# Patient Record
Sex: FEMALE | Race: BLACK OR AFRICAN AMERICAN | Employment: FULL TIME | URBAN - METROPOLITAN AREA
[De-identification: names, ages, dates, MRNs, and addresses within clinical notes are randomized per-mention and may not be internally consistent; named-entity substitution may affect disease eponyms.]

---

## 2019-08-01 ENCOUNTER — HOSPITAL ENCOUNTER (OUTPATIENT)
Age: 59
Discharge: HOME | End: 2019-08-03
Payer: MEDICAID

## 2019-08-01 ENCOUNTER — HOSPITAL ENCOUNTER (OUTPATIENT)
Dept: GENERAL RADIOLOGY | Age: 59
Discharge: HOME | End: 2019-08-03
Payer: MEDICAID

## 2019-08-01 DIAGNOSIS — M54.5 LOW BACK PAIN, UNSPECIFIED BACK PAIN LATERALITY, UNSPECIFIED CHRONICITY, WITH SCIATICA PRESENCE UNSPECIFIED: ICD-10-CM

## 2019-08-01 PROCEDURE — 72100 X-RAY EXAM L-S SPINE 2/3 VWS: CPT

## 2020-01-11 ENCOUNTER — APPOINTMENT (OUTPATIENT)
Dept: GENERAL RADIOLOGY | Age: 60
End: 2020-01-11
Payer: MEDICAID

## 2020-01-11 ENCOUNTER — HOSPITAL ENCOUNTER (EMERGENCY)
Age: 60
Discharge: HOME | End: 2020-01-11
Attending: EMERGENCY MEDICINE
Payer: MEDICAID

## 2020-01-11 VITALS
WEIGHT: 110 LBS | DIASTOLIC BLOOD PRESSURE: 74 MMHG | HEART RATE: 81 BPM | BODY MASS INDEX: 20.77 KG/M2 | RESPIRATION RATE: 19 BRPM | SYSTOLIC BLOOD PRESSURE: 124 MMHG | TEMPERATURE: 97.8 F | OXYGEN SATURATION: 99 % | HEIGHT: 61 IN

## 2020-01-11 LAB
ALBUMIN SERPL-MCNC: 3.6 G/DL (ref 3.5–5.2)
ALP BLD-CCNC: 87 U/L (ref 35–104)
ALT SERPL-CCNC: 48 U/L (ref 0–32)
ANION GAP SERPL CALCULATED.3IONS-SCNC: 11 MMOL/L (ref 7–16)
AST SERPL-CCNC: 90 U/L (ref 0–31)
BACTERIA: NORMAL /HPF
BASOPHILS ABSOLUTE: 0.05 E9/L (ref 0–0.2)
BASOPHILS RELATIVE PERCENT: 0.7 % (ref 0–2)
BILIRUB SERPL-MCNC: 0.9 MG/DL (ref 0–1.2)
BILIRUBIN DIRECT: 0.3 MG/DL (ref 0–0.3)
BILIRUBIN URINE: NEGATIVE
BILIRUBIN, INDIRECT: 0.6 MG/DL (ref 0–1)
BLOOD, URINE: NEGATIVE
BUN BLDV-MCNC: 10 MG/DL (ref 6–20)
CALCIUM SERPL-MCNC: 9.4 MG/DL (ref 8.6–10.2)
CHLORIDE BLD-SCNC: 81 MMOL/L (ref 98–107)
CLARITY: CLEAR
CO2: 29 MMOL/L (ref 22–29)
COLOR: YELLOW
CREAT SERPL-MCNC: 0.9 MG/DL (ref 0.5–1)
EOSINOPHILS ABSOLUTE: 0.08 E9/L (ref 0.05–0.5)
EOSINOPHILS RELATIVE PERCENT: 1.1 % (ref 0–6)
GFR AFRICAN AMERICAN: >60
GFR NON-AFRICAN AMERICAN: >60 ML/MIN/1.73
GLUCOSE BLD-MCNC: 107 MG/DL (ref 74–99)
GLUCOSE URINE: NEGATIVE MG/DL
HCT VFR BLD CALC: 35.9 % (ref 34–48)
HEMOGLOBIN: 12.8 G/DL (ref 11.5–15.5)
IMMATURE GRANULOCYTES #: 0.04 E9/L
IMMATURE GRANULOCYTES %: 0.6 % (ref 0–5)
KETONES, URINE: NEGATIVE MG/DL
LEUKOCYTE ESTERASE, URINE: ABNORMAL
LIPASE: 49 U/L (ref 13–60)
LYMPHOCYTES ABSOLUTE: 1.81 E9/L (ref 1.5–4)
LYMPHOCYTES RELATIVE PERCENT: 25.5 % (ref 20–42)
MAGNESIUM: 1.9 MG/DL (ref 1.6–2.6)
MCH RBC QN AUTO: 34 PG (ref 26–35)
MCHC RBC AUTO-ENTMCNC: 35.7 % (ref 32–34.5)
MCV RBC AUTO: 95.2 FL (ref 80–99.9)
MONOCYTES ABSOLUTE: 1.41 E9/L (ref 0.1–0.95)
MONOCYTES RELATIVE PERCENT: 19.9 % (ref 2–12)
NEUTROPHILS ABSOLUTE: 3.71 E9/L (ref 1.8–7.3)
NEUTROPHILS RELATIVE PERCENT: 52.2 % (ref 43–80)
NITRITE, URINE: NEGATIVE
PDW BLD-RTO: 15 FL (ref 11.5–15)
PH UA: 7 (ref 5–9)
PLATELET # BLD: 123 E9/L (ref 130–450)
PMV BLD AUTO: 10.2 FL (ref 7–12)
POTASSIUM REFLEX MAGNESIUM: 2.9 MMOL/L (ref 3.5–5)
PROTEIN UA: NEGATIVE MG/DL
RBC # BLD: 3.77 E12/L (ref 3.5–5.5)
RBC UA: NORMAL /HPF (ref 0–2)
SODIUM BLD-SCNC: 121 MMOL/L (ref 132–146)
SPECIFIC GRAVITY UA: <=1.005 (ref 1–1.03)
TOTAL PROTEIN: 7.5 G/DL (ref 6.4–8.3)
TROPONIN: <0.01 NG/ML (ref 0–0.03)
UROBILINOGEN, URINE: 0.2 E.U./DL
WBC # BLD: 7.1 E9/L (ref 4.5–11.5)
WBC UA: NORMAL /HPF (ref 0–5)

## 2020-01-11 PROCEDURE — 81001 URINALYSIS AUTO W/SCOPE: CPT

## 2020-01-11 PROCEDURE — 80076 HEPATIC FUNCTION PANEL: CPT

## 2020-01-11 PROCEDURE — 71045 X-RAY EXAM CHEST 1 VIEW: CPT

## 2020-01-11 PROCEDURE — 80048 BASIC METABOLIC PNL TOTAL CA: CPT

## 2020-01-11 PROCEDURE — 87088 URINE BACTERIA CULTURE: CPT

## 2020-01-11 PROCEDURE — 83690 ASSAY OF LIPASE: CPT

## 2020-01-11 PROCEDURE — 84484 ASSAY OF TROPONIN QUANT: CPT

## 2020-01-11 PROCEDURE — 83735 ASSAY OF MAGNESIUM: CPT

## 2020-01-11 PROCEDURE — 99284 EMERGENCY DEPT VISIT MOD MDM: CPT

## 2020-01-11 PROCEDURE — 96360 HYDRATION IV INFUSION INIT: CPT

## 2020-01-11 PROCEDURE — 85025 COMPLETE CBC W/AUTO DIFF WBC: CPT

## 2020-01-11 PROCEDURE — 2580000003 HC RX 258: Performed by: EMERGENCY MEDICINE

## 2020-01-11 PROCEDURE — 36415 COLL VENOUS BLD VENIPUNCTURE: CPT

## 2020-01-11 PROCEDURE — 6370000000 HC RX 637 (ALT 250 FOR IP): Performed by: EMERGENCY MEDICINE

## 2020-01-11 PROCEDURE — 93005 ELECTROCARDIOGRAM TRACING: CPT | Performed by: EMERGENCY MEDICINE

## 2020-01-11 RX ORDER — SODIUM CHLORIDE, SODIUM LACTATE, POTASSIUM CHLORIDE, CALCIUM CHLORIDE 600; 310; 30; 20 MG/100ML; MG/100ML; MG/100ML; MG/100ML
1000 INJECTION, SOLUTION INTRAVENOUS ONCE
Status: DISCONTINUED | OUTPATIENT
Start: 2020-01-11 | End: 2020-01-11

## 2020-01-11 RX ORDER — SODIUM CHLORIDE, SODIUM LACTATE, POTASSIUM CHLORIDE, CALCIUM CHLORIDE 600; 310; 30; 20 MG/100ML; MG/100ML; MG/100ML; MG/100ML
1000 INJECTION, SOLUTION INTRAVENOUS ONCE
Status: COMPLETED | OUTPATIENT
Start: 2020-01-11 | End: 2020-01-11

## 2020-01-11 RX ADMIN — POTASSIUM BICARBONATE 40 MEQ: 782 TABLET, EFFERVESCENT ORAL at 19:50

## 2020-01-11 RX ADMIN — SODIUM CHLORIDE, POTASSIUM CHLORIDE, SODIUM LACTATE AND CALCIUM CHLORIDE 1000 ML: 600; 310; 30; 20 INJECTION, SOLUTION INTRAVENOUS at 19:49

## 2020-01-11 RX ADMIN — SODIUM CHLORIDE, POTASSIUM CHLORIDE, SODIUM LACTATE AND CALCIUM CHLORIDE 1000 ML: 600; 310; 30; 20 INJECTION, SOLUTION INTRAVENOUS at 19:13

## 2020-01-11 NOTE — ED PROVIDER NOTES
HPI:  1/11/20, Time: 6:02 PM         Giovanna Henao is a 61 y.o. female presenting to the ED for dizziness and nausea/vomiting/diarrhea, beginning 3 days ago. The complaint has been intermittent, mild in severity, and worsened by nothing. Patient states that she has had 3 days of intermittent nausea with vomiting, ongoing diarrhea. Patient states this has happened in the past and her potassium has decreased and causes her to become lightheaded. She states that today she is feeling lightheaded, describes a feeling as well of being off balance when she walks. Patient denies having any falls. She denies any headache. No fevers, denies any abdominal pain. Review of Systems:   Pertinent positives and negatives are stated within HPI, all other systems reviewed and are negative.          --------------------------------------------- PAST HISTORY ---------------------------------------------  Past Medical History:  has a past medical history of Thyroid disease. Past Surgical History:  has no past surgical history on file. Social History:  reports that she has been smoking. She does not have any smokeless tobacco history on file. Family History: family history is not on file. The patients home medications have been reviewed.     Allergies: Morphine    -------------------------------------------------- RESULTS -------------------------------------------------  All laboratory and radiology results have been personally reviewed by myself   LABS:  Results for orders placed or performed during the hospital encounter of 01/11/20   CBC Auto Differential   Result Value Ref Range    WBC 7.1 4.5 - 11.5 E9/L    RBC 3.77 3.50 - 5.50 E12/L    Hemoglobin 12.8 11.5 - 15.5 g/dL    Hematocrit 35.9 34.0 - 48.0 %    MCV 95.2 80.0 - 99.9 fL    MCH 34.0 26.0 - 35.0 pg    MCHC 35.7 (H) 32.0 - 34.5 %    RDW 15.0 11.5 - 15.0 fL    Platelets 548 (L) 900 - 450 E9/L    MPV 10.2 7.0 - 12.0 fL    Neutrophils % 52.2 43.0 - 80.0 % Immature Granulocytes % 0.6 0.0 - 5.0 %    Lymphocytes % 25.5 20.0 - 42.0 %    Monocytes % 19.9 (H) 2.0 - 12.0 %    Eosinophils % 1.1 0.0 - 6.0 %    Basophils % 0.7 0.0 - 2.0 %    Neutrophils Absolute 3.71 1.80 - 7.30 E9/L    Immature Granulocytes # 0.04 E9/L    Lymphocytes Absolute 1.81 1.50 - 4.00 E9/L    Monocytes Absolute 1.41 (H) 0.10 - 0.95 E9/L    Eosinophils Absolute 0.08 0.05 - 0.50 E9/L    Basophils Absolute 0.05 0.00 - 0.20 N4/Z   Basic Metabolic Panel w/ Reflex to MG   Result Value Ref Range    Sodium 121 (L) 132 - 146 mmol/L    Potassium reflex Magnesium 2.9 (L) 3.5 - 5.0 mmol/L    Chloride 81 (L) 98 - 107 mmol/L    CO2 29 22 - 29 mmol/L    Anion Gap 11 7 - 16 mmol/L    Glucose 107 (H) 74 - 99 mg/dL    BUN 10 6 - 20 mg/dL    CREATININE 0.9 0.5 - 1.0 mg/dL    GFR Non-African American >60 >=60 mL/min/1.73    GFR African American >60     Calcium 9.4 8.6 - 10.2 mg/dL   Troponin   Result Value Ref Range    Troponin <0.01 0.00 - 0.03 ng/mL   Lipase   Result Value Ref Range    Lipase 49 13 - 60 U/L   Hepatic Function Panel   Result Value Ref Range    Total Protein 7.5 6.4 - 8.3 g/dL    Alb 3.6 3.5 - 5.2 g/dL    Alkaline Phosphatase 87 35 - 104 U/L    ALT 48 (H) 0 - 32 U/L    AST 90 (H) 0 - 31 U/L    Total Bilirubin 0.9 0.0 - 1.2 mg/dL    Bilirubin, Direct 0.3 0.0 - 0.3 mg/dL    Bilirubin, Indirect 0.6 0.0 - 1.0 mg/dL   Urinalysis, reflex to microscopic   Result Value Ref Range    Color, UA Yellow Straw/Yellow    Clarity, UA Clear Clear    Glucose, Ur Negative Negative mg/dL    Bilirubin Urine Negative Negative    Ketones, Urine Negative Negative mg/dL    Specific Gravity, UA <=1.005 1.005 - 1.030    Blood, Urine Negative Negative    pH, UA 7.0 5.0 - 9.0    Protein, UA Negative Negative mg/dL    Urobilinogen, Urine 0.2 <2.0 E.U./dL    Nitrite, Urine Negative Negative    Leukocyte Esterase, Urine TRACE (A) Negative   Magnesium   Result Value Ref Range    Magnesium 1.9 1.6 - 2.6 mg/dL   Microscopic Urinalysis Result Value Ref Range    WBC, UA 1-3 0 - 5 /HPF    RBC, UA NONE 0 - 2 /HPF    Bacteria, UA NONE /HPF   EKG 12 Lead   Result Value Ref Range    Ventricular Rate 94 BPM    Atrial Rate 94 BPM    P-R Interval 140 ms    QRS Duration 78 ms    Q-T Interval 394 ms    QTc Calculation (Bazett) 492 ms    P Axis 75 degrees    R Axis 84 degrees    T Axis 74 degrees       RADIOLOGY:  Interpreted by Radiologist.  XR CHEST PORTABLE   Final Result   No acute cardiopulmonary process. EKG:  Per my interpretation sinus rhythm 94, normal axis, normal intervals, no ST-T changes. No findings suggestive of acute ischemia or injury      ------------------------- NURSING NOTES AND VITALS REVIEWED ---------------------------   The nursing notes within the ED encounter and vital signs as below have been reviewed. /74   Pulse 81   Temp 97.8 °F (36.6 °C)   Resp 19   Ht 5' 1\" (1.549 m)   Wt 110 lb (49.9 kg)   SpO2 99%   BMI 20.78 kg/m²   Oxygen Saturation Interpretation: Normal      ---------------------------------------------------PHYSICAL EXAM--------------------------------------      Constitutional/General: Alert and oriented x3, well appearing, non toxic in NAD  Head: Normocephalic and atraumatic  Eyes: PERRL, EOMI  Mouth: Oropharynx clear, handling secretions, no trismus  Neck: Supple, full ROM,   Pulmonary: Lungs clear to auscultation bilaterally, no wheezes, rales, or rhonchi. Not in respiratory distress  Cardiovascular:  Regular rate and rhythm, no murmurs, gallops, or rubs. 2+ distal pulses  Abdomen: Soft, non tender, non distended,   Extremities: Moves all extremities x 4.  Warm and well perfused  Skin: warm and dry without rash  Neurologic: GCS 15, no focal deficits  Psych: Normal Affect      ------------------------------ ED COURSE/MEDICAL DECISION MAKING----------------------  Medications   lactated ringers infusion 1,000 mL (0 mLs Intravenous Stopped 1/11/20 2035)   potassium bicarb-citric acid (EFFER-K) effervescent tablet 40 mEq (40 mEq Oral Given 20)         ED COURSE:       Medical Decision Makin-year-old female presenting with lightheadedness as well as nausea/vomiting/diarrhea. She has had low potassium in the past with volume losses causing the same symptoms. Concern for the same. She is hemodynamically stable and otherwise well-appearing, neurologically intact. EKG shows no signs of ischemia or injury. Metabolic panel does show a slight decrease in potassium at 2.9. No leukocytosis or anemia. Patient was given oral potassium as well as lactated Ringer's. Reevaluation has patient feeling significantly better. We ambulated through the department with no difficulties. Patient is comfortable with discharge home. She states that the diarrhea has decreased significantly today, likely that she will retain her potassium. But has agreed to follow-up with PCP, instruction to return for any changes in condition or new symptoms. Counseling: The emergency provider has spoken with the patient and discussed todays results, in addition to providing specific details for the plan of care and counseling regarding the diagnosis and prognosis. Questions are answered at this time and they are agreeable with the plan.      --------------------------------- IMPRESSION AND DISPOSITION ---------------------------------    IMPRESSION  1. Near syncope    2. Diarrhea, unspecified type    3. Hypokalemia        DISPOSITION  Disposition: Discharge to home  Patient condition is stable      NOTE: This report was transcribed using voice recognition software.  Every effort was made to ensure accuracy; however, inadvertent computerized transcription errors may be present        Ria Lo DO  [phone]

## 2020-01-13 LAB
EKG ATRIAL RATE: 94 BPM
EKG P AXIS: 75 DEGREES
EKG P-R INTERVAL: 140 MS
EKG Q-T INTERVAL: 394 MS
EKG QRS DURATION: 78 MS
EKG QTC CALCULATION (BAZETT): 492 MS
EKG R AXIS: 84 DEGREES
EKG T AXIS: 74 DEGREES
EKG VENTRICULAR RATE: 94 BPM
URINE CULTURE, ROUTINE: NORMAL

## 2020-01-13 PROCEDURE — 93010 ELECTROCARDIOGRAM REPORT: CPT | Performed by: INTERNAL MEDICINE

## 2020-09-11 ENCOUNTER — OFFICE VISIT (OUTPATIENT)
Dept: FAMILY MEDICINE CLINIC | Age: 60
End: 2020-09-11
Payer: MEDICAID

## 2020-09-11 VITALS
OXYGEN SATURATION: 94 % | HEIGHT: 61 IN | DIASTOLIC BLOOD PRESSURE: 82 MMHG | SYSTOLIC BLOOD PRESSURE: 123 MMHG | BODY MASS INDEX: 19.45 KG/M2 | HEART RATE: 97 BPM | TEMPERATURE: 98.5 F | WEIGHT: 103 LBS

## 2020-09-11 PROCEDURE — 99213 OFFICE O/P EST LOW 20 MIN: CPT | Performed by: STUDENT IN AN ORGANIZED HEALTH CARE EDUCATION/TRAINING PROGRAM

## 2020-09-11 PROCEDURE — G8427 DOCREV CUR MEDS BY ELIG CLIN: HCPCS | Performed by: STUDENT IN AN ORGANIZED HEALTH CARE EDUCATION/TRAINING PROGRAM

## 2020-09-11 PROCEDURE — 4004F PT TOBACCO SCREEN RCVD TLK: CPT | Performed by: STUDENT IN AN ORGANIZED HEALTH CARE EDUCATION/TRAINING PROGRAM

## 2020-09-11 PROCEDURE — 3017F COLORECTAL CA SCREEN DOC REV: CPT | Performed by: STUDENT IN AN ORGANIZED HEALTH CARE EDUCATION/TRAINING PROGRAM

## 2020-09-11 PROCEDURE — G8420 CALC BMI NORM PARAMETERS: HCPCS | Performed by: STUDENT IN AN ORGANIZED HEALTH CARE EDUCATION/TRAINING PROGRAM

## 2020-09-11 PROCEDURE — 99212 OFFICE O/P EST SF 10 MIN: CPT | Performed by: STUDENT IN AN ORGANIZED HEALTH CARE EDUCATION/TRAINING PROGRAM

## 2020-09-11 ASSESSMENT — PATIENT HEALTH QUESTIONNAIRE - PHQ9
SUM OF ALL RESPONSES TO PHQ9 QUESTIONS 1 & 2: 0
1. LITTLE INTEREST OR PLEASURE IN DOING THINGS: 0
SUM OF ALL RESPONSES TO PHQ QUESTIONS 1-9: 0
2. FEELING DOWN, DEPRESSED OR HOPELESS: 0
SUM OF ALL RESPONSES TO PHQ QUESTIONS 1-9: 0

## 2020-09-11 NOTE — PROGRESS NOTES
[facility]  FAMILY MEDICINE RESIDENCY PROGRAM  DATE OF VISIT : 2020    Patient : Palak Escudero   Age : 61 y.o.  : 1960   MRN : [id number]   ______________________________________________________________________    Chief Complaint :   Chief Complaint   Patient presents with    Leg Pain     Right leg    COPD     and asthma       HPI : Palak Escudero is 61 y.o. female who presented to the clinic today for R leg pain and for refill of albuterol for asthma/copd. Leg pain-Patient reports R anterior thigh pain that has been occurring a few times per week. She has had in in the past and it usually resolves on its own, but it is lasting longer this time. Patient also gets cramping in her calf muscles at night. She denies tenderness or swelling. She has a history of hypokalemia. She is not taking any medication for pain relief. Asthma/COPD-Patient reports using rescue inhaler about once per week for wheezing, as well as nasal spray. She denies chest pain, chest tightness, and SOB. Patient also has hyperthyroidism, for which she takes methimazole. She does not take it every day. She reports some weight loss and loss of appetite. She denies night sweats. She also denies depression and anxiety. Patient also takes omeprazole as needed for occasional reflux with specific foods. She is interested in an appetite stimulant. She is also due for some lab work. Past Medical History :  Past Medical History:   Diagnosis Date    Thyroid disease      No past surgical history on file. Allergies : Allergies   Allergen Reactions    Morphine        Medication List :    Current Outpatient Medications   Medication Sig Dispense Refill    albuterol sulfate HFA (VENTOLIN HFA) 108 (90 Base) MCG/ACT inhaler Inhale 2 puffs into the lungs 4 times daily as needed for Wheezing 3 Inhaler 1     No current facility-administered medications for this visit.          Review of Systems :  Review of Systems   Constitutional: Positive for appetite change (decreased appetite) and unexpected weight change (weight loss). Negative for diaphoresis. Respiratory: Positive for wheezing (occasional). Negative for chest tightness and shortness of breath. Cardiovascular: Negative for chest pain. Gastrointestinal:        Denies reflux   Psychiatric/Behavioral: Negative for dysphoric mood. The patient is not nervous/anxious. ______________________________________________________________________    Physical Exam :    Vitals: /82 (Site: Left Upper Arm, Position: Sitting, Cuff Size: Medium Adult)   Pulse 97   Temp 98.5 °F (36.9 °C) (Oral)   Ht 5' 1\" (1.549 m)   Wt 103 lb (46.7 kg)   SpO2 94%   BMI 19.46 kg/m²   General Appearance: Well developed, awake, alert, oriented, and in NAD  HEENT: NCAT, MMM, no pallor or icterus. Neck: Symmetrical, trachea midline. Chest wall/Lung: CTAB, respirations unlabored. No ronchi/wheezing/rales   Heart: RRR, normal S1 and S2, no murmurs, rubs or gallops. Abdomen: SNTND  Extremities: Extremities normal, atraumatic, no cyanosis, clubbing or edema. Skin: Skin color, texture, turgor normal, no rashes or lesions  Musculokeletal: ROM grossly normal in all joints of extremities, no obvious joint swelling. No tenderness to palpation of R thigh  Neurologic: Alert&Oriented x3. No focal motor deficits detected. Psychiatric: Normal mood. Normal affect. Normal behavior. ______________________________________________________________________    Assessment & Plan :    1. Mild intermittent asthma without complication  · Refill:  - albuterol sulfate HFA (VENTOLIN HFA) 108 (90 Base) MCG/ACT inhaler; Inhale 2 puffs into the lungs 4 times daily as needed for Wheezing  Dispense: 3 Inhaler;  Refill: 1    Patient left prior to discussing plan    Additional plan and future considerations:     -consider remeron as appetite stimulant  -counseled patient on taking methimazole daily  -patient due for labs, mammogram, and other care gaps    Return to Office: No follow-ups on file.     Regla Seymour, DO   Case discussed with Dr. Lauren Gupta

## 2020-09-11 NOTE — PROGRESS NOTES
S: 61 y.o. female here for establish care and R anterolateral thigh pain. Happens 3 times per week. Resolved on its own in the past. Can get severe. No redness/swelling. Not taking any med for this. Gets muscle cramps occasionally. Hypokalemia 2.9 in January 2/2 n/v/d.   pmh gerd, copd, asthma, hyperthyroidism. Takes albuterol once per wk. Omeprazole prn for gerd  Methimazole 5 mg but inconsistently   Wants to quit smoking, has patch at home. Weight loss. No f/c/abd pain or night sweats. Decreased appetite. O: VS: /82 (Site: Left Upper Arm, Position: Sitting, Cuff Size: Medium Adult)   Pulse 97   Temp 98.5 °F (36.9 °C) (Oral)   Ht 5' 1\" (1.549 m)   Wt 103 lb (46.7 kg)   SpO2 94%   BMI 19.46 kg/m²    General: NAD, alert and interacting appropriately. CV:  RRR, no gallops, rubs, or murmurs    Resp: CTAB   Abd:  Soft, nontender   Ext:  No edema. No ttp thigh. Impression: R anterior thigh pain 2/2 muscle spasm/cramp 2/2 dehydration vs radiculopathy vs OA of knee or hip. gerd, copd, asthma, hyperthyroidism. Plan:   Increase hydration. CTM leg pain  Refill albuterol  cscope utd. Needs mammo  S/p hysterectomy    Attending Physician Statement  I have discussed the case, including pertinent history and exam findings with the resident. I agree with the documented assessment and plan.

## 2020-09-13 RX ORDER — ALBUTEROL SULFATE 90 UG/1
2 AEROSOL, METERED RESPIRATORY (INHALATION) 4 TIMES DAILY PRN
Qty: 3 INHALER | Refills: 1 | Status: SHIPPED
Start: 2020-09-13 | End: 2021-03-04 | Stop reason: SDUPTHER

## 2020-09-14 ENCOUNTER — HOSPITAL ENCOUNTER (OUTPATIENT)
Age: 60
Discharge: HOME | End: 2020-09-16
Payer: MEDICAID

## 2020-09-14 ENCOUNTER — NURSE ONLY (OUTPATIENT)
Dept: FAMILY MEDICINE CLINIC | Age: 60
End: 2020-09-14
Payer: MEDICAID

## 2020-09-14 LAB
BASOPHILS ABSOLUTE: 0.04 E9/L (ref 0–0.2)
BASOPHILS RELATIVE PERCENT: 1 % (ref 0–2)
EOSINOPHILS ABSOLUTE: 0.07 E9/L (ref 0.05–0.5)
EOSINOPHILS RELATIVE PERCENT: 1.8 % (ref 0–6)
HCT VFR BLD CALC: 42.8 % (ref 34–48)
HEMOGLOBIN: 15 G/DL (ref 11.5–15.5)
IMMATURE GRANULOCYTES #: 0.01 E9/L
IMMATURE GRANULOCYTES %: 0.3 % (ref 0–5)
LYMPHOCYTES ABSOLUTE: 1.21 E9/L (ref 1.5–4)
LYMPHOCYTES RELATIVE PERCENT: 30.6 % (ref 20–42)
MCH RBC QN AUTO: 34.7 PG (ref 26–35)
MCHC RBC AUTO-ENTMCNC: 35 % (ref 32–34.5)
MCV RBC AUTO: 99.1 FL (ref 80–99.9)
MONOCYTES ABSOLUTE: 0.65 E9/L (ref 0.1–0.95)
MONOCYTES RELATIVE PERCENT: 16.4 % (ref 2–12)
NEUTROPHILS ABSOLUTE: 1.98 E9/L (ref 1.8–7.3)
NEUTROPHILS RELATIVE PERCENT: 49.9 % (ref 43–80)
PDW BLD-RTO: 16.8 FL (ref 11.5–15)
PLATELET # BLD: 196 E9/L (ref 130–450)
PMV BLD AUTO: 10.6 FL (ref 7–12)
RBC # BLD: 4.32 E12/L (ref 3.5–5.5)
WBC # BLD: 4 E9/L (ref 4.5–11.5)

## 2020-09-14 PROCEDURE — 80053 COMPREHEN METABOLIC PANEL: CPT

## 2020-09-14 PROCEDURE — 85025 COMPLETE CBC W/AUTO DIFF WBC: CPT

## 2020-09-14 PROCEDURE — 36415 COLL VENOUS BLD VENIPUNCTURE: CPT

## 2020-09-15 LAB
ALBUMIN SERPL-MCNC: 4.2 G/DL (ref 3.5–5.2)
ALP BLD-CCNC: 94 U/L (ref 35–104)
ALT SERPL-CCNC: 22 U/L (ref 0–32)
ANION GAP SERPL CALCULATED.3IONS-SCNC: 17 MMOL/L (ref 7–16)
AST SERPL-CCNC: 68 U/L (ref 0–31)
BILIRUB SERPL-MCNC: 0.6 MG/DL (ref 0–1.2)
BUN BLDV-MCNC: 4 MG/DL (ref 8–23)
CALCIUM SERPL-MCNC: 9.7 MG/DL (ref 8.6–10.2)
CHLORIDE BLD-SCNC: 97 MMOL/L (ref 98–107)
CO2: 21 MMOL/L (ref 22–29)
CREAT SERPL-MCNC: 0.8 MG/DL (ref 0.5–1)
GFR AFRICAN AMERICAN: >60
GFR NON-AFRICAN AMERICAN: >60 ML/MIN/1.73
GLUCOSE BLD-MCNC: 94 MG/DL (ref 74–99)
POTASSIUM SERPL-SCNC: 4.9 MMOL/L (ref 3.5–5)
SODIUM BLD-SCNC: 135 MMOL/L (ref 132–146)
TOTAL PROTEIN: 8 G/DL (ref 6.4–8.3)

## 2020-09-15 ASSESSMENT — ENCOUNTER SYMPTOMS
CHEST TIGHTNESS: 0
SHORTNESS OF BREATH: 0
WHEEZING: 1

## 2020-10-24 ENCOUNTER — HOSPITAL ENCOUNTER (OUTPATIENT)
Dept: GENERAL RADIOLOGY | Age: 60
Discharge: HOME | End: 2020-10-26
Payer: MEDICAID

## 2020-10-24 PROCEDURE — 77063 BREAST TOMOSYNTHESIS BI: CPT

## 2020-11-04 ENCOUNTER — TELEPHONE (OUTPATIENT)
Dept: ONCOLOGY | Age: 60
End: 2020-11-04

## 2020-11-04 NOTE — TELEPHONE ENCOUNTER
Call to patient in reference to her mammogram performed at [facility] on October 24, 2020. Instructed patient that the radiologist has recommended some additional breast imaging, in order to make a final determination/result. Verbalizes understanding and is agreeable to proceed. Appointment provided.        Edgar Ramirez, RN, BSN, [address]

## 2020-11-12 ENCOUNTER — HOSPITAL ENCOUNTER (OUTPATIENT)
Dept: GENERAL RADIOLOGY | Age: 60
Discharge: HOME | End: 2020-11-14
Payer: MEDICAID

## 2020-11-12 PROCEDURE — 76642 ULTRASOUND BREAST LIMITED: CPT

## 2020-11-12 PROCEDURE — G0279 TOMOSYNTHESIS, MAMMO: HCPCS

## 2021-03-04 ENCOUNTER — OFFICE VISIT (OUTPATIENT)
Dept: FAMILY MEDICINE CLINIC | Age: 61
End: 2021-03-04
Payer: MEDICAID

## 2021-03-04 VITALS
HEART RATE: 92 BPM | OXYGEN SATURATION: 95 % | TEMPERATURE: 97.6 F | WEIGHT: 111 LBS | HEIGHT: 62 IN | BODY MASS INDEX: 20.43 KG/M2 | SYSTOLIC BLOOD PRESSURE: 125 MMHG | DIASTOLIC BLOOD PRESSURE: 82 MMHG

## 2021-03-04 DIAGNOSIS — J45.20 MILD INTERMITTENT ASTHMA WITHOUT COMPLICATION: ICD-10-CM

## 2021-03-04 DIAGNOSIS — E05.90 HYPERTHYROIDISM: Primary | ICD-10-CM

## 2021-03-04 DIAGNOSIS — Z00.00 HEALTH CARE MAINTENANCE: ICD-10-CM

## 2021-03-04 DIAGNOSIS — M62.838 MUSCLE SPASMS OF BOTH LOWER EXTREMITIES: ICD-10-CM

## 2021-03-04 DIAGNOSIS — J44.9 CHRONIC OBSTRUCTIVE PULMONARY DISEASE, UNSPECIFIED COPD TYPE (HCC): ICD-10-CM

## 2021-03-04 PROCEDURE — 99212 OFFICE O/P EST SF 10 MIN: CPT | Performed by: STUDENT IN AN ORGANIZED HEALTH CARE EDUCATION/TRAINING PROGRAM

## 2021-03-04 PROCEDURE — G8926 SPIRO NO PERF OR DOC: HCPCS | Performed by: STUDENT IN AN ORGANIZED HEALTH CARE EDUCATION/TRAINING PROGRAM

## 2021-03-04 PROCEDURE — G8484 FLU IMMUNIZE NO ADMIN: HCPCS | Performed by: STUDENT IN AN ORGANIZED HEALTH CARE EDUCATION/TRAINING PROGRAM

## 2021-03-04 PROCEDURE — 99213 OFFICE O/P EST LOW 20 MIN: CPT | Performed by: STUDENT IN AN ORGANIZED HEALTH CARE EDUCATION/TRAINING PROGRAM

## 2021-03-04 PROCEDURE — 3017F COLORECTAL CA SCREEN DOC REV: CPT | Performed by: STUDENT IN AN ORGANIZED HEALTH CARE EDUCATION/TRAINING PROGRAM

## 2021-03-04 PROCEDURE — 4004F PT TOBACCO SCREEN RCVD TLK: CPT | Performed by: STUDENT IN AN ORGANIZED HEALTH CARE EDUCATION/TRAINING PROGRAM

## 2021-03-04 PROCEDURE — G8427 DOCREV CUR MEDS BY ELIG CLIN: HCPCS | Performed by: STUDENT IN AN ORGANIZED HEALTH CARE EDUCATION/TRAINING PROGRAM

## 2021-03-04 PROCEDURE — 3023F SPIROM DOC REV: CPT | Performed by: STUDENT IN AN ORGANIZED HEALTH CARE EDUCATION/TRAINING PROGRAM

## 2021-03-04 PROCEDURE — G8420 CALC BMI NORM PARAMETERS: HCPCS | Performed by: STUDENT IN AN ORGANIZED HEALTH CARE EDUCATION/TRAINING PROGRAM

## 2021-03-04 RX ORDER — ALBUTEROL SULFATE 90 UG/1
2 AEROSOL, METERED RESPIRATORY (INHALATION) 4 TIMES DAILY PRN
Qty: 3 INHALER | Refills: 1 | Status: SHIPPED
Start: 2021-03-04 | End: 2021-12-23 | Stop reason: SDUPTHER

## 2021-03-04 RX ORDER — CYCLOBENZAPRINE HCL 5 MG
5 TABLET ORAL NIGHTLY PRN
Qty: 30 TABLET | Refills: 1 | Status: SHIPPED
Start: 2021-03-04 | End: 2021-06-23 | Stop reason: SDUPTHER

## 2021-03-04 RX ORDER — TIOTROPIUM BROMIDE INHALATION SPRAY 1.56 UG/1
2 SPRAY, METERED RESPIRATORY (INHALATION) DAILY
Qty: 1 INHALER | Refills: 3 | Status: SHIPPED
Start: 2021-03-04 | End: 2021-08-11

## 2021-03-04 ASSESSMENT — PATIENT HEALTH QUESTIONNAIRE - PHQ9
SUM OF ALL RESPONSES TO PHQ QUESTIONS 1-9: 0
2. FEELING DOWN, DEPRESSED OR HOPELESS: 0
SUM OF ALL RESPONSES TO PHQ9 QUESTIONS 1 & 2: 0

## 2021-03-04 ASSESSMENT — ENCOUNTER SYMPTOMS
WHEEZING: 1
SHORTNESS OF BREATH: 1
COUGH: 0

## 2021-03-04 NOTE — PROGRESS NOTES
[facility]  FAMILY MEDICINE RESIDENCY PROGRAM  DATE OF VISIT : 3/7/2021    Patient : Martha Landeros   Age : 61 y.o.  : 1960   MRN : [id number]   ______________________________________________________________________    Chief Complaint :   Chief Complaint   Patient presents with    COPD     follow up    Back Pain     for the past seven months    Hyperthyroidism     follow up       HPI : Martha Landeros is 61 y.o. female who presented to the clinic today for follow up of COPD and hyperthyroidism, as well as for low back pain with leg pain. Patient reports her mask is bothering her, and she has been using her albuterol inhaler 3-4 times per day for the past 4 months. She denies SOB. Currently smokes 1/2 ppd, thinking about quitting but declines medications or nicotine replacement at this time. Patient is on methimazole 5 mg/day. She denies heat/cold intolerance, diarrhea, constipation. She also reports low back pain with leg pain, and reports the pain in her legs is more cramping in nature. This has been present for around 7 months. Patient reports she has had hallucinations when taking ibuprofen. Past Medical History :  Past Medical History:   Diagnosis Date    Thyroid disease      No past surgical history on file. Allergies : Allergies   Allergen Reactions    Ibuprofen Other (See Comments)     Hallucinations    Morphine        Medication List :    Current Outpatient Medications   Medication Sig Dispense Refill    albuterol sulfate HFA (VENTOLIN HFA) 108 (90 Base) MCG/ACT inhaler Inhale 2 puffs into the lungs 4 times daily as needed for Wheezing 3 Inhaler 1    tiotropium (SPIRIVA RESPIMAT) 1.25 MCG/ACT AERS inhaler Inhale 2 puffs into the lungs daily 1 Inhaler 3    cyclobenzaprine (FLEXERIL) 5 MG tablet Take 1 tablet by mouth nightly as needed for Muscle spasms 30 tablet 1     No current facility-administered medications for this visit.          Review of Systems :  Review of Systems   Respiratory: Positive for shortness of breath and wheezing (occasional). Negative for cough. Cardiovascular: Negative for chest pain. Gastrointestinal: Negative for constipation and diarrhea. Endocrine: Negative for cold intolerance and heat intolerance. Musculoskeletal: Positive for back pain. Reports leg cramps     ______________________________________________________________________    Physical Exam :    Vitals: /82 (Site: Left Upper Arm, Position: Sitting, Cuff Size: Medium Adult)   Pulse 92   Temp 97.6 °F (36.4 °C) (Temporal)   Ht 5' 2\" (1.575 m)   Wt 111 lb (50.3 kg)   SpO2 95%   BMI 20.30 kg/m²   General Appearance: Well developed, awake, alert, oriented, and in NAD  HEENT: NCAT, MMM, no pallor or icterus. Neck: Supple, symmetrical, trachea midline. No carotid bruits. No cervical lymphadenopathy  Chest wall/Lung: CTAB, respirations unlabored. No ronchi/wheezing/rales   Heart: RRR, normal S1 and S2, no murmurs, rubs or gallops. Abdomen: SNTND  Extremities: Extremities normal, atraumatic, no cyanosis, clubbing or edema. Skin: Skin color, texture, turgor normal, no rashes or lesions  Musculokeletal: ROM grossly normal in all joints of extremities, no obvious joint swelling. Neurologic: Alert&Oriented x3. No focal motor deficits detected. Psychiatric: Normal mood. Normal affect. Normal behavior. ______________________________________________________________________    Assessment & Plan :    1. Mild intermittent asthma without complication/Chronic obstructive pulmonary disease, unspecified COPD type ([address]  · Refill:  - albuterol sulfate HFA (VENTOLIN HFA) 108 (90 Base) MCG/ACT inhaler; Inhale 2 puffs into the lungs 4 times daily as needed for Wheezing  Dispense: 3 Inhaler; Refill: 1  · Start:  - tiotropium (SPIRIVA RESPIMAT) 1.25 MCG/ACT AERS inhaler; Inhale 2 puffs into the lungs daily  Dispense: 1 Inhaler; Refill: 3    2. Hyperthyroidism  - TSH; Future    3. Health care maintenance  - LIPID PANEL; Future  - BASIC METABOLIC PANEL; Future    4. Muscle spasms of both lower extremities  · Start:  - cyclobenzaprine (FLEXERIL) 5 MG tablet; Take 1 tablet by mouth nightly as needed for Muscle spasms  Dispense: 30 tablet; Refill: 1      Additional plan and future considerations:       Return to Office: Return in about 4 weeks (around 4/1/2021) for copd, muscle spasms.     Albaro Pedro DO   Case discussed with Dr. Griselda Ramos

## 2021-03-04 NOTE — PROGRESS NOTES
S: Ember Freeman 61 y.o. female  here for F/U COPD, hyperthyroidism  COPD: albuterol PRN. Masks for COVID bothering her breathing with increased albuterol use of 3-4 times/day x 4 months. Denies current SOB  Current smokes 1/2 ppd in pre contemplative cessation phase  Hyperthyoiridsm: Methimazole 5 mg/day; longstanding diagnosis. Currently denies S/S of thyroid dysfunction  ROS: low back pain with leg pain. Described as cramping. O: VS: /82 (Site: Left Upper Arm, Position: Sitting, Cuff Size: Medium Adult)   Pulse 92   Temp 97.6 °F (36.4 °C) (Temporal)   Ht 5' 2\" (1.575 m)   Wt 111 lb (50.3 kg)   SpO2 95%   BMI 20.30 kg/m²    General: NAD              Neck: thyroid exam unremarkable; no LA or carotid bruit   CV:  RRR, no gallops, rubs, or murmurs   Resp: CTAB no R/R/W   Abd:  Soft, nontender, no masses    Ext:  no C/C/E    Assessment / Plan:      Cresencio Oliva was seen today for copd, back pain and hyperthyroidism. Diagnoses and all orders for this visit:    1. Mild intermittent asthma without complication/Chronic obstructive pulmonary disease, unspecified COPD type (HCC)  · Refill:  - albuterol sulfate HFA (VENTOLIN HFA) 108 (90 Base) MCG/ACT inhaler; Inhale 2 puffs into the lungs 4 times daily as needed for Wheezing  Dispense: 3 Inhaler; Refill: 1  · Start:  - tiotropium (SPIRIVA RESPIMAT) 1.25 MCG/ACT AERS inhaler; Inhale 2 puffs into the lungs daily  Dispense: 1 Inhaler; Refill: 3     2. Hyperthyroidism  - TSH; Future     3. Health care maintenance  - LIPID PANEL; Future  - BASIC METABOLIC PANEL; Future     4. Muscle spasms of both lower extremities  · Start:  - cyclobenzaprine (FLEXERIL) 5 MG tablet; Take 1 tablet by mouth nightly as needed for Muscle spasms  Dispense: 30 tablet; Refill: 1        Additional plan and future considerations:        Return to Office: Return in about 4 weeks (around 4/1/2021) for copd, muscle spasms. COPD: known diagnosis.   Only has rescue inhaler with increasing use due to symptoms. Current smoker; not ready to quit. Initiate LAMA (Spiriva). Refill Albuterol  Hyperthyroidism: due for lab work  Leg Cramps: check lytes, BUN, Creatinine, GFR. Therapy based on lab outcome       Return in about 4 weeks (around 4/1/2021) for copd, muscle spasms. F/U 4 weeks to assess response of symptom to new medication    Attending Physician Statement  I have discussed the case, including pertinent history and exam findings with the resident. I agree with the documented assessment and plan.          Huyen Costello MD

## 2021-03-04 NOTE — PATIENT INSTRUCTIONS
Patient Education        Back Stretches: Exercises  Introduction  Here are some examples of exercises for stretching your back. Start each exercise slowly. Ease off the exercise if you start to have pain. Your doctor or physical therapist will tell you when you can start these exercises and which ones will work best for you. How to do the exercises  Overhead stretch   1. Stand comfortably with your feet shoulder-width apart. 2. Looking straight ahead, raise both arms over your head and reach toward the ceiling. Do not allow your head to tilt back. 3. Hold for 15 to 30 seconds, then lower your arms to your sides. 4. Repeat 2 to 4 times. Side stretch   1. Stand comfortably with your feet shoulder-width apart. 2. Raise one arm over your head, and then lean to the other side. 3. Slide your hand down your leg as you let the weight of your arm gently stretch your side muscles. Hold for 15 to 30 seconds. 4. Repeat 2 to 4 times on each side. Press-up   1. Lie on your stomach, supporting your body with your forearms. 2. Press your elbows down into the floor to raise your upper back. As you do this, relax your stomach muscles and allow your back to arch without using your back muscles. As your press up, do not let your hips or pelvis come off the floor. 3. Hold for 15 to 30 seconds, then relax. 4. Repeat 2 to 4 times. Relax and rest   1. Lie on your back with a rolled towel under your neck and a pillow under your knees. Extend your arms comfortably to your sides. 2. Relax and breathe normally. 3. Remain in this position for about 10 minutes. 4. If you can, do this 2 or 3 times each day. Follow-up care is a key part of your treatment and safety. Be sure to make and go to all appointments, and call your doctor if you are having problems. It's also a good idea to know your test results and keep a list of the medicines you take. Where can you learn more? Go to https://agustín.[employer]. org and sign in to your [employer] account. Enter B000 in the [employer] box to learn more about \"Back Stretches: Exercises. \"     If you do not have an account, please click on the \"Sign Up Now\" link. Current as of: March 2, 2020               Content Version: 12.6  © 3063-8899 [email or web address]. Care instructions adapted under license by [facility] [place] ([facility]). If you have questions about a medical condition or this instruction, always ask your healthcare professional. [address] any warranty or liability for your use of this information.

## 2021-03-07 ASSESSMENT — ENCOUNTER SYMPTOMS
BACK PAIN: 1
CONSTIPATION: 0
DIARRHEA: 0

## 2021-03-12 ENCOUNTER — IMMUNIZATION (OUTPATIENT)
Dept: PRIMARY CARE CLINIC | Age: 61
End: 2021-03-12
Payer: MEDICAID

## 2021-03-12 PROCEDURE — 91300 COVID-19, PFIZER VACCINE 30MCG/0.3ML DOSE: CPT | Performed by: PHYSICIAN ASSISTANT

## 2021-03-12 PROCEDURE — 0001A PR IMM ADMN SARSCOV2 30MCG/0.3ML DIL RECON 1ST DOSE: CPT | Performed by: PHYSICIAN ASSISTANT

## 2021-04-05 ENCOUNTER — IMMUNIZATION (OUTPATIENT)
Dept: PRIMARY CARE CLINIC | Age: 61
End: 2021-04-05
Payer: MEDICAID

## 2021-04-05 PROCEDURE — 0002A COVID-19, PFIZER VACCINE 30MCG/0.3ML DOSE: CPT | Performed by: NURSE PRACTITIONER

## 2021-04-05 PROCEDURE — 91300 COVID-19, PFIZER VACCINE 30MCG/0.3ML DOSE: CPT | Performed by: NURSE PRACTITIONER

## 2021-06-22 DIAGNOSIS — M62.838 MUSCLE SPASMS OF BOTH LOWER EXTREMITIES: ICD-10-CM

## 2021-06-22 RX ORDER — CYCLOBENZAPRINE HCL 5 MG
1 TABLET ORAL
COMMUNITY
Start: 2021-03-28 | End: 2021-06-23 | Stop reason: ALTCHOICE

## 2021-06-22 RX ORDER — CYCLOBENZAPRINE HCL 5 MG
5 TABLET ORAL
Status: CANCELLED | OUTPATIENT
Start: 2021-06-22

## 2021-06-22 NOTE — TELEPHONE ENCOUNTER
Last Appointment:  3/4/2021  Future Appointments   Date Time Provider Grover Vasquez   7/19/2021 10:20 AM Yogesh Shipley, DO Yaquelin MARQUES [facility]

## 2021-06-22 NOTE — TELEPHONE ENCOUNTER
Last Appointment:  3/4/2021  Future Appointments   Date Time Provider Grover Vasquez   7/19/2021 10:20 AM DO Robyn Canchola [facility]

## 2021-06-23 RX ORDER — OMEPRAZOLE 40 MG/1
40 CAPSULE, DELAYED RELEASE ORAL
Qty: 90 CAPSULE | Refills: 1 | Status: SHIPPED
Start: 2021-06-23 | End: 2022-01-10 | Stop reason: SDUPTHER

## 2021-06-23 RX ORDER — CYCLOBENZAPRINE HCL 5 MG
5 TABLET ORAL NIGHTLY PRN
Qty: 30 TABLET | Refills: 1 | Status: SHIPPED
Start: 2021-06-23 | End: 2021-09-27

## 2021-08-03 ENCOUNTER — OFFICE VISIT (OUTPATIENT)
Dept: FAMILY MEDICINE CLINIC | Age: 61
End: 2021-08-03
Payer: MEDICAID

## 2021-08-03 VITALS
WEIGHT: 105 LBS | HEART RATE: 105 BPM | DIASTOLIC BLOOD PRESSURE: 89 MMHG | BODY MASS INDEX: 19.32 KG/M2 | TEMPERATURE: 97.8 F | SYSTOLIC BLOOD PRESSURE: 120 MMHG | HEIGHT: 62 IN | OXYGEN SATURATION: 95 %

## 2021-08-03 DIAGNOSIS — J45.20 MILD INTERMITTENT ASTHMA WITHOUT COMPLICATION: ICD-10-CM

## 2021-08-03 DIAGNOSIS — Z87.891 PERSONAL HISTORY OF TOBACCO USE: Primary | ICD-10-CM

## 2021-08-03 DIAGNOSIS — Z00.00 HEALTH CARE MAINTENANCE: ICD-10-CM

## 2021-08-03 DIAGNOSIS — Z91.09 ENVIRONMENTAL ALLERGIES: ICD-10-CM

## 2021-08-03 DIAGNOSIS — E05.90 HYPERTHYROIDISM: ICD-10-CM

## 2021-08-03 PROCEDURE — G8420 CALC BMI NORM PARAMETERS: HCPCS | Performed by: STUDENT IN AN ORGANIZED HEALTH CARE EDUCATION/TRAINING PROGRAM

## 2021-08-03 PROCEDURE — G8427 DOCREV CUR MEDS BY ELIG CLIN: HCPCS | Performed by: STUDENT IN AN ORGANIZED HEALTH CARE EDUCATION/TRAINING PROGRAM

## 2021-08-03 PROCEDURE — 99213 OFFICE O/P EST LOW 20 MIN: CPT | Performed by: STUDENT IN AN ORGANIZED HEALTH CARE EDUCATION/TRAINING PROGRAM

## 2021-08-03 PROCEDURE — 4004F PT TOBACCO SCREEN RCVD TLK: CPT | Performed by: STUDENT IN AN ORGANIZED HEALTH CARE EDUCATION/TRAINING PROGRAM

## 2021-08-03 PROCEDURE — 3017F COLORECTAL CA SCREEN DOC REV: CPT | Performed by: STUDENT IN AN ORGANIZED HEALTH CARE EDUCATION/TRAINING PROGRAM

## 2021-08-03 PROCEDURE — 99212 OFFICE O/P EST SF 10 MIN: CPT | Performed by: STUDENT IN AN ORGANIZED HEALTH CARE EDUCATION/TRAINING PROGRAM

## 2021-08-03 PROCEDURE — G0296 VISIT TO DETERM LDCT ELIG: HCPCS | Performed by: FAMILY MEDICINE

## 2021-08-03 PROCEDURE — 36415 COLL VENOUS BLD VENIPUNCTURE: CPT | Performed by: STUDENT IN AN ORGANIZED HEALTH CARE EDUCATION/TRAINING PROGRAM

## 2021-08-03 RX ORDER — CETIRIZINE HYDROCHLORIDE 10 MG/1
10 TABLET ORAL DAILY
Qty: 30 TABLET | Refills: 5 | Status: SHIPPED
Start: 2021-08-03 | End: 2022-04-15 | Stop reason: SDUPTHER

## 2021-08-03 RX ORDER — GUAIFENESIN 600 MG/1
600 TABLET, EXTENDED RELEASE ORAL 2 TIMES DAILY
Qty: 30 TABLET | Refills: 0 | Status: SHIPPED | OUTPATIENT
Start: 2021-08-03 | End: 2021-08-18

## 2021-08-03 RX ORDER — MONTELUKAST SODIUM 10 MG/1
10 TABLET ORAL DAILY
Qty: 30 TABLET | Refills: 3 | Status: SHIPPED
Start: 2021-08-03 | End: 2021-12-23 | Stop reason: SDUPTHER

## 2021-08-03 SDOH — ECONOMIC STABILITY: FOOD INSECURITY: WITHIN THE PAST 12 MONTHS, THE FOOD YOU BOUGHT JUST DIDN'T LAST AND YOU DIDN'T HAVE MONEY TO GET MORE.: NEVER TRUE

## 2021-08-03 SDOH — ECONOMIC STABILITY: FOOD INSECURITY: WITHIN THE PAST 12 MONTHS, YOU WORRIED THAT YOUR FOOD WOULD RUN OUT BEFORE YOU GOT MONEY TO BUY MORE.: NEVER TRUE

## 2021-08-03 ASSESSMENT — SOCIAL DETERMINANTS OF HEALTH (SDOH): HOW HARD IS IT FOR YOU TO PAY FOR THE VERY BASICS LIKE FOOD, HOUSING, MEDICAL CARE, AND HEATING?: NOT HARD AT ALL

## 2021-08-03 ASSESSMENT — LIFESTYLE VARIABLES
HOW OFTEN DO YOU HAVE A DRINK CONTAINING ALCOHOL: 2-4 TIMES A MONTH
HOW MANY STANDARD DRINKS CONTAINING ALCOHOL DO YOU HAVE ON A TYPICAL DAY: 1 OR 2

## 2021-08-03 ASSESSMENT — ENCOUNTER SYMPTOMS: SORE THROAT: 1

## 2021-08-03 NOTE — PROGRESS NOTES
[facility] RESIDENCY PROGRAM  DATE OF VISIT : 8/3/2021    Patient : Jai Gibson   Age : 64 y.o.  : 1960   MRN : [id number]   ______________________________________________________________________    Chief Complaint :   Chief Complaint   Patient presents with    Allergic Reaction     seasonal allergy       HPI : Jai Gibson is 64 y.o. female who presented to the clinic today for worsening seasonal allergies for the past week. Tried OTC \"allergy\" medicine similar to benadryl as well as flonase, which has given some sinus pressure relief but has not relieved eye itchiness or sneezing. Also reports scratchy throat and clear sputum. Denies SOB, but does report wheezing in hot weather. Uses rescue inhaler 2-3 times per day for the past week. She was using albuterol once daily before that. She is currently smoking 1/4 ppd, decreased from 1/2 ppd, and is working to decrerase smoking. Also reports some leg cramps, but flexaril works to relieve it. She reports only drinking about 2 x 16 oz bottles of water. Up to date on pap and mammo from obgyn. Agreeable to low dose CT scan. Past Medical History :  Past Medical History:   Diagnosis Date    Thyroid disease      No past surgical history on file. Allergies :    Allergies   Allergen Reactions    Ibuprofen Other (See Comments)     Hallucinations    Morphine        Medication List :    Current Outpatient Medications   Medication Sig Dispense Refill    guaiFENesin (MUCINEX) 600 MG extended release tablet Take 1 tablet by mouth 2 times daily for 15 days 30 tablet 0    cetirizine (ZYRTEC) 10 MG tablet Take 1 tablet by mouth daily 30 tablet 5    montelukast (SINGULAIR) 10 MG tablet Take 1 tablet by mouth daily 30 tablet 3    omeprazole (PRILOSEC) 40 MG delayed release capsule Take 1 capsule by mouth every morning (before breakfast) 90 capsule 1    albuterol sulfate HFA (VENTOLIN HFA) 108 (90 Base) MCG/ACT inhaler Inhale 2 puffs into the lungs 4 times daily as needed for Wheezing 3 Inhaler 1    tiotropium (SPIRIVA RESPIMAT) 1.25 MCG/ACT AERS inhaler Inhale 2 puffs into the lungs daily 1 Inhaler 3     Current Facility-Administered Medications   Medication Dose Route Frequency Provider Last Rate Last Admin    lidocaine 1 % injection 2 mL  2 mL Intradermal Once Tristen Adorno MD            Review of Systems :  Review of Systems   Constitutional: Negative for chills and fever. HENT: Positive for sneezing and sore throat. Negative for ear pain and hearing loss. Eyes: Positive for itching. Negative for discharge. Respiratory: Positive for wheezing (with hot weather). Negative for shortness of breath. Cardiovascular: Negative for chest pain.     ______________________________________________________________________    Physical Exam :    Vitals: /89 (Site: Left Upper Arm, Position: Sitting, Cuff Size: Medium Adult)   Pulse 105   Temp 97.8 °F (36.6 °C) (Temporal)   Ht 5' 2\" (1.575 m)   Wt 105 lb (47.6 kg)   SpO2 95%   BMI 19.20 kg/m²   General Appearance: Well developed, awake, alert, oriented, and in NAD  HEENT: Some cerumen B/L, no impaction. TM normal B/L, pharynx with postnasal drainage, nares without exudate, sinuses nontender  Neck: Symmetrical, trachea midline. Chest wall/Lung: CTAB, respirations unlabored. No ronchi/wheezing/rales   Heart: RRR, normal S1 and S2, no murmurs, rubs or gallops. Abdomen: SNTND  Extremities: Extremities normal, atraumatic, no cyanosis, clubbing or  edema. Skin: Skin color, texture, turgor normal, no rashes or lesions  Musculokeletal: ROM grossly normal in all joints of extremities, no obvious joint swelling. Neurologic: Alert&Oriented x3. No focal motor deficits detected. Psychiatric: Normal mood. Normal affect. Normal behavior.    ______________________________________________________________________    Assessment & Plan :    Environmental allergies  · Start zyrtec for allergies, see if asthma symptoms improve with improved allergies  · Start zyrtec, continue flonase BID, mucinex to thin mucous, encourage hydration  - guaiFENesin (MUCINEX) 600 MG extended release tablet; Take 1 tablet by mouth 2 times daily for 15 days  Dispense: 30 tablet; Refill: 0    Personal history of tobacco use  - ME VISIT TO DISCUSS LUNG CA SCREEN W LDCT  - CT Lung Screen (Annual); Future    Mild intermittent asthma without complication  · Asthma not well controlled, add singuliar, continue as needed albuterol  · Add mucinex to thin mucous secretions  - montelukast (SINGULAIR) 10 MG tablet; Take 1 tablet by mouth daily  Dispense: 30 tablet; Refill: 3  - cetirizine (ZYRTEC) 10 MG tablet; Take 1 tablet by mouth daily  Dispense: 30 tablet; Refill: 5   WILL NEED PFTs IN NEAR FUTURE    Additional plan and future considerations:       Return to Office: No follow-ups on file. Chanel Chan DO   Case discussed with Dr. Hugo Douglas    Low Dose CT (LDCT) Lung Screening criteria met   Age 50-69   Pack year smoking >30   Still smoking or less than 15 year since quit   No sign or symptoms of lung cancer   > 11 months since last LDCT     Risks and benefits of lung cancer screening with LDCT scans discussed:    Significance of positive screen - False-positive LDCT results often occur. 95% of all positive results do not lead to a diagnosis of cancer. Usually further imaging can resolve most false-positive results; however, some patients may require invasive procedures. Over diagnosis risk - 10% to 12% of screen-detected lung cancer cases are over diagnosed--that is, the cancer would not have been detected in the patient's lifetime without the screening.     Need for follow up screens annually to continue lung cancer screening effectiveness     Risks associated with radiation from annual LDCT- Radiation exposure is about the same as for a mammogram, which is about 1/3 of the annual background radiation exposure from everyday life. Starting screening at age 54 is not likely to increase cancer risk from radiation exposure. Patients with comorbidities resulting in life expectancy of < 10 years, or that would preclude treatment of an abnormality identified on CT, should not be screened due to lack of benefit.     To obtain maximal benefit from this screening, smoking cessation and long-term abstinence from smoking is critical

## 2021-08-03 NOTE — PROGRESS NOTES
S: 64 y.o. female here for concerns about seasonal allergies for 1 week. Otc meds similar to benedryl and flonase have not really helped. Sneezing and eye burning and itching. Clear phlegm. Using albuterol 2-3 times per day in the past week for wheezing. She usually uses her albuterol 1x per day. Smoking 1/4 ppd and working to cut down. O: VS: /89 (Site: Left Upper Arm, Position: Sitting, Cuff Size: Medium Adult)   Pulse 105   Temp 97.8 °F (36.6 °C) (Temporal)   Ht 5' 2\" (1.575 m)   Wt 105 lb (47.6 kg)   SpO2 95%   BMI 19.20 kg/m²    General: NAD   CV:  RRR, no gallops, rubs, or murmurs   Resp: CTAB no R/R/W   Abd:  Soft, nontender, no masses    Ext:  no C/C/E   HEENT-tms nl some cerumen, pharynx with postnasal drainage, nares without exudate, sinuses nonttp; Impression/Plan:   1. Allergic rhinitis-zyrtec, humidifier, flonase, mucinex, antihistamine eye gtts  2. Hyperthyroidism-labs  3. Asthma-add singulair  Obtain ror for [facility] records. And has had a pap with her ob  Lung cancer screening. rto in 2 weeks    Attending Physician Statement  I have discussed the case, including pertinent history and exam findings with the resident. I agree with the documented assessment and plan.         Thaddeus Nolasco MD

## 2021-08-04 ENCOUNTER — OFFICE VISIT (OUTPATIENT)
Dept: FAMILY MEDICINE CLINIC | Age: 61
End: 2021-08-04
Payer: MEDICAID

## 2021-08-04 VITALS
HEART RATE: 107 BPM | BODY MASS INDEX: 20.01 KG/M2 | OXYGEN SATURATION: 98 % | WEIGHT: 106 LBS | DIASTOLIC BLOOD PRESSURE: 79 MMHG | HEIGHT: 61 IN | SYSTOLIC BLOOD PRESSURE: 124 MMHG | TEMPERATURE: 97.9 F

## 2021-08-04 DIAGNOSIS — M79.5 FOREIGN BODY (FB) IN SOFT TISSUE: Primary | ICD-10-CM

## 2021-08-04 LAB
ANION GAP SERPL CALCULATED.3IONS-SCNC: 16 MMOL/L (ref 7–16)
BUN BLDV-MCNC: 9 MG/DL (ref 6–23)
CALCIUM SERPL-MCNC: 9.3 MG/DL (ref 8.6–10.2)
CHLORIDE BLD-SCNC: 88 MMOL/L (ref 98–107)
CHOLESTEROL, TOTAL: 196 MG/DL (ref 0–199)
CO2: 31 MMOL/L (ref 22–29)
CREAT SERPL-MCNC: 0.9 MG/DL (ref 0.5–1)
GFR AFRICAN AMERICAN: >60
GFR NON-AFRICAN AMERICAN: >60 ML/MIN/1.73
GLUCOSE BLD-MCNC: 63 MG/DL (ref 74–99)
HDLC SERPL-MCNC: 70 MG/DL
LDL CHOLESTEROL CALCULATED: 100 MG/DL (ref 0–99)
POTASSIUM SERPL-SCNC: 4 MMOL/L (ref 3.5–5)
SODIUM BLD-SCNC: 135 MMOL/L (ref 132–146)
TRIGL SERPL-MCNC: 130 MG/DL (ref 0–149)
TSH SERPL DL<=0.05 MIU/L-ACNC: 0.66 UIU/ML (ref 0.27–4.2)
VLDLC SERPL CALC-MCNC: 26 MG/DL

## 2021-08-04 PROCEDURE — 90471 IMMUNIZATION ADMIN: CPT

## 2021-08-04 PROCEDURE — 99212 OFFICE O/P EST SF 10 MIN: CPT | Performed by: STUDENT IN AN ORGANIZED HEALTH CARE EDUCATION/TRAINING PROGRAM

## 2021-08-04 PROCEDURE — 90715 TDAP VACCINE 7 YRS/> IM: CPT

## 2021-08-04 PROCEDURE — 99213 OFFICE O/P EST LOW 20 MIN: CPT | Performed by: STUDENT IN AN ORGANIZED HEALTH CARE EDUCATION/TRAINING PROGRAM

## 2021-08-04 PROCEDURE — 6360000002 HC RX W HCPCS

## 2021-08-04 PROCEDURE — 3017F COLORECTAL CA SCREEN DOC REV: CPT | Performed by: STUDENT IN AN ORGANIZED HEALTH CARE EDUCATION/TRAINING PROGRAM

## 2021-08-04 PROCEDURE — 4004F PT TOBACCO SCREEN RCVD TLK: CPT | Performed by: STUDENT IN AN ORGANIZED HEALTH CARE EDUCATION/TRAINING PROGRAM

## 2021-08-04 PROCEDURE — G8427 DOCREV CUR MEDS BY ELIG CLIN: HCPCS | Performed by: STUDENT IN AN ORGANIZED HEALTH CARE EDUCATION/TRAINING PROGRAM

## 2021-08-04 PROCEDURE — G8420 CALC BMI NORM PARAMETERS: HCPCS | Performed by: STUDENT IN AN ORGANIZED HEALTH CARE EDUCATION/TRAINING PROGRAM

## 2021-08-04 RX ORDER — LIDOCAINE HYDROCHLORIDE 10 MG/ML
1 INJECTION, SOLUTION EPIDURAL; INFILTRATION; INTRACAUDAL; PERINEURAL ONCE
Qty: 1 ML | Refills: 0
Start: 2021-08-04 | End: 2021-08-05 | Stop reason: CLARIF

## 2021-08-04 ASSESSMENT — ENCOUNTER SYMPTOMS: SHORTNESS OF BREATH: 0

## 2021-08-04 NOTE — PROGRESS NOTES
Attended with Dr Delaney Cruz for removal of erica thorn from left small finger, volar aspect, distal phalanx. Betadine prep, lidocaine injection, sharp removal.  Tolerated well    Tetanus toxoid. Attending Physician Statement  I have discussed the case, including pertinent history and exam findings with the resident. I also have seen the patient and performed key portions of the examination. I agree with the documented assessment and plan.

## 2021-08-04 NOTE — PROGRESS NOTES
[address] [facility] RESIDENCY PROGRAM  DATE OF VISIT : 2021    Patient : Connie Willard   Age : 64 y.o.  : 1960   MRN : [id number]   ______________________________________________________________________    Chief Complaint :   Chief Complaint   Patient presents with    Finger Pain     left pinky finger has thorn in it needs removed       HPI : Connie Willard is 64 y.o. female who presented to the clinic today for Foreign body in soft tissue    Foreign Body in soft tissue  - has had thorn in left 5th digit for almost a valeriano  - pain has worsened. - patient had been tending to her garden when she injured herself with a thorn.   - reports unable to extract thorn from arm. - presents today for removal of thorn. Past Medical History :  Past Medical History:   Diagnosis Date    Thyroid disease      No past surgical history on file. Allergies : Allergies   Allergen Reactions    Ibuprofen Other (See Comments)     Hallucinations    Morphine        Medication List :    Current Outpatient Medications   Medication Sig Dispense Refill    guaiFENesin (MUCINEX) 600 MG extended release tablet Take 1 tablet by mouth 2 times daily for 15 days 30 tablet 0    cetirizine (ZYRTEC) 10 MG tablet Take 1 tablet by mouth daily 30 tablet 5    montelukast (SINGULAIR) 10 MG tablet Take 1 tablet by mouth daily 30 tablet 3    omeprazole (PRILOSEC) 40 MG delayed release capsule Take 1 capsule by mouth every morning (before breakfast) 90 capsule 1    albuterol sulfate HFA (VENTOLIN HFA) 108 (90 Base) MCG/ACT inhaler Inhale 2 puffs into the lungs 4 times daily as needed for Wheezing 3 Inhaler 1    tiotropium (SPIRIVA RESPIMAT) 1.25 MCG/ACT AERS inhaler Inhale 2 puffs into the lungs daily 1 Inhaler 3     No current facility-administered medications for this visit. Family History :    No family history on file. Surgical History :   No past surgical history on file.     Social History :   Social History     Tobacco Use    Smoking status: Current Every Day Smoker     Packs/day: 0.50     Years: 40.00     Pack years: 20.00     Start date: 9/11/1980    Smokeless tobacco: Never Used   Substance Use Topics    Alcohol use: Not on file     Comment: Denies    Drug use: Not on file     Comment: Denies       Review of Systems :  Review of Systems   Constitutional: Negative for chills and fever. Respiratory: Negative for shortness of breath. Cardiovascular: Negative for chest pain. Skin: Positive for wound. ______________________________________________________________________    Physical Exam :    Vitals: /79 (Site: Left Upper Arm, Position: Sitting, Cuff Size: Medium Adult)   Pulse 107   Temp 97.9 °F (36.6 °C) (Temporal)   Ht 5' 1\" (1.549 m)   Wt 106 lb (48.1 kg)   SpO2 98%   BMI 20.03 kg/m²   General Appearance: Well developed, awake, alert, oriented, and in NAD  Chest wall/Lung: CTAB, respirations unlabored. No ronchi/wheezing/rales   Heart[de-identified] RRR, normal S1 and S2, no murmurs, rubs or gallops. Abdomen: SNTND, +BSx4  Extremities: Extremities normal, atraumatic, no cyanosis, clubbing or edema. 5th digit of the left hand, non erthymatous, tender to touch, foreign body noted on adorno side. Skin: Skin color, texture, turgor normal, no rashes or lesions    Medical Decision:  Signs and symptoms consistent with a cutaneous abscess in a immunocompetent patient with no evidence of systemic toxicity. Plan is for incision and drainage and . Incision and Drainage Procedure Note:    Indication: Abscess    Procedure: The patient was positioned appropriately and the skin over the incision site was prepped with betadine and draped in a sterile fashion. Local anesthesia was 1% lidocaine without epi 0.2 cc was used.  After appropriate analgesia was achieved, an incision was then made over the apex of the lesion with return of purulent material. The area with then explored and foreign body

## 2021-08-05 RX ORDER — LIDOCAINE HYDROCHLORIDE 10 MG/ML
2 INJECTION, SOLUTION INFILTRATION; PERINEURAL ONCE
Status: SHIPPED | OUTPATIENT
Start: 2021-08-04

## 2021-08-05 ASSESSMENT — ENCOUNTER SYMPTOMS
EYE DISCHARGE: 0
EYE ITCHING: 1
WHEEZING: 1
SHORTNESS OF BREATH: 0

## 2021-08-10 DIAGNOSIS — Z87.891 PERSONAL HISTORY OF TOBACCO USE: ICD-10-CM

## 2021-08-10 DIAGNOSIS — J44.9 CHRONIC OBSTRUCTIVE PULMONARY DISEASE, UNSPECIFIED COPD TYPE (HCC): ICD-10-CM

## 2021-08-10 DIAGNOSIS — J45.20 MILD INTERMITTENT ASTHMA WITHOUT COMPLICATION: ICD-10-CM

## 2021-08-10 DIAGNOSIS — Z91.09 ENVIRONMENTAL ALLERGIES: ICD-10-CM

## 2021-08-10 RX ORDER — TIOTROPIUM BROMIDE INHALATION SPRAY 1.56 UG/1
2 SPRAY, METERED RESPIRATORY (INHALATION) DAILY
Qty: 4 G | Status: CANCELLED | OUTPATIENT
Start: 2021-08-10

## 2021-08-11 RX ORDER — TIOTROPIUM BROMIDE INHALATION SPRAY 1.56 UG/1
2 SPRAY, METERED RESPIRATORY (INHALATION) DAILY
Qty: 4 G | Refills: 3 | Status: SHIPPED
Start: 2021-08-11 | End: 2021-10-05 | Stop reason: ALTCHOICE

## 2021-08-17 ENCOUNTER — OFFICE VISIT (OUTPATIENT)
Dept: FAMILY MEDICINE CLINIC | Age: 61
End: 2021-08-17
Payer: MEDICAID

## 2021-08-17 VITALS
BODY MASS INDEX: 20.01 KG/M2 | SYSTOLIC BLOOD PRESSURE: 121 MMHG | HEIGHT: 61 IN | DIASTOLIC BLOOD PRESSURE: 74 MMHG | WEIGHT: 106 LBS | OXYGEN SATURATION: 95 % | HEART RATE: 106 BPM | TEMPERATURE: 99 F

## 2021-08-17 DIAGNOSIS — J45.20 MILD INTERMITTENT ASTHMA WITHOUT COMPLICATION: ICD-10-CM

## 2021-08-17 DIAGNOSIS — R00.0 TACHYCARDIA: ICD-10-CM

## 2021-08-17 DIAGNOSIS — J44.9 CHRONIC OBSTRUCTIVE PULMONARY DISEASE, UNSPECIFIED COPD TYPE (HCC): Primary | ICD-10-CM

## 2021-08-17 PROCEDURE — 3017F COLORECTAL CA SCREEN DOC REV: CPT | Performed by: STUDENT IN AN ORGANIZED HEALTH CARE EDUCATION/TRAINING PROGRAM

## 2021-08-17 PROCEDURE — 4004F PT TOBACCO SCREEN RCVD TLK: CPT | Performed by: STUDENT IN AN ORGANIZED HEALTH CARE EDUCATION/TRAINING PROGRAM

## 2021-08-17 PROCEDURE — G8420 CALC BMI NORM PARAMETERS: HCPCS | Performed by: STUDENT IN AN ORGANIZED HEALTH CARE EDUCATION/TRAINING PROGRAM

## 2021-08-17 PROCEDURE — 3023F SPIROM DOC REV: CPT | Performed by: STUDENT IN AN ORGANIZED HEALTH CARE EDUCATION/TRAINING PROGRAM

## 2021-08-17 PROCEDURE — 99213 OFFICE O/P EST LOW 20 MIN: CPT | Performed by: STUDENT IN AN ORGANIZED HEALTH CARE EDUCATION/TRAINING PROGRAM

## 2021-08-17 PROCEDURE — 99212 OFFICE O/P EST SF 10 MIN: CPT | Performed by: STUDENT IN AN ORGANIZED HEALTH CARE EDUCATION/TRAINING PROGRAM

## 2021-08-17 PROCEDURE — G8427 DOCREV CUR MEDS BY ELIG CLIN: HCPCS | Performed by: STUDENT IN AN ORGANIZED HEALTH CARE EDUCATION/TRAINING PROGRAM

## 2021-08-17 PROCEDURE — G8926 SPIRO NO PERF OR DOC: HCPCS | Performed by: STUDENT IN AN ORGANIZED HEALTH CARE EDUCATION/TRAINING PROGRAM

## 2021-08-17 RX ORDER — DEXAMETHASONE 4 MG/1
1 TABLET ORAL 2 TIMES DAILY
Qty: 1 INHALER | Refills: 3
Start: 2021-08-17 | End: 2022-04-15 | Stop reason: SDUPTHER

## 2021-08-17 ASSESSMENT — ENCOUNTER SYMPTOMS
WHEEZING: 0
SHORTNESS OF BREATH: 0

## 2021-08-17 NOTE — PROGRESS NOTES
Attending Physician Statement    S:   Chief Complaint   Patient presents with    2 Week Follow-Up      Patient is a 64year old female here for follow up of asthma and allergies. Was here a couple weeks ago was needing rescue inhaler 3-4 times a day. singulair was added and zyrtec . Now only using albuterol once a day. O: Blood pressure 121/74, pulse 106, temperature 99 °F (37.2 °C), temperature source Oral, height 5' 1\" (1.549 m), weight 106 lb (48.1 kg), SpO2 95 %. Exam:   Heart - RRR   Lungs - scattered insp and exp wheeze    Ext- no peripheral edema   A: asthma/copd   P:  Order PFTs. Add flovent 110mcg two puffs bid    Continue singulair and zyrtec    Continue spiriva    Follow-up as ordered    Attending Attestation   I have discussed the case, including pertinent history and exam findings with the resident. I agree with the documented assessment and plan.

## 2021-08-17 NOTE — PROGRESS NOTES
capsule 1    albuterol sulfate HFA (VENTOLIN HFA) 108 (90 Base) MCG/ACT inhaler Inhale 2 puffs into the lungs 4 times daily as needed for Wheezing 3 Inhaler 1     Current Facility-Administered Medications   Medication Dose Route Frequency Provider Last Rate Last Admin    lidocaine 1 % injection 2 mL  2 mL Intradermal Once Suzanne Martinez MD            Review of Systems :  Review of Systems   Constitutional: Negative for chills and fever. HENT: Negative for congestion and postnasal drip. Respiratory: Negative for shortness of breath and wheezing. Cardiovascular: Negative for chest pain. Neurological: Negative for dizziness and light-headedness. ______________________________________________________________________    Physical Exam :    Vitals: /74 (Site: Left Upper Arm, Position: Sitting, Cuff Size: Medium Adult)   Pulse 106   Temp 99 °F (37.2 °C) (Oral)   Ht 5' 1\" (1.549 m)   Wt 106 lb (48.1 kg)   SpO2 95%   BMI 20.03 kg/m²   General Appearance: Well developed, awake, alert, oriented, and in NAD  HEENT: NCAT, MMM, no pallor or icterus. Neck: Symmetrical, trachea midline. Chest wall/Lung: CTAB, respirations unlabored. Scattered inspiratory and expiratory wheezes. Heart: RRR, normal S1 and S2, no murmurs, rubs or gallops. Abdomen: SNTND  Extremities: Extremities normal, atraumatic, no cyanosis, clubbing or edema. Skin: Skin color, texture, turgor normal, no rashes or lesions  Musculokeletal: ROM grossly normal in all joints of extremities, no obvious joint swelling. Neurologic: Alert&Oriented x3. No focal motor deficits detected. Psychiatric: Normal mood. Normal affect. Normal behavior. ______________________________________________________________________    Assessment & Plan :    Chronic obstructive pulmonary disease, unspecified COPD type ([facility] 75.)  Mild intermittent asthma without complication  · Improved symptoms, but not well controlled. Add flovent.   · PFT to clarify asthma vs COPD with smoking  history  - FLOVENT  MCG/ACT inhaler; Inhale 1 puff into the lungs 2 times daily  Dispense: 1 Inhaler; Refill: 3  - Full PFT Study With Bronchodilator; Future    Tachycardia  - EKG 12 lead; Future      Additional plan and future considerations:       Return to Office: Return for Return for EKG for tachycardia.     Liz Marte DO   Case discussed with Dr. Hemant Castillo

## 2021-08-31 ENCOUNTER — TELEPHONE (OUTPATIENT)
Dept: CASE MANAGEMENT | Age: 61
End: 2021-08-31

## 2021-08-31 NOTE — TELEPHONE ENCOUNTER
I called the patient and she confirmed her CT lung screening at [facility] on 9/1/2021 at 6:30 pm.  I reminded the patient to arrive at 6:00 pm, enter through the main entrance, and register. Patient confirmed.             Electronically signed by Sia Colvin on 8/31/21 at 1:56 PM EDT

## 2021-09-16 ENCOUNTER — TELEPHONE (OUTPATIENT)
Dept: CASE MANAGEMENT | Age: 61
End: 2021-09-16

## 2021-09-16 NOTE — TELEPHONE ENCOUNTER
Patient called in and confirmed her CT lung screening at [facility] on 9/17/2021 at 8:00 am.  I reminded the patient to arrive at 7:30 am, enter through the main entrance, and register. Patient confirmed.           Electronically signed by Diallo Culver on 9/16/21 at 1:47 PM EDT

## 2021-09-16 NOTE — TELEPHONE ENCOUNTER
I called the patient and left a message reminding her of the CT lung screening at [facility] on 9/17/2021 at 8:00 am with an 7:30 am arrival.  If unable to keep this appt call the office at [phone] to get rescheduled.           Electronically signed by Elizabeth Henning on 9/16/21 at 12:13 PM EDT

## 2021-09-17 ENCOUNTER — HOSPITAL ENCOUNTER (OUTPATIENT)
Dept: CT IMAGING | Age: 61
Discharge: HOME | End: 2021-09-19
Payer: MEDICAID

## 2021-09-17 DIAGNOSIS — Z87.891 PERSONAL HISTORY OF TOBACCO USE: ICD-10-CM

## 2021-09-17 PROCEDURE — 71271 CT THORAX LUNG CANCER SCR C-: CPT

## 2021-09-20 ENCOUNTER — TELEPHONE (OUTPATIENT)
Dept: CASE MANAGEMENT | Age: 61
End: 2021-09-20

## 2021-09-20 NOTE — TELEPHONE ENCOUNTER
No call, encounter opened to process CT Lung Screening. CT Lung Screen: 9/17/2021    Impression   Pulmonary nodules measuring up to 3 mm in the right upper lobe.       LUNG RADS:   Per ACR Lung-RADS Version 1.1       Lung rads 2.  Continue annual low-dose CT screening of the chest.       RECOMMENDATIONS:   If you would like to register your patient with the [facility], please contact the Nurse Navigator at   [phone]. Pack years: 21    Social History     Tobacco Use  Smoking Status: Current Every Day Smoker    Start Date: 09/11/1980   Quit Date:    Types: Cigarettes   Packs/Day: 0.5   Years: 40   Pack Years: 20   Smokeless Tobacco: Never Used          Results letter sent to patient via my chart or mailed.      [address]

## 2021-09-24 ENCOUNTER — HOSPITAL ENCOUNTER (OUTPATIENT)
Dept: PULMONOLOGY | Age: 61
Discharge: HOME | End: 2021-09-24
Payer: MEDICAID

## 2021-09-24 DIAGNOSIS — J45.20 MILD INTERMITTENT ASTHMA WITHOUT COMPLICATION: ICD-10-CM

## 2021-09-24 DIAGNOSIS — J44.9 CHRONIC OBSTRUCTIVE PULMONARY DISEASE, UNSPECIFIED COPD TYPE (HCC): ICD-10-CM

## 2021-09-24 PROCEDURE — 94726 PLETHYSMOGRAPHY LUNG VOLUMES: CPT

## 2021-09-24 PROCEDURE — 94060 EVALUATION OF WHEEZING: CPT

## 2021-09-24 PROCEDURE — 94729 DIFFUSING CAPACITY: CPT

## 2021-09-26 NOTE — PROCEDURES
[address]                  [address] [facility]  [facility]                               PULMONARY FUNCTION    PATIENT NAME: Willis Diallo                      :        1960  MED REC NO:   [id number]                            ROOM:  ACCOUNT NO:   [de-identified]                           ADMIT DATE: 2021  PROVIDER:     Evan Durham DO    DATE OF PROCEDURE:  2021    PULMONARY FUNCTION TEST    INDICATION FOR PULMONARY FUNCTION TEST:  COPD, mild asthma without  complication. HEIGHT:  The patient's height is 61 inches. WEIGHT:  Weight is 112 pounds. SPIROMETRY:  FVC is 1.64 which is 68% of predicted. FEV1 is 0.95 which  is 49% of predicted. MVV is 43 which is 50% of predicted. With  bronchodilator, there is only a 6% change in FEV1; however in the small  airways, there was a change of 33%. LUNG VOLUMES:  FVC is 1.61 which is 66% of predicted. RV is 5.55 which  is 298% of predicated. TLC is 7.16 which is 165% of predicted. DIFFUSION:  Diffusion is 3.03 which is 14% of predicted. Diffusion  corrected for alveolar ventilation is 2.21 which is 50% of predicted. The flow volume loop is consistent with significant looping. COMMENTS:  The patient had a difficult time exhaling on FVC, attempted  several times, the best result was safe. DLCO was also attempted three  times and the best result was safe. INTERPRETATION:  Spirometry is consistent with severe obstruction with a  minimal bronchodilator response. MVV is decreased, which may be  consistent with decreased physiological reserve. Lung volumes are  consistent with significant air trapping, diffusion moderately reduced. Please clinically correlate.         Gomez Zhu DO    D: 2021 14:57:54       T: 2021 21:37:45     [email or web address]  Job#: [id number]     Doc#: [id number]    CC:

## 2021-09-27 DIAGNOSIS — M62.838 MUSCLE SPASMS OF BOTH LOWER EXTREMITIES: ICD-10-CM

## 2021-09-27 RX ORDER — CYCLOBENZAPRINE HCL 5 MG
TABLET ORAL
Qty: 30 TABLET | Refills: 1 | Status: SHIPPED
Start: 2021-09-27 | End: 2021-12-07

## 2021-10-01 ENCOUNTER — TELEPHONE (OUTPATIENT)
Dept: FAMILY MEDICINE CLINIC | Age: 61
End: 2021-10-01

## 2021-10-01 NOTE — TELEPHONE ENCOUNTER
----- Message from Grace Almanzar sent at 10/1/2021 10:05 AM EDT -----  Subject: Refill Request    QUESTIONS  Name of Medication? cyclobenzaprine (FLEXERIL) 5 MG tablet  Patient-reported dosage and instructions? 5 mg once a day  How many days do you have left? 0  [facility]? [id number]  Pharmacy phone number (if available)? [phone]  ---------------------------------------------------------------------------  --------------  CALL BACK INFO  What is the best way for the office to contact you? OK to leave message on   voicemail  Preferred Call Back Phone Number?  [phone]

## 2021-10-04 ENCOUNTER — TELEPHONE (OUTPATIENT)
Dept: FAMILY MEDICINE CLINIC | Age: 61
End: 2021-10-04

## 2021-10-04 DIAGNOSIS — J43.8 OTHER EMPHYSEMA (HCC): ICD-10-CM

## 2021-10-04 NOTE — TELEPHONE ENCOUNTER
PFTs on 9/24/21 showed severe obstruction with minimal bronchodilator response. Patient currently on:    Flovent 110 mcg/act inhaler 1 puff BID  Spiriva Respimat 1.25 mcg/act inhaler 2 puffs QD  Singulair 10 mg daily  Albuterol sulfate 108 mcg/act inhaler 2 puffs 4 times daily prn wheezing. Low dose CT showed 3 nodules <5 mm with repeat CT in one year recommended. Mild emphysematous changes noted. Update problem list 'asthma' to COPD/emphysema. Group B by GOLD criteria, more symptomatic with low risk for exacerbation. Recommendation of LABA or LAMA plus ABDULKADIR prn. Attempted call to patient to discuss results. Was not able to leave message for return call. Will call again to discuss switching from flovent to advair unless currently well controlled. Stop singulair.

## 2021-10-04 NOTE — TELEPHONE ENCOUNTER
----- Message from Antoine Sohail sent at 10/4/2021  9:31 AM EDT -----  Subject: Refill Request    QUESTIONS  Name of Medication? cyclobenzaprine (FLEXERIL) 5 MG tablet  Patient-reported dosage and instructions? 2 pills once a day; with meal,   for breakfast and for dinner  How many days do you have left? 0  Preferred Pharmacy? [id number]  Pharmacy phone number (if available)? [phone]  Additional Information for Provider? Patient is looking for immediate   refill of prescription; stated that Pharmacy did not have refill request.   ---------------------------------------------------------------------------  --------------  CALL BACK INFO  What is the best way for the office to contact you? OK to leave message on   voicemail  Preferred Call Back Phone Number?  [phone]

## 2021-10-05 ENCOUNTER — OFFICE VISIT (OUTPATIENT)
Dept: FAMILY MEDICINE CLINIC | Age: 61
End: 2021-10-05
Payer: MEDICAID

## 2021-10-05 ENCOUNTER — NURSE TRIAGE (OUTPATIENT)
Dept: OTHER | Facility: CLINIC | Age: 61
End: 2021-10-05

## 2021-10-05 VITALS
HEIGHT: 61 IN | DIASTOLIC BLOOD PRESSURE: 68 MMHG | RESPIRATION RATE: 16 BRPM | TEMPERATURE: 97.9 F | OXYGEN SATURATION: 96 % | WEIGHT: 103 LBS | BODY MASS INDEX: 19.45 KG/M2 | SYSTOLIC BLOOD PRESSURE: 104 MMHG | HEART RATE: 99 BPM

## 2021-10-05 DIAGNOSIS — J44.9 CHRONIC OBSTRUCTIVE PULMONARY DISEASE, UNSPECIFIED COPD TYPE (HCC): ICD-10-CM

## 2021-10-05 DIAGNOSIS — Z23 ENCOUNTER FOR VACCINATION: ICD-10-CM

## 2021-10-05 DIAGNOSIS — L02.91 ABSCESS: Primary | ICD-10-CM

## 2021-10-05 PROCEDURE — 3023F SPIROM DOC REV: CPT | Performed by: STUDENT IN AN ORGANIZED HEALTH CARE EDUCATION/TRAINING PROGRAM

## 2021-10-05 PROCEDURE — G0008 ADMIN INFLUENZA VIRUS VAC: HCPCS

## 2021-10-05 PROCEDURE — 99213 OFFICE O/P EST LOW 20 MIN: CPT | Performed by: STUDENT IN AN ORGANIZED HEALTH CARE EDUCATION/TRAINING PROGRAM

## 2021-10-05 PROCEDURE — 4004F PT TOBACCO SCREEN RCVD TLK: CPT | Performed by: STUDENT IN AN ORGANIZED HEALTH CARE EDUCATION/TRAINING PROGRAM

## 2021-10-05 PROCEDURE — 90686 IIV4 VACC NO PRSV 0.5 ML IM: CPT

## 2021-10-05 PROCEDURE — 99212 OFFICE O/P EST SF 10 MIN: CPT | Performed by: STUDENT IN AN ORGANIZED HEALTH CARE EDUCATION/TRAINING PROGRAM

## 2021-10-05 PROCEDURE — 3017F COLORECTAL CA SCREEN DOC REV: CPT | Performed by: STUDENT IN AN ORGANIZED HEALTH CARE EDUCATION/TRAINING PROGRAM

## 2021-10-05 PROCEDURE — G8482 FLU IMMUNIZE ORDER/ADMIN: HCPCS | Performed by: STUDENT IN AN ORGANIZED HEALTH CARE EDUCATION/TRAINING PROGRAM

## 2021-10-05 PROCEDURE — 90732 PPSV23 VACC 2 YRS+ SUBQ/IM: CPT

## 2021-10-05 PROCEDURE — 6360000002 HC RX W HCPCS

## 2021-10-05 PROCEDURE — G8427 DOCREV CUR MEDS BY ELIG CLIN: HCPCS | Performed by: STUDENT IN AN ORGANIZED HEALTH CARE EDUCATION/TRAINING PROGRAM

## 2021-10-05 PROCEDURE — G0009 ADMIN PNEUMOCOCCAL VACCINE: HCPCS

## 2021-10-05 PROCEDURE — G8420 CALC BMI NORM PARAMETERS: HCPCS | Performed by: STUDENT IN AN ORGANIZED HEALTH CARE EDUCATION/TRAINING PROGRAM

## 2021-10-05 PROCEDURE — G8926 SPIRO NO PERF OR DOC: HCPCS | Performed by: STUDENT IN AN ORGANIZED HEALTH CARE EDUCATION/TRAINING PROGRAM

## 2021-10-05 RX ORDER — SULFAMETHOXAZOLE AND TRIMETHOPRIM 800; 160 MG/1; MG/1
1 TABLET ORAL 2 TIMES DAILY
Qty: 10 TABLET | Refills: 0 | Status: SHIPPED | OUTPATIENT
Start: 2021-10-05 | End: 2021-10-10

## 2021-10-05 NOTE — PROGRESS NOTES
Vaccine Information Sheet, \"Influenza - Inactivated\"  given to Nereida Pérez, or parent/legal guardian of  Nereida Pérez and verbalized understanding. Patient responses:    Have you ever had a reaction to a flu vaccine? No  Do you have any current illness? No  Have you ever had [place] Syndrome? No  Do you have a serious allergy to any of the follow: Neomycin, Polymyxin, Thimerosal, eggs or egg products? No    Flu vaccine given per order. Please see immunization tab. Risks and benefits explained. Current VIS given.       Immunizations Administered     Name Date Dose Route    Influenza, Quadv, IM, PF (6 mo and older Fluzone, Flulaval, Fluarix, and 3 yrs and older Afluria) 10/5/2021 0.5 mL Intramuscular    Site: Deltoid- Right    Lot: [id number]    NDC: 23155-024-15    Pneumococcal Polysaccharide ([id number]) 10/5/2021 0.5 mL Intramuscular    Site: Deltoid- Left    Lot: [id number]    NDC: 3934-6564-18

## 2021-10-05 NOTE — TELEPHONE ENCOUNTER
Received call from Octavio Islas at [facility] with Red Flag Complaint. Brief description of triage: Insect bite to right cheek on face. Small red raised lump with hives forming and worsening around it. Triage indicates for patient to see PCP today. Care advice provided, patient verbalizes understanding; denies any other questions or concerns; instructed to call back for any new or worsening symptoms. Writer provided warm transfer to Sri Price at [facility] for appointment scheduling. Attention Provider: Thank you for allowing me to participate in the care of your patient. The patient was connected to triage in response to information provided to the [facility]. Please do not respond through this encounter as the response is not directed to a shared pool. Reason for Disposition   Red or very tender (to touch) area, getting larger over 48 hours after the bite    Answer Assessment - Initial Assessment Questions  1. TYPE of INSECT: \"What type of insect was it? \"       Unknown    2. ONSET: \"When did you get bitten? \"       Last week    3. LOCATION: \"Where is the insect bite located? \"       Right cheek    4. REDNESS: \"Is the area red or pink? \" If so, ask \"What size is area of redness? \" (inches or cm). \"When did the redness start? \"      Red, raised large bump with hive forming around it. 5. PAIN: \"Is there any pain? \" If so, ask: \"How bad is it? \"  (Scale 1-10; or mild, moderate, severe)      No pain    6. ITCHING: \"Does it itch? \" If so, ask: \"How bad is the itch? \"     - MILD: doesn't interfere with normal activities    - MODERATE-SEVERE: interferes with work, school, sleep, or other activities       Not itchy    7. SWELLING: \"How big is the swelling? \" (inches, cm, or compare to coins)      Smaller than a dime     8. OTHER SYMPTOMS: \"Do you have any other symptoms? \"  (e.g., difficulty breathing, hives)      Hives forming around it. 9. PREGNANCY: \"Is there any chance you are pregnant? \" \"When was

## 2021-10-05 NOTE — PROGRESS NOTES
[address] [facility]  FAMILY MEDICINE RESIDENCY PROGRAM  DATE OF VISIT : 10/5/2021    Patient : Traci Rogers   Age : 64 y.o.  : 1960   MRN : [id number]   ______________________________________________________________________    Chief Complaint :   Chief Complaint   Patient presents with    Other     bite/swelling to face       HPI : Traci Rogers is 64 y.o. female who presented to the clinic today for concern of insect bite on R cheek just below eye with swelling and redness. She states it occurred a few days ago when opening a window. She is not sure what bit her. She did not have any initial swelling or redness. Next day, had a pimple  In that spot. She tried to pop it with a safety pin she heated over flame first. She then tried applying witch hazel and acne cream, but the bit became bigger. She has not tried benadryl and tylenol. She denies drainage from lesion. Also discussed with patient low dose CT lungs and PFTs. Low dose CT negative for masses, but shows changes consistent with emphysema. PFT consistent with severe obstruction with minimal bronchodilator response. Patient agreeable to adjustment in medications. Patient agreeable to flu shot. Past Medical History :  Past Medical History:   Diagnosis Date    Thyroid disease      No past surgical history on file. Allergies :    Allergies   Allergen Reactions    Ibuprofen Other (See Comments)     Hallucinations    Morphine        Medication List :    Current Outpatient Medications   Medication Sig Dispense Refill    tiotropium (SPIRIVA RESPIMAT) 2.5 MCG/ACT AERS inhaler Inhale 2 puffs into the lungs daily 1 each 3    sulfamethoxazole-trimethoprim (BACTRIM DS;SEPTRA DS) 800-160 MG per tablet Take 1 tablet by mouth 2 times daily for 5 days 10 tablet 0    cyclobenzaprine (FLEXERIL) 5 MG tablet TAKE 1 TABLET BY MOUTH EVERY NIGHT AS NEEDED FOR MUSCLE SPASMS 30 tablet 1    FLOVENT  MCG/ACT inhaler Inhale 1 puff into the lungs 2 times daily 1 Inhaler 3    cetirizine (ZYRTEC) 10 MG tablet Take 1 tablet by mouth daily 30 tablet 5    montelukast (SINGULAIR) 10 MG tablet Take 1 tablet by mouth daily 30 tablet 3    omeprazole (PRILOSEC) 40 MG delayed release capsule Take 1 capsule by mouth every morning (before breakfast) 90 capsule 1    albuterol sulfate HFA (VENTOLIN HFA) 108 (90 Base) MCG/ACT inhaler Inhale 2 puffs into the lungs 4 times daily as needed for Wheezing 3 Inhaler 1     Current Facility-Administered Medications   Medication Dose Route Frequency Provider Last Rate Last Admin    lidocaine 1 % injection 2 mL  2 mL IntraDERmal Once David Cade MD            ______________________________________________________________________    Physical Exam :    Vitals: /68 (Site: Right Upper Arm, Position: Sitting, Cuff Size: Medium Adult)   Pulse 99   Temp 97.9 °F (36.6 °C) (Temporal)   Resp 16   Ht 5' 1\" (1.549 m)   Wt 103 lb (46.7 kg)   SpO2 96%   BMI 19.46 kg/m²   General Appearance: Awake, alert, oriented, and in NAD  HEENT: NCAT, no pallor or icterus  Neck: Symmetrical, trachea midline. Chest wall/Lung: CTAB, respirations unlabored. No ronchi/wheezing/rales   Heart: RRR, normal S1 and S2, no murmurs, rubs or gallops  Abdomen: SNTND  Extremities: Extremities normal, atraumatic, no cyanosis, clubbing or edema. Skin: Small abscess on R cheek below eye, slight tenderness to palpation. Slight fluctuation appreciated between surface papule. No drainage. Musculokeletal: ROM grossly normal in all joints of extremities, no obvious joint swelling  Neurologic: Alert&Oriented x3. No focal motor deficits detected   Psychiatric: Normal mood. Normal affect. Normal behavior  ______________________________________________________________________    Assessment & Plan :    1.  Chronic obstructive pulmonary disease, unspecified COPD type (HCC)  · Increase spiriva from asthma dose to copd dose of 2.5 mcg/act  · Continue flonse for now, and rescue inhaler  · Continue singulair as patient also has history of environmental allergies  - tiotropium (SPIRIVA RESPIMAT) 2.5 MCG/ACT AERS inhaler; Inhale 2 puffs into the lungs daily  Dispense: 1 each; Refill: 3    2. Abscess  · Trial of bactrim  - sulfamethoxazole-trimethoprim (BACTRIM DS;SEPTRA DS) 800-160 MG per tablet; Take 1 tablet by mouth 2 times daily for 5 days  Dispense: 10 tablet; Refill: 0    3. Encounter for vaccination  - INFLUENZA, QUADV, 3 YRS AND OLDER, IM PF, PREFILL SYR OR SDV, 0.5ML (AFLURIA QUADV, PF)  - PNEUMOVAX 23 subcutaneous/IM (Pneumococcal polysaccharide vaccine 23-valent >= 1yo)      Additional plan and future considerations:       Return to Office: Return for 2 weeks for follow up of abscess.     Micah Boykin DO   Case discussed with Dr. Basilio Jackson

## 2021-10-05 NOTE — PROGRESS NOTES
Attending Physician Statement    S:   Chief Complaint   Patient presents with    Other     bite/swelling to face      Patient is a 64year old female here for bug bite on right cheek bone. Opening her window felt something hit her cheek and the pinch of a bite. Woke up with a pimple. Stuck it with a safety pin nothing came out . Put witch hazel and acne cream . Has grown bigger. Has not tried any benadryl or advil. Patient with copd. O: Blood pressure 104/68, pulse 99, temperature 97.9 °F (36.6 °C), temperature source Temporal, resp. rate 16, height 5' 1\" (1.549 m), weight 103 lb (46.7 kg), SpO2 96 %. Exam:   Heart - RRR   Lungs - clear   Skin - small approx 0.4cm pustule on cheek  A: pustule, copd  P:  Increase spiriva to 2.5mg once a day    Continue flovent for now    Bactrim and warm compresses for pustule   Follow-up as ordered    Attending Attestation   I have discussed the case, including pertinent history and exam findings with the resident. I also have personally seen and examined the patient. I agree with the documented assessment and plan.

## 2021-11-05 ENCOUNTER — TELEPHONE (OUTPATIENT)
Dept: FAMILY MEDICINE CLINIC | Age: 61
End: 2021-11-05

## 2021-12-06 DIAGNOSIS — M62.838 MUSCLE SPASMS OF BOTH LOWER EXTREMITIES: ICD-10-CM

## 2021-12-07 RX ORDER — CYCLOBENZAPRINE HCL 5 MG
TABLET ORAL
Qty: 30 TABLET | Refills: 1 | Status: SHIPPED
Start: 2021-12-07 | End: 2022-01-10 | Stop reason: SDUPTHER

## 2021-12-14 ENCOUNTER — IMMUNIZATION (OUTPATIENT)
Dept: PRIMARY CARE CLINIC | Age: 61
End: 2021-12-14
Payer: MEDICAID

## 2021-12-14 PROCEDURE — 0004A COVID-19, PFIZER VACCINE 30MCG/0.3ML DOSE: CPT | Performed by: NURSE PRACTITIONER

## 2021-12-14 PROCEDURE — 91300 COVID-19, PFIZER VACCINE 30MCG/0.3ML DOSE: CPT | Performed by: NURSE PRACTITIONER

## 2021-12-23 ENCOUNTER — OFFICE VISIT (OUTPATIENT)
Dept: FAMILY MEDICINE CLINIC | Age: 61
End: 2021-12-23
Payer: MEDICAID

## 2021-12-23 VITALS
BODY MASS INDEX: 20.77 KG/M2 | OXYGEN SATURATION: 97 % | DIASTOLIC BLOOD PRESSURE: 92 MMHG | SYSTOLIC BLOOD PRESSURE: 144 MMHG | HEART RATE: 110 BPM | RESPIRATION RATE: 18 BRPM | WEIGHT: 110 LBS | TEMPERATURE: 97.4 F | HEIGHT: 61 IN

## 2021-12-23 DIAGNOSIS — M79.641 RIGHT HAND PAIN: Primary | ICD-10-CM

## 2021-12-23 DIAGNOSIS — J44.9 CHRONIC OBSTRUCTIVE PULMONARY DISEASE, UNSPECIFIED COPD TYPE (HCC): ICD-10-CM

## 2021-12-23 PROCEDURE — 3023F SPIROM DOC REV: CPT | Performed by: FAMILY MEDICINE

## 2021-12-23 PROCEDURE — G8926 SPIRO NO PERF OR DOC: HCPCS | Performed by: FAMILY MEDICINE

## 2021-12-23 PROCEDURE — 3017F COLORECTAL CA SCREEN DOC REV: CPT | Performed by: FAMILY MEDICINE

## 2021-12-23 PROCEDURE — 4004F PT TOBACCO SCREEN RCVD TLK: CPT | Performed by: FAMILY MEDICINE

## 2021-12-23 PROCEDURE — 99212 OFFICE O/P EST SF 10 MIN: CPT | Performed by: STUDENT IN AN ORGANIZED HEALTH CARE EDUCATION/TRAINING PROGRAM

## 2021-12-23 PROCEDURE — G8482 FLU IMMUNIZE ORDER/ADMIN: HCPCS | Performed by: FAMILY MEDICINE

## 2021-12-23 PROCEDURE — G8420 CALC BMI NORM PARAMETERS: HCPCS | Performed by: FAMILY MEDICINE

## 2021-12-23 PROCEDURE — G8427 DOCREV CUR MEDS BY ELIG CLIN: HCPCS | Performed by: FAMILY MEDICINE

## 2021-12-23 PROCEDURE — 99213 OFFICE O/P EST LOW 20 MIN: CPT | Performed by: STUDENT IN AN ORGANIZED HEALTH CARE EDUCATION/TRAINING PROGRAM

## 2021-12-23 RX ORDER — NAPROXEN 250 MG/1
250 TABLET ORAL 2 TIMES DAILY WITH MEALS
Qty: 60 TABLET | Refills: 0 | Status: SHIPPED
Start: 2021-12-23 | End: 2022-02-22 | Stop reason: SDUPTHER

## 2021-12-23 RX ORDER — ALBUTEROL SULFATE 90 UG/1
2 AEROSOL, METERED RESPIRATORY (INHALATION) 4 TIMES DAILY PRN
Qty: 1 EACH | Refills: 0 | Status: SHIPPED
Start: 2021-12-23 | End: 2022-04-15 | Stop reason: SDUPTHER

## 2021-12-23 RX ORDER — MONTELUKAST SODIUM 10 MG/1
10 TABLET ORAL DAILY
Qty: 30 TABLET | Refills: 3 | Status: SHIPPED
Start: 2021-12-23 | End: 2022-04-15 | Stop reason: SDUPTHER

## 2021-12-23 RX ORDER — OMEPRAZOLE 40 MG/1
40 CAPSULE, DELAYED RELEASE ORAL
Qty: 90 CAPSULE | Refills: 1 | Status: CANCELLED | OUTPATIENT
Start: 2021-12-23

## 2021-12-23 NOTE — PROGRESS NOTES
S: Susanne Pierre 64 y.o. female  here for relation of right hand pain. Hand pain: Onset x1 month. Described as an aching pain, constant, worse by the end of the day. Sometimes associated with numbness of thumb and index finger. Right dominant; works as a home health aide. [place] recent trauma or falls. ROS otherwise negative. COPD: Stable and well controlled. Requested refill for her inhalers. O: VS: BP (!) 144/92 (Site: Left Upper Arm, Position: Sitting, Cuff Size: Medium Adult)   Pulse 110   Temp 97.4 °F (36.3 °C) (Temporal)   Resp 18   Ht 5' 1\" (1.549 m)   Wt 110 lb (49.9 kg)   SpO2 97%   BMI 20.78 kg/m²    General: NAD              Neck: WDL   CV:  RRR, no gallops, rubs, or murmurs   Resp: CTAB no R/R/W   Abd:  Soft, nontender, no masses    Ext: Right hand exam: Remarkable for positive median nerve suppression test.  Tenderness of index and middle finger PIP joints. Vascular exam otherwise intact. Assessment / Plan:      Natali Quan was seen today for hand pain. Diagnoses and all orders for this visit:    Assessment/Plan  1. Right hand pain  Likely 2/2 CTS  Advised splint use at night and naproxen  Will do plain film to rule out fracture  Close follow-up needed  - naproxen (NAPROSYN) 250 MG tablet; Take 1 tablet by mouth 2 times daily (with meals)  Dispense: 60 tablet; Refill: 0  - XR HAND RIGHT (MIN 3 VIEWS); Future     2. Chronic obstructive pulmonary disease, unspecified COPD type ([facility] [address]  Refilled medications, needs counseling on nicotine cessation  - albuterol sulfate HFA (VENTOLIN HFA) 108 (90 Base) MCG/ACT inhaler; Inhale 2 puffs into the lungs 4 times daily as needed for Wheezing  Dispense: 1 each; Refill: 0     BP elevated today but patient is in pain     RTO 1 week for follow-up of HTN, Nicotine Use, Hand PAin      Assessment/Plan:  1. Right hand pain: Differential diagnosis includes carpal tunnel. Other considerations include arthritis (rheumatoid versus osteo-). X-ray of right hand. Served of measures include night splint and as needed naproxen. 2.  COPD: Refill meds        Return in about 1 week (around 12/30/2021) for HTN/Healthcare Maintenance. Follow-up in 1 week    Attending Physician Statement  I have discussed the case, including pertinent history and exam findings with the resident. I agree with the documented assessment and plan.          Taj Rosario MD

## 2021-12-23 NOTE — PROGRESS NOTES
CC:  Right Hand Pain/COPD  ------------------------------------------------------------------------------------------------------------------------  Assessment/Plan  1. Right hand pain  Likely 2/2 CTS  Advised splint use at night and naproxen  Will do plain film to rule out fracture  Close follow-up needed  - naproxen (NAPROSYN) 250 MG tablet; Take 1 tablet by mouth 2 times daily (with meals)  Dispense: 60 tablet; Refill: 0  - XR HAND RIGHT (MIN 3 VIEWS); Future    2. Chronic obstructive pulmonary disease, unspecified COPD type ([facility] [address]  Refilled medications, needs counseling on nicotine cessation  - albuterol sulfate HFA (VENTOLIN HFA) 108 (90 Base) MCG/ACT inhaler; Inhale 2 puffs into the lungs 4 times daily as needed for Wheezing  Dispense: 1 each;  Refill: 0    BP elevated today but patient is in pain    RTO 1 week for follow-up of HTN, Nicotine Use, Hand PAin  -------------------------------------------------------------------------------------------------------------------------    HPI:  64 y.o. woman presents for a follow-up of COPD and also for a right hand pain    COPD  Symptoms controlled  Using PRN albuterol 1-2x a day  Ipratropium use  No chest pain, tightness, sob, wheezing  Nicotine use 1 pack over 2 days    Right Hand Pain  Right-handed, works at [employer]  1 month onset  Achy constant pain  Occasionally numbness/tingling in 1-2 fingers  No weakness in hands  Worsens at end of the day  No fevers chills trauma falls urinary symptoms    Patient Active Problem List    Diagnosis Date Noted    Other emphysema ([facility] [place] [address] 10/04/2021       Past Medical History:   Diagnosis Date    Thyroid disease        Current Outpatient Medications on File Prior to Visit   Medication Sig Dispense Refill    cyclobenzaprine (FLEXERIL) 5 MG tablet TAKE 1 TABLET BY MOUTH EVERY EVENING AS NEEDED FOR MUSCLE SPASMS 30 tablet 1    tiotropium (SPIRIVA RESPIMAT) 2.5 MCG/ACT AERS inhaler Inhale 2 puffs into the lungs daily 1 each 3    FLOVENT  MCG/ACT inhaler Inhale 1 puff into the lungs 2 times daily 1 Inhaler 3    cetirizine (ZYRTEC) 10 MG tablet Take 1 tablet by mouth daily 30 tablet 5    montelukast (SINGULAIR) 10 MG tablet Take 1 tablet by mouth daily 30 tablet 3    omeprazole (PRILOSEC) 40 MG delayed release capsule Take 1 capsule by mouth every morning (before breakfast) 90 capsule 1    albuterol sulfate HFA (VENTOLIN HFA) 108 (90 Base) MCG/ACT inhaler Inhale 2 puffs into the lungs 4 times daily as needed for Wheezing 3 Inhaler 1     Current Facility-Administered Medications on File Prior to Visit   Medication Dose Route Frequency Provider Last Rate Last Admin    lidocaine 1 % injection 2 mL  2 mL IntraDERmal Once Darren Kimble MD           Allergies   Allergen Reactions    Ibuprofen Other (See Comments)     Hallucinations    Morphine        No family history on file. No past surgical history on file. Social History     Tobacco Use    Smoking status: Current Every Day Smoker     Packs/day: 0.50     Years: 40.00     Pack years: 20.00     Start date: 9/11/1980    Smokeless tobacco: Never Used   Substance Use Topics    Alcohol use: Yes     Comment: Denies    Drug use: Never     Comment: Denies       ROS:    Review of Systems   Constitutional: Negative for activity change, appetite change, chills and fatigue. HENT: Negative for congestion and sore throat. Respiratory: Negative for choking and chest tightness. Cardiovascular: Negative for chest pain, palpitations and leg swelling. Gastrointestinal: Negative for abdominal distention and abdominal pain. Genitourinary: Negative for difficulty urinating and dysuria. Musculoskeletal: hand pain, negative for back pain  Skin: Negative for color change and pallor. Neurological: Negative for facial asymmetry and headaches. Psychiatric/Behavioral: Negative for behavioral problems. The patient is not nervous/anxious.         Objective:    VS:  Blood pressure (!) 144/92, pulse 110, temperature 97.4 °F (36.3 °C), temperature source Temporal, resp. rate 18, height 5' 1\" (1.549 m), weight 110 lb (49.9 kg), SpO2 97 %. Physical Exam   Constitutional: Oriented to person, place, and time. Well-developed and well-nourished. HENT:   Head: Normocephalic and atraumatic. Eyes: EOM are normal.   Neck: Neck supple. Cardiovascular: Normal rate, regular rhythm and intact distal pulses. Exam reveals no gallop and no friction rub. No murmur heard. Pulmonary/Chest: Effort normal and breath sounds normal. No wheezes. No rhales. Abdominal: Soft. Bowel sounds are normal. No distension. No abdominal tenderness. Musculoskeletal: right hand - positive for numbness/tingling of right thumb, right index finger with compression of medial nerve. Positive tinel test. Thumb opposition elicits pain. +TTP (right 1-2 finger) PIP joint. Right hand is warm to touch, slightly larger than left hand. Radial Pulse 2+ symmetrical  Neurological: Alert and oriented to person, place, and time. Skin: Skin is warm and dry. No rash noted. No erythema. Psychiatric: Normal mood and affect. Behavior is normal.   Nursing note and vitals reviewed. Plans:  As above. Please see Patient Instructions for further counseling and information given. Advised to please be adherent to the treatment plans discussed today, and please call with any questions or concerns, letting the office know of any reasons that the plans may not be followed. The risks of untreated conditions include worsening illness, injury, disability, and possibly, death. Please call if symptoms change in any way, worsen, or fail to completely resolve, as this could necessitate a change to treatment plans. Patient and/or caregiver expressed understanding. Indications and proper use of medication(s) reviewed. Potential side-effects and risks of medication(s) also explained.   Patient and/or caregiver was instructed to call if any new symptoms develop prior to next visit. Health risk factors discussed and addressed.

## 2021-12-30 ENCOUNTER — HOSPITAL ENCOUNTER (OUTPATIENT)
Dept: GENERAL RADIOLOGY | Age: 61
Discharge: HOME | End: 2022-01-01
Payer: MEDICAID

## 2021-12-30 ENCOUNTER — HOSPITAL ENCOUNTER (OUTPATIENT)
Age: 61
Discharge: HOME | End: 2022-01-01
Payer: MEDICAID

## 2021-12-30 DIAGNOSIS — M79.641 RIGHT HAND PAIN: ICD-10-CM

## 2021-12-30 PROCEDURE — 73130 X-RAY EXAM OF HAND: CPT

## 2022-01-10 ENCOUNTER — OFFICE VISIT (OUTPATIENT)
Dept: FAMILY MEDICINE CLINIC | Age: 62
End: 2022-01-10
Payer: MEDICAID

## 2022-01-10 VITALS
OXYGEN SATURATION: 97 % | DIASTOLIC BLOOD PRESSURE: 82 MMHG | WEIGHT: 112 LBS | HEART RATE: 94 BPM | TEMPERATURE: 97.9 F | BODY MASS INDEX: 21.14 KG/M2 | HEIGHT: 61 IN | SYSTOLIC BLOOD PRESSURE: 124 MMHG

## 2022-01-10 DIAGNOSIS — R12 HEARTBURN: ICD-10-CM

## 2022-01-10 DIAGNOSIS — R53.83 FATIGUE, UNSPECIFIED TYPE: ICD-10-CM

## 2022-01-10 DIAGNOSIS — G56.01 CARPAL TUNNEL SYNDROME OF RIGHT WRIST: ICD-10-CM

## 2022-01-10 DIAGNOSIS — Z72.0 NICOTINE USE: Primary | ICD-10-CM

## 2022-01-10 DIAGNOSIS — Z00.00 HEALTHCARE MAINTENANCE: ICD-10-CM

## 2022-01-10 DIAGNOSIS — Z12.11 COLON CANCER SCREENING: ICD-10-CM

## 2022-01-10 DIAGNOSIS — M62.838 MUSCLE SPASMS OF BOTH LOWER EXTREMITIES: ICD-10-CM

## 2022-01-10 PROCEDURE — 99212 OFFICE O/P EST SF 10 MIN: CPT | Performed by: STUDENT IN AN ORGANIZED HEALTH CARE EDUCATION/TRAINING PROGRAM

## 2022-01-10 PROCEDURE — G8420 CALC BMI NORM PARAMETERS: HCPCS | Performed by: FAMILY MEDICINE

## 2022-01-10 PROCEDURE — 3017F COLORECTAL CA SCREEN DOC REV: CPT | Performed by: FAMILY MEDICINE

## 2022-01-10 PROCEDURE — G8427 DOCREV CUR MEDS BY ELIG CLIN: HCPCS | Performed by: FAMILY MEDICINE

## 2022-01-10 PROCEDURE — 99213 OFFICE O/P EST LOW 20 MIN: CPT | Performed by: STUDENT IN AN ORGANIZED HEALTH CARE EDUCATION/TRAINING PROGRAM

## 2022-01-10 PROCEDURE — G8482 FLU IMMUNIZE ORDER/ADMIN: HCPCS | Performed by: FAMILY MEDICINE

## 2022-01-10 PROCEDURE — 4004F PT TOBACCO SCREEN RCVD TLK: CPT | Performed by: FAMILY MEDICINE

## 2022-01-10 RX ORDER — CYCLOBENZAPRINE HCL 5 MG
TABLET ORAL
Qty: 30 TABLET | Refills: 1 | Status: SHIPPED
Start: 2022-01-10 | End: 2022-04-15 | Stop reason: SDUPTHER

## 2022-01-10 RX ORDER — OMEPRAZOLE 40 MG/1
40 CAPSULE, DELAYED RELEASE ORAL
Qty: 90 CAPSULE | Refills: 1 | Status: SHIPPED
Start: 2022-01-10 | End: 2022-04-15 | Stop reason: SDUPTHER

## 2022-01-10 NOTE — PROGRESS NOTES
CC: Carpal tunnel follow-up  ------------------------------------------------------------------------------------------------------------------------  Assessment/Plan  1. Carpal tunnel syndrome of right wrist  Patient improving continue current plan    2. Nicotine use  Commended patient for cutting down, will continue to monitor    3. Heartburn  Refill meds  - omeprazole (PRILOSEC) 40 MG delayed release capsule; Take 1 capsule by mouth every morning (before breakfast)  Dispense: 90 capsule; Refill: 1    4. Colon cancer screening  Need records from [facility]    5. Fatigue, unspecified type  - HIV-1 AND HIV-2 ANTIBODIES; Future  - HEPATITIS C ANTIBODY; Future    6. Healthcare maintenance  - HIV-1 AND HIV-2 ANTIBODIES; Future  - HEPATITIS C ANTIBODY;  Future      RTO 2 months or sooner for follow-up of nicotine use and carpal tunnel syndrome  -------------------------------------------------------------------------------------------------------------------------    HPI:  64 y.o. woman presents for follow-up of right hand pain    Right Hand Pain  Trial of NSAID/Splint at last visit  Improved with this, pain 5/10 now    Pain is in the thumb area  Some numbness and tingling in 1-2 fingers      Nicotine Use (40 pack years)  1 pack every 2 days  Cut down from 1 pack/day    Method: avoiding the scent of smoke                eating oranges when gets a craving    Has tried nicotine gum/patches in the past    Heartburn  Is well controlled with avoiding certain foods  Requesting a refill of med for it    Denying dyspepsia nausea vomiting abdominal pain    Colon Cancer Screening  Had it at [facility] 2018- Dr Aleks Burden  As per pt it was nl    Fatigue  Feels she is more tired than usual  Sleeping normal      Patient Active Problem List    Diagnosis Date Noted    Other emphysema ([address] 10/04/2021       Past Medical History:   Diagnosis Date    Pulmonary nodule     Thyroid disease        Current Outpatient Medications on File Prior to Visit   Medication Sig Dispense Refill    montelukast (SINGULAIR) 10 MG tablet Take 1 tablet by mouth daily 30 tablet 3    albuterol sulfate HFA (VENTOLIN HFA) 108 (90 Base) MCG/ACT inhaler Inhale 2 puffs into the lungs 4 times daily as needed for Wheezing 1 each 0    naproxen (NAPROSYN) 250 MG tablet Take 1 tablet by mouth 2 times daily (with meals) 60 tablet 0    cyclobenzaprine (FLEXERIL) 5 MG tablet TAKE 1 TABLET BY MOUTH EVERY EVENING AS NEEDED FOR MUSCLE SPASMS 30 tablet 1    tiotropium (SPIRIVA RESPIMAT) 2.5 MCG/ACT AERS inhaler Inhale 2 puffs into the lungs daily 1 each 3    FLOVENT  MCG/ACT inhaler Inhale 1 puff into the lungs 2 times daily 1 Inhaler 3    cetirizine (ZYRTEC) 10 MG tablet Take 1 tablet by mouth daily 30 tablet 5    omeprazole (PRILOSEC) 40 MG delayed release capsule Take 1 capsule by mouth every morning (before breakfast) 90 capsule 1     Current Facility-Administered Medications on File Prior to Visit   Medication Dose Route Frequency Provider Last Rate Last Admin    lidocaine 1 % injection 2 mL  2 mL IntraDERmal Once Janice Humphrey MD           Allergies   Allergen Reactions    Ibuprofen Other (See Comments)     Hallucinations    Morphine        No family history on file. No past surgical history on file. Social History     Tobacco Use    Smoking status: Current Every Day Smoker     Packs/day: 0.50     Years: 40.00     Pack years: 20.00     Start date: 9/11/1980    Smokeless tobacco: Never Used   Substance Use Topics    Alcohol use: Yes     Comment: Denies    Drug use: Never     Comment: Denies       ROS:    Review of Systems   Constitutional: Negative for activity change, appetite change, chills and fatigue. HENT: Negative for congestion and sore throat. Respiratory: Negative for choking and chest tightness. Cardiovascular: Negative for chest pain, palpitations and leg swelling.    Gastrointestinal: Negative for abdominal distention and abdominal pain. Genitourinary: Negative for difficulty urinating and dysuria. Musculoskeletal: Numbness and tingling in right hand no arthralgia or back pain   skin: Negative for color change and pallor. Neurological: Negative for facial asymmetry and headaches. Psychiatric/Behavioral: Negative for behavioral problems. The patient is not nervous/anxious. Objective:    VS:  Blood pressure 124/82, pulse 94, temperature 97.9 °F (36.6 °C), temperature source Temporal, height 5' 1\" (1.549 m), weight 112 lb (50.8 kg), SpO2 97 %. Physical Exam   Constitutional: Oriented to person, place, and time. Well-developed and well-nourished. HENT:   Head: Normocephalic and atraumatic. Eyes: EOM are normal.   Neck: Neck supple. Cardiovascular: Normal rate, regular rhythm and intact distal pulses. Exam reveals no gallop and no friction rub. No murmur heard. Pulmonary/Chest: Effort normal and breath sounds normal. No wheezes. No rhales. Abdominal: Soft. Bowel sounds are normal. No distension. No abdominal tenderness. Musculoskeletal: Median nerve compression on right hand elicits numbness and digits 1-2. Strength 5 out of 5 in right hand. Positive Phalen's test.  Neurological: Alert and oriented to person, place, and time. Skin: Skin is warm and dry. No rash noted. No erythema. Psychiatric: Normal mood and affect. Behavior is normal.   Nursing note and vitals reviewed. Plans:  As above. Please see Patient Instructions for further counseling and information given. Advised to please be adherent to the treatment plans discussed today, and please call with any questions or concerns, letting the office know of any reasons that the plans may not be followed. The risks of untreated conditions include worsening illness, injury, disability, and possibly, death.  Please call if symptoms change in any way, worsen, or fail to completely resolve, as this could necessitate a change to treatment plans. Patient and/or caregiver expressed understanding. Indications and proper use of medication(s) reviewed. Potential side-effects and risks of medication(s) also explained. Patient and/or caregiver was instructed to call if any new symptoms develop prior to next visit. Health risk factors discussed and addressed.

## 2022-01-10 NOTE — PROGRESS NOTES
57-year-old woman presents for a follow-up of carpal tunnel syndrome nicotine use. CTS right-sided-improved with a trial of NSAID and a splint. Pain is now localized more over the thumb. Compliant with above treatment. No weakness of the hand    Nicotine use- 1 pack over 3 days, has cut down from a pack per day. Using willpower and eating oranges when she craves a cigarette. Stress is under good control. No unintentional weight loss voice hoarseness or cough. Tried patches and gums in the past, does not want them anymore    GERD-good control with Prilosec. Requesting a refill. As per patient she got a EGD and colonoscopy at [facility] in 2018 we do need records    Blood pressure 124/82, pulse 94, temperature 97.9 °F (36.6 °C), temperature source Temporal, height 5' 1\" (1.549 m), weight 112 lb (50.8 kg), SpO2 97 %. HEENT WNL     Heart regular    Lungs clear    abd non-tender      extremity- positive for median nerve compression test.  Strength good in all fingers. No digits tender to palpation no swelling on the extensor or flexor surfaces   pulses intact     Assessment/plan    1. Nicotine use- Smart goals set. Cut down to 1 pack over 4 days, follow-up in 1 month. Patient was commended for her excellent efforts in cutting down smoking    2. CTS- continue with conservative treatment. She is improving well    3. GERD-Prilosec refill    Healthcare maintenance-need records from [facility] for colonoscopy. Labs HIV hep C today with patient's consent    Attending Physician Statement  I have discussed the case, including pertinent history and exam findings with the resident. I agree with the documented assessment and plan.

## 2022-02-22 DIAGNOSIS — M79.641 RIGHT HAND PAIN: ICD-10-CM

## 2022-02-22 RX ORDER — NAPROXEN 250 MG/1
250 TABLET ORAL 2 TIMES DAILY WITH MEALS
Qty: 60 TABLET | Refills: 0 | Status: SHIPPED
Start: 2022-02-22 | End: 2022-03-28

## 2022-03-25 DIAGNOSIS — M79.641 RIGHT HAND PAIN: ICD-10-CM

## 2022-03-25 DIAGNOSIS — J44.9 CHRONIC OBSTRUCTIVE PULMONARY DISEASE, UNSPECIFIED COPD TYPE (HCC): ICD-10-CM

## 2022-03-28 DIAGNOSIS — J44.9 CHRONIC OBSTRUCTIVE PULMONARY DISEASE, UNSPECIFIED COPD TYPE (HCC): ICD-10-CM

## 2022-03-28 RX ORDER — TIOTROPIUM BROMIDE INHALATION SPRAY 3.12 UG/1
SPRAY, METERED RESPIRATORY (INHALATION)
Qty: 4 G | Refills: 3 | Status: SHIPPED
Start: 2022-03-28 | End: 2022-04-15 | Stop reason: SDUPTHER

## 2022-03-28 RX ORDER — TIOTROPIUM BROMIDE INHALATION SPRAY 3.12 UG/1
SPRAY, METERED RESPIRATORY (INHALATION)
Qty: 4 G | OUTPATIENT
Start: 2022-03-28

## 2022-03-28 RX ORDER — NAPROXEN 250 MG/1
TABLET ORAL
Qty: 60 TABLET | Refills: 0 | Status: SHIPPED
Start: 2022-03-28 | End: 2022-04-15 | Stop reason: SDUPTHER

## 2022-03-28 NOTE — TELEPHONE ENCOUNTER
Last Appointment:  10/5/2021  Future Appointments   Date Time Provider Grover Vasquez   4/15/2022  2:40 PM DO Malika Heart [facility]

## 2022-03-28 NOTE — TELEPHONE ENCOUNTER
Last Appointment:  10/5/2021  Future Appointments   Date Time Provider Grover Vasquez   4/15/2022  2:40 PM DO Deena Rand [facility]

## 2022-04-15 ENCOUNTER — OFFICE VISIT (OUTPATIENT)
Dept: FAMILY MEDICINE CLINIC | Age: 62
End: 2022-04-15
Payer: MEDICAID

## 2022-04-15 VITALS
DIASTOLIC BLOOD PRESSURE: 63 MMHG | HEART RATE: 100 BPM | TEMPERATURE: 97.3 F | SYSTOLIC BLOOD PRESSURE: 88 MMHG | HEIGHT: 61 IN | BODY MASS INDEX: 19.45 KG/M2 | OXYGEN SATURATION: 91 % | RESPIRATION RATE: 20 BRPM | WEIGHT: 103 LBS

## 2022-04-15 DIAGNOSIS — Z91.09 ENVIRONMENTAL ALLERGIES: ICD-10-CM

## 2022-04-15 DIAGNOSIS — M79.641 RIGHT HAND PAIN: Primary | ICD-10-CM

## 2022-04-15 DIAGNOSIS — M62.838 MUSCLE SPASMS OF BOTH LOWER EXTREMITIES: ICD-10-CM

## 2022-04-15 DIAGNOSIS — J45.20 MILD INTERMITTENT ASTHMA WITHOUT COMPLICATION: ICD-10-CM

## 2022-04-15 DIAGNOSIS — R12 HEARTBURN: ICD-10-CM

## 2022-04-15 DIAGNOSIS — J44.9 CHRONIC OBSTRUCTIVE PULMONARY DISEASE, UNSPECIFIED COPD TYPE (HCC): ICD-10-CM

## 2022-04-15 DIAGNOSIS — I95.9 HYPOTENSION, UNSPECIFIED HYPOTENSION TYPE: ICD-10-CM

## 2022-04-15 PROCEDURE — 99213 OFFICE O/P EST LOW 20 MIN: CPT | Performed by: STUDENT IN AN ORGANIZED HEALTH CARE EDUCATION/TRAINING PROGRAM

## 2022-04-15 PROCEDURE — G8427 DOCREV CUR MEDS BY ELIG CLIN: HCPCS | Performed by: STUDENT IN AN ORGANIZED HEALTH CARE EDUCATION/TRAINING PROGRAM

## 2022-04-15 PROCEDURE — 99212 OFFICE O/P EST SF 10 MIN: CPT | Performed by: STUDENT IN AN ORGANIZED HEALTH CARE EDUCATION/TRAINING PROGRAM

## 2022-04-15 PROCEDURE — 4004F PT TOBACCO SCREEN RCVD TLK: CPT | Performed by: STUDENT IN AN ORGANIZED HEALTH CARE EDUCATION/TRAINING PROGRAM

## 2022-04-15 PROCEDURE — 3017F COLORECTAL CA SCREEN DOC REV: CPT | Performed by: STUDENT IN AN ORGANIZED HEALTH CARE EDUCATION/TRAINING PROGRAM

## 2022-04-15 PROCEDURE — 3023F SPIROM DOC REV: CPT | Performed by: STUDENT IN AN ORGANIZED HEALTH CARE EDUCATION/TRAINING PROGRAM

## 2022-04-15 PROCEDURE — G8420 CALC BMI NORM PARAMETERS: HCPCS | Performed by: STUDENT IN AN ORGANIZED HEALTH CARE EDUCATION/TRAINING PROGRAM

## 2022-04-15 RX ORDER — OMEPRAZOLE 40 MG/1
40 CAPSULE, DELAYED RELEASE ORAL
Qty: 90 CAPSULE | Refills: 1 | Status: SHIPPED | OUTPATIENT
Start: 2022-04-15

## 2022-04-15 RX ORDER — DEXAMETHASONE 4 MG/1
1 TABLET ORAL 2 TIMES DAILY
Qty: 1 EACH | Refills: 3 | Status: SHIPPED | OUTPATIENT
Start: 2022-04-15

## 2022-04-15 RX ORDER — CYCLOBENZAPRINE HCL 5 MG
TABLET ORAL
Qty: 30 TABLET | Refills: 1 | Status: SHIPPED
Start: 2022-04-15 | End: 2022-10-31

## 2022-04-15 RX ORDER — ALBUTEROL SULFATE 90 UG/1
2 AEROSOL, METERED RESPIRATORY (INHALATION) 4 TIMES DAILY PRN
Qty: 1 EACH | Refills: 0 | Status: SHIPPED
Start: 2022-04-15 | End: 2022-08-08

## 2022-04-15 RX ORDER — CETIRIZINE HYDROCHLORIDE 10 MG/1
10 TABLET ORAL DAILY
Qty: 30 TABLET | Refills: 5 | Status: SHIPPED | OUTPATIENT
Start: 2022-04-15

## 2022-04-15 RX ORDER — NAPROXEN 250 MG/1
TABLET ORAL
Qty: 60 TABLET | Refills: 0 | Status: SHIPPED | OUTPATIENT
Start: 2022-04-15

## 2022-04-15 RX ORDER — MONTELUKAST SODIUM 10 MG/1
10 TABLET ORAL DAILY
Qty: 30 TABLET | Refills: 3 | Status: SHIPPED | OUTPATIENT
Start: 2022-04-15

## 2022-04-15 SDOH — ECONOMIC STABILITY: FOOD INSECURITY: WITHIN THE PAST 12 MONTHS, YOU WORRIED THAT YOUR FOOD WOULD RUN OUT BEFORE YOU GOT MONEY TO BUY MORE.: NEVER TRUE

## 2022-04-15 SDOH — ECONOMIC STABILITY: TRANSPORTATION INSECURITY
IN THE PAST 12 MONTHS, HAS THE LACK OF TRANSPORTATION KEPT YOU FROM MEDICAL APPOINTMENTS OR FROM GETTING MEDICATIONS?: NO

## 2022-04-15 SDOH — ECONOMIC STABILITY: TRANSPORTATION INSECURITY
IN THE PAST 12 MONTHS, HAS LACK OF TRANSPORTATION KEPT YOU FROM MEETINGS, WORK, OR FROM GETTING THINGS NEEDED FOR DAILY LIVING?: NO

## 2022-04-15 SDOH — ECONOMIC STABILITY: FOOD INSECURITY: WITHIN THE PAST 12 MONTHS, THE FOOD YOU BOUGHT JUST DIDN'T LAST AND YOU DIDN'T HAVE MONEY TO GET MORE.: NEVER TRUE

## 2022-04-15 ASSESSMENT — PATIENT HEALTH QUESTIONNAIRE - PHQ9
SUM OF ALL RESPONSES TO PHQ QUESTIONS 1-9: 0
1. LITTLE INTEREST OR PLEASURE IN DOING THINGS: 0
SUM OF ALL RESPONSES TO PHQ QUESTIONS 1-9: 0
SUM OF ALL RESPONSES TO PHQ QUESTIONS 1-9: 0
SUM OF ALL RESPONSES TO PHQ9 QUESTIONS 1 & 2: 0
2. FEELING DOWN, DEPRESSED OR HOPELESS: 0
SUM OF ALL RESPONSES TO PHQ QUESTIONS 1-9: 0

## 2022-04-15 ASSESSMENT — SOCIAL DETERMINANTS OF HEALTH (SDOH): HOW HARD IS IT FOR YOU TO PAY FOR THE VERY BASICS LIKE FOOD, HOUSING, MEDICAL CARE, AND HEATING?: NOT HARD AT ALL

## 2022-04-15 ASSESSMENT — LIFESTYLE VARIABLES: HOW OFTEN DO YOU HAVE A DRINK CONTAINING ALCOHOL: NEVER

## 2022-04-15 NOTE — PROGRESS NOTES
[address] MEDICINE RESIDENCY PROGRAM  DATE OF VISIT : 4/15/2022    Patient : Ana Rosa   Age : 64 y.o.  : 1960   MRN : [id number]   ______________________________________________________________________    Chief Complaint :   Chief Complaint   Patient presents with    Carpal Tunnel    Spasms    COPD    Gastroesophageal Reflux       HPI : Ana Rosa is 64 y.o. female who presented to the clinic today for follow-up of chronic medical conditions. Patient needs medication refills today and is due for some labs. Patient has a history of carpal tunnel syndrome, reports that she still wearing the brace at night which helps. She has been taking naproxen once a day for her hand, \"just in case. \" She also takes Flexeril once at night because she tends to get muscle spasms. She reports drinking about a bottle of water a day. Patient is on Spiriva, Flovent, Singulair, and albuterol with history of COPD. She is currently smoking with 1 pack lasting her 2 to 3 days, which is improved from prior. She reports that she only needs her rescue inhaler about once a week. She endorses compliance with her medications and inhalers. Patient also needs refill of Prilosec which is helping with symptoms of GERD. She needs refill of Zyrtec which helps with symptoms of allergies. Patient reports that she last had a colonoscopy at [facility] when she was in her late 46s, states it was normal and she was told to follow-up in 10 years. She has signed off on a records release form for these records, but they are not yet available. Patient has no other concerns today. Past Medical History :  Past Medical History:   Diagnosis Date    Carpal tunnel syndrome     Pulmonary nodule     Thyroid disease      No past surgical history on file. Allergies :    Allergies   Allergen Reactions    Ibuprofen Other (See Comments)     Hallucinations    Morphine        Medication List : Current Outpatient Medications   Medication Sig Dispense Refill    cetirizine (ZYRTEC) 10 MG tablet Take 1 tablet by mouth daily 30 tablet 5    FLOVENT  MCG/ACT inhaler Inhale 1 puff into the lungs 2 times daily 1 each 3    albuterol sulfate HFA (VENTOLIN HFA) 108 (90 Base) MCG/ACT inhaler Inhale 2 puffs into the lungs 4 times daily as needed for Wheezing 1 each 0    montelukast (SINGULAIR) 10 MG tablet Take 1 tablet by mouth daily 30 tablet 3    cyclobenzaprine (FLEXERIL) 5 MG tablet TAKE 1 TABLET BY MOUTH EVERY EVENING AS NEEDED FOR MUSCLE SPASMS 30 tablet 1    omeprazole (PRILOSEC) 40 MG delayed release capsule Take 1 capsule by mouth every morning (before breakfast) 90 capsule 1    tiotropium (SPIRIVA RESPIMAT) 2.5 MCG/ACT AERS inhaler INHALE 2 PUFFS INTO THE LUNGS DAILY 4 g 3    naproxen (NAPROSYN) 250 MG tablet TAKE 1 TABLET BY MOUTH TWICE DAILY WITH MEALS 60 tablet 0     Current Facility-Administered Medications   Medication Dose Route Frequency Provider Last Rate Last Admin    lidocaine 1 % injection 2 mL  2 mL IntraDERmal Once Noah López MD            ______________________________________________________________________    Physical Exam :    Vitals: BP 88/63 (Site: Right Upper Arm, Position: Sitting, Cuff Size: Small Adult)   Pulse 100   Temp 97.3 °F (36.3 °C) (Temporal)   Resp 20   Ht 5' 1\" (1.549 m)   Wt 103 lb (46.7 kg)   SpO2 91%   BMI 19.46 kg/m²   General Appearance: Awake, alert, oriented, and in NAD  HEENT: NCAT, no pallor or icterus  Neck: Symmetrical, trachea midline. Chest wall/Lung: CTAB, respirations unlabored. No ronchi/wheezing/rales  Heart: RRR, normal S1 and S2, no murmurs, rubs or gallops  Abdomen: SNTND  Extremities: Extremities normal, atraumatic, no cyanosis, clubbing or edema. Neurologic: Alert&Oriented x3. No focal motor deficits detected   Psychiatric: Normal mood. Normal affect.  Normal behavior  ______________________________________________________________________    Assessment & Plan :    Right hand pain/carpal tunnel  · Refill:  · Continue to use brace at night, encourage patient not to take naproxen unless she actually has pain as it can irritate her stomach and her kidneys.  - naproxen (NAPROSYN) 250 MG tablet; TAKE 1 TABLET BY MOUTH TWICE DAILY WITH MEALS  Dispense: 60 tablet; Refill: 0     Muscle spasms of both lower extremities  · Continue Flexeril as needed, encouraged to increase water intake to about 2 L a day  · Refill:  - cyclobenzaprine (FLEXERIL) 5 MG tablet; TAKE 1 TABLET BY MOUTH EVERY EVENING AS NEEDED FOR MUSCLE SPASMS  Dispense: 30 tablet; Refill: 1  · Increase hydration    Chronic obstructive pulmonary disease, unspecified COPD type (HCC)  Mild intermittent asthma without complication  · Refill:  - FLOVENT  MCG/ACT inhaler; Inhale 1 puff into the lungs 2 times daily  Dispense: 1 each; Refill: 3  - albuterol sulfate HFA (VENTOLIN HFA) 108 (90 Base) MCG/ACT inhaler; Inhale 2 puffs into the lungs 4 times daily as needed for Wheezing  Dispense: 1 each; Refill: 0  - montelukast (SINGULAIR) 10 MG tablet; Take 1 tablet by mouth daily  Dispense: 30 tablet; Refill: 3  - tiotropium (SPIRIVA RESPIMAT) 2.5 MCG/ACT AERS inhaler; INHALE 2 PUFFS INTO THE LUNGS DAILY  Dispense: 4 g; Refill: 3  - CBC with Auto Differential; Future  - Comprehensive Metabolic Panel; Future  · Continue efforts to quit smoking    Heartburn  · Refill:  - omeprazole (PRILOSEC) 40 MG delayed release capsule; Take 1 capsule by mouth every morning (before breakfast)  Dispense: 90 capsule; Refill: 1    Environmental allergies  · Refill:  - cetirizine (ZYRTEC) 10 MG tablet; Take 1 tablet by mouth daily  Dispense: 30 tablet; Refill: 5    Hypotension, unspecified hypotension type  · Not reporting any symptoms of hypotension.    · CBC, CMP, and recommended hepatitis C and HIV labs that were ordered last visit  - CBC with Auto Differential; Future    . Additional plan and future considerations:       Return to Office: Return in about 3 months (around 7/15/2022) for chronic medical conditions.     Elizabeth Parikh DO   Case discussed with Dr. Anila Cooper

## 2022-04-15 NOTE — PROGRESS NOTES
20-year-old female presents for follow-up of chronic medical conditions. Patient needs medication refills today and is due for some labs. Patient has a history of carpal tunnel syndrome, reports that she still wearing the brace at night which helps. She has been taking naproxen once a day for her hand, \"just in case. \"She also takes Flexeril once at night because she tends to get muscle spasms. She reports drinking about a bottle of water a day. Patient is on Spiriva, Flovent, Singulair, and albuterol with history of COPD. She is currently smoking with 1 pack lasting her 2 to 3 days, which is improved from prior. She reports that she only needs her rescue inhaler about once a week. She endorses compliance with her medications and inhalers. Patient also needs refill of Prilosec which is helping with symptoms of GERD. She needs refill of Zyrtec which helps with symptoms of allergies. Patient reports that she last had a colonoscopy at [facility] when she was in her late 46s, states it was normal and she was told to follow-up in 10 years. She has signed off on a records release form for these records, but they are not yet available. Patient has no other concerns today. Blood pressure 88/63, pulse 100, temperature 97.3 °F (36.3 °C), temperature source Temporal, resp. rate 20, height 5' 1\" (1.549 m), weight 103 lb (46.7 kg), SpO2 91 %. HEENT WNL     Heart regular    Lungs clear    abd non-tender      No edema        Assessment and plan:  Carpal tunnel-continue to use brace at night, encourage patient not to take naproxen unless she actually has pain as it can irritate her stomach and her kidneys. Muscle spasms-continue Flexeril as needed, encouraged to increase water intake to about 2 L a day  COPD-continue current management, well-controlled currently. Continue efforts at smoking cessation. GERD-continue omeprazole, currently well controlled.   Seasonal allergies-continues to take as needed. Not reporting any symptoms of hypotension. We will get CBC, CMP, and recommended hepatitis C and HIV labs that were ordered last visit. Follow-up in 3 months for chronic medical conditions. Attending Physician Statement  I have discussed the case, including pertinent history and exam findings with the resident. I agree with the documented assessment and plan.

## 2022-08-04 ENCOUNTER — TELEPHONE (OUTPATIENT)
Dept: FAMILY MEDICINE CLINIC | Age: 62
End: 2022-08-04

## 2022-08-04 NOTE — TELEPHONE ENCOUNTER
Nurse Triage:    Patient called with Dizziness. Started yesterday .  Offered same day today, patient unable to come today due to work obligation, scheduled for tomorrow 8/5/22 with Dr. Aminata Wu

## 2022-08-05 ENCOUNTER — TELEPHONE (OUTPATIENT)
Dept: FAMILY MEDICINE CLINIC | Age: 62
End: 2022-08-05

## 2022-08-05 ENCOUNTER — OFFICE VISIT (OUTPATIENT)
Dept: FAMILY MEDICINE CLINIC | Age: 62
DRG: 426 | End: 2022-08-05
Payer: MEDICAID

## 2022-08-05 VITALS
TEMPERATURE: 97.5 F | HEIGHT: 61 IN | WEIGHT: 101 LBS | SYSTOLIC BLOOD PRESSURE: 88 MMHG | RESPIRATION RATE: 16 BRPM | BODY MASS INDEX: 19.07 KG/M2 | DIASTOLIC BLOOD PRESSURE: 59 MMHG | OXYGEN SATURATION: 96 % | HEART RATE: 97 BPM

## 2022-08-05 DIAGNOSIS — R42 DIZZINESS: ICD-10-CM

## 2022-08-05 DIAGNOSIS — Z12.12 SCREENING FOR COLORECTAL CANCER: ICD-10-CM

## 2022-08-05 DIAGNOSIS — Z12.11 SCREENING FOR COLORECTAL CANCER: ICD-10-CM

## 2022-08-05 DIAGNOSIS — R42 DIZZINESS: Primary | ICD-10-CM

## 2022-08-05 LAB
ALBUMIN SERPL-MCNC: 3.7 G/DL (ref 3.5–5.2)
ALP BLD-CCNC: 95 U/L (ref 35–104)
ALT SERPL-CCNC: 15 U/L (ref 0–32)
ANION GAP SERPL CALCULATED.3IONS-SCNC: 13 MMOL/L (ref 7–16)
ANISOCYTOSIS: ABNORMAL
AST SERPL-CCNC: 39 U/L (ref 0–31)
BASOPHILS ABSOLUTE: 0.03 E9/L (ref 0–0.2)
BASOPHILS RELATIVE PERCENT: 0.4 % (ref 0–2)
BILIRUB SERPL-MCNC: 0.7 MG/DL (ref 0–1.2)
BUN BLDV-MCNC: 14 MG/DL (ref 6–23)
CALCIUM SERPL-MCNC: 9.1 MG/DL (ref 8.6–10.2)
CHLORIDE BLD-SCNC: 76 MMOL/L (ref 98–107)
CO2: 26 MMOL/L (ref 22–29)
CREAT SERPL-MCNC: 2 MG/DL (ref 0.5–1)
EOSINOPHILS ABSOLUTE: 0.08 E9/L (ref 0.05–0.5)
EOSINOPHILS RELATIVE PERCENT: 1 % (ref 0–6)
GFR AFRICAN AMERICAN: 31
GFR NON-AFRICAN AMERICAN: 31 ML/MIN/1.73
GLUCOSE BLD-MCNC: 92 MG/DL (ref 74–99)
HCT VFR BLD CALC: 35.9 % (ref 34–48)
HEMOGLOBIN: 13.3 G/DL (ref 11.5–15.5)
IMMATURE GRANULOCYTES #: 0.03 E9/L
IMMATURE GRANULOCYTES %: 0.4 % (ref 0–5)
LYMPHOCYTES ABSOLUTE: 1.72 E9/L (ref 1.5–4)
LYMPHOCYTES RELATIVE PERCENT: 21.7 % (ref 20–42)
MCH RBC QN AUTO: 36.7 PG (ref 26–35)
MCHC RBC AUTO-ENTMCNC: 37 % (ref 32–34.5)
MCV RBC AUTO: 99.2 FL (ref 80–99.9)
MONOCYTES ABSOLUTE: 1.52 E9/L (ref 0.1–0.95)
MONOCYTES RELATIVE PERCENT: 19.1 % (ref 2–12)
NEUTROPHILS ABSOLUTE: 4.56 E9/L (ref 1.8–7.3)
NEUTROPHILS RELATIVE PERCENT: 57.4 % (ref 43–80)
PAPPENHEIMER BODIES: ABNORMAL
PDW BLD-RTO: 15.9 FL (ref 11.5–15)
PLATELET # BLD: 200 E9/L (ref 130–450)
PMV BLD AUTO: 10.8 FL (ref 7–12)
POIKILOCYTES: ABNORMAL
POLYCHROMASIA: ABNORMAL
POTASSIUM SERPL-SCNC: 3.2 MMOL/L (ref 3.5–5)
RBC # BLD: 3.62 E12/L (ref 3.5–5.5)
SODIUM BLD-SCNC: 115 MMOL/L (ref 132–146)
TARGET CELLS: ABNORMAL
TOTAL PROTEIN: 7.4 G/DL (ref 6.4–8.3)
TSH SERPL DL<=0.05 MIU/L-ACNC: 0.61 UIU/ML (ref 0.27–4.2)
WBC # BLD: 7.9 E9/L (ref 4.5–11.5)

## 2022-08-05 PROCEDURE — G8427 DOCREV CUR MEDS BY ELIG CLIN: HCPCS | Performed by: FAMILY MEDICINE

## 2022-08-05 PROCEDURE — 99213 OFFICE O/P EST LOW 20 MIN: CPT | Performed by: FAMILY MEDICINE

## 2022-08-05 PROCEDURE — 36415 COLL VENOUS BLD VENIPUNCTURE: CPT | Performed by: FAMILY MEDICINE

## 2022-08-05 PROCEDURE — 4004F PT TOBACCO SCREEN RCVD TLK: CPT | Performed by: FAMILY MEDICINE

## 2022-08-05 PROCEDURE — 3017F COLORECTAL CA SCREEN DOC REV: CPT | Performed by: FAMILY MEDICINE

## 2022-08-05 PROCEDURE — 99212 OFFICE O/P EST SF 10 MIN: CPT | Performed by: FAMILY MEDICINE

## 2022-08-05 PROCEDURE — G8420 CALC BMI NORM PARAMETERS: HCPCS | Performed by: FAMILY MEDICINE

## 2022-08-05 NOTE — PROGRESS NOTES
ear canal and external ear normal.      Ears:      Comments: No fluid or effusion behind tympanic membranes     Nose: Nose normal.      Mouth/Throat:      Mouth: Mucous membranes are moist.      Pharynx: No oropharyngeal exudate or posterior oropharyngeal erythema. Eyes:      General:         Right eye: No discharge. Left eye: No discharge. Extraocular Movements: Extraocular movements intact. Conjunctiva/sclera: Conjunctivae normal.      Pupils: Pupils are equal, round, and reactive to light. Cardiovascular:      Rate and Rhythm: Normal rate and regular rhythm. Pulses: Normal pulses. Heart sounds: Normal heart sounds. No murmur heard. No gallop. Pulmonary:      Effort: Pulmonary effort is normal. No respiratory distress. Breath sounds: No wheezing. Musculoskeletal:         General: Normal range of motion. Right lower leg: No edema. Left lower leg: No edema. Skin:     General: Skin is warm. Neurological:      General: No focal deficit present. Mental Status: She is alert and oriented to person, place, and time. Sensory: No sensory deficit. Motor: No weakness. Coordination: Coordination normal.      Gait: Gait normal.     Assessment/Plan:    1. Dizziness  Consider orthostatic hypotension vs dehydration vs electrolyte or thyroid abnormalities vs anemia vs vestibular dysfunction  Physical exam with no obvious findings  Will order blood work in order to rule out anemia , tsh abnormalities or electrolyte abnormalities as a cause  Orthostatics positive this visit  - advise increased/adequate hydration, getting up slowly , monitoring symptoms  - Comprehensive Metabolic Panel; Future  - CBC with Auto Differential; Future  - TSH; Future    2. Screening for colorectal cancer  Refer to General Surgery for colonoscopy  - Maddie Mcbride MD, General Surgery, [place]     RTO 1 month or sooner prn for any persistent, new, or worsening symptoms. Please see Patient Instructions for further counseling and information given. Advised to please be adherent to the treatment plans discussed today, and please call with any questions or concerns, letting the office know of any reasons that the plans may not be followed. The risks of untreated conditions include worsening illness, injury, disability, and possibly, death. Please call if symptoms change in any way, worsen, or fail to completely resolve, as this could necessitate a change to treatment plans. Patient and/or caregiver expressed understanding. Indications and proper use of medication(s) reviewed. Potential side-effects and risks of medication(s) also explained. Patient and/or caregiver was instructed to call if any new symptoms develop prior to next visit. Health risk factors discussed and addressed.      Electronically signed by Justin Andres MD PGY-3 on 8/5/2022 at 11:30 AM  This case was discussed with attending physician: Dr. Alyx Lucio

## 2022-08-05 NOTE — TELEPHONE ENCOUNTER
Received critical lab result for patient seen today during office visit. CMP revealed NA: 115 , K: 3.2 , Cr: 2.0     Called patient 3x to no avail . Did leave a message with her results per her request through physician communications. Called home number and mother responded. Per patient's physician communications, mother is not on the list to release results to. 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 If patient does not call back, or respond, please call her in the AM and advise going to the hospital for hyponatremia management or repeat lab work in order to determine if it is real hyponatremia.     Meryle Maus, MD  8/5/2022  6:55 PM

## 2022-08-05 NOTE — PROGRESS NOTES
S: 58 y.o. female who presents with hx copd, allergies and muscle spasms. Today she c/o dizziness which is described as a 1 week hx of head spinning, especially if she rises too quickly. On prior occasions with these symptoms, her K has been low. Has hx of hypothyroidism. She is drinking 3, 16 oz bottles of water daily. She is taking zyrtec and flexeril. O: VS: BP (!) 88/59 (Site: Right Upper Arm, Position: Standing, Cuff Size: Medium Adult) Comment (Position): Dizziness  while standing  Pulse 97   Temp 97.5 °F (36.4 °C) (Temporal)   Resp 16   Ht 5' 1\" (1.549 m)   Wt 101 lb (45.8 kg)   SpO2 96%   BMI 19.08 kg/m²    General: NAD, appropriate affect and grooming   CV:  RRR, no gallops, rubs, or murmurs   Resp: CTAB   Abd:  Soft, nontender   Ext:  No edema  Impression/Plan:   Orthostatic hypotension  Increase fluids  Get up slowly  Check labs  Hm  Refer to gen surgery for colorectal cancer screen    Attending Physician Statement  I have discussed the case, including pertinent history and exam findings with the resident. I agree with the documented assessment and plan.

## 2022-08-06 ENCOUNTER — HOSPITAL ENCOUNTER (INPATIENT)
Age: 62
LOS: 1 days | Discharge: LEFT BEFORE DISCHARGE | DRG: 426 | End: 2022-08-06
Attending: EMERGENCY MEDICINE | Admitting: FAMILY MEDICINE
Payer: MEDICAID

## 2022-08-06 ENCOUNTER — APPOINTMENT (OUTPATIENT)
Dept: GENERAL RADIOLOGY | Age: 62
DRG: 426 | End: 2022-08-06
Payer: MEDICAID

## 2022-08-06 VITALS
TEMPERATURE: 97.8 F | SYSTOLIC BLOOD PRESSURE: 114 MMHG | RESPIRATION RATE: 20 BRPM | OXYGEN SATURATION: 96 % | DIASTOLIC BLOOD PRESSURE: 83 MMHG | HEART RATE: 90 BPM

## 2022-08-06 DIAGNOSIS — E87.6 ACUTE HYPOKALEMIA: ICD-10-CM

## 2022-08-06 DIAGNOSIS — E87.1 ACUTE HYPONATREMIA: Primary | ICD-10-CM

## 2022-08-06 LAB
ALBUMIN SERPL-MCNC: 3.9 G/DL (ref 3.5–5.2)
ALP BLD-CCNC: 94 U/L (ref 35–104)
ALT SERPL-CCNC: 20 U/L (ref 0–32)
ANION GAP SERPL CALCULATED.3IONS-SCNC: 11 MMOL/L (ref 7–16)
ANION GAP SERPL CALCULATED.3IONS-SCNC: 8 MMOL/L (ref 7–16)
ANISOCYTOSIS: ABNORMAL
AST SERPL-CCNC: 53 U/L (ref 0–31)
BACTERIA: ABNORMAL /HPF
BASOPHILS ABSOLUTE: 0 E9/L (ref 0–0.2)
BASOPHILS RELATIVE PERCENT: 0.4 % (ref 0–2)
BILIRUB SERPL-MCNC: 1.1 MG/DL (ref 0–1.2)
BILIRUBIN URINE: NEGATIVE
BLOOD, URINE: ABNORMAL
BUN BLDV-MCNC: 12 MG/DL (ref 6–23)
BUN BLDV-MCNC: 13 MG/DL (ref 6–23)
CALCIUM SERPL-MCNC: 9.1 MG/DL (ref 8.6–10.2)
CALCIUM SERPL-MCNC: 9.5 MG/DL (ref 8.6–10.2)
CHLORIDE BLD-SCNC: 67 MMOL/L (ref 98–107)
CHLORIDE BLD-SCNC: 73 MMOL/L (ref 98–107)
CHLORIDE URINE RANDOM: <20 MMOL/L
CLARITY: CLEAR
CO2: 27 MMOL/L (ref 22–29)
CO2: 33 MMOL/L (ref 22–29)
COLOR: YELLOW
CREAT SERPL-MCNC: 1.1 MG/DL (ref 0.5–1)
CREAT SERPL-MCNC: 1.3 MG/DL (ref 0.5–1)
CREATININE URINE: 30 MG/DL (ref 29–226)
CREATININE URINE: 30 MG/DL (ref 29–226)
EOSINOPHILS ABSOLUTE: 0.07 E9/L (ref 0.05–0.5)
EOSINOPHILS RELATIVE PERCENT: 0.9 % (ref 0–6)
GFR AFRICAN AMERICAN: 50
GFR AFRICAN AMERICAN: >60
GFR NON-AFRICAN AMERICAN: 50 ML/MIN/1.73
GFR NON-AFRICAN AMERICAN: >60 ML/MIN/1.73
GLUCOSE BLD-MCNC: 80 MG/DL (ref 74–99)
GLUCOSE BLD-MCNC: 85 MG/DL (ref 74–99)
GLUCOSE URINE: NEGATIVE MG/DL
HCT VFR BLD CALC: 34.9 % (ref 34–48)
HEMOGLOBIN: 13 G/DL (ref 11.5–15.5)
HOWELL-JOLLY BODIES: ABNORMAL
KETONES, URINE: NEGATIVE MG/DL
LEUKOCYTE ESTERASE, URINE: ABNORMAL
LYMPHOCYTES ABSOLUTE: 1.6 E9/L (ref 1.5–4)
LYMPHOCYTES RELATIVE PERCENT: 20.9 % (ref 20–42)
MAGNESIUM: 2.2 MG/DL (ref 1.6–2.6)
MCH RBC QN AUTO: 36.3 PG (ref 26–35)
MCHC RBC AUTO-ENTMCNC: 37.2 % (ref 32–34.5)
MCV RBC AUTO: 97.5 FL (ref 80–99.9)
MONOCYTES ABSOLUTE: 1.44 E9/L (ref 0.1–0.95)
MONOCYTES RELATIVE PERCENT: 19.1 % (ref 2–12)
NEUTROPHILS ABSOLUTE: 4.48 E9/L (ref 1.8–7.3)
NEUTROPHILS RELATIVE PERCENT: 59.1 % (ref 43–80)
NITRITE, URINE: NEGATIVE
OSMOLALITY URINE: 86 MOSM/KG (ref 300–900)
OSMOLALITY: 232 MOSM/KG (ref 285–310)
PDW BLD-RTO: 14.6 FL (ref 11.5–15)
PH UA: 6 (ref 5–9)
PLATELET # BLD: 189 E9/L (ref 130–450)
PMV BLD AUTO: 10 FL (ref 7–12)
POIKILOCYTES: ABNORMAL
POTASSIUM REFLEX MAGNESIUM: 3.1 MMOL/L (ref 3.5–5)
POTASSIUM SERPL-SCNC: 3.5 MMOL/L (ref 3.5–5)
POTASSIUM, UR: 6.6 MMOL/L
PROTEIN PROTEIN: <4 MG/DL (ref 0–12)
PROTEIN UA: NEGATIVE MG/DL
PROTEIN/CREAT RATIO: 0.1
PROTEIN/CREAT RATIO: 0.1 (ref 0–0.2)
RBC # BLD: 3.58 E12/L (ref 3.5–5.5)
RBC UA: ABNORMAL /HPF (ref 0–2)
REASON FOR REJECTION: NORMAL
REJECTED TEST: NORMAL
SODIUM BLD-SCNC: 108 MMOL/L (ref 132–146)
SODIUM BLD-SCNC: 111 MMOL/L (ref 132–146)
SODIUM URINE: <20 MMOL/L
SPECIFIC GRAVITY UA: <=1.005 (ref 1–1.03)
TARGET CELLS: ABNORMAL
TOTAL PROTEIN: 7.8 G/DL (ref 6.4–8.3)
TSH SERPL DL<=0.05 MIU/L-ACNC: 0.56 UIU/ML (ref 0.27–4.2)
UREA NITROGEN, UR: 96 MG/DL (ref 800–1666)
URIC ACID, SERUM: 5.6 MG/DL (ref 2.4–5.7)
UROBILINOGEN, URINE: 0.2 E.U./DL
WBC # BLD: 7.6 E9/L (ref 4.5–11.5)
WBC UA: ABNORMAL /HPF (ref 0–5)

## 2022-08-06 PROCEDURE — 93005 ELECTROCARDIOGRAM TRACING: CPT

## 2022-08-06 PROCEDURE — 82533 TOTAL CORTISOL: CPT

## 2022-08-06 PROCEDURE — 96365 THER/PROPH/DIAG IV INF INIT: CPT

## 2022-08-06 PROCEDURE — 84133 ASSAY OF URINE POTASSIUM: CPT

## 2022-08-06 PROCEDURE — 87088 URINE BACTERIA CULTURE: CPT

## 2022-08-06 PROCEDURE — G0378 HOSPITAL OBSERVATION PER HR: HCPCS

## 2022-08-06 PROCEDURE — 84300 ASSAY OF URINE SODIUM: CPT

## 2022-08-06 PROCEDURE — 84550 ASSAY OF BLOOD/URIC ACID: CPT

## 2022-08-06 PROCEDURE — 6360000002 HC RX W HCPCS

## 2022-08-06 PROCEDURE — 83930 ASSAY OF BLOOD OSMOLALITY: CPT

## 2022-08-06 PROCEDURE — 82570 ASSAY OF URINE CREATININE: CPT

## 2022-08-06 PROCEDURE — 83935 ASSAY OF URINE OSMOLALITY: CPT

## 2022-08-06 PROCEDURE — 84443 ASSAY THYROID STIM HORMONE: CPT

## 2022-08-06 PROCEDURE — 96360 HYDRATION IV INFUSION INIT: CPT

## 2022-08-06 PROCEDURE — 6370000000 HC RX 637 (ALT 250 FOR IP)

## 2022-08-06 PROCEDURE — 36415 COLL VENOUS BLD VENIPUNCTURE: CPT

## 2022-08-06 PROCEDURE — 99285 EMERGENCY DEPT VISIT HI MDM: CPT

## 2022-08-06 PROCEDURE — 1200000000 HC SEMI PRIVATE

## 2022-08-06 PROCEDURE — 2580000003 HC RX 258

## 2022-08-06 PROCEDURE — 84540 ASSAY OF URINE/UREA-N: CPT

## 2022-08-06 PROCEDURE — 82436 ASSAY OF URINE CHLORIDE: CPT

## 2022-08-06 PROCEDURE — 81001 URINALYSIS AUTO W/SCOPE: CPT

## 2022-08-06 PROCEDURE — 84560 ASSAY OF URINE/URIC ACID: CPT

## 2022-08-06 PROCEDURE — 96366 THER/PROPH/DIAG IV INF ADDON: CPT

## 2022-08-06 PROCEDURE — 85025 COMPLETE CBC W/AUTO DIFF WBC: CPT

## 2022-08-06 PROCEDURE — 84156 ASSAY OF PROTEIN URINE: CPT

## 2022-08-06 PROCEDURE — 96361 HYDRATE IV INFUSION ADD-ON: CPT

## 2022-08-06 PROCEDURE — 80053 COMPREHEN METABOLIC PANEL: CPT

## 2022-08-06 PROCEDURE — 71045 X-RAY EXAM CHEST 1 VIEW: CPT

## 2022-08-06 PROCEDURE — 83735 ASSAY OF MAGNESIUM: CPT

## 2022-08-06 PROCEDURE — 80048 BASIC METABOLIC PNL TOTAL CA: CPT

## 2022-08-06 RX ORDER — ARFORMOTEROL TARTRATE 15 UG/2ML
15 SOLUTION RESPIRATORY (INHALATION) 2 TIMES DAILY
Status: CANCELLED | OUTPATIENT
Start: 2022-08-06

## 2022-08-06 RX ORDER — POTASSIUM CHLORIDE 7.45 MG/ML
10 INJECTION INTRAVENOUS
Status: COMPLETED | OUTPATIENT
Start: 2022-08-06 | End: 2022-08-06

## 2022-08-06 RX ORDER — ONDANSETRON 2 MG/ML
4 INJECTION INTRAMUSCULAR; INTRAVENOUS EVERY 6 HOURS PRN
Status: CANCELLED | OUTPATIENT
Start: 2022-08-06

## 2022-08-06 RX ORDER — 0.9 % SODIUM CHLORIDE 0.9 %
500 INTRAVENOUS SOLUTION INTRAVENOUS ONCE
Status: COMPLETED | OUTPATIENT
Start: 2022-08-06 | End: 2022-08-06

## 2022-08-06 RX ORDER — BUDESONIDE 0.5 MG/2ML
0.5 INHALANT ORAL 2 TIMES DAILY
Status: CANCELLED | OUTPATIENT
Start: 2022-08-06

## 2022-08-06 RX ORDER — ACETAMINOPHEN 650 MG/1
650 SUPPOSITORY RECTAL EVERY 6 HOURS PRN
Status: CANCELLED | OUTPATIENT
Start: 2022-08-06

## 2022-08-06 RX ORDER — SODIUM CHLORIDE 0.9 % (FLUSH) 0.9 %
5-40 SYRINGE (ML) INJECTION PRN
Status: CANCELLED | OUTPATIENT
Start: 2022-08-06

## 2022-08-06 RX ORDER — SODIUM CHLORIDE 0.9 % (FLUSH) 0.9 %
5-40 SYRINGE (ML) INJECTION EVERY 12 HOURS SCHEDULED
Status: CANCELLED | OUTPATIENT
Start: 2022-08-06

## 2022-08-06 RX ORDER — ENOXAPARIN SODIUM 100 MG/ML
40 INJECTION SUBCUTANEOUS DAILY
Status: CANCELLED | OUTPATIENT
Start: 2022-08-06

## 2022-08-06 RX ORDER — POLYETHYLENE GLYCOL 3350 17 G/17G
17 POWDER, FOR SOLUTION ORAL DAILY PRN
Status: CANCELLED | OUTPATIENT
Start: 2022-08-06

## 2022-08-06 RX ORDER — NICOTINE 21 MG/24HR
1 PATCH, TRANSDERMAL 24 HOURS TRANSDERMAL DAILY
Status: CANCELLED | OUTPATIENT
Start: 2022-08-06

## 2022-08-06 RX ORDER — POTASSIUM CHLORIDE 20 MEQ/1
40 TABLET, EXTENDED RELEASE ORAL ONCE
Status: COMPLETED | OUTPATIENT
Start: 2022-08-06 | End: 2022-08-06

## 2022-08-06 RX ORDER — ONDANSETRON 4 MG/1
4 TABLET, ORALLY DISINTEGRATING ORAL EVERY 8 HOURS PRN
Status: CANCELLED | OUTPATIENT
Start: 2022-08-06

## 2022-08-06 RX ORDER — ACETAMINOPHEN 325 MG/1
650 TABLET ORAL EVERY 6 HOURS PRN
Status: CANCELLED | OUTPATIENT
Start: 2022-08-06

## 2022-08-06 RX ORDER — SODIUM CHLORIDE 9 MG/ML
INJECTION, SOLUTION INTRAVENOUS PRN
Status: CANCELLED | OUTPATIENT
Start: 2022-08-06

## 2022-08-06 RX ORDER — ALBUTEROL SULFATE 2.5 MG/3ML
2.5 SOLUTION RESPIRATORY (INHALATION) EVERY 6 HOURS PRN
Status: CANCELLED | OUTPATIENT
Start: 2022-08-06

## 2022-08-06 RX ORDER — IPRATROPIUM BROMIDE AND ALBUTEROL SULFATE 2.5; .5 MG/3ML; MG/3ML
1 SOLUTION RESPIRATORY (INHALATION)
Status: CANCELLED | OUTPATIENT
Start: 2022-08-06

## 2022-08-06 RX ADMIN — SODIUM CHLORIDE 500 ML: 9 INJECTION, SOLUTION INTRAVENOUS at 09:17

## 2022-08-06 RX ADMIN — POTASSIUM CHLORIDE 40 MEQ: 1500 TABLET, EXTENDED RELEASE ORAL at 11:09

## 2022-08-06 RX ADMIN — POTASSIUM CHLORIDE 10 MEQ: 7.46 INJECTION, SOLUTION INTRAVENOUS at 12:16

## 2022-08-06 RX ADMIN — POTASSIUM CHLORIDE 10 MEQ: 7.46 INJECTION, SOLUTION INTRAVENOUS at 11:14

## 2022-08-06 ASSESSMENT — ENCOUNTER SYMPTOMS
ABDOMINAL PAIN: 0
PHOTOPHOBIA: 0
WHEEZING: 0
TROUBLE SWALLOWING: 0
NAUSEA: 0
COUGH: 0
SHORTNESS OF BREATH: 0
VOMITING: 0
VOICE CHANGE: 0
BACK PAIN: 0
DIARRHEA: 1

## 2022-08-06 NOTE — ED NOTES
Patient incontinent of urine, given patient gown to change into.       Mayela Olivas RN  08/06/22 [id number]

## 2022-08-06 NOTE — H&P
[facility]  History and Physical    Patient:  Gautam Redman 58 y.o. female MRN: [id number]     Date of Service: 8/6/2022    Hospital Day: 1      Chief complaint: had concerns including Dizziness (Lightheaded X2 days, Sent in by PCP for low potassium and low sodium). History of Present Illness   The patient is a 58 y.o. female with a past medical history of COPD, hypothyroidism, active tobacco use, GERD. Presented to the ED on foot from home after being advised by PCP to come in for severe lab derangements. Patient was evaluated in office 8/5 for complaints of dizziness. Labs were obtained at that time which noted hyponatremia at 115, hypokalemia at at 3.2 and with an RYLEE of creatinine 2.0. Patient notes she went in for evaluation of dizziness. Patient notes his dizziness began Wednesday. She does not recall any specific inciting events. She has never experienced this dizziness before. She notes she only experienced dizziness when she stood up from a seated position or when she was standing. She did not experience any dizziness or symptoms when she was seated. No recent falls, trauma, assault or injury. She does not associate any other symptoms with this dizziness such as chest pain, shortness of breath, palpitations or extremity weakness. Currently patient endorses intake of 1 beer per day for several years. She notes she drinks 1 16 ounce genesee ice beer (5.5%) daily. Does not currently endorse the intake of any other alcohol products. She is currently also an active tobacco user, smokes approximately half pack per day, endorses smoking history for over 10 years. Patient currently states she drinks approximately 3-4 500 mL bottles of water per day. She does not note any increase recently in her water intake such as drinking several liters per day.   She does deny any changes in appetite though maintains eating very little through the day.    Otherwise feels well. Patient currently denies any chest pain, shortness of breath, lightheadedness or dizziness. Denies any abnormal changes in weight such as unintentional weight loss. Denies any fever, chills, nausea, poor appetite/diarrhea. ED Course: Initial ER evaluation consistent with hyponatremia at 108, hypokalemia 3.1. Mild RYLEE. CBC WNL. TSH WNL. Chest x-ray without acute process. Patient given 500 mL NS bolus and admitted for hyponatremic evaluation. Consults placed to nephrology, critical care. Labs including urine studies, osmolarity, urine and serum obtained. Past Medical History:      Diagnosis Date    Carpal tunnel syndrome     Pulmonary nodule     Thyroid disease        Past Surgical History:    History reviewed. No pertinent surgical history. Medications Prior to Admission:    Prior to Admission medications    Medication Sig Start Date End Date Taking?  Authorizing Provider   cetirizine (ZYRTEC) 10 MG tablet Take 1 tablet by mouth daily 4/15/22   Michael Edwardser, DO   FLOVENT  MCG/ACT inhaler Inhale 1 puff into the lungs 2 times daily 4/15/22   [place] Memory A Rech, DO   albuterol sulfate HFA (VENTOLIN HFA) 108 (90 Base) MCG/ACT inhaler Inhale 2 puffs into the lungs 4 times daily as needed for Wheezing 4/15/22   [place] Memory A Rech, DO   montelukast (SINGULAIR) 10 MG tablet Take 1 tablet by mouth daily 4/15/22   Kaylah A Rech, DO   cyclobenzaprine (FLEXERIL) 5 MG tablet TAKE 1 TABLET BY MOUTH EVERY EVENING AS NEEDED FOR MUSCLE SPASMS 4/15/22   Michael Edwardser, DO   omeprazole (PRILOSEC) 40 MG delayed release capsule Take 1 capsule by mouth every morning (before breakfast) 4/15/22   [place] Memory A Rech, DO   tiotropium (SPIRIVA RESPIMAT) 2.5 MCG/ACT AERS inhaler INHALE 2 PUFFS INTO THE LUNGS DAILY 4/15/22   Kaylah A Rech, DO   naproxen (NAPROSYN) 250 MG tablet TAKE 1 TABLET BY MOUTH TWICE DAILY WITH MEALS 4/15/22   Michael Edwardser, DO       Allergies:  Ibuprofen and Morphine    Social History: TOBACCO:   reports that she has been smoking cigarettes. She started smoking about 41 years ago. She has a 20.00 pack-year smoking history. She has never used smokeless tobacco.  ETOH:   reports current alcohol use of about 2.0 standard drinks per week. REVIEW OF SYSTEMS:    Constitutional: No fever, no chills, no change in weight; good appetite  HEENT: No blurred vision, no ear problems, no sore throat, no rhinorrhea. Respiratory: No cough, no sputum production, no pleuritic chest pain, no shortness of breath  Cardiology: No angina, no dyspnea on exertion, no paroxysmal nocturnal dyspnea, no orthopnea, no palpitation, no leg swelling. Gastroenterology: No dysphagia, no reflux; no abdominal pain, no nausea or vomiting; no constipation or diarrhea. No hematochezia   Genitourinary: No dysuria, no frequency, hesitancy; no hematuria  Musculoskeletal: no joint pain, no myalgia, no change in range of movement  Neurology: no focal weakness in extremities, no slurred speech, no double vision, no tingling or numbness sensation  Endocrinology: no temperature intolerance, no polyphagia, polydipsia or polyuria  Hematology: no increased bleeding, no bruising, no lymphadenopathy  Skin: no skin changes noticed by patient  Psychology: no depressed mood, no suicidal ideation    Physical Exam   Vitals: /80   Pulse 79   Temp 97.8 °F (36.6 °C)   Resp 16   SpO2 96%     General Appearance: Nontoxic, non-ill-appearing. Alert and oriented x3. Easily arousable and conversant.   Head: normocephalic and atraumatic  Eyes: Right pupil poorly reactive to light, left pupil equal and reactive to light  Pulmonary/Chest: Diffusely decreased breath sounds, mild to moderate expiratory wheezing noted diffusely  Cardiovascular: Normal rate, normal rhythm, no murmurs  Abdomen: soft, non-tender and non-distended  Extremities: no edema  Musculoskeletal: normal range of motion, no joint swelling, deformity or tenderness  Neurologic: Left patellar reflex 2+, right patellar reflex 1+, speech pattern and articulation normal, normal muscle strength in the upper and lower extremities. Bilateral resting tremor noted disappearing with intention, cranial nerves II through XII grossly intact, delayed finger-to-nose testing, delayed rapid alternating movements  Labs and Imaging Studies   Basic Labs  Recent Labs     08/05/22  1155 08/06/22  0849   * 108*   K 3.2* 3.1*   CL 76* 67*   CO2 26 33*   BUN 14 13   CREATININE 2.0* 1.3*   GLUCOSE 92 80   CALCIUM 9.1 9.5       Recent Labs     08/05/22  1155   WBC 7.9   RBC 3.62   HGB 13.3   HCT 35.9   MCV 99.2   MCH 36.7*   MCHC 37.0*   RDW 15.9*      MPV 10.8       XR CHEST PORTABLE   Final Result   No acute cardiopulmonary disease. EKG; normal sinus rhythm, no STEMI    Resident's Assessment and Plan     1. Hyponatremia  - Broad differential at the current time, taking to account patient's chronic history of beer consumption coupled with poor intake of caloric foods. Symptoms likely consistent with beer potomania as urine osmolarity returned 86. Hypotonic hyponatremia?  - Also taking into account exam with asymmetric reflexes. May consider cranial etiology. Will obtain CT head to investigate. - Previous imaging reviewed, noted multiple pulmonary nodules on CT lung screening 9/2021. May consider malignancy in etiology for hyponatremia such as SIADH though lower suspicion. May also consider obtaining CT A/P if indicated to further investigate for malignancy. - Hyponatremia labs pending including urine electrolytes, creatinine, nitrogen and urea, cortisol.  -Fluid restriction  - Low-sodium diet  - No history of psychiatric medication use. - Nephrology consult  - Critical care consult given severity of hyponatremia.     2.  Dizziness  - Likely secondary to volume depletion and hyponatremia.  - Follow-up orthostatics, monitor for resolution following improvement in hyponatremia.  - Encourage oral intake of fluids/food after hyponatremia resolution.  - Slow transition    3. History of COPD  - Recent PFTs from 9/21, consistent with severe obstruction with minimal BD response.  - Consider repeating outpatient PFTs if needed. - Does not appear to be in acute patient at this time. - Treat symptomatically with bronchodilators/steroids. - Will place order for duo nebs Q4 hours as needed  - Consider Pulmicort/Brovana nebulizations  - Aggressive pulmonary hygiene  - Incentive spirometry. - Out of bed as able    4. History of GERD  - Continue Prilosec 40 mg daily.      PT/OT evaluation: Pending  DVT prophylaxis/ GI prophylaxis: Early ambulation/Lovenox/Protonix  Disposition: Intensive care    Ursula Mckeon MD,   Attending physician: Dr. Amy Rizo

## 2022-08-06 NOTE — ED NOTES
Patient not in room, patient IV pulled out. Patient belongings not in room. Patient found outside ER in parking lot stating needing to smoke a cigarrette. RN spoke to patient regarding continuing care. Patient states wish to leave. Patient alert and oriented x 4. Patient ride arrived at this time. 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 Patient AMA form signed with patient aware of risks of leaving.       Ike Grant RN  08/06/22 9922

## 2022-08-06 NOTE — ED PROVIDER NOTES
ATTENDING PROVIDER ATTESTATION:     Shannan Fischer presented to the emergency department for evaluation of Dizziness (Lightheaded X2 days, Sent in by PCP for low potassium and low sodium)   and was initially evaluated by the Medical Resident. See Original ED Note for H&P and ED course above. I have reviewed and discussed the case, including pertinent history (medical, surgical, family and social) and exam findings with the Medical Resident assigned to Shannan Amado. I have personally performed and/or participated in the history, exam, medical decision making, and procedures and agree with all pertinent clinical information and any additional changes or corrections are noted below in my assessment and plan. I have discussed this patient in detail with the resident, and provided the instruction and education,       I have reviewed my findings and recommendations with the assigned Medical Resident, Shannan Fischer and members of family present at the time of disposition. I have performed a history and physical examination of this patient and directly supervised the resident caring for this patient      History of Present Illness:    Presents to the ED for low sodium and low potassium, beginning prior to arrival.  The complaint has been constant, severe in severity, and worsened by nothing. Here for evaluation of low sodium and low potassium. She says her doctor for labs reevaluation needs to come in. She reports she feels dizzy 5 minutes but denies other complaints. No chest pain or shortness of breath. She does report she drinks 1 beer a day. She also ports she had nausea, vomiting, diarrhea a few days ago. This has all stopped.         Review of Systems:   A complete review of systems was performed and pertinent positives and negatives are stated within HPI, all other systems reviewed and are negative.    --------------------------------------------- PAST HISTORY ---------------------------------------------  Past Medical History:  has a past medical history of Carpal tunnel syndrome, Pulmonary nodule, and Thyroid disease. Past Surgical History: denies    Social History:  reports that she has been smoking cigarettes. She started smoking about 41 years ago. She has a 20.00 pack-year smoking history. She has never used smokeless tobacco. She reports current alcohol use of about 2.0 standard drinks per week. She reports that she does not use drugs. Family History: family history is not on file. Unless otherwise noted, family history is non contributory    The patients home medications have been reviewed. Allergies: Ibuprofen and Morphine    EKG Interpretation  Interpreted by emergency department physician, Dr. Rodriguez Last     8/6/22  Time: 9794    Rhythm: normal sinus   Rate: normal  Axis: normal  Conduction: normal  ST Segments: no acute change  T Waves: no acute change    Clinical Impression: Sinus rhythm, no acute ischemic changes  Comparison to Old EKG  stable from prior      Physical Exam:  Constitutional/General: Alert and oriented x3  Head: Normocephalic and atraumatic  Eyes: PERRL, EOMI, sclera non icteric  ENT: Oropharynx clear, handling secretions  Neck: Supple, full ROM, no stridor, no meningeal signs  Respiratory: Lungs clear to auscultation bilaterally, no wheezes, rales, or rhonchi. Not in respiratory distress  Cardiovascular:  Regular rate. Regular rhythm. No murmurs, no gallops, no rubs. 2+ distal pulses. Equal extremity pulses. GI:  Abdomen Soft, Non tender, Non distended. No rebound, guarding, or rigidity. No pulsatile masses. Musculoskeletal: Moves all extremities x 4. Warm and well perfused,  no clubbing, no cyanosis, no edema. Palpable peripheral pulses  Integument: skin warm and dry. No rashes.    Neurologic: GCS 15, no focal deficits  Psychiatric: Normal Affect      I directly supervised any procedures performed by the resident and was present for the procedure including all critical portions of the procedure      The cardiac monitor revealed sinus rhythm with a heart rate in the 90s as interpreted by me. The cardiac monitor was ordered secondary to the patient's hyponatremia and to monitor the patient for dysrhythmia. [facility] [id number]      I, Dr. Amanda Campoverde, am the primary provider of record    My Medical Decision Making:         Acute hyponatremia, dangerously low  STAT nephrology consult  Needs ICU admission, ICU accepts  1 fluid bolus did start to improve sodium    CRITICAL CARE:  Please note that the withdrawal or failure to initiate urgent interventions for this patient would likely result in a life threatening deterioration or permanent disability. Accordingly this patient received 30 minutes of critical care time, including coordination of care, and direct bedside care and excluding separately billable procedures. 1. Acute hyponatremia    2.  Acute hypokalemia            Mahnaz Fink MD  08/06/22 1516

## 2022-08-06 NOTE — ED NOTES
Pt seen walking out of hospital, stopped by Jose Maria De Leon RN. I witnessed Kerri trying to redirect pt back to ER. Pt stated she called for a ride and is leaving. Pt's Sodium is 108. Pt educated on critical results and that it could be life threatening if she left. Pt insisted that she wanted to leave and stated \" left me sign the paper, i'm leaving I got a new job I cant lose. \"   Pt again educated on the severity of her leaving, but still insisted on signing the [facility] paper. I also spoke with the family member that was picking pt in and educated him on the severity of her labs, and the importance of her staying.   Famly member said he \"would try to convince her to come back \"     Aquiles Frost RN  08/06/22 5037

## 2022-08-06 NOTE — CONSULTS
Dry.  PSYCHIATRIC:  Cooperative. NEUROLOGIC:  Cranial nerves grossly intact, II through XII. BLOOD WORK:  Sodium is 108, potassium 3.1, CO2 33, creatinine 1.3. ASSESSMENT AND PLAN:  1. Hyponatremia, clinically euvolemic. 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 2.  She is clinically stable; however, her serum sodium is 108 at 8:00  a.m. this morning. 3.  I will repeat her stat serum sodium. If serum sodium is not  drifting up, we will plan to start her on hypotonic normal saline. At  this point with history of alcoholism, I would hold on starting  Tolvaptan. 4.  We will aim for serum collection of normal than 6 to 8 mEq as they  are normal than 12 to 14 mEq in 48 hours. We will follow along with you.         Winston Davila MD    D: 08/06/2022 12:03:02       T: 08/06/2022 13:00:20     [email or web address]  Job#: [id number]     Doc#: [id number]    CC:

## 2022-08-06 NOTE — ED NOTES
Ambulated to bathroom, urine specimen obtained. xray at bedside.      Albino Jackson RN  08/06/22 5916

## 2022-08-06 NOTE — ED NOTES
Attending provider messaged regarding patient leaving AMA.       Albino Jackson, FARZANA  08/06/22 Donal Liu RN  08/06/22 [id number]

## 2022-08-06 NOTE — ED PROVIDER NOTES
58y.o. year old female presenting to the emergency room with concerns of lightheadedness beginning Wednesday. Patient reports that symptom's onset wednesday. Worsen with ambulating. Improves with nothing. Severity of moderate, with no radiation . Symptoms are constant in timing. Symptoms described as sensation that she is about to pass out. Patient reports associated symptoms of diarrhea . Patient in  no acute distress. Meektuan Connollya outpatient with RYLEE, hyponatremia, hypokalemia    Chief Complaint   Patient presents with    Dizziness     Lightheaded X2 days, Sent in by PCP for low potassium and low sodium       Review of Systems   Constitutional:  Positive for fatigue. Negative for chills and fever. HENT:  Negative for trouble swallowing and voice change. Eyes:  Negative for photophobia and visual disturbance. Respiratory:  Negative for cough, shortness of breath and wheezing. Cardiovascular:  Negative for chest pain. Gastrointestinal:  Positive for diarrhea. Negative for abdominal pain, nausea and vomiting. Genitourinary:  Negative for dysuria, flank pain, hematuria and urgency. Musculoskeletal:  Negative for arthralgias, back pain and neck pain. Skin:  Negative for rash and wound. Neurological:  Positive for light-headedness. Negative for syncope and headaches. Psychiatric/Behavioral:  Negative for behavioral problems and confusion. The patient is not nervous/anxious. Physical Exam  Vitals reviewed. Constitutional:       General: She is not in acute distress. Appearance: Normal appearance. HENT:      Head: Normocephalic. Right Ear: External ear normal.      Left Ear: External ear normal.      Nose: Nose normal.      Mouth/Throat:      Mouth: Mucous membranes are dry. Eyes:      General:         Right eye: No discharge. Left eye: No discharge. Conjunctiva/sclera: Conjunctivae normal.   Cardiovascular:      Rate and Rhythm: Normal rate and regular rhythm. Pulses: Normal pulses. Heart sounds: No murmur heard. No friction rub. Pulmonary:      Effort: Pulmonary effort is normal. No respiratory distress. Breath sounds: No stridor. No rales. Abdominal:      General: There is no distension. Palpations: Abdomen is soft. Tenderness: There is no abdominal tenderness. There is no guarding or rebound. Musculoskeletal:         General: No tenderness or deformity. Cervical back: Normal range of motion and neck supple. No rigidity or tenderness. Right lower leg: No edema. Left lower leg: No edema. Skin:     General: Skin is warm. Coloration: Skin is not jaundiced. Neurological:      Mental Status: She is alert and oriented to person, place, and time. Sensory: No sensory deficit. Motor: No weakness. Psychiatric:         Mood and Affect: Mood normal.         Behavior: Behavior normal.        Procedures     XR CHEST PORTABLE   Final Result   No acute cardiopulmonary disease. EKG:  This EKG is signed by emergency department physician. Rate: 81  Rhythm: Sinus  AXIS:normal  ST Changes:no ST elevations  Interpretation: prolonged QT, sinus rhythm  Comparison: stable as compared to patient's most recent EKG     MDM  Number of Diagnoses or Management Options  Diagnosis management comments: 58year old female presenting to the ER with complaints of lightheadedness. Patient had outpatient lab work yesterday which was hyponatremic and hyperkalemic with RYELE at that time. Patient had recent diarrheal and vomiting episode. On Wednesday lasting 2 days. Patient also reports alcohol use. Spoke to family medicine, discussed patient will plan to admit patient at this time. Consult to nephrology placed. Patient given 500 cc and potassium replacement. Patient in no acute distress at this time.                     --------------------------------------------- PAST HISTORY ---------------------------------------------  Past Medical History:  has a past medical history of Carpal tunnel syndrome, Pulmonary nodule, and Thyroid disease. Past Surgical History:  has no past surgical history on file. Social History:  reports that she has been smoking cigarettes. She started smoking about 41 years ago. She has a 20.00 pack-year smoking history. She has never used smokeless tobacco. She reports current alcohol use of about 2.0 standard drinks per week. She reports that she does not use drugs. Family History: family history is not on file. The patients home medications have been reviewed.     Allergies: Ibuprofen and Morphine    -------------------------------------------------- RESULTS -------------------------------------------------    LABS:  Results for orders placed or performed during the hospital encounter of 08/06/22   CBC with Auto Differential   Result Value Ref Range    WBC 7.6 4.5 - 11.5 E9/L    RBC 3.58 3.50 - 5.50 E12/L    Hemoglobin 13.0 11.5 - 15.5 g/dL    Hematocrit 34.9 34.0 - 48.0 %    MCV 97.5 80.0 - 99.9 fL    MCH 36.3 (H) 26.0 - 35.0 pg    MCHC 37.2 (H) 32.0 - 34.5 %    RDW 14.6 11.5 - 15.0 fL    Platelets 323 104 - 199 E9/L    MPV 10.0 7.0 - 12.0 fL    Neutrophils % 59.1 43.0 - 80.0 %    Lymphocytes % 20.9 20.0 - 42.0 %    Monocytes % 19.1 (H) 2.0 - 12.0 %    Eosinophils % 0.9 0.0 - 6.0 %    Basophils % 0.4 0.0 - 2.0 %    Neutrophils Absolute 4.48 1.80 - 7.30 E9/L    Lymphocytes Absolute 1.60 1.50 - 4.00 E9/L    Monocytes Absolute 1.44 (H) 0.10 - 0.95 E9/L    Eosinophils Absolute 0.07 0.05 - 0.50 E9/L    Basophils Absolute 0.00 0.00 - 0.20 E9/L    Anisocytosis 3+     Poikilocytes 1+     Target Cells 1+     Heard-Jolly Bodies 1+    Comprehensive Metabolic Panel w/ Reflex to MG   Result Value Ref Range    Sodium 108 (LL) 132 - 146 mmol/L    Potassium reflex Magnesium 3.1 (L) 3.5 - 5.0 mmol/L    Chloride 67 (LL) 98 - 107 mmol/L    CO2 33 (H) 22 - 29 mmol/L Anion Gap 8 7 - 16 mmol/L    Glucose 80 74 - 99 mg/dL    BUN 13 6 - 23 mg/dL    Creatinine 1.3 (H) 0.5 - 1.0 mg/dL    GFR Non-African American 50 >=60 mL/min/1.73    GFR African American 50     Calcium 9.5 8.6 - 10.2 mg/dL    Total Protein 7.8 6.4 - 8.3 g/dL    Albumin 3.9 3.5 - 5.2 g/dL    Total Bilirubin 1.1 0.0 - 1.2 mg/dL    Alkaline Phosphatase 94 35 - 104 U/L    ALT 20 0 - 32 U/L    AST 53 (H) 0 - 31 U/L   URINE ELECTROLYTES   Result Value Ref Range    Sodium, Ur <20 Not Established mmol/L    Potassium, Ur 6.6 Not Established mmol/L    Chloride <20 Not Established mmol/L   UREA NITROGEN, URINE   Result Value Ref Range    Urea Nitrogen, Ur 96 (L) 800 - 1666 mg/dL   URIC ACID, URINE RANDOM   Result Value Ref Range    Urine Total Volume RANDOM     Hours Collected RANDOM    OSMOLALITY, URINE   Result Value Ref Range    Osmolality, Ur 86 (L) 300 - 900 mOsm/kg   CREATININE, RANDOM URINE   Result Value Ref Range    Creatinine, Ur 30 29 - 226 mg/dL   PROTEIN / CREATININE RATIO, URINE   Result Value Ref Range    Protein, Ur <4 0 - 12 mg/dL    Creatinine, Ur 30 29 - 226 mg/dL    Protein/Creat Ratio 0.1 0.0 - 0.2    Protein/Creat Ratio 0.1    TSH   Result Value Ref Range    TSH 0.560 0.270 - 4.200 uIU/mL   Uric Acid   Result Value Ref Range    Uric Acid, Serum 5.6 2.4 - 5.7 mg/dL   Urinalysis with Microscopic   Result Value Ref Range    Color, UA Yellow Straw/Yellow    Clarity, UA Clear Clear    Glucose, Ur Negative Negative mg/dL    Bilirubin Urine Negative Negative    Ketones, Urine Negative Negative mg/dL    Specific Gravity, UA <=1.005 1.005 - 1.030    Blood, Urine TRACE-INTACT Negative    pH, UA 6.0 5.0 - 9.0    Protein, UA Negative Negative mg/dL    Urobilinogen, Urine 0.2 <2.0 E.U./dL    Nitrite, Urine Negative Negative    Leukocyte Esterase, Urine TRACE (A) Negative    WBC, UA 1-3 0 - 5 /HPF    RBC, UA 0-1 0 - 2 /HPF    Bacteria, UA NONE SEEN None Seen /HPF   OSMOLALITY, SERUM   Result Value Ref Range Osmolality 232 (L) 285 - 310 mOsm/Kg   Magnesium   Result Value Ref Range    Magnesium 2.2 1.6 - 2.6 mg/dL   Basic Metabolic Panel   Result Value Ref Range    Sodium 111 (LL) 132 - 146 mmol/L    Potassium 3.5 3.5 - 5.0 mmol/L    Chloride 73 (LL) 98 - 107 mmol/L    CO2 27 22 - 29 mmol/L    Anion Gap 11 7 - 16 mmol/L    Glucose 85 74 - 99 mg/dL    BUN 12 6 - 23 mg/dL    Creatinine 1.1 (H) 0.5 - 1.0 mg/dL    GFR Non-African American >60 >=60 mL/min/1.73    GFR African American >60     Calcium 9.1 8.6 - 10.2 mg/dL   SPECIMEN REJECTION   Result Value Ref Range    Rejected Test TRP%     Reason for Rejection see below    EKG 12 Lead   Result Value Ref Range    Ventricular Rate 81 BPM    Atrial Rate 81 BPM    P-R Interval 166 ms    QRS Duration 82 ms    Q-T Interval 444 ms    QTc Calculation (Bazett) 515 ms    P Axis 80 degrees    R Axis 82 degrees    T Axis 79 degrees       RADIOLOGY:  XR CHEST PORTABLE   Final Result   No acute cardiopulmonary disease.                   ------------------------- NURSING NOTES AND VITALS REVIEWED ---------------------------  Date / Time Roomed:  8/6/2022  8:24 AM  ED Bed Assignment:  07/07    The nursing notes within the ED encounter and vital signs as below have been reviewed.      Patient Vitals for the past 24 hrs:   BP Temp Temp src Pulse Resp SpO2   08/06/22 1145 114/83 -- -- 90 20 96 %   08/06/22 1010 (!) 123/90 -- -- 85 16 96 %   08/06/22 0915 120/80 97.8 °F (36.6 °C) -- 79 16 96 %   08/06/22 0821 -- -- -- 89 16 96 %   08/06/22 0818 109/82 -- -- -- -- --   08/06/22 0814 -- 97.5 °F (36.4 °C) Oral -- -- --       Oxygen Saturation Interpretation: Normal    ------------------------------------------ PROGRESS NOTES ------------------------------------------  Re-evaluation(s):  Patients symptoms show no change  Repeat physical examination is not changed    Counseling:  I have spoken with the patient and discussed todays results, in addition to providing specific details for the plan of care and counseling regarding the diagnosis and prognosis. Their questions are answered at this time and they are agreeable with the plan of admission.    --------------------------------- ADDITIONAL PROVIDER NOTES ---------------------------------  Consultations:  . Spoke with Dr. Chaya Rico. Discussed case. They will admit the patient. This patient's ED course included: a personal history and physicial examination, re-evaluation prior to disposition, multiple bedside re-evaluations, IV medications, cardiac monitoring, and continuous pulse oximetry    This patient has remained hemodynamically stable during their ED course. Diagnosis:  1. Acute hyponatremia    2. Acute hypokalemia        Disposition:  Patient's disposition: Admit to CCU/ICU  Patient's condition is stable. Attending was present and available throughout encounter including all critical portions;  See Attending Note/Attestation for Final Plan       Dorian Campbell DO  Resident  08/06/22 1533

## 2022-08-07 LAB — URINE CULTURE, ROUTINE: NORMAL

## 2022-08-08 DIAGNOSIS — J44.9 CHRONIC OBSTRUCTIVE PULMONARY DISEASE, UNSPECIFIED COPD TYPE (HCC): ICD-10-CM

## 2022-08-08 LAB
CORTISOL TOTAL: 17.64 MCG/DL (ref 2.68–18.4)
EKG ATRIAL RATE: 81 BPM
EKG P AXIS: 80 DEGREES
EKG P-R INTERVAL: 166 MS
EKG Q-T INTERVAL: 444 MS
EKG QRS DURATION: 82 MS
EKG QTC CALCULATION (BAZETT): 515 MS
EKG R AXIS: 82 DEGREES
EKG T AXIS: 79 DEGREES
EKG VENTRICULAR RATE: 81 BPM

## 2022-08-08 RX ORDER — ALBUTEROL SULFATE 90 UG/1
AEROSOL, METERED RESPIRATORY (INHALATION)
Qty: 6.7 G | Refills: 2 | Status: SHIPPED | OUTPATIENT
Start: 2022-08-08

## 2022-08-09 LAB
CREATININE 24 HOUR URINE: NORMAL MG/D (ref 500–1400)
CREATININE URINE: 9 MG/DL
HOURS COLLECTED: NORMAL
URIC ACID 24 HOUR URINE: NORMAL MG/D (ref 250–750)
URIC ACID, UR: <2.2 MG/DL
URINE TOTAL VOLUME: NORMAL

## 2022-10-30 DIAGNOSIS — M62.838 MUSCLE SPASMS OF BOTH LOWER EXTREMITIES: ICD-10-CM

## 2022-10-31 RX ORDER — CYCLOBENZAPRINE HCL 5 MG
TABLET ORAL
Qty: 30 TABLET | Refills: 1 | Status: SHIPPED | OUTPATIENT
Start: 2022-10-31

## 2022-10-31 RX ORDER — ACETAMINOPHEN 650 MG/1
TABLET, FILM COATED, EXTENDED RELEASE ORAL
Qty: 30 TABLET | Refills: 2 | Status: SHIPPED | OUTPATIENT
Start: 2022-10-31

## 2022-10-31 NOTE — TELEPHONE ENCOUNTER
Last Appointment:  4/15/2022  Future Appointments   Date Time Provider Grover Vasquez   11/7/2022  1:30 PM Emy Cody MD [facility]

## 2022-11-28 DIAGNOSIS — J44.9 CHRONIC OBSTRUCTIVE PULMONARY DISEASE, UNSPECIFIED COPD TYPE (HCC): ICD-10-CM

## 2022-11-28 RX ORDER — MONTELUKAST SODIUM 10 MG/1
10 TABLET ORAL DAILY
Qty: 30 TABLET | Refills: 3 | Status: SHIPPED | OUTPATIENT
Start: 2022-11-28

## 2022-11-28 NOTE — TELEPHONE ENCOUNTER
Last Appointment:  1/10/2022  Future Appointments   Date Time Provider Grover Vasquez   12/27/2022  9:45 AM Trixie Goel MD [facility]

## 2022-12-10 ENCOUNTER — APPOINTMENT (OUTPATIENT)
Dept: GENERAL RADIOLOGY | Age: 62
DRG: 426 | End: 2022-12-10
Payer: MEDICAID

## 2022-12-10 ENCOUNTER — APPOINTMENT (OUTPATIENT)
Dept: CT IMAGING | Age: 62
DRG: 426 | End: 2022-12-10
Payer: MEDICAID

## 2022-12-10 ENCOUNTER — HOSPITAL ENCOUNTER (INPATIENT)
Age: 62
LOS: 3 days | Discharge: LEFT BEFORE DISCHARGE | DRG: 426 | End: 2022-12-13
Attending: STUDENT IN AN ORGANIZED HEALTH CARE EDUCATION/TRAINING PROGRAM | Admitting: FAMILY MEDICINE
Payer: MEDICAID

## 2022-12-10 DIAGNOSIS — R56.9 SEIZURE (HCC): ICD-10-CM

## 2022-12-10 DIAGNOSIS — E87.1 HYPONATREMIA: Primary | ICD-10-CM

## 2022-12-10 DIAGNOSIS — F10.930 ALCOHOL WITHDRAWAL SYNDROME WITHOUT COMPLICATION (HCC): ICD-10-CM

## 2022-12-10 LAB
ACETAMINOPHEN LEVEL: <5 MCG/ML (ref 10–30)
ALBUMIN SERPL-MCNC: 3.3 G/DL (ref 3.5–5.2)
ALP BLD-CCNC: 120 U/L (ref 35–104)
ALT SERPL-CCNC: 20 U/L (ref 0–32)
AMMONIA: 21 UMOL/L (ref 11–51)
AMPHETAMINE SCREEN, URINE: NOT DETECTED
ANION GAP SERPL CALCULATED.3IONS-SCNC: 10 MMOL/L (ref 7–16)
ANION GAP SERPL CALCULATED.3IONS-SCNC: 11 MMOL/L (ref 7–16)
AST SERPL-CCNC: 49 U/L (ref 0–31)
BACTERIA: ABNORMAL /HPF
BARBITURATE SCREEN URINE: NOT DETECTED
BASOPHILS ABSOLUTE: 0.02 E9/L (ref 0–0.2)
BASOPHILS RELATIVE PERCENT: 0.2 % (ref 0–2)
BENZODIAZEPINE SCREEN, URINE: NOT DETECTED
BILIRUB SERPL-MCNC: 1.1 MG/DL (ref 0–1.2)
BILIRUBIN URINE: NEGATIVE
BLOOD, URINE: NEGATIVE
BUN BLDV-MCNC: 12 MG/DL (ref 6–23)
BUN BLDV-MCNC: 12 MG/DL (ref 6–23)
CALCIUM SERPL-MCNC: 8.1 MG/DL (ref 8.6–10.2)
CALCIUM SERPL-MCNC: 8.1 MG/DL (ref 8.6–10.2)
CANNABINOID SCREEN URINE: NOT DETECTED
CHLORIDE BLD-SCNC: 75 MMOL/L (ref 98–107)
CHLORIDE BLD-SCNC: 78 MMOL/L (ref 98–107)
CHLORIDE URINE RANDOM: <20 MMOL/L
CLARITY: CLEAR
CO2: 29 MMOL/L (ref 22–29)
CO2: 29 MMOL/L (ref 22–29)
COCAINE METABOLITE SCREEN URINE: NOT DETECTED
COLOR: YELLOW
CREAT SERPL-MCNC: 0.9 MG/DL (ref 0.5–1)
CREAT SERPL-MCNC: 0.9 MG/DL (ref 0.5–1)
EOSINOPHILS ABSOLUTE: 0.02 E9/L (ref 0.05–0.5)
EOSINOPHILS RELATIVE PERCENT: 0.2 % (ref 0–6)
EPITHELIAL CELLS, UA: ABNORMAL /HPF
ETHANOL: <10 MG/DL (ref 0–0.08)
FENTANYL SCREEN, URINE: NOT DETECTED
GFR SERPL CREATININE-BSD FRML MDRD: >60 ML/MIN/1.73
GLUCOSE BLD-MCNC: 104 MG/DL (ref 74–99)
GLUCOSE BLD-MCNC: 93 MG/DL (ref 74–99)
GLUCOSE BLD-MCNC: 98 MG/DL (ref 74–99)
GLUCOSE URINE: NEGATIVE MG/DL
HCT VFR BLD CALC: 29 % (ref 34–48)
HEMOGLOBIN: 10.3 G/DL (ref 11.5–15.5)
IMMATURE GRANULOCYTES #: 0.08 E9/L
IMMATURE GRANULOCYTES %: 0.9 % (ref 0–5)
INR BLD: 1.1
KETONES, URINE: NEGATIVE MG/DL
LACTIC ACID, SEPSIS: 1 MMOL/L (ref 0.5–1.9)
LACTIC ACID, SEPSIS: 1.1 MMOL/L (ref 0.5–1.9)
LEUKOCYTE ESTERASE, URINE: NEGATIVE
LIPASE: 15 U/L (ref 13–60)
LYMPHOCYTES ABSOLUTE: 1.05 E9/L (ref 1.5–4)
LYMPHOCYTES RELATIVE PERCENT: 12.3 % (ref 20–42)
Lab: NORMAL
MAGNESIUM: 1.4 MG/DL (ref 1.6–2.6)
MAGNESIUM: 1.4 MG/DL (ref 1.6–2.6)
MCH RBC QN AUTO: 37.7 PG (ref 26–35)
MCHC RBC AUTO-ENTMCNC: 37.9 % (ref 32–34.5)
MCV RBC AUTO: 99.3 FL (ref 80–99.9)
METER GLUCOSE: 128 MG/DL (ref 74–99)
METHADONE SCREEN, URINE: NOT DETECTED
MONOCYTES ABSOLUTE: 0.92 E9/L (ref 0.1–0.95)
MONOCYTES RELATIVE PERCENT: 10.8 % (ref 2–12)
NEUTROPHILS ABSOLUTE: 6.45 E9/L (ref 1.8–7.3)
NEUTROPHILS RELATIVE PERCENT: 75.6 % (ref 43–80)
NITRITE, URINE: NEGATIVE
OPIATE SCREEN URINE: NOT DETECTED
OSMOLALITY URINE: 88 MOSM/KG (ref 300–900)
OSMOLALITY: 240 MOSM/KG (ref 285–310)
OXYCODONE URINE: NOT DETECTED
PDW BLD-RTO: 15.7 FL (ref 11.5–15)
PERFORMED ON: ABNORMAL
PH UA: 7 (ref 5–9)
PHENCYCLIDINE SCREEN URINE: NOT DETECTED
PLATELET # BLD: 259 E9/L (ref 130–450)
PMV BLD AUTO: 10.4 FL (ref 7–12)
POC CHLORIDE: 77 MMOL/L (ref 100–108)
POC CREATININE: 0.9 MG/DL (ref 0.5–1)
POC POTASSIUM: 6 MMOL/L (ref 3.5–5)
POC SODIUM: 112 MMOL/L (ref 132–146)
POTASSIUM REFLEX MAGNESIUM: 3 MMOL/L (ref 3.5–5)
POTASSIUM REFLEX MAGNESIUM: 3.3 MMOL/L (ref 3.5–5)
POTASSIUM, UR: 10.6 MMOL/L
PROTEIN UA: NEGATIVE MG/DL
PROTHROMBIN TIME: 12.2 SEC (ref 9.3–12.4)
RBC # BLD: 2.92 E12/L (ref 3.5–5.5)
RBC UA: ABNORMAL /HPF (ref 0–2)
REASON FOR REJECTION: NORMAL
REJECTED TEST: NORMAL
SALICYLATE, SERUM: <0.3 MG/DL (ref 0–30)
SODIUM BLD-SCNC: 115 MMOL/L (ref 132–146)
SODIUM BLD-SCNC: 117 MMOL/L (ref 132–146)
SODIUM URINE: <20 MMOL/L
SPECIFIC GRAVITY UA: <=1.005 (ref 1–1.03)
TOTAL CK: 516 U/L (ref 20–180)
TOTAL PROTEIN: 6.8 G/DL (ref 6.4–8.3)
TRICYCLIC ANTIDEPRESSANTS SCREEN SERUM: NEGATIVE NG/ML
TROPONIN, HIGH SENSITIVITY: 14 NG/L (ref 0–9)
URIC ACID, SERUM: 4.8 MG/DL (ref 2.4–5.7)
UROBILINOGEN, URINE: 1 E.U./DL
WBC # BLD: 8.5 E9/L (ref 4.5–11.5)
WBC UA: ABNORMAL /HPF (ref 0–5)

## 2022-12-10 PROCEDURE — 72125 CT NECK SPINE W/O DYE: CPT

## 2022-12-10 PROCEDURE — 93005 ELECTROCARDIOGRAM TRACING: CPT | Performed by: STUDENT IN AN ORGANIZED HEALTH CARE EDUCATION/TRAINING PROGRAM

## 2022-12-10 PROCEDURE — 83690 ASSAY OF LIPASE: CPT

## 2022-12-10 PROCEDURE — 82435 ASSAY OF BLOOD CHLORIDE: CPT

## 2022-12-10 PROCEDURE — 80143 DRUG ASSAY ACETAMINOPHEN: CPT

## 2022-12-10 PROCEDURE — 84484 ASSAY OF TROPONIN QUANT: CPT

## 2022-12-10 PROCEDURE — 82436 ASSAY OF URINE CHLORIDE: CPT

## 2022-12-10 PROCEDURE — 81001 URINALYSIS AUTO W/SCOPE: CPT

## 2022-12-10 PROCEDURE — 83935 ASSAY OF URINE OSMOLALITY: CPT

## 2022-12-10 PROCEDURE — 82565 ASSAY OF CREATININE: CPT

## 2022-12-10 PROCEDURE — 83930 ASSAY OF BLOOD OSMOLALITY: CPT

## 2022-12-10 PROCEDURE — 70486 CT MAXILLOFACIAL W/O DYE: CPT

## 2022-12-10 PROCEDURE — 83735 ASSAY OF MAGNESIUM: CPT

## 2022-12-10 PROCEDURE — 82947 ASSAY GLUCOSE BLOOD QUANT: CPT

## 2022-12-10 PROCEDURE — 83605 ASSAY OF LACTIC ACID: CPT

## 2022-12-10 PROCEDURE — 82077 ASSAY SPEC XCP UR&BREATH IA: CPT

## 2022-12-10 PROCEDURE — 85610 PROTHROMBIN TIME: CPT

## 2022-12-10 PROCEDURE — 84132 ASSAY OF SERUM POTASSIUM: CPT

## 2022-12-10 PROCEDURE — 82962 GLUCOSE BLOOD TEST: CPT

## 2022-12-10 PROCEDURE — 80307 DRUG TEST PRSMV CHEM ANLYZR: CPT

## 2022-12-10 PROCEDURE — 6360000002 HC RX W HCPCS: Performed by: STUDENT IN AN ORGANIZED HEALTH CARE EDUCATION/TRAINING PROGRAM

## 2022-12-10 PROCEDURE — 87040 BLOOD CULTURE FOR BACTERIA: CPT

## 2022-12-10 PROCEDURE — 85025 COMPLETE CBC W/AUTO DIFF WBC: CPT

## 2022-12-10 PROCEDURE — 99285 EMERGENCY DEPT VISIT HI MDM: CPT

## 2022-12-10 PROCEDURE — 70450 CT HEAD/BRAIN W/O DYE: CPT

## 2022-12-10 PROCEDURE — 71045 X-RAY EXAM CHEST 1 VIEW: CPT

## 2022-12-10 PROCEDURE — 82140 ASSAY OF AMMONIA: CPT

## 2022-12-10 PROCEDURE — 80179 DRUG ASSAY SALICYLATE: CPT

## 2022-12-10 PROCEDURE — 80053 COMPREHEN METABOLIC PANEL: CPT

## 2022-12-10 PROCEDURE — 2000000000 HC ICU R&B

## 2022-12-10 PROCEDURE — 84300 ASSAY OF URINE SODIUM: CPT

## 2022-12-10 PROCEDURE — 82550 ASSAY OF CK (CPK): CPT

## 2022-12-10 PROCEDURE — 80048 BASIC METABOLIC PNL TOTAL CA: CPT

## 2022-12-10 PROCEDURE — 84133 ASSAY OF URINE POTASSIUM: CPT

## 2022-12-10 PROCEDURE — 84295 ASSAY OF SERUM SODIUM: CPT

## 2022-12-10 PROCEDURE — 36415 COLL VENOUS BLD VENIPUNCTURE: CPT

## 2022-12-10 PROCEDURE — 84550 ASSAY OF BLOOD/URIC ACID: CPT

## 2022-12-10 RX ORDER — FOLIC ACID 5 MG/ML
1 INJECTION, SOLUTION INTRAMUSCULAR; INTRAVENOUS; SUBCUTANEOUS ONCE
Status: DISCONTINUED | OUTPATIENT
Start: 2022-12-10 | End: 2022-12-13 | Stop reason: HOSPADM

## 2022-12-10 RX ORDER — POTASSIUM CHLORIDE 7.45 MG/ML
10 INJECTION INTRAVENOUS
Status: DISPENSED | OUTPATIENT
Start: 2022-12-11 | End: 2022-12-11

## 2022-12-10 RX ORDER — MAGNESIUM SULFATE IN WATER 40 MG/ML
2000 INJECTION, SOLUTION INTRAVENOUS ONCE
Status: COMPLETED | OUTPATIENT
Start: 2022-12-10 | End: 2022-12-11

## 2022-12-10 RX ORDER — SODIUM CHLORIDE 0.9 % (FLUSH) 0.9 %
5-40 SYRINGE (ML) INJECTION PRN
Status: DISCONTINUED | OUTPATIENT
Start: 2022-12-10 | End: 2022-12-13 | Stop reason: HOSPADM

## 2022-12-10 RX ORDER — SODIUM CHLORIDE 0.9 % (FLUSH) 0.9 %
5-40 SYRINGE (ML) INJECTION EVERY 12 HOURS SCHEDULED
Status: DISCONTINUED | OUTPATIENT
Start: 2022-12-10 | End: 2022-12-13 | Stop reason: HOSPADM

## 2022-12-10 RX ORDER — THIAMINE HYDROCHLORIDE 100 MG/ML
100 INJECTION, SOLUTION INTRAMUSCULAR; INTRAVENOUS ONCE
Status: COMPLETED | OUTPATIENT
Start: 2022-12-10 | End: 2022-12-10

## 2022-12-10 RX ORDER — LORAZEPAM 2 MG/ML
1 INJECTION INTRAMUSCULAR ONCE
Status: DISCONTINUED | OUTPATIENT
Start: 2022-12-10 | End: 2022-12-13 | Stop reason: HOSPADM

## 2022-12-10 RX ORDER — SODIUM CHLORIDE 9 MG/ML
INJECTION, SOLUTION INTRAVENOUS PRN
Status: DISCONTINUED | OUTPATIENT
Start: 2022-12-10 | End: 2022-12-11 | Stop reason: SDUPTHER

## 2022-12-10 RX ADMIN — POTASSIUM CHLORIDE 10 MEQ: 7.46 INJECTION, SOLUTION INTRAVENOUS at 23:39

## 2022-12-10 RX ADMIN — MAGNESIUM SULFATE HEPTAHYDRATE 2000 MG: 40 INJECTION, SOLUTION INTRAVENOUS at 21:52

## 2022-12-10 RX ADMIN — THIAMINE HYDROCHLORIDE 100 MG: 100 INJECTION, SOLUTION INTRAMUSCULAR; INTRAVENOUS at 22:02

## 2022-12-10 ASSESSMENT — ENCOUNTER SYMPTOMS
EYE REDNESS: 0
VOICE CHANGE: 0
ABDOMINAL PAIN: 0
BACK PAIN: 0
VOMITING: 0
CHEST TIGHTNESS: 0
EYE DISCHARGE: 0
COUGH: 0
WHEEZING: 0
PHOTOPHOBIA: 0
RHINORRHEA: 0
ABDOMINAL DISTENTION: 0
SORE THROAT: 0
NAUSEA: 0
SHORTNESS OF BREATH: 0
TROUBLE SWALLOWING: 0
DIARRHEA: 0

## 2022-12-11 ENCOUNTER — APPOINTMENT (OUTPATIENT)
Dept: ULTRASOUND IMAGING | Age: 62
DRG: 426 | End: 2022-12-11
Payer: MEDICAID

## 2022-12-11 LAB
ANION GAP SERPL CALCULATED.3IONS-SCNC: 10 MMOL/L (ref 7–16)
ANION GAP SERPL CALCULATED.3IONS-SCNC: 10 MMOL/L (ref 7–16)
ANION GAP SERPL CALCULATED.3IONS-SCNC: 7 MMOL/L (ref 7–16)
ANION GAP SERPL CALCULATED.3IONS-SCNC: 7 MMOL/L (ref 7–16)
ANION GAP SERPL CALCULATED.3IONS-SCNC: 8 MMOL/L (ref 7–16)
ANION GAP SERPL CALCULATED.3IONS-SCNC: 9 MMOL/L (ref 7–16)
BASOPHILS ABSOLUTE: 0.01 E9/L (ref 0–0.2)
BASOPHILS RELATIVE PERCENT: 0.1 % (ref 0–2)
BUN BLDV-MCNC: 10 MG/DL (ref 6–23)
BUN BLDV-MCNC: 11 MG/DL (ref 6–23)
BUN BLDV-MCNC: 4 MG/DL (ref 6–23)
BUN BLDV-MCNC: 4 MG/DL (ref 6–23)
BUN BLDV-MCNC: 5 MG/DL (ref 6–23)
BUN BLDV-MCNC: 6 MG/DL (ref 6–23)
BUN BLDV-MCNC: 8 MG/DL (ref 6–23)
BUN BLDV-MCNC: 8 MG/DL (ref 6–23)
BUN BLDV-MCNC: 9 MG/DL (ref 6–23)
CALCIUM SERPL-MCNC: 8 MG/DL (ref 8.6–10.2)
CALCIUM SERPL-MCNC: 8 MG/DL (ref 8.6–10.2)
CALCIUM SERPL-MCNC: 8.1 MG/DL (ref 8.6–10.2)
CALCIUM SERPL-MCNC: 8.1 MG/DL (ref 8.6–10.2)
CALCIUM SERPL-MCNC: 8.2 MG/DL (ref 8.6–10.2)
CALCIUM SERPL-MCNC: 8.2 MG/DL (ref 8.6–10.2)
CALCIUM SERPL-MCNC: 8.3 MG/DL (ref 8.6–10.2)
CALCIUM SERPL-MCNC: 8.3 MG/DL (ref 8.6–10.2)
CALCIUM SERPL-MCNC: 8.4 MG/DL (ref 8.6–10.2)
CALCIUM SERPL-MCNC: 8.5 MG/DL (ref 8.6–10.2)
CALCIUM SERPL-MCNC: 8.5 MG/DL (ref 8.6–10.2)
CHLORIDE BLD-SCNC: 82 MMOL/L (ref 98–107)
CHLORIDE BLD-SCNC: 84 MMOL/L (ref 98–107)
CHLORIDE BLD-SCNC: 84 MMOL/L (ref 98–107)
CHLORIDE BLD-SCNC: 85 MMOL/L (ref 98–107)
CHLORIDE BLD-SCNC: 86 MMOL/L (ref 98–107)
CHLORIDE BLD-SCNC: 86 MMOL/L (ref 98–107)
CHLORIDE BLD-SCNC: 87 MMOL/L (ref 98–107)
CHLORIDE BLD-SCNC: 88 MMOL/L (ref 98–107)
CHOLESTEROL, TOTAL: 157 MG/DL (ref 0–199)
CO2: 25 MMOL/L (ref 22–29)
CO2: 25 MMOL/L (ref 22–29)
CO2: 26 MMOL/L (ref 22–29)
CO2: 26 MMOL/L (ref 22–29)
CO2: 27 MMOL/L (ref 22–29)
CO2: 27 MMOL/L (ref 22–29)
CO2: 29 MMOL/L (ref 22–29)
CO2: 31 MMOL/L (ref 22–29)
CO2: 31 MMOL/L (ref 22–29)
CORTISOL TOTAL: 24.91 MCG/DL (ref 2.68–18.4)
CREAT SERPL-MCNC: 0.5 MG/DL (ref 0.5–1)
CREAT SERPL-MCNC: 0.6 MG/DL (ref 0.5–1)
CREAT SERPL-MCNC: 0.7 MG/DL (ref 0.5–1)
CREAT SERPL-MCNC: 0.8 MG/DL (ref 0.5–1)
EOSINOPHILS ABSOLUTE: 0.07 E9/L (ref 0.05–0.5)
EOSINOPHILS RELATIVE PERCENT: 1 % (ref 0–6)
FERRITIN: 765 NG/ML
FOLATE: 10 NG/ML (ref 4.8–24.2)
GFR SERPL CREATININE-BSD FRML MDRD: >60 ML/MIN/1.73
GLUCOSE BLD-MCNC: 104 MG/DL (ref 74–99)
GLUCOSE BLD-MCNC: 120 MG/DL (ref 74–99)
GLUCOSE BLD-MCNC: 121 MG/DL (ref 74–99)
GLUCOSE BLD-MCNC: 122 MG/DL (ref 74–99)
GLUCOSE BLD-MCNC: 133 MG/DL (ref 74–99)
GLUCOSE BLD-MCNC: 137 MG/DL (ref 74–99)
GLUCOSE BLD-MCNC: 143 MG/DL (ref 74–99)
GLUCOSE BLD-MCNC: 86 MG/DL (ref 74–99)
GLUCOSE BLD-MCNC: 92 MG/DL (ref 74–99)
GLUCOSE BLD-MCNC: 93 MG/DL (ref 74–99)
GLUCOSE BLD-MCNC: 98 MG/DL (ref 74–99)
HCT VFR BLD CALC: 29.8 % (ref 34–48)
HDLC SERPL-MCNC: 78 MG/DL
HEMOGLOBIN: 11.1 G/DL (ref 11.5–15.5)
IMMATURE GRANULOCYTES #: 0.03 E9/L
IMMATURE GRANULOCYTES %: 0.4 % (ref 0–5)
IRON SATURATION: 49 % (ref 15–50)
IRON: 83 MCG/DL (ref 37–145)
LDL CHOLESTEROL CALCULATED: 67 MG/DL (ref 0–99)
LYMPHOCYTES ABSOLUTE: 1.28 E9/L (ref 1.5–4)
LYMPHOCYTES RELATIVE PERCENT: 19.1 % (ref 20–42)
MCH RBC QN AUTO: 38.4 PG (ref 26–35)
MCHC RBC AUTO-ENTMCNC: 37.2 % (ref 32–34.5)
MCV RBC AUTO: 103.1 FL (ref 80–99.9)
MONOCYTES ABSOLUTE: 0.96 E9/L (ref 0.1–0.95)
MONOCYTES RELATIVE PERCENT: 14.3 % (ref 2–12)
NEUTROPHILS ABSOLUTE: 4.36 E9/L (ref 1.8–7.3)
NEUTROPHILS RELATIVE PERCENT: 65.1 % (ref 43–80)
PDW BLD-RTO: 16 FL (ref 11.5–15)
PLATELET # BLD: 240 E9/L (ref 130–450)
PMV BLD AUTO: 10.1 FL (ref 7–12)
POTASSIUM SERPL-SCNC: 2.9 MMOL/L (ref 3.5–5)
POTASSIUM SERPL-SCNC: 3.1 MMOL/L (ref 3.5–5)
POTASSIUM SERPL-SCNC: 3.1 MMOL/L (ref 3.5–5)
POTASSIUM SERPL-SCNC: 3.2 MMOL/L (ref 3.5–5)
POTASSIUM SERPL-SCNC: 3.4 MMOL/L (ref 3.5–5)
POTASSIUM SERPL-SCNC: 3.4 MMOL/L (ref 3.5–5)
POTASSIUM SERPL-SCNC: 3.7 MMOL/L (ref 3.5–5)
POTASSIUM SERPL-SCNC: 3.8 MMOL/L (ref 3.5–5)
POTASSIUM SERPL-SCNC: 4 MMOL/L (ref 3.5–5)
POTASSIUM SERPL-SCNC: 4.1 MMOL/L (ref 3.5–5)
POTASSIUM SERPL-SCNC: 5.2 MMOL/L (ref 3.5–5)
RBC # BLD: 2.89 E12/L (ref 3.5–5.5)
REASON FOR REJECTION: NORMAL
REJECTED TEST: NORMAL
SODIUM BLD-SCNC: 118 MMOL/L (ref 132–146)
SODIUM BLD-SCNC: 118 MMOL/L (ref 132–146)
SODIUM BLD-SCNC: 119 MMOL/L (ref 132–146)
SODIUM BLD-SCNC: 120 MMOL/L (ref 132–146)
SODIUM BLD-SCNC: 121 MMOL/L (ref 132–146)
SODIUM BLD-SCNC: 122 MMOL/L (ref 132–146)
SODIUM BLD-SCNC: 122 MMOL/L (ref 132–146)
SODIUM BLD-SCNC: 123 MMOL/L (ref 132–146)
SODIUM BLD-SCNC: 123 MMOL/L (ref 132–146)
SODIUM BLD-SCNC: 124 MMOL/L (ref 132–146)
SODIUM BLD-SCNC: 125 MMOL/L (ref 132–146)
TOTAL IRON BINDING CAPACITY: 170 MCG/DL (ref 250–450)
TRIGL SERPL-MCNC: 62 MG/DL (ref 0–149)
TROPONIN, HIGH SENSITIVITY: 7 NG/L (ref 0–9)
TSH SERPL DL<=0.05 MIU/L-ACNC: 0.54 UIU/ML (ref 0.27–4.2)
VITAMIN B-12: 875 PG/ML (ref 211–946)
VLDLC SERPL CALC-MCNC: 12 MG/DL
WBC # BLD: 6.7 E9/L (ref 4.5–11.5)

## 2022-12-11 PROCEDURE — 80048 BASIC METABOLIC PNL TOTAL CA: CPT

## 2022-12-11 PROCEDURE — 85025 COMPLETE CBC W/AUTO DIFF WBC: CPT

## 2022-12-11 PROCEDURE — 82607 VITAMIN B-12: CPT

## 2022-12-11 PROCEDURE — 6370000000 HC RX 637 (ALT 250 FOR IP)

## 2022-12-11 PROCEDURE — 82533 TOTAL CORTISOL: CPT

## 2022-12-11 PROCEDURE — 82728 ASSAY OF FERRITIN: CPT

## 2022-12-11 PROCEDURE — 2580000003 HC RX 258: Performed by: INTERNAL MEDICINE

## 2022-12-11 PROCEDURE — 6360000002 HC RX W HCPCS: Performed by: INTERNAL MEDICINE

## 2022-12-11 PROCEDURE — 36415 COLL VENOUS BLD VENIPUNCTURE: CPT

## 2022-12-11 PROCEDURE — 6360000002 HC RX W HCPCS: Performed by: FAMILY MEDICINE

## 2022-12-11 PROCEDURE — 2580000003 HC RX 258

## 2022-12-11 PROCEDURE — 99222 1ST HOSP IP/OBS MODERATE 55: CPT | Performed by: FAMILY MEDICINE

## 2022-12-11 PROCEDURE — 84484 ASSAY OF TROPONIN QUANT: CPT

## 2022-12-11 PROCEDURE — 6360000002 HC RX W HCPCS

## 2022-12-11 PROCEDURE — 83550 IRON BINDING TEST: CPT

## 2022-12-11 PROCEDURE — 83540 ASSAY OF IRON: CPT

## 2022-12-11 PROCEDURE — 99255 IP/OBS CONSLTJ NEW/EST HI 80: CPT | Performed by: INTERNAL MEDICINE

## 2022-12-11 PROCEDURE — 2580000003 HC RX 258: Performed by: STUDENT IN AN ORGANIZED HEALTH CARE EDUCATION/TRAINING PROGRAM

## 2022-12-11 PROCEDURE — 91200 LIVER ELASTOGRAPHY: CPT

## 2022-12-11 PROCEDURE — 6360000002 HC RX W HCPCS: Performed by: STUDENT IN AN ORGANIZED HEALTH CARE EDUCATION/TRAINING PROGRAM

## 2022-12-11 PROCEDURE — 84443 ASSAY THYROID STIM HORMONE: CPT

## 2022-12-11 PROCEDURE — 80074 ACUTE HEPATITIS PANEL: CPT

## 2022-12-11 PROCEDURE — 2000000000 HC ICU R&B

## 2022-12-11 PROCEDURE — 6370000000 HC RX 637 (ALT 250 FOR IP): Performed by: FAMILY MEDICINE

## 2022-12-11 PROCEDURE — 76705 ECHO EXAM OF ABDOMEN: CPT

## 2022-12-11 PROCEDURE — 86706 HEP B SURFACE ANTIBODY: CPT

## 2022-12-11 PROCEDURE — 94640 AIRWAY INHALATION TREATMENT: CPT

## 2022-12-11 PROCEDURE — 80061 LIPID PANEL: CPT

## 2022-12-11 PROCEDURE — 82746 ASSAY OF FOLIC ACID SERUM: CPT

## 2022-12-11 RX ORDER — LORAZEPAM 1 MG/1
3 TABLET ORAL
Status: DISCONTINUED | OUTPATIENT
Start: 2022-12-11 | End: 2022-12-13 | Stop reason: HOSPADM

## 2022-12-11 RX ORDER — SODIUM CHLORIDE 0.9 % (FLUSH) 0.9 %
5-40 SYRINGE (ML) INJECTION EVERY 12 HOURS SCHEDULED
Status: DISCONTINUED | OUTPATIENT
Start: 2022-12-11 | End: 2022-12-11 | Stop reason: SDUPTHER

## 2022-12-11 RX ORDER — POTASSIUM CHLORIDE 7.45 MG/ML
10 INJECTION INTRAVENOUS
Status: COMPLETED | OUTPATIENT
Start: 2022-12-11 | End: 2022-12-11

## 2022-12-11 RX ORDER — POLYETHYLENE GLYCOL 3350 17 G/17G
17 POWDER, FOR SOLUTION ORAL DAILY PRN
Status: DISCONTINUED | OUTPATIENT
Start: 2022-12-11 | End: 2022-12-13 | Stop reason: HOSPADM

## 2022-12-11 RX ORDER — GAUZE BANDAGE 2" X 2"
100 BANDAGE TOPICAL DAILY
Status: DISCONTINUED | OUTPATIENT
Start: 2022-12-11 | End: 2022-12-11 | Stop reason: SDUPTHER

## 2022-12-11 RX ORDER — LORAZEPAM 1 MG/1
2 TABLET ORAL
Status: DISCONTINUED | OUTPATIENT
Start: 2022-12-11 | End: 2022-12-13 | Stop reason: HOSPADM

## 2022-12-11 RX ORDER — LORAZEPAM 1 MG/1
1 TABLET ORAL
Status: DISCONTINUED | OUTPATIENT
Start: 2022-12-11 | End: 2022-12-13 | Stop reason: HOSPADM

## 2022-12-11 RX ORDER — PANTOPRAZOLE SODIUM 40 MG/1
40 TABLET, DELAYED RELEASE ORAL
Status: DISCONTINUED | OUTPATIENT
Start: 2022-12-11 | End: 2022-12-13 | Stop reason: HOSPADM

## 2022-12-11 RX ORDER — LANOLIN ALCOHOL/MO/W.PET/CERES
100 CREAM (GRAM) TOPICAL DAILY
Status: DISCONTINUED | OUTPATIENT
Start: 2022-12-11 | End: 2022-12-11

## 2022-12-11 RX ORDER — SODIUM CHLORIDE 0.9 % (FLUSH) 0.9 %
5-40 SYRINGE (ML) INJECTION PRN
Status: DISCONTINUED | OUTPATIENT
Start: 2022-12-11 | End: 2022-12-12

## 2022-12-11 RX ORDER — BUDESONIDE 0.5 MG/2ML
0.5 INHALANT ORAL 2 TIMES DAILY
Status: DISCONTINUED | OUTPATIENT
Start: 2022-12-11 | End: 2022-12-13 | Stop reason: HOSPADM

## 2022-12-11 RX ORDER — LORAZEPAM 2 MG/ML
1 INJECTION INTRAMUSCULAR
Status: DISCONTINUED | OUTPATIENT
Start: 2022-12-11 | End: 2022-12-13 | Stop reason: HOSPADM

## 2022-12-11 RX ORDER — POTASSIUM CHLORIDE 20 MEQ/1
40 TABLET, EXTENDED RELEASE ORAL ONCE
Status: COMPLETED | OUTPATIENT
Start: 2022-12-11 | End: 2022-12-11

## 2022-12-11 RX ORDER — ACETAMINOPHEN 650 MG/1
650 SUPPOSITORY RECTAL EVERY 6 HOURS PRN
Status: DISCONTINUED | OUTPATIENT
Start: 2022-12-11 | End: 2022-12-13 | Stop reason: HOSPADM

## 2022-12-11 RX ORDER — DEXTROSE MONOHYDRATE 100 MG/ML
INJECTION, SOLUTION INTRAVENOUS CONTINUOUS PRN
Status: DISCONTINUED | OUTPATIENT
Start: 2022-12-11 | End: 2022-12-13 | Stop reason: HOSPADM

## 2022-12-11 RX ORDER — LORAZEPAM 2 MG/ML
3 INJECTION INTRAMUSCULAR
Status: DISCONTINUED | OUTPATIENT
Start: 2022-12-11 | End: 2022-12-13 | Stop reason: HOSPADM

## 2022-12-11 RX ORDER — LORAZEPAM 2 MG/ML
4 INJECTION INTRAMUSCULAR
Status: DISCONTINUED | OUTPATIENT
Start: 2022-12-11 | End: 2022-12-13 | Stop reason: HOSPADM

## 2022-12-11 RX ORDER — SODIUM CHLORIDE 0.9 % (FLUSH) 0.9 %
5-40 SYRINGE (ML) INJECTION EVERY 12 HOURS SCHEDULED
Status: DISCONTINUED | OUTPATIENT
Start: 2022-12-11 | End: 2022-12-12

## 2022-12-11 RX ORDER — ACETAMINOPHEN 650 MG/1
650 SUPPOSITORY RECTAL EVERY 6 HOURS PRN
Status: DISCONTINUED | OUTPATIENT
Start: 2022-12-11 | End: 2022-12-11 | Stop reason: SDUPTHER

## 2022-12-11 RX ORDER — SODIUM CHLORIDE 0.9 % (FLUSH) 0.9 %
5-40 SYRINGE (ML) INJECTION PRN
Status: DISCONTINUED | OUTPATIENT
Start: 2022-12-11 | End: 2022-12-11 | Stop reason: SDUPTHER

## 2022-12-11 RX ORDER — ONDANSETRON 2 MG/ML
4 INJECTION INTRAMUSCULAR; INTRAVENOUS EVERY 6 HOURS PRN
Status: DISCONTINUED | OUTPATIENT
Start: 2022-12-11 | End: 2022-12-13 | Stop reason: HOSPADM

## 2022-12-11 RX ORDER — ACETAMINOPHEN 325 MG/1
650 TABLET ORAL EVERY 6 HOURS PRN
Status: DISCONTINUED | OUTPATIENT
Start: 2022-12-11 | End: 2022-12-11 | Stop reason: SDUPTHER

## 2022-12-11 RX ORDER — DESMOPRESSIN ACETATE 4 UG/ML
1 INJECTION, SOLUTION INTRAVENOUS; SUBCUTANEOUS ONCE
Status: COMPLETED | OUTPATIENT
Start: 2022-12-11 | End: 2022-12-11

## 2022-12-11 RX ORDER — ENOXAPARIN SODIUM 100 MG/ML
30 INJECTION SUBCUTANEOUS DAILY
Status: DISCONTINUED | OUTPATIENT
Start: 2022-12-11 | End: 2022-12-13 | Stop reason: HOSPADM

## 2022-12-11 RX ORDER — SODIUM CHLORIDE 9 MG/ML
INJECTION, SOLUTION INTRAVENOUS PRN
Status: DISCONTINUED | OUTPATIENT
Start: 2022-12-11 | End: 2022-12-13 | Stop reason: HOSPADM

## 2022-12-11 RX ORDER — LORAZEPAM 2 MG/ML
2 INJECTION INTRAMUSCULAR
Status: DISCONTINUED | OUTPATIENT
Start: 2022-12-11 | End: 2022-12-13 | Stop reason: HOSPADM

## 2022-12-11 RX ORDER — ONDANSETRON 4 MG/1
4 TABLET, ORALLY DISINTEGRATING ORAL EVERY 8 HOURS PRN
Status: DISCONTINUED | OUTPATIENT
Start: 2022-12-11 | End: 2022-12-13 | Stop reason: HOSPADM

## 2022-12-11 RX ORDER — SODIUM CHLORIDE 9 MG/ML
INJECTION, SOLUTION INTRAVENOUS PRN
Status: DISCONTINUED | OUTPATIENT
Start: 2022-12-11 | End: 2022-12-11 | Stop reason: SDUPTHER

## 2022-12-11 RX ORDER — ACETAMINOPHEN 325 MG/1
650 TABLET ORAL EVERY 6 HOURS PRN
Status: DISCONTINUED | OUTPATIENT
Start: 2022-12-11 | End: 2022-12-13 | Stop reason: HOSPADM

## 2022-12-11 RX ORDER — POLYETHYLENE GLYCOL 3350 17 G/17G
17 POWDER, FOR SOLUTION ORAL DAILY PRN
Status: DISCONTINUED | OUTPATIENT
Start: 2022-12-11 | End: 2022-12-11

## 2022-12-11 RX ORDER — FOLIC ACID 1 MG/1
1 TABLET ORAL DAILY
Status: DISCONTINUED | OUTPATIENT
Start: 2022-12-11 | End: 2022-12-13 | Stop reason: HOSPADM

## 2022-12-11 RX ORDER — LORAZEPAM 1 MG/1
4 TABLET ORAL
Status: DISCONTINUED | OUTPATIENT
Start: 2022-12-11 | End: 2022-12-13 | Stop reason: HOSPADM

## 2022-12-11 RX ORDER — LANOLIN ALCOHOL/MO/W.PET/CERES
100 CREAM (GRAM) TOPICAL DAILY
Status: DISCONTINUED | OUTPATIENT
Start: 2022-12-18 | End: 2022-12-13 | Stop reason: HOSPADM

## 2022-12-11 RX ORDER — DEXTROSE MONOHYDRATE 50 MG/ML
INJECTION, SOLUTION INTRAVENOUS CONTINUOUS
Status: DISCONTINUED | OUTPATIENT
Start: 2022-12-11 | End: 2022-12-11

## 2022-12-11 RX ORDER — IPRATROPIUM BROMIDE AND ALBUTEROL SULFATE 2.5; .5 MG/3ML; MG/3ML
1 SOLUTION RESPIRATORY (INHALATION) EVERY 4 HOURS PRN
Status: DISCONTINUED | OUTPATIENT
Start: 2022-12-11 | End: 2022-12-12

## 2022-12-11 RX ADMIN — DEXTROSE MONOHYDRATE: 50 INJECTION, SOLUTION INTRAVENOUS at 06:47

## 2022-12-11 RX ADMIN — ONDANSETRON HYDROCHLORIDE 4 MG: 2 SOLUTION INTRAMUSCULAR; INTRAVENOUS at 08:21

## 2022-12-11 RX ADMIN — DESMOPRESSIN ACETATE 1 MCG: 4 SOLUTION INTRAVENOUS at 08:09

## 2022-12-11 RX ADMIN — ONDANSETRON 4 MG: 4 TABLET, ORALLY DISINTEGRATING ORAL at 16:45

## 2022-12-11 RX ADMIN — BUDESONIDE 500 MCG: 0.5 SUSPENSION RESPIRATORY (INHALATION) at 20:10

## 2022-12-11 RX ADMIN — POTASSIUM CHLORIDE 10 MEQ: 7.46 INJECTION, SOLUTION INTRAVENOUS at 14:56

## 2022-12-11 RX ADMIN — POTASSIUM CHLORIDE 10 MEQ: 7.46 INJECTION, SOLUTION INTRAVENOUS at 13:51

## 2022-12-11 RX ADMIN — PANTOPRAZOLE SODIUM 40 MG: 40 TABLET, DELAYED RELEASE ORAL at 06:43

## 2022-12-11 RX ADMIN — THIAMINE HYDROCHLORIDE 500 MG: 100 INJECTION, SOLUTION INTRAMUSCULAR; INTRAVENOUS at 21:03

## 2022-12-11 RX ADMIN — Medication 10 ML: at 21:04

## 2022-12-11 RX ADMIN — POTASSIUM CHLORIDE 10 MEQ: 7.46 INJECTION, SOLUTION INTRAVENOUS at 01:41

## 2022-12-11 RX ADMIN — SODIUM CHLORIDE, PRESERVATIVE FREE 10 ML: 5 INJECTION INTRAVENOUS at 21:03

## 2022-12-11 RX ADMIN — POTASSIUM CHLORIDE 10 MEQ: 7.46 INJECTION, SOLUTION INTRAVENOUS at 00:34

## 2022-12-11 RX ADMIN — DEXTROSE MONOHYDRATE 150 ML/HR: 50 INJECTION, SOLUTION INTRAVENOUS at 13:49

## 2022-12-11 RX ADMIN — THIAMINE HYDROCHLORIDE 500 MG: 100 INJECTION, SOLUTION INTRAMUSCULAR; INTRAVENOUS at 12:49

## 2022-12-11 RX ADMIN — BUDESONIDE 500 MCG: 0.5 SUSPENSION RESPIRATORY (INHALATION) at 10:39

## 2022-12-11 RX ADMIN — POTASSIUM CHLORIDE 40 MEQ: 1500 TABLET, EXTENDED RELEASE ORAL at 05:48

## 2022-12-11 RX ADMIN — ENOXAPARIN SODIUM 30 MG: 100 INJECTION SUBCUTANEOUS at 08:31

## 2022-12-11 RX ADMIN — Medication 10 ML: at 08:28

## 2022-12-11 RX ADMIN — POTASSIUM CHLORIDE 10 MEQ: 7.46 INJECTION, SOLUTION INTRAVENOUS at 12:44

## 2022-12-11 NOTE — PROGRESS NOTES
[facility] Attending    S: 58 y.o. female with a history of COPD, alcohol and tobacco abuse as well as some chronic issues with hyponatremia presented to the hospital with significant hyponatremia, dizzines s/p fall at home with seizure like activity. She has been admitted to the ICU for management and nephrology consulted. Today, the patient notes that she is feeling much better than when she came in to the hospital.  Her sodium started correcting too quickly, so the patient was placed on D5 W to help slow the sodium correction and prevent osmotic demyelination. She notes some mild nausea this morning, but is other wise okay. She does have the cut on her face from the fall which is a little tender, she says. O: VS- Blood pressure (!) 161/101, pulse 84, temperature 98.2 °F (36.8 °C), temperature source Oral, resp. rate 13, height 5' 1\" (1.549 m), weight 99 lb 4.8 oz (45 kg), SpO2 97 %. Exam is as noted by resident with the following changes, additions or corrections:  Gen:  AAO NAD; cuts noted on the left face/temple region, without active bleeding  Lungs:  CTAB  CVS-RRR  Ext:  no CCE  Abs:  soft, non tender    Impressions/Plans:  Hyponatremia-renal following; slow correction; BMPs Q2 hours  Seizure like activity/dizziness/falls-likely 2/2 hyponatremia; could also consider alcohol withdrawal; CT no fractures or hemorrhage; could consider neuro consult if further seizure like activity occurs  Anemia, mild, macrocytic-newly noted; B12/folate normal; check FOBT; will need outpatient follow up scopes; monitor H/H  Elevated CK-continue to monitor     Attending Physician Statement  I have reviewed the chart and seen the patient with the resident(s). I personally reviewed images, EKG's and similar tests, if present.   I personally reviewed and performed key elements of the history and exam.  I have reviewed and confirmed student and/or resident history and exam with changes as indicated above. I agree with the assessment, plan and orders as documented by the resident. Please refer to the resident progress note today and admission history and physical  for additional information.       Luisito Moses MD

## 2022-12-11 NOTE — CONSULTS
Giovani [facility]  Internal Medicine Residency Program  Consult Note  [facility]    Patient:  Loulou Silva 58 y.o. female MRN: [id number]     Date of Service: 12/11/2022    Hospital Day: 2      Chief complaint: dizziness, falls, and weakness  Admitted for hyponatremia   History of Present Illness   The patient is a 58 y.o. female who presents with chief complaint of dizziness falls and weakness. She states that starting about 2 days prior to admission, she started feeling unsteady on her feet. She states this has happened before, once when she came to the ED and was diagnosed with hyponatremia in August of 2022, but prior to that time the dizziness was always related to low potassium. She states that yesterday, she fell and lost consciousness and hit her face. She woke within a few minutes but continued to have falls. It was at that time that her fiance called 911 and she was transported to the hospital for further evaluation. She states that she recently started drinking daily. She drinks one 16 oz can of beer a day. She started drinking in July. She states that there is no particular reason she started drinking, denies any life stressors. 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 Past medical history for this patient includes hypothyroidism and carpal tunnel syndrome. She also has a pulmonary nodule, last CT scan was in 2021 showing 3 mm nodule in right upper lobe. She also has history of COPD with PFTs in 2021. Family history includes prostate cancer in her father, but no other known medical conditions in any other family members. She states that she typically eats meats, potatoes, and vegetables as her diet. She does not exercise, but states she is active during her work ours at her job. She admits to 1/2 pack of cigarettes daily and one 16 oz can of beer daily. She denies illicit drug use. ED Course: Per the ED notes, the patient was having confusion and seizure-like activity at home (tremors of the hands). On examination, she was A&O x2. She was hemodynamically stable and labs showed initial Na of 115. She also had hypokalemia with K of 3.0. CT was performed and showed no acute intracranial abnormality or fracture. CXR showed no acute process. Nephrology was consulted, and patient was previously seen by Dr. Marnie De Anda. Patient was then admitted to the ICU for further management. ED Meds: Patient was given 2 g mag, 30 meq of K, thiamin 100 mg. ED Fluids: Patient was given no fluids. Past Medical History:      Diagnosis Date    Carpal tunnel syndrome     Pulmonary nodule     Thyroid disease        Past Surgical History:    No past surgical history on file. Medications Prior to Admission:    Prior to Admission medications    Medication Sig Start Date End Date Taking?  Authorizing Provider   montelukast (SINGULAIR) 10 MG tablet TAKE 1 TABLET BY MOUTH DAILY 11/28/22   Connor Santos MD   MAPAP ARTHRITIS PAIN 650 MG extended release tablet TAKE 1 TABLET BY MOUTH EVERY 6 HOURS AS NEEDED FOR PAIN 10/31/22   Connor Santos MD   cyclobenzaprine (FLEXERIL) 5 MG tablet TAKE 1 TABLET BY MOUTH EVERY EVENING AS NEEDED FOR MUSCLE SPASMS 10/31/22   Connor Santos MD   albuterol sulfate HFA (PROVENTIL;VENTOLIN;PROAIR) 108 (90 Base) MCG/ACT inhaler INHALE 2 PUFFS INTO THE LUNGS FOUR TIMES DAILY AS NEEDED FOR WHEEZING 8/8/22   Connor Santos MD   cetirizine (ZYRTEC) 10 MG tablet Take 1 tablet by mouth daily 4/15/22   Sheron Villarreal, DO   FLOVENT  MCG/ACT inhaler Inhale 1 puff into the lungs 2 times daily 4/15/22   Sheron Villarreal, DO   omeprazole (PRILOSEC) 40 MG delayed release capsule Take 1 capsule by mouth every morning (before breakfast) 4/15/22   Toñito Cool A Rech, DO   tiotropium (SPIRIVA RESPIMAT) 2.5 MCG/ACT AERS inhaler INHALE 2 PUFFS INTO THE LUNGS DAILY 4/15/22   Kaylah A Rech, DO   naproxen (NAPROSYN) 250 MG tablet TAKE 1 TABLET BY MOUTH TWICE DAILY WITH MEALS 4/15/22   Kaylah Wren DO       Allergies:  Ibuprofen and Morphine    Social History:   TOBACCO:   reports that she has been smoking cigarettes. She started smoking about 42 years ago. She has a 20.00 pack-year smoking history. She has never used smokeless tobacco.  ETOH:   reports current alcohol use of about 2.0 standard drinks per week. OCCUPATION: nursing aide     Family History:   No family history on file. REVIEW OF SYSTEMS:    Constitutional: No fever, no chills, no change in weight; good appetite  HEENT: No blurred vision, no ear problems, no sore throat, no rhinorrhea. Respiratory: No cough, no sputum production, no pleuritic chest pain, no shortness of breath  Cardiology: No angina, no dyspnea on exertion, no paroxysmal nocturnal dyspnea, no orthopnea, no palpitation, no leg swelling. Gastroenterology: No dysphagia, no reflux; no abdominal pain, no nausea or vomiting; no constipation or diarrhea.  No hematochezia   Genitourinary: No dysuria, no frequency, hesitancy; no hematuria  Musculoskeletal: no joint pain, no myalgia, no change in range of movement  Neurology: no focal weakness in extremities, no slurred speech, no double vision, no tingling or numbness sensation, + dizziness, + tremor (at rest and during movement)  Endocrinology: no temperature intolerance, no polyphagia, polydipsia or polyuria  Hematology: no increased bleeding, no bruising, no lymphadenopathy  Skin: no skin changes noticed by patient  Psychology: no depressed mood, no suicidal ideation    Physical Exam   Vitals: /73   Pulse 85   Resp 19   Ht 5' 1\" (1.549 m)   Wt 99 lb 4.8 oz (45 kg)   SpO2 97%   BMI 18.76 kg/m²           General Appearance: alert and oriented to person, place and time, well-developed and well-nourished, in no acute distress  Head: abnormal- abrasion noted on left cheek  Eyes: pupils equal, round, and reactive to light, extraocular eye movements intact, conjunctivae normal  Neck: neck supple and non tender without mass   Pulmonary/Chest: clear to auscultation bilaterally- no wheezes, rales or rhonchi, normal air movement, no respiratory distress  Cardiovascular: normal rate, regular rhythm, and no murmurs  Abdomen: soft, non-tender, non-distended, normal bowel sounds, no masses or organomegaly  Extremities: no edema  Musculoskeletal: normal range of motion, no joint swelling, deformity or tenderness  Neurologic: no cranial nerve deficit, sensation to light touch intact, muscle strength normal, speech abnormal- slurring, and resting tremor noted     Lines     site day    Art line   None    TLC None    PICC None    Hemoaccess None    Oxygen:    Room air    ABG:   No results found for: PHART, PH, XTY0IVN, PCO2, PO2ART, PO2, PZR1IUH, HCO3, BEART, BE, THGBART, THB, QVU8CGE, W3QKGBWI, O2SAT  Labs and Imaging Studies   Basic Labs  CBC:   Lab Results   Component Value Date/Time    WBC 8.5 12/10/2022 08:10 PM    RBC 2.92 12/10/2022 08:10 PM    HGB 10.3 12/10/2022 08:10 PM    HCT 29.0 12/10/2022 08:10 PM    MCV 99.3 12/10/2022 08:10 PM    RDW 15.7 12/10/2022 08:10 PM     12/10/2022 08:10 PM     CMP:  Lab Results   Component Value Date/Time     12/11/2022 04:08 AM    K 2.9 12/11/2022 04:08 AM    K 3.0 12/10/2022 09:45 PM    CL 85 12/11/2022 04:08 AM    CO2 31 12/11/2022 04:08 AM    BUN 11 12/11/2022 04:08 AM    PROT 6.8 12/10/2022 08:10 PM       Imaging Studies:     CT HEAD WO CONTRAST    Result Date: 12/10/2022  EXAMINATION: CT OF THE HEAD WITHOUT CONTRAST; CT OF THE FACE WITHOUT CONTRAST 12/10/2022 7:55 pm TECHNIQUE: CT of the head was performed without the administration of intravenous contrast. Automated exposure control, iterative reconstruction, and/or weight based adjustment of the mA/kV was utilized to reduce the radiation dose to as low as reasonably achievable.; CT of the face was performed without the administration of intravenous contrast. Multiplanar reformatted images are provided for review. Automated exposure control, iterative reconstruction, and/or weight based adjustment of the mA/kV was utilized to reduce the radiation dose to as low as reasonably achievable. COMPARISON: None. HISTORY: ORDERING SYSTEM PROVIDED HISTORY: [facility] seizure TECHNOLOGIST PROVIDED HISTORY: Has a \"code stroke\" or \"stroke alert\" been called? ->No Reason for exam:->fall seizure Decision Support Exception - unselect if not a suspected or confirmed emergency medical condition->Emergency Medical Condition (MA) What reading provider will be dictating this exam?->CRC; ORDERING SYSTEM PROVIDED HISTORY: fall TECHNOLOGIST PROVIDED HISTORY: Reason for exam:->fall Decision Support Exception - unselect if not a suspected or confirmed emergency medical condition->Emergency Medical Condition (MA) What reading provider will be dictating this exam?->CRC FINDINGS: CT HEAD: BRAIN/VENTRICLES: There is no acute intracranial hemorrhage, mass effect or midline shift. No abnormal extra-axial fluid collection. The gray-white differentiation is maintained without evidence of an acute infarct. 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 There is no evidence of hydrocephalus. There are nonspecific hypoattenuating foci in the subcortical and periventricular white matter that most likely represent chronic microangiopathic ischemic changes in a patient of this age. There are atherosclerotic calcifications of the intracranial vessels. CT FACIAL BONES: FACIAL BONES: The maxilla, pterygoid plates and zygomatic arches are intact. The mandible is intact. The mandibular condyles are normally situated. The nasal bones and maxillary nasal processes are intact. ORBITS: The globes appear intact. The extraocular muscles, optic nerve sheath complexes and lacrimal glands appear unremarkable. No retrobulbar hematoma or mass is seen. The orbital walls and rims are intact.  SINUSES/MASTOIDS: The paranasal sinuses and mastoid air cells are well aerated. No acute fracture is seen. SOFT TISSUES: No acute abnormality of the visualized skull or soft tissues. BRAIN/VENTRICLES: .     No acute intracranial abnormality. No acute traumatic injury of the facial bones. CT FACIAL BONES WO CONTRAST    Result Date: 12/10/2022  EXAMINATION: CT OF THE HEAD WITHOUT CONTRAST; CT OF THE FACE WITHOUT CONTRAST 12/10/2022 7:55 pm TECHNIQUE: CT of the head was performed without the administration of intravenous contrast. Automated exposure control, iterative reconstruction, and/or weight based adjustment of the mA/kV was utilized to reduce the radiation dose to as low as reasonably achievable.; CT of the face was performed without the administration of intravenous contrast. Multiplanar reformatted images are provided for review. Automated exposure control, iterative reconstruction, and/or weight based adjustment of the mA/kV was utilized to reduce the radiation dose to as low as reasonably achievable. COMPARISON: None. HISTORY: ORDERING SYSTEM PROVIDED HISTORY: fall seizure TECHNOLOGIST PROVIDED HISTORY: Has a \"code stroke\" or \"stroke alert\" been called? ->No Reason for exam:->fall seizure Decision Support Exception - unselect if not a suspected or confirmed emergency medical condition->Emergency Medical Condition (MA) What reading provider will be dictating this exam?->CRC; ORDERING SYSTEM PROVIDED HISTORY: fall TECHNOLOGIST PROVIDED HISTORY: Reason for exam:->fall Decision Support Exception - unselect if not a suspected or confirmed emergency medical condition->Emergency Medical Condition (MA) What reading provider will be dictating this exam?->CRC FINDINGS: CT HEAD: BRAIN/VENTRICLES: There is no acute intracranial hemorrhage, mass effect or midline shift. No abnormal extra-axial fluid collection. The gray-white differentiation is maintained without evidence of an acute infarct. 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  There is no evidence of hydrocephalus. There are nonspecific hypoattenuating foci in the subcortical and periventricular white matter that most likely represent chronic microangiopathic ischemic changes in a patient of this age. There are atherosclerotic calcifications of the intracranial vessels. CT FACIAL BONES: FACIAL BONES: The maxilla, pterygoid plates and zygomatic arches are intact. The mandible is intact. The mandibular condyles are normally situated. The nasal bones and maxillary nasal processes are intact. ORBITS: The globes appear intact. The extraocular muscles, optic nerve sheath complexes and lacrimal glands appear unremarkable. No retrobulbar hematoma or mass is seen. The orbital walls and rims are intact. SINUSES/MASTOIDS: The paranasal sinuses and mastoid air cells are well aerated. No acute fracture is seen. SOFT TISSUES: No acute abnormality of the visualized skull or soft tissues. BRAIN/VENTRICLES: .     No acute intracranial abnormality. No acute traumatic injury of the facial bones. CT CERVICAL SPINE WO CONTRAST    Result Date: 12/10/2022  EXAMINATION: CT OF THE CERVICAL SPINE WITHOUT CONTRAST 12/10/2022 7:55 pm TECHNIQUE: CT of the cervical spine was performed without the administration of intravenous contrast. Multiplanar reformatted images are provided for review. Automated exposure control, iterative reconstruction, and/or weight based adjustment of the mA/kV was utilized to reduce the radiation dose to as low as reasonably achievable. COMPARISON: None. HISTORY: ORDERING SYSTEM PROVIDED HISTORY: fall TECHNOLOGIST PROVIDED HISTORY: Reason for exam:->fall Decision Support Exception - unselect if not a suspected or confirmed emergency medical condition->Emergency Medical Condition (MA) What reading provider will be dictating this exam?->CRC FINDINGS: BONES/ALIGNMENT: There is no acute fracture or traumatic malalignment.  DEGENERATIVE CHANGES: Moderate to severe most conspicuous at C4-7 vertebrae with a suggestion of central stenosis such as C4-5 and C5-6 SOFT TISSUES: There is no prevertebral soft tissue swelling. No acute fracture is identified. XR CHEST PORTABLE    Result Date: 12/10/2022  EXAMINATION: ONE XRAY VIEW OF THE CHEST 12/10/2022 6:48 pm COMPARISON: None. HISTORY: ORDERING SYSTEM PROVIDED HISTORY: fall seizure pna TECHNOLOGIST PROVIDED HISTORY: Reason for exam:->fall seizure pna What reading provider will be dictating this exam?->CRC FINDINGS: Cardiomediastinal silhouette: No cardiomegaly or obvious acute process is identified. Lungs/pleura: No acute pulmonary infiltrate, pleural effusion, or pneumothorax is identified. Other: No displaced fracture.      No significant injury is identified on the portableexam.       EKG: normal sinus rhythm, motion artifact noted     Resident's Assessment and Plan     Neuro   AMS due to hyponatremia   Na 117   Confused on admission but improving this morning, no A&Ox4   Monitor mental status  Pulmonary  COPD Gold Stage 3   Patient on singulair, flovent, spiriva, and albuterol at home  Per outpatient records, last PFTs in 2021 FEV1/FVC 58%, FEV1 49% predicted   Pulmicort and Duoneb ordered  Pulmonary nodule  Patient smokes 0.5 packs per day for 40 years - 20 pack year history  Last CT lung screening in August 2021 showing 3 mm nodule in the right upper lobe  Continue yearly lung screening  GI   Concern for liver fibrosis in the setting of alcohol use   Elevated AST   RUQ US show mildly coarsened liver parenchyma   FIB-4 scan elevated at 2.83 - fibroscan ordered  Renal   Hyponatremia due to beer potomania  Na 117 on admission, initially improving without direct intervention  Overcorrected to 125  Started on D5 at 100 cc/hr - increased to 150 cc/hr per nephro  Given 1mcg DDAVP   Monitor BMP q2 hrs  Hypokalemia  Patient states she has had multiple issues with low potassium in the past   3.0 on admission, replaced  Remains low today, 3.1  Endocrine   Possible hx of hypothyroidism  Patient states she has hypothyroidism and takes a medication daily   Unable to tell dose or name of med  Chart review unrevealing for synthroid prescription  TSH in the past normal  Repeat TSH normal  Monitor off synthroid  Heme/Onc  Macrocytic anemia   Hgb 11.1, .1  In the setting of alcohol abuse  Continue thiamine and folic acid supplementation  Psych   Alcohol abuse  Currently drinks 1 can (16 oz) daily   Started daily drinking in July States she had no acute changes in her life, no trauma, no psychiatric concerns  Other    PT/OT evaluation: ordered   DVT prophylaxis/ GI prophylaxis: Lovenox   Disposition: Admit to ICU    Luis Jaquez DO, PGY-1   Attending physician: Dr. Elda Telelz  Department of Pulmonary, Critical Care and Sleep Medicine  Jose De Jesus Joyce  Department of Internal Medicine      During multidisciplinary team rounds John Cain is a 58 y.o. female was seen, examined and discussed. This is confirmation that I have personally seen and examined the patient and that the key elements of the encounter were performed by me (> 85 % time). The medications & laboratory data was discussed and adjusted where necessary. The radiographic images were reviewed or with radiologist or consultant if felt dis-concordant with the exam or history. The above findings were corroborated, plans confirmed and changes made if needed. Family is updated at the bedside as available. Key issues of the case were discussed among consultants. Critical Care time is documented if appropriate.       Souleymane Cooper DO, FACP, FCCP, Kassandra Yeboah,

## 2022-12-11 NOTE — ED PROVIDER NOTES
58y.o. year old female presenting to the emergency room with concerns of confusion, seizure-like activity. Reports that symptom's onset prior to arrival. Worsen with not drinking. Improves with none. Severity of moderate, with no radiation . Symptoms are resolving in timing. Symptoms described as patient sat down on stairs and then lied down. Family concerned by tremor-like activity. Patient answering questions for Ems, and was able to move legs but would not hold up. associated symptoms of . Patient awake, able to follow commands on arrival, moving all four extremities, Aox2. Previous history of hyponatremia, alcohol abuse noted. Chief Complaint   Patient presents with    Tremors     Per EMS called by patients mother for Dts/ in and out of conciousness, and left sided facial drop with bilateral leg drop. Symptoms resolved on arrival. patient confused in route with tremors. Review of Systems   Unable to perform ROS: Mental status change   Constitutional:  Negative for chills, fatigue and fever. HENT:  Negative for congestion, rhinorrhea, trouble swallowing and voice change. Eyes:  Negative for photophobia, discharge, redness and visual disturbance. Respiratory:  Negative for cough, shortness of breath and wheezing. Cardiovascular:  Negative for chest pain and palpitations. Gastrointestinal:  Negative for abdominal pain, diarrhea, nausea and vomiting. Genitourinary:  Negative for dysuria, frequency, hematuria and urgency. Musculoskeletal:  Negative for arthralgias, back pain, neck pain and neck stiffness. Skin:  Negative for rash and wound. Neurological:  Positive for weakness. Negative for dizziness, syncope, numbness and headaches. Psychiatric/Behavioral:  Positive for confusion. Negative for behavioral problems. The patient is nervous/anxious. Physical Exam  Vitals reviewed. Constitutional:       General: She is not in acute distress. Appearance: Normal appearance. She is not diaphoretic. HENT:      Head: Normocephalic. Right Ear: External ear normal.      Left Ear: External ear normal.      Nose: Nose normal. No congestion. Eyes:      General: No scleral icterus. Right eye: No discharge. Left eye: No discharge. Conjunctiva/sclera: Conjunctivae normal.   Cardiovascular:      Rate and Rhythm: Normal rate and regular rhythm. Heart sounds: No friction rub. Pulmonary:      Effort: Pulmonary effort is normal. No respiratory distress. Breath sounds: No stridor. No rhonchi. Abdominal:      General: There is no distension. Palpations: Abdomen is soft. Tenderness: There is no abdominal tenderness. There is no guarding or rebound. Musculoskeletal:         General: No tenderness or deformity. Cervical back: Normal range of motion and neck supple. No rigidity or tenderness. Skin:     General: Skin is warm. Coloration: Skin is not jaundiced. Neurological:      Mental Status: She is alert. She is disoriented. Sensory: No sensory deficit. Motor: No weakness. Psychiatric:         Mood and Affect: Mood normal.         Behavior: Behavior is slowed. Procedures     US FIBROSCAN   Final Result   Hepatic fibrosis.       Steatosis score of S0-S1      Fibrosis score of F3      Additional Info:   [email or web address]   an-results         260 to 290 dB/m   * S1: S2   * 11% to 33%: 34% to 66%      Higher than 290 dB/m   * S1: S3   * 11% to 33%: 67% or more            2 to 7 kPa   * 8 to 9 kPa: 8 to 9 kPa   * 8 to 11 kPa: 9 to 14 kPa   * 18 kPa or higher: 14 kPa or higher      2 to 7 kPa   * 8 to 9 kPa: 7 to 11 kPa   * 8 to 11 kPa: 11 to 14 kPa   * 18 kPa or higher: 14 kPa or higher      2 to 7 kPa   * 8 to 9 kPa: 7 to 9 kPa   * 8 to 11 kPa: 9 to 17 kPa   * 18 kPa or higher: 17 kPa or higher      2 to 7 kPa   * 8 to 9 kPa: 7.5 to 10 kPa   * 8 to 11 kPa: 10 to 14 kPa   * 18 kPa or higher: 14 kPa or higher      2 to 7 kPa   * 8 to 9 kPa: 7 to 11 kPa   * 8 to 11 kPa: 11 to 19 kPa   * 18 kPa or higher: 19 kPa or higher            US ABDOMEN LIMITED   Final Result   Cholecystectomy. Common bile duct at the upper limits of normal.      Mildly coarsened liver parenchyma. CT HEAD WO CONTRAST   Final Result   No acute intracranial abnormality. No acute traumatic injury of the facial bones. CT CERVICAL SPINE WO CONTRAST   Final Result   No acute fracture is identified. CT FACIAL BONES WO CONTRAST   Final Result   No acute intracranial abnormality. No acute traumatic injury of the facial bones. XR CHEST PORTABLE   Final Result   No significant injury is identified on the portableexam.             EKG:  This EKG is signed by emergency department physician. Rate: 105  Rhythm: Sinus  Interpretation: sinus tachycardia  Comparison: changes compared to previous EKG     MDM  Number of Diagnoses or Management Options  Diagnosis management comments: 58year old female presenting to the ER with complaints of tremors. Patient reported altered by family. Alcohol abuse reported,. patient sat down on steps and laid down, EMS reports that family denies any falls, witnessed patient sit down mammogram.  Symptoms improving per EMS prior to arrival.  Patient initial exam confused. CT demonstrating no acute intracranial abnormalities, no fracture noted. Chest x-ray no acute injury noted. Patient noted to be hyponatremic on 1 occasion sodium 1, BMP with sodium 115. Patient with previous admissions for hyponatremia. Discussed with Dr. Linh Parson, patient previously seen by Dr. Rajwinder Ayala, recommended consult with Dr. Rebecca Robin and ICU admission. Spoke to Dr. Kala Dejesus, discussed patient, patient accepted to ICU. Spoke to Dr. Mely Manjarrez discussed patient patient accepted family medicine for admission. Spoke to Dr. Rebecca Robin, will put in orders.  Recommended patient have sodium checked every 4 hours with results to be called to nephrology. ED Course as of 12/12/22 1017   Sat Dec 10, 2022   2117 Spoke to Dr. Crow Tapia, intensivist, discussed patient, accepted patient to the unit. Spoke to Dr. Annetta Hodgkin, discussed patient, will plan to admit patient at this time. [JEY]   2121 If patient has seizure again nephrology recommends 50cc of hypertonic saline [SS]   2122 Nephrology wants to be contacted with repeat sodium   [JEY]      ED Course User Index  [JEY] Janice Elizabeth DO  [SS] Naif Becerril MD        ED Course as of 12/12/22 1017   Sat Dec 10, 2022   2117 Spoke to Dr. Crow Tapia, intensivist, discussed patient, accepted patient to the unit. Spoke to Dr. Annetta Hodgkin, discussed patient, will plan to admit patient at this time. [JEY]   2121 If patient has seizure again nephrology recommends 50cc of hypertonic saline [SS]   2122 Nephrology wants to be contacted with repeat sodium   [JEY]      ED Course User Index  [JEY] Janice Elizabeth DO  [SS] Naif Becerril MD       --------------------------------------------- PAST HISTORY ---------------------------------------------  Past Medical History:  has a past medical history of Carpal tunnel syndrome, Pulmonary nodule, and Thyroid disease. Past Surgical History:  has no past surgical history on file. Social History:  reports that she has been smoking cigarettes. She started smoking about 42 years ago. She has a 20.00 pack-year smoking history. She has never used smokeless tobacco. She reports current alcohol use of about 2.0 standard drinks per week. She reports that she does not use drugs. Family History: family history is not on file. The patients home medications have been reviewed.     Allergies: Ibuprofen and Morphine    -------------------------------------------------- RESULTS -------------------------------------------------    LABS:  Results for orders placed or performed during the hospital encounter of 12/10/22   Culture, Blood 1    Specimen: Blood   Result Value Ref Range    Blood Culture, Routine 24 Hours no growth    Culture, Blood 2    Specimen: Blood   Result Value Ref Range    Culture, Blood 2 24 Hours no growth    CBC with Auto Differential   Result Value Ref Range    WBC 8.5 4.5 - 11.5 E9/L    RBC 2.92 (L) 3.50 - 5.50 E12/L    Hemoglobin 10.3 (L) 11.5 - 15.5 g/dL    Hematocrit 29.0 (L) 34.0 - 48.0 %    MCV 99.3 80.0 - 99.9 fL    MCH 37.7 (H) 26.0 - 35.0 pg    MCHC 37.9 (H) 32.0 - 34.5 %    RDW 15.7 (H) 11.5 - 15.0 fL    Platelets 073 561 - 510 E9/L    MPV 10.4 7.0 - 12.0 fL    Neutrophils % 75.6 43.0 - 80.0 %    Immature Granulocytes % 0.9 0.0 - 5.0 %    Lymphocytes % 12.3 (L) 20.0 - 42.0 %    Monocytes % 10.8 2.0 - 12.0 %    Eosinophils % 0.2 0.0 - 6.0 %    Basophils % 0.2 0.0 - 2.0 %    Neutrophils Absolute 6.45 1.80 - 7.30 E9/L    Immature Granulocytes # 0.08 E9/L    Lymphocytes Absolute 1.05 (L) 1.50 - 4.00 E9/L    Monocytes Absolute 0.92 0.10 - 0.95 E9/L    Eosinophils Absolute 0.02 (L) 0.05 - 0.50 E9/L    Basophils Absolute 0.02 0.00 - 0.20 E9/L   Comprehensive Metabolic Panel w/ Reflex to MG   Result Value Ref Range    Sodium 115 (LL) 132 - 146 mmol/L    Potassium reflex Magnesium 3.3 (L) 3.5 - 5.0 mmol/L    Chloride 75 (L) 98 - 107 mmol/L    CO2 29 22 - 29 mmol/L    Anion Gap 11 7 - 16 mmol/L    Glucose 93 74 - 99 mg/dL    BUN 12 6 - 23 mg/dL    Creatinine 0.9 0.5 - 1.0 mg/dL    Est, Glom Filt Rate >60 >=60 mL/min/1.73    Calcium 8.1 (L) 8.6 - 10.2 mg/dL    Total Protein 6.8 6.4 - 8.3 g/dL    Albumin 3.3 (L) 3.5 - 5.2 g/dL    Total Bilirubin 1.1 0.0 - 1.2 mg/dL    Alkaline Phosphatase 120 (H) 35 - 104 U/L    ALT 20 0 - 32 U/L    AST 49 (H) 0 - 31 U/L   Lactate, Sepsis   Result Value Ref Range    Lactic Acid, Sepsis 1.1 0.5 - 1.9 mmol/L   Lactate, Sepsis   Result Value Ref Range    Lactic Acid, Sepsis 1.0 0.5 - 1.9 mmol/L   Urinalysis with Microscopic   Result Value Ref Range    Color, UA Yellow Straw/Yellow    Clarity, UA Clear Clear    Glucose, Ur Negative Negative mg/dL    Bilirubin Urine Negative Negative    Ketones, Urine Negative Negative mg/dL    Specific Gravity, UA <=1.005 1.005 - 1.030    Blood, Urine Negative Negative    pH, UA 7.0 5.0 - 9.0    Protein, UA Negative Negative mg/dL    Urobilinogen, Urine 1.0 <2.0 E.U./dL    Nitrite, Urine Negative Negative    Leukocyte Esterase, Urine Negative Negative    WBC, UA NONE 0 - 5 /HPF    RBC, UA NONE 0 - 2 /HPF    Epithelial Cells, UA RARE /HPF    Bacteria, UA RARE (A) None Seen /HPF   Urine Drug Screen   Result Value Ref Range    Amphetamine Screen, Urine NOT DETECTED Negative <1000 ng/mL    Barbiturate Screen, Ur NOT DETECTED Negative < 200 ng/mL    Benzodiazepine Screen, Urine NOT DETECTED Negative < 200 ng/mL    Cannabinoid Scrn, Ur NOT DETECTED Negative < 50ng/mL    Cocaine Metabolite Screen, Urine NOT DETECTED Negative < 300 ng/mL    Opiate Scrn, Ur NOT DETECTED Negative < 300ng/mL    PCP Screen, Urine NOT DETECTED Negative < 25 ng/mL    Methadone Screen, Urine NOT DETECTED Negative <300 ng/mL    Oxycodone Urine NOT DETECTED Negative <100 ng/mL    FENTANYL SCREEN, URINE NOT DETECTED Negative <1 ng/mL    Drug Screen Comment: see below    Ammonia   Result Value Ref Range    Ammonia 21.0 11.0 - 51.0 umol/L   Lipase   Result Value Ref Range    Lipase 15 13 - 60 U/L   Urine electrolytes   Result Value Ref Range    Sodium, Ur <20 Not Established mmol/L    Potassium, Ur 10.6 Not Established mmol/L    Chloride <20 Not Established mmol/L   OSMOLALITY, URINE   Result Value Ref Range    Osmolality, Ur 88 (L) 300 - 900 mOsm/kg   Protime-INR   Result Value Ref Range    Protime 12.2 9.3 - 12.4 sec    INR 1.1    Osmolality, Serum   Result Value Ref Range    Osmolality 240 (L) 285 - 310 mOsm/Kg   Serum Drug Screen   Result Value Ref Range    Ethanol Lvl <10 mg/dL    Acetaminophen Level <5.0 (L) 10.0 - 22.3 mcg/mL    Salicylate, Serum <9.3 0.0 - 30.0 mg/dL    TCA Scrn NEGATIVE Cutoff:300 ng/mL   CK   Result Value Ref Range    Total  (H) 20 - 180 U/L   Magnesium   Result Value Ref Range    Magnesium 1.4 (L) 1.6 - 2.6 mg/dL   SPECIMEN REJECTION   Result Value Ref Range    Rejected Test trp5     Reason for Rejection see below    SPECIMEN REJECTION   Result Value Ref Range    Rejected Test NH3     Reason for Rejection see below    Basic Metabolic Panel w/ Reflex to MG   Result Value Ref Range    Sodium 117 (LL) 132 - 146 mmol/L    Potassium reflex Magnesium 3.0 (L) 3.5 - 5.0 mmol/L    Chloride 78 (L) 98 - 107 mmol/L    CO2 29 22 - 29 mmol/L    Anion Gap 10 7 - 16 mmol/L    Glucose 98 74 - 99 mg/dL    BUN 12 6 - 23 mg/dL    Creatinine 0.9 0.5 - 1.0 mg/dL    Est, Glom Filt Rate >60 >=60 mL/min/1.73    Calcium 8.1 (L) 8.6 - 10.2 mg/dL   Uric Acid   Result Value Ref Range    Uric Acid, Serum 4.8 2.4 - 5.7 mg/dL   SPECIMEN REJECTION   Result Value Ref Range    Rejected Test trp5     Reason for Rejection see below    Troponin   Result Value Ref Range    Troponin, High Sensitivity 14 (H) 0 - 9 ng/L   Troponin   Result Value Ref Range    Troponin, High Sensitivity 7 0 - 9 ng/L   Magnesium   Result Value Ref Range    Magnesium 1.4 (L) 1.6 - 2.6 mg/dL   Basic Metabolic Panel   Result Value Ref Range    Sodium 119 (LL) 132 - 146 mmol/L    Potassium 3.4 (L) 3.5 - 5.0 mmol/L    Chloride 82 (L) 98 - 107 mmol/L    CO2 27 22 - 29 mmol/L    Anion Gap 10 7 - 16 mmol/L    Glucose 98 74 - 99 mg/dL    BUN 11 6 - 23 mg/dL    Creatinine 0.8 0.5 - 1.0 mg/dL    Est, Glom Filt Rate >60 >=60 mL/min/1.73    Calcium 8.1 (L) 8.6 - 10.2 mg/dL   Basic Metabolic Panel   Result Value Ref Range    Sodium 123 (L) 132 - 146 mmol/L    Potassium 3.4 (L) 3.5 - 5.0 mmol/L    Chloride 85 (L) 98 - 107 mmol/L    CO2 29 22 - 29 mmol/L    Anion Gap 9 7 - 16 mmol/L    Glucose 121 (H) 74 - 99 mg/dL    BUN 9 6 - 23 mg/dL    Creatinine 0.7 0.5 - 1.0 mg/dL    Est, Glom Filt Rate >60 >=60 mL/min/1.73    Calcium 8.5 (L) 8.6 - 10.2 mg/dL   SPECIMEN REJECTION   Result Value Ref Range    Rejected Test BMP     Reason for Rejection see below    Iron and TIBC   Result Value Ref Range    Iron 83 37 - 145 mcg/dL    TIBC 170 (L) 250 - 450 mcg/dL    Iron Saturation 49 15 - 50 %   Ferritin   Result Value Ref Range    Ferritin 765 ng/mL   CBC with Auto Differential   Result Value Ref Range    WBC 6.7 4.5 - 11.5 E9/L    RBC 2.89 (L) 3.50 - 5.50 E12/L    Hemoglobin 11.1 (L) 11.5 - 15.5 g/dL    Hematocrit 29.8 (L) 34.0 - 48.0 %    .1 (H) 80.0 - 99.9 fL    MCH 38.4 (H) 26.0 - 35.0 pg    MCHC 37.2 (H) 32.0 - 34.5 %    RDW 16.0 (H) 11.5 - 15.0 fL    Platelets 168 631 - 562 E9/L    MPV 10.1 7.0 - 12.0 fL    Neutrophils % 65.1 43.0 - 80.0 %    Immature Granulocytes % 0.4 0.0 - 5.0 %    Lymphocytes % 19.1 (L) 20.0 - 42.0 %    Monocytes % 14.3 (H) 2.0 - 12.0 %    Eosinophils % 1.0 0.0 - 6.0 %    Basophils % 0.1 0.0 - 2.0 %    Neutrophils Absolute 4.36 1.80 - 7.30 E9/L    Immature Granulocytes # 0.03 E9/L    Lymphocytes Absolute 1.28 (L) 1.50 - 4.00 E9/L    Monocytes Absolute 0.96 (H) 0.10 - 0.95 E9/L    Eosinophils Absolute 0.07 0.05 - 0.50 E9/L    Basophils Absolute 0.01 0.00 - 0.20 J3/I   Basic Metabolic Panel   Result Value Ref Range    Sodium 123 (L) 132 - 146 mmol/L    Potassium 2.9 (L) 3.5 - 5.0 mmol/L    Chloride 85 (L) 98 - 107 mmol/L    CO2 31 (H) 22 - 29 mmol/L    Anion Gap 7 7 - 16 mmol/L    Glucose 92 74 - 99 mg/dL    BUN 11 6 - 23 mg/dL    Creatinine 0.7 0.5 - 1.0 mg/dL    Est, Glom Filt Rate >60 >=60 mL/min/1.73    Calcium 8.0 (L) 8.6 - 10.2 mg/dL   Basic Metabolic Panel   Result Value Ref Range    Sodium 125 (L) 132 - 146 mmol/L    Potassium 3.1 (L) 3.5 - 5.0 mmol/L    Chloride 86 (L) 98 - 107 mmol/L    CO2 29 22 - 29 mmol/L    Anion Gap 10 7 - 16 mmol/L    Glucose 86 74 - 99 mg/dL    BUN 11 6 - 23 mg/dL    Creatinine 0.7 0.5 - 1.0 mg/dL    Est, Glom Filt Rate >60 >=60 mL/min/1.73    Calcium 8.3 (L) 8.6 - 10.2 mg/dL   Basic Metabolic Panel   Result Value Ref Range    Sodium 124 (L) 132 - 146 mmol/L    Potassium 3.2 (L) 3.5 - 5.0 mmol/L    Chloride 84 (L) 98 - 107 mmol/L    CO2 31 (H) 22 - 29 mmol/L    Anion Gap 9 7 - 16 mmol/L    Glucose 133 (H) 74 - 99 mg/dL    BUN 10 6 - 23 mg/dL    Creatinine 0.6 0.5 - 1.0 mg/dL    Est, Glom Filt Rate >60 >=60 mL/min/1.73    Calcium 8.2 (L) 8.6 - 10.2 mg/dL   Basic Metabolic Panel   Result Value Ref Range    Sodium 122 (L) 132 - 146 mmol/L    Potassium 3.1 (L) 3.5 - 5.0 mmol/L    Chloride 86 (L) 98 - 107 mmol/L    CO2 27 22 - 29 mmol/L    Anion Gap 9 7 - 16 mmol/L    Glucose 143 (H) 74 - 99 mg/dL    BUN 8 6 - 23 mg/dL    Creatinine 0.6 0.5 - 1.0 mg/dL    Est, Glom Filt Rate >60 >=60 mL/min/1.73    Calcium 8.1 (L) 8.6 - 10.2 mg/dL   Lipid Panel   Result Value Ref Range    Cholesterol, Total 157 0 - 199 mg/dL    Triglycerides 62 0 - 149 mg/dL    HDL 78 >40 mg/dL    LDL Calculated 67 0 - 99 mg/dL    VLDL Cholesterol Calculated 12 mg/dL   TSH   Result Value Ref Range    TSH 0.539 0.270 - 4.200 uIU/mL   Vitamin B12 & Folate   Result Value Ref Range    Vitamin B-12 875 211 - 946 pg/mL    Folate 10.0 4.8 - 24.2 ng/mL   Cortisol Total   Result Value Ref Range    Cortisol 24.91 (H) 2.68 - 18.40 mcg/dL   Basic Metabolic Panel   Result Value Ref Range    Sodium 118 (LL) 132 - 146 mmol/L    Potassium 3.8 3.5 - 5.0 mmol/L    Chloride 84 (L) 98 - 107 mmol/L    CO2 26 22 - 29 mmol/L    Anion Gap 8 7 - 16 mmol/L    Glucose 122 (H) 74 - 99 mg/dL    BUN 5 (L) 6 - 23 mg/dL    Creatinine 0.5 0.5 - 1.0 mg/dL    Est, Glom Filt Rate >60 >=60 mL/min/1.73    Calcium 8.5 (L) 8.6 - 10.2 mg/dL   Basic Metabolic Panel   Result Value Ref Range    Sodium 122 (L) 132 - 146 mmol/L    Potassium 3.7 3.5 - 5.0 mmol/L    Chloride 85 (L) 98 - 107 mmol/L    CO2 29 22 - 29 mmol/L    Anion Gap 8 7 - 16 mmol/L    Glucose 137 (H) 74 - 99 mg/dL    BUN 8 6 - 23 mg/dL    Creatinine 0.6 0.5 - 1.0 mg/dL    Est, Glom Filt Rate >60 >=60 mL/min/1.73 Calcium 8.2 (L) 8.6 - 10.2 mg/dL   Basic Metabolic Panel   Result Value Ref Range    Sodium 118 (LL) 132 - 146 mmol/L    Potassium 4.1 3.5 - 5.0 mmol/L    Chloride 85 (L) 98 - 107 mmol/L    CO2 26 22 - 29 mmol/L    Anion Gap 7 7 - 16 mmol/L    Glucose 120 (H) 74 - 99 mg/dL    BUN 6 6 - 23 mg/dL    Creatinine 0.5 0.5 - 1.0 mg/dL    Est, Glom Filt Rate >60 >=60 mL/min/1.73    Calcium 8.0 (L) 8.6 - 10.2 mg/dL   CBC with Auto Differential   Result Value Ref Range    WBC 5.5 4.5 - 11.5 E9/L    RBC 2.73 (L) 3.50 - 5.50 E12/L    Hemoglobin 10.5 (L) 11.5 - 15.5 g/dL    Hematocrit 27.9 (L) 34.0 - 48.0 %    .2 (H) 80.0 - 99.9 fL    MCH 38.5 (H) 26.0 - 35.0 pg    MCHC 37.6 (H) 32.0 - 34.5 %    RDW 15.9 (H) 11.5 - 15.0 fL    Platelets 469 745 - 555 E9/L    MPV 10.2 7.0 - 12.0 fL    Neutrophils % 47.8 43.0 - 80.0 %    Immature Granulocytes % 0.5 0.0 - 5.0 %    Lymphocytes % 30.0 20.0 - 42.0 %    Monocytes % 20.1 (H) 2.0 - 12.0 %    Eosinophils % 1.1 0.0 - 6.0 %    Basophils % 0.5 0.0 - 2.0 %    Neutrophils Absolute 2.61 1.80 - 7.30 E9/L    Immature Granulocytes # 0.03 E9/L    Lymphocytes Absolute 1.64 1.50 - 4.00 E9/L    Monocytes Absolute 1.10 (H) 0.10 - 0.95 E9/L    Eosinophils Absolute 0.06 0.05 - 0.50 E9/L    Basophils Absolute 0.03 0.00 - 0.20 E9/L   Magnesium   Result Value Ref Range    Magnesium 1.7 1.6 - 2.6 mg/dL   Phosphorus   Result Value Ref Range    Phosphorus 2.6 2.5 - 4.5 mg/dL   Basic Metabolic Panel   Result Value Ref Range    Sodium 120 (L) 132 - 146 mmol/L    Potassium 5.2 (H) 3.5 - 5.0 mmol/L    Chloride 87 (L) 98 - 107 mmol/L    CO2 25 22 - 29 mmol/L    Anion Gap 8 7 - 16 mmol/L    Glucose 104 (H) 74 - 99 mg/dL    BUN 4 (L) 6 - 23 mg/dL    Creatinine 0.5 0.5 - 1.0 mg/dL    Est, Glom Filt Rate >60 >=60 mL/min/1.73    Calcium 8.3 (L) 8.6 - 10.2 mg/dL   Basic Metabolic Panel   Result Value Ref Range    Sodium 122 (L) 132 - 146 mmol/L    Potassium 4.2 3.5 - 5.0 mmol/L    Chloride 86 (L) 98 - 107 mmol/L CO2 25 22 - 29 mmol/L    Anion Gap 11 7 - 16 mmol/L    Glucose 88 74 - 99 mg/dL    BUN 5 (L) 6 - 23 mg/dL    Creatinine 0.6 0.5 - 1.0 mg/dL    Est, Glom Filt Rate >60 >=60 mL/min/1.73    Calcium 8.6 8.6 - 10.2 mg/dL   Basic Metabolic Panel   Result Value Ref Range    Sodium 120 (L) 132 - 146 mmol/L    Potassium 3.8 3.5 - 5.0 mmol/L    Chloride 88 (L) 98 - 107 mmol/L    CO2 24 22 - 29 mmol/L    Anion Gap 8 7 - 16 mmol/L    Glucose 95 74 - 99 mg/dL    BUN 4 (L) 6 - 23 mg/dL    Creatinine 0.6 0.5 - 1.0 mg/dL    Est, Glom Filt Rate >60 >=60 mL/min/1.73    Calcium 8.3 (L) 8.6 - 10.2 mg/dL   Basic Metabolic Panel   Result Value Ref Range    Sodium 121 (L) 132 - 146 mmol/L    Potassium 4.0 3.5 - 5.0 mmol/L    Chloride 88 (L) 98 - 107 mmol/L    CO2 25 22 - 29 mmol/L    Anion Gap 8 7 - 16 mmol/L    Glucose 93 74 - 99 mg/dL    BUN 4 (L) 6 - 23 mg/dL    Creatinine 0.5 0.5 - 1.0 mg/dL    Est, Glom Filt Rate >60 >=60 mL/min/1.73    Calcium 8.4 (L) 8.6 - 10.2 mg/dL   POCT Glucose   Result Value Ref Range    Meter Glucose 128 (H) 74 - 99 mg/dL   POCT Venous   Result Value Ref Range    POC Sodium 112 (LL) 132 - 146 mmol/L    POC Potassium 6.0 (H) 3.5 - 5.0 mmol/L    POC Chloride 77 (L) 100 - 108 mmol/L    POC Glucose 104 (H) 74 - 99 mg/dl    POC Creatinine 0.9 0.5 - 1.0 mg/dL    Est, Glom Filt Rate >60 >=60 mL/min/1.73    Performed on SEE BELOW    EKG 12 Lead   Result Value Ref Range    Ventricular Rate 103 BPM    Atrial Rate 103 BPM    P-R Interval 164 ms    QRS Duration 62 ms    Q-T Interval 370 ms    QTc Calculation (Bazett) 484 ms    P Axis 88 degrees    R Axis 73 degrees    T Axis 73 degrees       RADIOLOGY:  US FIBROSCAN   Final Result   Hepatic fibrosis.       Steatosis score of S0-S1      Fibrosis score of F3      Additional Info:   [email or web address]   an-results         260 to 290 dB/m   * S1: S2   * 11% to 33%: 34% to 66%      Higher than 290 dB/m   * S1: S3 * 11% to 33%: 67% or more            2 to 7 kPa   * 8 to 9 kPa: 8 to 9 kPa   * 8 to 11 kPa: 9 to 14 kPa   * 18 kPa or higher: 14 kPa or higher      2 to 7 kPa   * 8 to 9 kPa: 7 to 11 kPa   * 8 to 11 kPa: 11 to 14 kPa   * 18 kPa or higher: 14 kPa or higher      2 to 7 kPa   * 8 to 9 kPa: 7 to 9 kPa   * 8 to 11 kPa: 9 to 17 kPa   * 18 kPa or higher: 17 kPa or higher      2 to 7 kPa   * 8 to 9 kPa: 7.5 to 10 kPa   * 8 to 11 kPa: 10 to 14 kPa   * 18 kPa or higher: 14 kPa or higher      2 to 7 kPa   * 8 to 9 kPa: 7 to 11 kPa   * 8 to 11 kPa: 11 to 19 kPa   * 18 kPa or higher: 19 kPa or higher            US ABDOMEN LIMITED   Final Result   Cholecystectomy. Common bile duct at the upper limits of normal.      Mildly coarsened liver parenchyma. CT HEAD WO CONTRAST   Final Result   No acute intracranial abnormality. No acute traumatic injury of the facial bones. CT CERVICAL SPINE WO CONTRAST   Final Result   No acute fracture is identified. CT FACIAL BONES WO CONTRAST   Final Result   No acute intracranial abnormality. No acute traumatic injury of the facial bones. XR CHEST PORTABLE   Final Result   No significant injury is identified on the portableexam.                   ------------------------- NURSING NOTES AND VITALS REVIEWED ---------------------------  Date / Time Roomed:  12/10/2022  6:14 PM  ED Bed Assignment:  [id number]    The nursing notes within the ED encounter and vital signs as below have been reviewed.      Patient Vitals for the past 24 hrs:   BP Temp Temp src Pulse Resp SpO2 Weight   12/12/22 1000 (!) 79/55 -- -- (!) 104 19 100 % --   12/12/22 0900 (!) 79/52 -- -- 97 25 97 % --   12/12/22 0838 -- -- -- 91 19 99 % --   12/12/22 0800 111/76 99 °F (37.2 °C) Oral (!) 103 27 99 % --   12/12/22 0700 118/80 -- -- 91 22 97 % --   12/12/22 0600 92/71 -- -- 95 15 92 % 96 lb 4.8 oz (43.7 kg)   12/12/22 0554 -- -- -- 94 -- -- --   12/12/22 0500 108/84 -- -- 91 15 97 % -- 12/12/22 0400 (!) 132/90 98.2 °F (36.8 °C) Oral 87 15 97 % --   12/12/22 0300 104/74 -- -- 95 14 96 % --   12/12/22 0200 118/84 -- -- 85 20 98 % --   12/12/22 0100 124/82 -- -- (!) 108 24 91 % --   12/12/22 0000 110/81 98.3 °F (36.8 °C) Oral 91 15 99 % --   12/11/22 2300 -- -- -- 84 17 96 % --   12/11/22 2200 131/81 -- -- 90 14 95 % --   12/11/22 2100 (!) 150/99 -- -- 87 12 94 % --   12/11/22 2011 -- -- -- 87 21 95 % --   12/11/22 2000 (!) 148/90 -- -- 92 17 93 % --   12/11/22 1800 (!) 162/106 -- -- 91 27 99 % --   12/11/22 1700 (!) 139/92 -- -- 86 12 94 % --   12/11/22 1600 (!) 146/88 98.4 °F (36.9 °C) Oral 83 21 (!) 74 % --   12/11/22 1500 (!) 161/101 -- -- 84 13 -- --   12/11/22 1400 (!) 148/97 -- -- 87 24 97 % --   12/11/22 1300 (!) 136/97 -- -- 90 15 -- --   12/11/22 1200 135/84 98.2 °F (36.8 °C) Oral 86 27 100 % --   12/11/22 1100 (!) 141/90 -- -- 92 23 93 % --       Oxygen Saturation Interpretation: Normal    ------------------------------------------ PROGRESS NOTES ------------------------------------------  Re-evaluation(s):  Patients symptoms are improving  Repeat physical examination is not changed    Counseling:  I have spoken with the patient and discussed todays results, in addition to providing specific details for the plan of care and counseling regarding the diagnosis and prognosis. Their questions are answered at this time and they are agreeable with the plan of admission.    --------------------------------- ADDITIONAL PROVIDER NOTES ---------------------------------  Consultations:  Spoke with Dr. Dwight Prader. Discussed case. They will admit the patient. This patient's ED course included: a personal history and physicial examination, re-evaluation prior to disposition, multiple bedside re-evaluations, IV medications, cardiac monitoring, and continuous pulse oximetry    This patient has remained hemodynamically stable during their ED course. Diagnosis:  1. Hyponatremia    2.  Alcohol withdrawal syndrome without complication (HCC)    3. Seizure ([facility] [address]        Disposition:  Patient's disposition: Admit  Patient's condition is stable. Attending was present and available throughout encounter including all critical portions;  See Attending Note/Attestation for Final [address], DO  Resident  12/10/22 0137       Valencia Marrero MD  12/12/22 1024

## 2022-12-11 NOTE — PLAN OF CARE
Nurse updated me on Sodium of 118 as a critical lab. Was told nephrology lowered D5 from 150 to 75. Confirmed at bedside that D5 was running at 76. Nurse to keep nephrology and ICU team updated with all critical labs and follow nephrology recommendations.     Electronically signed by Emy Canela MD on 12/11/2022 at 6:43 PM

## 2022-12-11 NOTE — PROGRESS NOTES
[facility] Inpatient   Resident Progress Note    S:  Hospital day: 1    Brief Synopsis: Elliot Marino is a 58 y.o. female with a PMH of COPD, alcohol and tobacco abuse. The patient presented for dizziness, fall and seizure like activity. Dizziness started on 12/10/2022, when she was at home. She felt dizzy the next day and fell twice. She did LOC for less than a minute and regained it with no post-ictal state. She denies any loss of control in her bladder or bowel movements. She presented to the ED after her 2nd fall and had seizure like activity with some tremors on the left upper extremity. In the ED, her Na was 115, Mg 1.4 and K at 3.3. CK in 500s, Hb was 11.4. CT of head, facial bones and cervical spine showed no acute processes. Nephrology, Dr. Rebecca Robin was consulted from the ED and pt will be admitted to ICU for hyponatremia management. Overnight, her serum Osm came back at 240 mOsm/kg, urine Osm 88 and urine sodium <20. She was given D5W. Latest Na 12 hours later was 123. Patient was seen and examined this morning. States that she feels better, tremors resolved and denies dizziness/nausea/vomiting. Alert and oriented x3.     Cont meds:    sodium chloride      dextrose      dextrose 150 mL/hr (12/11/22 0753)     Scheduled meds:    folic acid  1 mg Oral Daily    budesonide  0.5 mg Nebulization BID    pantoprazole  40 mg Oral QAM AC    enoxaparin  30 mg SubCUTAneous Daily    sodium chloride flush  5-40 mL IntraVENous 2 times per day    thiamine (VITAMIN B1) IVPB  500 mg IntraVENous Q8H    Followed by    [facility] [place] 12/13/2022] thiamine (VITAMIN B1) IVPB  250 mg IntraVENous Q24H    Followed by    [facility] ON 12/18/2022] thiamine  100 mg Oral Daily    LORazepam  1 mg IntraVENous Once    folic acid  1 mg IntraVENous Once    sodium chloride flush  5-40 mL IntraVENous 2 times per day     PRN meds: ipratropium-albuterol, LORazepam **OR** LORazepam **OR** LORazepam **OR** LORazepam **OR** LORazepam **OR** LORazepam **OR** LORazepam **OR** LORazepam, ondansetron **OR** ondansetron, sodium chloride flush, sodium chloride, acetaminophen **OR** acetaminophen, glucose, dextrose bolus **OR** dextrose bolus, glucagon (rDNA), dextrose, polyethylene glycol, sodium chloride flush     I reviewed the patient's Past Medical and Surgical History, Medications and Allergies. O:  VS: BP (!) 132/93   Pulse (!) 104   Temp 98.6 °F (37 °C) (Oral)   Resp 22   Ht 5' 1\" (1.549 m)   Wt 99 lb 4.8 oz (45 kg)   SpO2 91%   BMI 18.76 kg/m²     Physical Exam:  Physical Exam  Vitals reviewed. Constitutional:       General: She is not in acute distress. HENT:      Head: Normocephalic. Comments: Laceration on left cheek bone  Eyes:      Extraocular Movements: Extraocular movements intact. Pupils: Pupils are equal, round, and reactive to light. Cardiovascular:      Rate and Rhythm: Normal rate and regular rhythm. Pulmonary:      Comments: Wheezing present  Abdominal:      General: Abdomen is flat. There is no distension. Palpations: Abdomen is soft. Tenderness: There is no abdominal tenderness. There is no guarding. Musculoskeletal:      Cervical back: Normal range of motion. Right lower leg: No edema. Left lower leg: No edema. Neurological:      General: No focal deficit present. Mental Status: She is alert and oriented to person, place, and time. Cranial Nerves: No cranial nerve deficit. Sensory: No sensory deficit. Motor: No weakness. Psychiatric:         Mood and Affect: Mood normal.          Labs:  Na/K/Cl/CO2:  124/3.2/84/31 (12/11 9948)  BUN/Cr/glu/ALT/AST/amyl/lip:  10/0.6/--/--/--/--/-- (12/11 0511)  WBC/Hgb/Hct/Plts:  6.7/11.1/29.8/240 (12/11 0609)  estimated creatinine clearance is 69 mL/min (based on SCr of 0.6 mg/dL). Other pertinent labs as noted below    New Imaging:  US ABDOMEN LIMITED   Final Result   Cholecystectomy.   Common bile duct at the upper limits of normal.      Mildly coarsened liver parenchyma. CT HEAD WO CONTRAST   Final Result   No acute intracranial abnormality. No acute traumatic injury of the facial bones. CT CERVICAL SPINE WO CONTRAST   Final Result   No acute fracture is identified. CT FACIAL BONES WO CONTRAST   Final Result   No acute intracranial abnormality. No acute traumatic injury of the facial bones. XR CHEST PORTABLE   Final Result   No significant injury is identified on the portableexam.             A/P:  Principal Problem:    Hyponatremia  Resolved Problems:    * No resolved hospital problems.  *      Hyponatremia and electrolyte derangements  - Chronic alcohol abuse leading to beer potomania vs \"tea and toast\" diet vs SIADH from ectopic site  - glycemia normal, not likely pseudohyponatremia  - Na on admission 115, chronic, was similar in August 2022  - Admit to ICU  - Nephro consulted from ED  - pending serum Osm, Urine Osm, and urine electrolytes  - BMP q2h  - per nephro, give 1 amp of hypertonic saline if seizure like activity occurs  - Other electrolyte derangements: Mg on admission 1.4 and repleted, K was 3.3     Seizure like activity/Dizziness  - likely 2/2 to above vs alcohol withdrawal vs anemia  - CT on admission showed no orbital fx and no acute hemorrhages  - Pt not on any blood thinners  - Consider neuro consult     Hx of alcohol abuse/transaminitis  - alcohol level negative today, last drink on Thursday 1 12 oz beer  - never had hx of withdrawal  - AST elevated on admission  - RUQ US showed mildly coarsened liver parenchyma, no evidence of ascites  - hepatitis panel  - Continue folate and thiamine  - [facility] protocol     Hx of COPD/Tobacco Abuse  - Lung nodule in the right upper lode seen on last CT lung screen in Aug 2021, f/u yearly  - 0.5 pack a day  - declined nicotine patches due to reaction  - continue home meds: Flovent, Ipratropium and Albuterol  - Duoneb and Pulmicort while in hospital     Anemia  - new this admission, mild Hb 11.4  - Check iron panel  - Check FOBT, c-scope outpatient scheduled  - Hold NSAIDs, daily use of NSAIDs     Elevated CK  - CK 500s on admission  - Continue to monitor    DVT Prophylaxis: Lovenox  GI Prophylaxis: Protonix  Diet: Regular      Electronically signed by Balbina Maxwell MD on 12/11/2022 at 11:35 AM  This case was discussed with attending physician Dr. Ulises Reynoso

## 2022-12-11 NOTE — H&P
[facility]  History and Physical    Patient:  Santa Grady 58 y.o. female MRN: [id number]     Date of Service: 12/10/2022    Hospital Day: 1      Chief complaint: had concerns including Tremors (Per EMS called by patients mother for Dts/ in and out of conciousness, and left sided facial drop with bilateral leg drop. Symptoms resolved on arrival. patient confused in route with tremors. ). History of Present Illness   The patient is a 58 y.o. female with a PMHx of COPD, alcohol and tobacco abuse who presented to the ED due to dizziness, fall and seizure like activity. Patient states that dizziness started yesterday and was on and off. She was sitting on the couch when it started. Today, she felt dizzy again and fell to the floor. After the first fall, she states she loss consciousness for less than a minute and regained it with no confusion state. Denies losing bowel or bladder control. Her fiance and her mother witnessed the fall and the tremors. She got up and fell again. Family called EMS, and they found her laying on the stairs. Patient states her last drink was on Thursday, 1 can of 12 oz beer. She usually has a can every other day. She smoked half a pack of cigarettes each day. Denies any fever, chills, sick contacts recently. Patient was admitted for hyponatremia last in August 2022. In the ED, labs were significant for Na of 115, Mg of 1.4 and K of 3.3. AST was elevated to 49. CK in 500s. Hb was 11.4. CT of head, facial bones and cervical spine showed no acute processes. Nephrology, Dr. Octavio Brown was consulted from the ED and pt will be admitted to ICU for further management.       Medications   LORazepam (ATIVAN) injection 1 mg (1 mg IntraVENous Not Given 67/66/39 2091)   folic acid injection 1 mg (has no administration in time range)   magnesium sulfate 2000 mg in 50 mL IVPB premix (2,000 mg IntraVENous New Bag 12/10/22 0483)   sodium chloride flush 0.9 % injection 5-40 mL (has no administration in time range)   sodium chloride flush 0.9 % injection 5-40 mL (has no administration in time range)   0.9 % sodium chloride infusion (has no administration in time range)   potassium chloride 10 mEq/100 mL IVPB (Peripheral Line) (has no administration in time range)   thiamine (B-1) injection 100 mg (100 mg IntraVENous Given 12/10/22 2202)         Past Medical History:      Diagnosis Date    Carpal tunnel syndrome     Pulmonary nodule     Thyroid disease        Past Surgical History:    No past surgical history on file. Medications Prior to Admission:    Prior to Admission medications    Medication Sig Start Date End Date Taking? Authorizing Provider   montelukast (SINGULAIR) 10 MG tablet TAKE 1 TABLET BY MOUTH DAILY 11/28/22   Hellen Clark MD   MAPAP ARTHRITIS PAIN 650 MG extended release tablet TAKE 1 TABLET BY MOUTH EVERY 6 HOURS AS NEEDED FOR PAIN 10/31/22   Hellen Clark MD   cyclobenzaprine (FLEXERIL) 5 MG tablet TAKE 1 TABLET BY MOUTH EVERY EVENING AS NEEDED FOR MUSCLE SPASMS 10/31/22   Hellen Clark MD   albuterol sulfate HFA (PROVENTIL;VENTOLIN;PROAIR) 108 (90 Base) MCG/ACT inhaler INHALE 2 PUFFS INTO THE LUNGS FOUR TIMES DAILY AS NEEDED FOR WHEEZING 8/8/22   Hellen Clark MD   cetirizine (ZYRTEC) 10 MG tablet Take 1 tablet by mouth daily 4/15/22   Aubree Syed DO   FLOVENT  MCG/ACT inhaler Inhale 1 puff into the lungs 2 times daily 4/15/22   Aubree Syed DO   omeprazole (PRILOSEC) 40 MG delayed release capsule Take 1 capsule by mouth every morning (before breakfast) 4/15/22   [place] A Rech, DO   tiotropium (SPIRIVA RESPIMAT) 2.5 MCG/ACT AERS inhaler INHALE 2 PUFFS INTO THE LUNGS DAILY 4/15/22   Kaylah A Rech, DO   naproxen (NAPROSYN) 250 MG tablet TAKE 1 TABLET BY MOUTH TWICE DAILY WITH MEALS 4/15/22   Kaylah A Rech, DO       Allergies:  Ibuprofen and Morphine    Social History:   TOBACCO:   reports that she has been smoking cigarettes.  She started smoking about 42 years ago. She has a 20.00 pack-year smoking history. She has never used smokeless tobacco.  ETOH:   reports current alcohol use of about 2.0 standard drinks per week. Family History:   No family history on file. REVIEW OF SYSTEMS:    Review of Systems   Constitutional:  Negative for chills, diaphoresis, fatigue, fever and unexpected weight change. HENT:  Negative for rhinorrhea and sore throat. Respiratory:  Negative for cough, chest tightness and shortness of breath (not more than usual). Cardiovascular:  Negative for chest pain and leg swelling. Gastrointestinal:  Negative for abdominal distention, abdominal pain, nausea and vomiting. Genitourinary:  Negative for dysuria and urgency. Skin:  Negative for rash. Neurological:  Positive for dizziness, tremors and light-headedness. Negative for weakness. Physical Exam   Vitals: BP 99/62   Pulse 98   Resp 22   SpO2 97%     Physical Exam  HENT:      Head: Normocephalic. Comments: Laceration on left cheek     Right Ear: Tympanic membrane and external ear normal. There is no impacted cerumen. Left Ear: Tympanic membrane and external ear normal. There is no impacted cerumen. Nose: Nose normal.   Eyes:      Extraocular Movements: Extraocular movements intact. Pupils: Pupils are equal, round, and reactive to light. Cardiovascular:      Rate and Rhythm: Normal rate and regular rhythm. Pulses: Normal pulses. Heart sounds: Normal heart sounds. Pulmonary:      Breath sounds: Wheezing present. Abdominal:      General: Abdomen is flat. There is no distension. Tenderness: There is no guarding or rebound. Musculoskeletal:      Right lower leg: No edema. Left lower leg: No edema. Neurological:      General: No focal deficit present. Mental Status: She is alert and oriented to person, place, and time. Cranial Nerves: No cranial nerve deficit. Sensory: No sensory deficit. Motor: No weakness. Coordination: Coordination is intact. Comments: Tremors noted on he left arm and forearm, at rest and with movement   Psychiatric:         Mood and Affect: Mood normal.       Labs and Imaging Studies   Basic Labs  CBC:   Recent Labs     12/10/22  2010   WBC 8.5   RBC 2.92*   HGB 10.3*   HCT 29.0*   MCV 99.3   MCH 37.7*   MCHC 37.9*   RDW 15.7*      MPV 10.4       BMP:    Recent Labs     12/10/22  2010 12/10/22  2046 12/10/22  2145   *  --  117*   K 3.3*  --  3.0*   CL 75*  --  78*   CO2 29  --  29   BUN 12  --  12   CREATININE 0.9 0.9 0.9   GLUCOSE 93  --  98   CALCIUM 8.1*  --  8.1*   PROT 6.8  --   --    LABALBU 3.3*  --   --    BILITOT 1.1  --   --    ALKPHOS 120*  --   --    AST 49*  --   --    ALT 20  --   --        LIVER PROFILE:   Recent Labs     12/10/22  2010   AST 49*   ALT 20   LIPASE 15   BILITOT 1.1   ALKPHOS 120*       PT/INR:   Recent Labs     12/10/22  2010   PROTIME 12.2   INR 1.1       APTT:   No results for input(s): APTT in the last 72 hours. Fasting Lipid Panel:    Lab Results   Component Value Date/Time    CHOL 196 08/03/2021 02:53 PM    TRIG 130 08/03/2021 02:53 PM    HDL 70 08/03/2021 02:53 PM       Cardiac Enzymes:    Lab Results   Component Value Date    CKTOTAL 516 (H) 12/10/2022    TROPONINI <0.01 01/11/2020       Imaging Studies:     CT HEAD WO CONTRAST    Result Date: 12/10/2022  EXAMINATION: CT OF THE HEAD WITHOUT CONTRAST; CT OF THE FACE WITHOUT CONTRAST 12/10/2022 7:55 pm TECHNIQUE: CT of the head was performed without the administration of intravenous contrast. Automated exposure control, iterative reconstruction, and/or weight based adjustment of the mA/kV was utilized to reduce the radiation dose to as low as reasonably achievable.; CT of the face was performed without the administration of intravenous contrast. Multiplanar reformatted images are provided for review.  Automated exposure control, iterative reconstruction, and/or weight based adjustment of the mA/kV was utilized to reduce the radiation dose to as low as reasonably achievable. COMPARISON: None. HISTORY: ORDERING SYSTEM PROVIDED HISTORY: fall seizure TECHNOLOGIST PROVIDED HISTORY: Has a \"code stroke\" or \"stroke alert\" been called? ->No Reason for exam:->fall seizure Decision Support Exception - unselect if not a suspected or confirmed emergency medical condition->Emergency Medical Condition (MA) What reading provider will be dictating this exam?->CRC; ORDERING SYSTEM PROVIDED HISTORY: fall TECHNOLOGIST PROVIDED HISTORY: Reason for exam:->fall Decision Support Exception - unselect if not a suspected or confirmed emergency medical condition->Emergency Medical Condition (MA) What reading provider will be dictating this exam?->CRC FINDINGS: CT HEAD: BRAIN/VENTRICLES: There is no acute intracranial hemorrhage, mass effect or midline shift. No abnormal extra-axial fluid collection. The gray-white differentiation is maintained without evidence of an acute infarct. 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 There is no evidence of hydrocephalus. There are nonspecific hypoattenuating foci in the subcortical and periventricular white matter that most likely represent chronic microangiopathic ischemic changes in a patient of this age. There are atherosclerotic calcifications of the intracranial vessels. CT FACIAL BONES: FACIAL BONES: The maxilla, pterygoid plates and zygomatic arches are intact. The mandible is intact. The mandibular condyles are normally situated. The nasal bones and maxillary nasal processes are intact. ORBITS: The globes appear intact. The extraocular muscles, optic nerve sheath complexes and lacrimal glands appear unremarkable. No retrobulbar hematoma or mass is seen. The orbital walls and rims are intact. SINUSES/MASTOIDS: The paranasal sinuses and mastoid air cells are well aerated. No acute fracture is seen.  SOFT TISSUES: No acute abnormality of the visualized skull or soft tissues. BRAIN/VENTRICLES: .     No acute intracranial abnormality. No acute traumatic injury of the facial bones. CT FACIAL BONES WO CONTRAST    Result Date: 12/10/2022  EXAMINATION: CT OF THE HEAD WITHOUT CONTRAST; CT OF THE FACE WITHOUT CONTRAST 12/10/2022 7:55 pm TECHNIQUE: CT of the head was performed without the administration of intravenous contrast. Automated exposure control, iterative reconstruction, and/or weight based adjustment of the mA/kV was utilized to reduce the radiation dose to as low as reasonably achievable.; CT of the face was performed without the administration of intravenous contrast. Multiplanar reformatted images are provided for review. Automated exposure control, iterative reconstruction, and/or weight based adjustment of the mA/kV was utilized to reduce the radiation dose to as low as reasonably achievable. COMPARISON: None. HISTORY: ORDERING SYSTEM PROVIDED HISTORY: [facility] seizure TECHNOLOGIST PROVIDED HISTORY: Has a \"code stroke\" or \"stroke alert\" been called? ->No Reason for exam:->fall seizure Decision Support Exception - unselect if not a suspected or confirmed emergency medical condition->Emergency Medical Condition (MA) What reading provider will be dictating this exam?->CRC; ORDERING SYSTEM PROVIDED HISTORY: fall TECHNOLOGIST PROVIDED HISTORY: Reason for exam:->fall Decision Support Exception - unselect if not a suspected or confirmed emergency medical condition->Emergency Medical Condition (MA) What reading provider will be dictating this exam?->CRC FINDINGS: CT HEAD: BRAIN/VENTRICLES: There is no acute intracranial hemorrhage, mass effect or midline shift. No abnormal extra-axial fluid collection. The gray-white differentiation is maintained without evidence of an acute infarct. 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 There is no evidence of hydrocephalus.  There are nonspecific hypoattenuating foci in the subcortical and periventricular white matter that most likely represent chronic microangiopathic ischemic changes in a patient of this age. There are atherosclerotic calcifications of the intracranial vessels. CT FACIAL BONES: FACIAL BONES: The maxilla, pterygoid plates and zygomatic arches are intact. The mandible is intact. The mandibular condyles are normally situated. The nasal bones and maxillary nasal processes are intact. ORBITS: The globes appear intact. The extraocular muscles, optic nerve sheath complexes and lacrimal glands appear unremarkable. No retrobulbar hematoma or mass is seen. The orbital walls and rims are intact. SINUSES/MASTOIDS: The paranasal sinuses and mastoid air cells are well aerated. No acute fracture is seen. SOFT TISSUES: No acute abnormality of the visualized skull or soft tissues. BRAIN/VENTRICLES: .     No acute intracranial abnormality. No acute traumatic injury of the facial bones. CT CERVICAL SPINE WO CONTRAST    Result Date: 12/10/2022  EXAMINATION: CT OF THE CERVICAL SPINE WITHOUT CONTRAST 12/10/2022 7:55 pm TECHNIQUE: CT of the cervical spine was performed without the administration of intravenous contrast. Multiplanar reformatted images are provided for review. Automated exposure control, iterative reconstruction, and/or weight based adjustment of the mA/kV was utilized to reduce the radiation dose to as low as reasonably achievable. COMPARISON: None. HISTORY: ORDERING SYSTEM PROVIDED HISTORY: fall TECHNOLOGIST PROVIDED HISTORY: Reason for exam:->fall Decision Support Exception - unselect if not a suspected or confirmed emergency medical condition->Emergency Medical Condition (MA) What reading provider will be dictating this exam?->CRC FINDINGS: BONES/ALIGNMENT: There is no acute fracture or traumatic malalignment. DEGENERATIVE CHANGES: Moderate to severe most conspicuous at C4-7 vertebrae with a suggestion of central stenosis such as C4-5 and C5-6 SOFT TISSUES: There is no prevertebral soft tissue swelling.      No acute fracture is identified. XR CHEST PORTABLE    Result Date: 12/10/2022  EXAMINATION: ONE XRAY VIEW OF THE CHEST 12/10/2022 6:48 pm COMPARISON: None. HISTORY: ORDERING SYSTEM PROVIDED HISTORY: fall seizure pna TECHNOLOGIST PROVIDED HISTORY: Reason for exam:->fall seizure pna What reading provider will be dictating this exam?->CRC FINDINGS: Cardiomediastinal silhouette: No cardiomegaly or obvious acute process is identified. Lungs/pleura: No acute pulmonary infiltrate, pleural effusion, or pneumothorax is identified. Other: No displaced fracture. No significant injury is identified on the portableexam.       Resident's Assessment and Plan     Tiara Durham is a 58 y.o. female who presented with chief complaint of dizziness and fall, with tremors on the left side. Principal Problem:    Hyponatremia  Resolved Problems:    * No resolved hospital problems.  *    Hyponatremia  - Chronic alcohol abuse leading to beer potomania vs \"tea and toast\" diet vs SIADH unlikely  - glycemia normal, not likely pseudohyponatremia  - Na on admission 115, chronic, was similar in August 2022  - Admit to ICU  - Nephro consulted from ED  - pending serum Osm, Urine Osm, and urine electrolytes  - BMP q2h  - per nephro, give 1 amp of hypertonic saline if seizure like activity occurs  - Other electrolyte derangements: Mg on admission 1.4 and repleted, K was 3.3    Seizure like activity/Dizziness  - likely 2/2 to above vs alcohol withdrawal vs anemia  - CT on admission showed no orbital fx and no acute hemorrhages  - Pt not on any blood thinners  - Consider neuro consult    Hx of alcohol abuse/transaminitis  - alcohol level negative today, last drink on Thursday 1 12 oz beer  - never had hx of withdrawal  - AST elevated on admission  - RUQ US  - hepatitis panel  - Continue folate and thiamine  - [facility] protocol    Hx of COPD/Tobacco Abuse  - 0.5 pack a day  - declined nicotine patches due to reaction  - continue home meds: Flovent, Ipratropium and Albuterol  - Duoneb and Pulmicort while in hospital    Anemia  - new this admission, mild Hb 11.4  - Check iron panel  - Check FOBT, c-scope outpatient scheduled  - Hold NSAIDs, daily use of NSAIDs    Elevated CK  - CK 500s on admission  - Continue to monitor      PT/OT evaluation: pending  DVT prophylaxis: Lovenox  GI prophylaxis: Protonix  Diet: Regular  Full Code  Disposition: ICU      Emily Olivas MD  Attending physician: Dr. Zacarias Denson

## 2022-12-12 PROBLEM — E43 SEVERE PROTEIN-CALORIE MALNUTRITION (HCC): Chronic | Status: ACTIVE | Noted: 2022-12-12

## 2022-12-12 LAB
ANION GAP SERPL CALCULATED.3IONS-SCNC: 10 MMOL/L (ref 7–16)
ANION GAP SERPL CALCULATED.3IONS-SCNC: 10 MMOL/L (ref 7–16)
ANION GAP SERPL CALCULATED.3IONS-SCNC: 11 MMOL/L (ref 7–16)
ANION GAP SERPL CALCULATED.3IONS-SCNC: 12 MMOL/L (ref 7–16)
ANION GAP SERPL CALCULATED.3IONS-SCNC: 8 MMOL/L (ref 7–16)
ANION GAP SERPL CALCULATED.3IONS-SCNC: 9 MMOL/L (ref 7–16)
BASOPHILS ABSOLUTE: 0.03 E9/L (ref 0–0.2)
BASOPHILS RELATIVE PERCENT: 0.5 % (ref 0–2)
BUN BLDV-MCNC: 4 MG/DL (ref 6–23)
BUN BLDV-MCNC: 4 MG/DL (ref 6–23)
BUN BLDV-MCNC: 5 MG/DL (ref 6–23)
BUN BLDV-MCNC: 6 MG/DL (ref 6–23)
BUN BLDV-MCNC: 7 MG/DL (ref 6–23)
BUN BLDV-MCNC: 9 MG/DL (ref 6–23)
CALCIUM SERPL-MCNC: 7.4 MG/DL (ref 8.6–10.2)
CALCIUM SERPL-MCNC: 8.3 MG/DL (ref 8.6–10.2)
CALCIUM SERPL-MCNC: 8.6 MG/DL (ref 8.6–10.2)
CALCIUM SERPL-MCNC: 8.7 MG/DL (ref 8.6–10.2)
CALCIUM SERPL-MCNC: 8.7 MG/DL (ref 8.6–10.2)
CALCIUM SERPL-MCNC: 8.8 MG/DL (ref 8.6–10.2)
CHLORIDE BLD-SCNC: 85 MMOL/L (ref 98–107)
CHLORIDE BLD-SCNC: 86 MMOL/L (ref 98–107)
CHLORIDE BLD-SCNC: 88 MMOL/L (ref 98–107)
CHLORIDE BLD-SCNC: 92 MMOL/L (ref 98–107)
CHLORIDE BLD-SCNC: 93 MMOL/L (ref 98–107)
CHLORIDE BLD-SCNC: 97 MMOL/L (ref 98–107)
CO2: 21 MMOL/L (ref 22–29)
CO2: 23 MMOL/L (ref 22–29)
CO2: 23 MMOL/L (ref 22–29)
CO2: 24 MMOL/L (ref 22–29)
CO2: 25 MMOL/L (ref 22–29)
CO2: 26 MMOL/L (ref 22–29)
CREAT SERPL-MCNC: 0.6 MG/DL (ref 0.5–1)
CREAT SERPL-MCNC: 0.8 MG/DL (ref 0.5–1)
CREAT SERPL-MCNC: 0.9 MG/DL (ref 0.5–1)
CREAT SERPL-MCNC: 0.9 MG/DL (ref 0.5–1)
EKG ATRIAL RATE: 103 BPM
EKG P AXIS: 88 DEGREES
EKG P-R INTERVAL: 164 MS
EKG Q-T INTERVAL: 370 MS
EKG QRS DURATION: 62 MS
EKG QTC CALCULATION (BAZETT): 484 MS
EKG R AXIS: 73 DEGREES
EKG T AXIS: 73 DEGREES
EKG VENTRICULAR RATE: 103 BPM
EOSINOPHILS ABSOLUTE: 0.06 E9/L (ref 0.05–0.5)
EOSINOPHILS RELATIVE PERCENT: 1.1 % (ref 0–6)
GFR SERPL CREATININE-BSD FRML MDRD: >60 ML/MIN/1.73
GLUCOSE BLD-MCNC: 107 MG/DL (ref 74–99)
GLUCOSE BLD-MCNC: 128 MG/DL (ref 74–99)
GLUCOSE BLD-MCNC: 138 MG/DL (ref 74–99)
GLUCOSE BLD-MCNC: 85 MG/DL (ref 74–99)
GLUCOSE BLD-MCNC: 88 MG/DL (ref 74–99)
GLUCOSE BLD-MCNC: 95 MG/DL (ref 74–99)
HAV IGM SER IA-ACNC: NORMAL
HBV SURFACE AB TITR SER: NORMAL {TITER}
HCT VFR BLD CALC: 27.9 % (ref 34–48)
HEMOGLOBIN: 10.5 G/DL (ref 11.5–15.5)
HEPATITIS B CORE IGM ANTIBODY: NORMAL
HEPATITIS B SURFACE ANTIGEN INTERPRETATION: NORMAL
HEPATITIS C ANTIBODY INTERPRETATION: NORMAL
IMMATURE GRANULOCYTES #: 0.03 E9/L
IMMATURE GRANULOCYTES %: 0.5 % (ref 0–5)
LYMPHOCYTES ABSOLUTE: 1.64 E9/L (ref 1.5–4)
LYMPHOCYTES RELATIVE PERCENT: 30 % (ref 20–42)
MAGNESIUM: 1.7 MG/DL (ref 1.6–2.6)
MAGNESIUM: 2.8 MG/DL (ref 1.6–2.6)
MCH RBC QN AUTO: 38.5 PG (ref 26–35)
MCHC RBC AUTO-ENTMCNC: 37.6 % (ref 32–34.5)
MCV RBC AUTO: 102.2 FL (ref 80–99.9)
MONOCYTES ABSOLUTE: 1.1 E9/L (ref 0.1–0.95)
MONOCYTES RELATIVE PERCENT: 20.1 % (ref 2–12)
NEUTROPHILS ABSOLUTE: 2.61 E9/L (ref 1.8–7.3)
NEUTROPHILS RELATIVE PERCENT: 47.8 % (ref 43–80)
PDW BLD-RTO: 15.9 FL (ref 11.5–15)
PHOSPHORUS: 2.6 MG/DL (ref 2.5–4.5)
PLATELET # BLD: 240 E9/L (ref 130–450)
PMV BLD AUTO: 10.2 FL (ref 7–12)
POTASSIUM SERPL-SCNC: 2.9 MMOL/L (ref 3.5–5)
POTASSIUM SERPL-SCNC: 3.3 MMOL/L (ref 3.5–5)
POTASSIUM SERPL-SCNC: 3.4 MMOL/L (ref 3.5–5)
POTASSIUM SERPL-SCNC: 3.8 MMOL/L (ref 3.5–5)
POTASSIUM SERPL-SCNC: 4.2 MMOL/L (ref 3.5–5)
POTASSIUM SERPL-SCNC: 4.3 MMOL/L (ref 3.5–5)
RBC # BLD: 2.73 E12/L (ref 3.5–5.5)
SODIUM BLD-SCNC: 120 MMOL/L (ref 132–146)
SODIUM BLD-SCNC: 121 MMOL/L (ref 132–146)
SODIUM BLD-SCNC: 122 MMOL/L (ref 132–146)
SODIUM BLD-SCNC: 125 MMOL/L (ref 132–146)
SODIUM BLD-SCNC: 126 MMOL/L (ref 132–146)
SODIUM BLD-SCNC: 129 MMOL/L (ref 132–146)
WBC # BLD: 5.5 E9/L (ref 4.5–11.5)

## 2022-12-12 PROCEDURE — 6370000000 HC RX 637 (ALT 250 FOR IP): Performed by: INTERNAL MEDICINE

## 2022-12-12 PROCEDURE — 6360000002 HC RX W HCPCS: Performed by: FAMILY MEDICINE

## 2022-12-12 PROCEDURE — 2580000003 HC RX 258: Performed by: INTERNAL MEDICINE

## 2022-12-12 PROCEDURE — 97166 OT EVAL MOD COMPLEX 45 MIN: CPT

## 2022-12-12 PROCEDURE — 97162 PT EVAL MOD COMPLEX 30 MIN: CPT

## 2022-12-12 PROCEDURE — 80048 BASIC METABOLIC PNL TOTAL CA: CPT

## 2022-12-12 PROCEDURE — 97530 THERAPEUTIC ACTIVITIES: CPT

## 2022-12-12 PROCEDURE — 97535 SELF CARE MNGMENT TRAINING: CPT

## 2022-12-12 PROCEDURE — 93010 ELECTROCARDIOGRAM REPORT: CPT | Performed by: INTERNAL MEDICINE

## 2022-12-12 PROCEDURE — 84100 ASSAY OF PHOSPHORUS: CPT

## 2022-12-12 PROCEDURE — 6360000002 HC RX W HCPCS

## 2022-12-12 PROCEDURE — 6360000002 HC RX W HCPCS: Performed by: INTERNAL MEDICINE

## 2022-12-12 PROCEDURE — 85025 COMPLETE CBC W/AUTO DIFF WBC: CPT

## 2022-12-12 PROCEDURE — 6370000000 HC RX 637 (ALT 250 FOR IP)

## 2022-12-12 PROCEDURE — 36415 COLL VENOUS BLD VENIPUNCTURE: CPT

## 2022-12-12 PROCEDURE — 99232 SBSQ HOSP IP/OBS MODERATE 35: CPT | Performed by: FAMILY MEDICINE

## 2022-12-12 PROCEDURE — 6370000000 HC RX 637 (ALT 250 FOR IP): Performed by: FAMILY MEDICINE

## 2022-12-12 PROCEDURE — 2000000000 HC ICU R&B

## 2022-12-12 PROCEDURE — 2580000003 HC RX 258

## 2022-12-12 PROCEDURE — 2580000003 HC RX 258: Performed by: STUDENT IN AN ORGANIZED HEALTH CARE EDUCATION/TRAINING PROGRAM

## 2022-12-12 PROCEDURE — 99291 CRITICAL CARE FIRST HOUR: CPT | Performed by: INTERNAL MEDICINE

## 2022-12-12 PROCEDURE — 94640 AIRWAY INHALATION TREATMENT: CPT

## 2022-12-12 PROCEDURE — 83735 ASSAY OF MAGNESIUM: CPT

## 2022-12-12 RX ORDER — POTASSIUM CHLORIDE 7.45 MG/ML
10 INJECTION INTRAVENOUS
Status: DISCONTINUED | OUTPATIENT
Start: 2022-12-12 | End: 2022-12-12

## 2022-12-12 RX ORDER — IPRATROPIUM BROMIDE AND ALBUTEROL SULFATE 2.5; .5 MG/3ML; MG/3ML
1 SOLUTION RESPIRATORY (INHALATION) EVERY 4 HOURS PRN
Status: DISCONTINUED | OUTPATIENT
Start: 2022-12-12 | End: 2022-12-13 | Stop reason: HOSPADM

## 2022-12-12 RX ORDER — POTASSIUM CHLORIDE 20 MEQ/1
20 TABLET, EXTENDED RELEASE ORAL ONCE
Status: COMPLETED | OUTPATIENT
Start: 2022-12-12 | End: 2022-12-12

## 2022-12-12 RX ORDER — ENOXAPARIN SODIUM 100 MG/ML
40 INJECTION SUBCUTANEOUS DAILY
Status: CANCELLED | OUTPATIENT
Start: 2022-12-13

## 2022-12-12 RX ORDER — DEXTROSE MONOHYDRATE 50 MG/ML
INJECTION, SOLUTION INTRAVENOUS CONTINUOUS
Status: DISCONTINUED | OUTPATIENT
Start: 2022-12-12 | End: 2022-12-12

## 2022-12-12 RX ORDER — IPRATROPIUM BROMIDE AND ALBUTEROL SULFATE 2.5; .5 MG/3ML; MG/3ML
1 SOLUTION RESPIRATORY (INHALATION)
Status: DISCONTINUED | OUTPATIENT
Start: 2022-12-12 | End: 2022-12-12

## 2022-12-12 RX ORDER — POTASSIUM CHLORIDE 20 MEQ/1
40 TABLET, EXTENDED RELEASE ORAL ONCE
Status: COMPLETED | OUTPATIENT
Start: 2022-12-12 | End: 2022-12-12

## 2022-12-12 RX ORDER — MAGNESIUM SULFATE IN WATER 40 MG/ML
2000 INJECTION, SOLUTION INTRAVENOUS ONCE
Status: COMPLETED | OUTPATIENT
Start: 2022-12-12 | End: 2022-12-12

## 2022-12-12 RX ADMIN — SODIUM CHLORIDE, PRESERVATIVE FREE 10 ML: 5 INJECTION INTRAVENOUS at 09:15

## 2022-12-12 RX ADMIN — POTASSIUM CHLORIDE 40 MEQ: 1500 TABLET, EXTENDED RELEASE ORAL at 20:14

## 2022-12-12 RX ADMIN — MAGNESIUM SULFATE HEPTAHYDRATE 2000 MG: 40 INJECTION, SOLUTION INTRAVENOUS at 09:12

## 2022-12-12 RX ADMIN — DEXTROSE MONOHYDRATE: 50 INJECTION, SOLUTION INTRAVENOUS at 13:50

## 2022-12-12 RX ADMIN — THIAMINE HYDROCHLORIDE 500 MG: 100 INJECTION, SOLUTION INTRAMUSCULAR; INTRAVENOUS at 18:24

## 2022-12-12 RX ADMIN — THIAMINE HYDROCHLORIDE 500 MG: 100 INJECTION, SOLUTION INTRAMUSCULAR; INTRAVENOUS at 11:13

## 2022-12-12 RX ADMIN — Medication 10 ML: at 09:15

## 2022-12-12 RX ADMIN — ENOXAPARIN SODIUM 30 MG: 100 INJECTION SUBCUTANEOUS at 09:10

## 2022-12-12 RX ADMIN — BUDESONIDE 500 MCG: 0.5 SUSPENSION RESPIRATORY (INHALATION) at 21:28

## 2022-12-12 RX ADMIN — FOLIC ACID 1 MG: 1 TABLET ORAL at 09:10

## 2022-12-12 RX ADMIN — THIAMINE HYDROCHLORIDE 500 MG: 100 INJECTION, SOLUTION INTRAMUSCULAR; INTRAVENOUS at 02:16

## 2022-12-12 RX ADMIN — Medication 10 ML: at 21:27

## 2022-12-12 RX ADMIN — BUDESONIDE 500 MCG: 0.5 SUSPENSION RESPIRATORY (INHALATION) at 08:38

## 2022-12-12 RX ADMIN — IPRATROPIUM BROMIDE AND ALBUTEROL SULFATE 1 AMPULE: 2.5; .5 SOLUTION RESPIRATORY (INHALATION) at 11:43

## 2022-12-12 RX ADMIN — IPRATROPIUM BROMIDE AND ALBUTEROL SULFATE 1 AMPULE: 2.5; .5 SOLUTION RESPIRATORY (INHALATION) at 16:22

## 2022-12-12 RX ADMIN — POTASSIUM CHLORIDE 20 MEQ: 1500 TABLET, EXTENDED RELEASE ORAL at 09:24

## 2022-12-12 NOTE — PLAN OF CARE
Problem: Safety - Adult  Goal: Free from fall injury  Outcome: Progressing     Problem: Metabolic/Fluid and Electrolytes - Adult  Goal: Electrolytes maintained within normal limits  Outcome: Progressing     Problem: Nutrition Deficit:  Goal: Optimize nutritional status  Outcome: Progressing

## 2022-12-12 NOTE — PROGRESS NOTES
Comprehensive Nutrition Assessment    Type and Reason for Visit:  Initial, Positive Nutrition Screen    Nutrition Recommendations/Plan:     Continue Current Diet/ fluid restriction d/t hyponatremia     Start Oral Nutrition Supplements       Malnutrition Assessment:  Malnutrition Status:  Severe malnutrition (12/12/22 1442)    Context:  Chronic Illness     Findings of the 6 clinical characteristics of malnutrition:  Energy Intake:  75% or less estimated energy requirements for 1 month or longer  Weight Loss:  Unable to assess (no measured wt hx on file)     Body Fat Loss:  Severe body fat loss Orbital, Triceps   Muscle Mass Loss:  Severe muscle mass loss Temples (temporalis), Clavicles (pectoralis & deltoids), Calf (gastrocnemius), Thigh (quadraceps), Hand (interosseous)  Fluid Accumulation:  No significant fluid accumulation    Strength:  Not Performed    Nutrition Assessment:    Pt admit w/ fall & seizure like activity found to have hyponatremia (, improving). Noted ETOH abuse. PMHx COPD. Pt meets criteria for Severe Malnutrition. Will add Boost Pudding BID & Gelatein Daily to comply w/ dry tray diet order. Nutrition Related Findings:    Pt alert, cachectic appearing, -I/O's 2L no edema, Abd WDL Wound Type: None       Current Nutrition Intake & Therapies:    Average Meal Intake: 1-25% (low nutrient dense food/ portions PTA)     ADULT DIET; Regular; Low Sodium (2 gm); No Fluids on Tray    Anthropometric Measures:  Height: 5' 1\" (154.9 cm)  Ideal Body Weight (IBW): 105 lbs (48 kg)    Admission Body Weight: 99 lb (44.9 kg) (12/11 first measured)  Current Body Weight: 96 lb (43.5 kg) (12/12 actual), 91.4 % IBW.     Current BMI (kg/m2): 18.1  Usual Body Weight:  (UTO no measured wt hx on file)                       BMI Categories: Underweight (BMI less than 18.5)    Estimated Daily Nutrient Needs:  Energy Requirements Based On: Kcal/kg  Weight Used for Energy Requirements: Current  Energy (kcal/day): 30-35 kcal/kg CBW for healthy wt gain; 7949-7403  Weight Used for Protein Requirements: Current  Protein (g/day): 1.5-1.8 g/kg CBW; 65-80  Fluid (ml/day): dry tray per critical care team    Nutrition Diagnosis:   Severe malnutrition, In context of chronic, non-illness related related to inadequate protein-energy intake (ETOH abuse) as evidenced by poor intake prior to admission, severe muscle loss, severe loss of subcutaneous fat    Nutrition Interventions:   Nutrition Education/Counseling: Education not indicated  Coordination of Nutrition Care: Continue to monitor while inpatient       Goals:     Goals: PO intake 50% or greater, by next RD assessment       Nutrition Monitoring and Evaluation:      Food/Nutrient Intake Outcomes: Food and Nutrient Intake, Supplement Intake  Physical Signs/Symptoms Outcomes: Biochemical Data, Nutrition Focused Physical Findings, Skin, Weight, GI Status, Fluid Status or Edema, Hemodynamic Status    Discharge Planning:     Too soon to determine     Daja Bran RD, LD  Contact: Ext [phone]

## 2022-12-12 NOTE — PROGRESS NOTES
[address]       ILOE:41/05/7139                                                               Patient Name: Santa Grady  MRN: [id number]  : 1960  Room: [address] [place] [address]       Evaluating OT: Sandra Leslie OTR/L; [id number]     Referring Provider: Lisa Martinez DO   Specific Provider Orders/Date: OT eval and treat (22 )       Diagnosis: Hyponatremia [E87.1]     Reason for admission: Hyponatremia. Pt admitted for concerns of confusion & seziure-like activity. Surgery/Procedures: None this admission     Pertinent Medical History:    Past Medical History:   Diagnosis Date    Carpal tunnel syndrome     Pulmonary nodule     Thyroid disease         *Precautions:  Fall Risk, seizure precautions, impulsive, monitor BP    Assessment of current deficits   [x] Functional mobility  [x]ADLs  [x] Strength               [x]Cognition   [x] Functional transfers   [x] IADLs         [x] Safety Awareness   [x]Endurance   [] Fine Coordination        [] ROM     [] Vision/perception   []Sensation    []Gross Motor Coordination [x] Balance   [] Delirium                  []Motor Control     [] Communication    OT PLAN OF CARE   OT POC based on physician orders, patient diagnosis and results of clinical assessment.        Frequency/Duration: 1-3 days/wk for 1-2 weeks PRN    Specific OT Treatment Interventions to include:   * Instruction/training on adapted ADL techniques and AE recommendations to increase functional independence within precautions       * Training on energy conservation strategies, correct breathing pattern and techniques to improve independence/tolerance for self-care routine  * Functional transfer/mobility training/DME recommendations for increased independence, safety, and fall prevention  * Patient/Family education to increase follow through with safety techniques and functional independence  * Recommendation of environmental modifications for increased safety with functional transfers/mobility and ADLs  * Cognitive retraining/development of therapeutic activities to improve problem solving, judgement, memory, and attention for increased safety/participation in ADL/IADL tasks  * Therapeutic exercise to improve motor endurance, ROM, and functional strength for ADLs/functional transfers  * Therapeutic activities to facilitate/challenge dynamic balance, stand tolerance for increased safety and independence with ADLs  * Positioning to improve skin integrity, interaction with environment and functional independence    Recommended Adaptive Equipment: TBD     Home Living: Pt lives with mother  in a 2 story home with 5 step(s) to enter and 1 rail(s); bed/bath on 2nd floor with full flight of stairs & 1 handrail. Bathroom setup: [address] owned: None    Prior Level of Function: IND with ADLs;  IND with IADLs. No AD for functional mobility. Driving: Yes  Occupation: HHA    Pain Level: pt c/o 0/10 pain this session    Cognition: A&O: 4/4 ; Follows 1 step commands. Pt impulsive throughout session requiring mod verbal cues for safety awareness. Memory: Good   Comprehension: Fair   Problem solving: Fair   Judgement/safety: Fair-               Communication skills: WL           Vision: WFL               Glasses: Yes                                                   Hearing: WFL     RASS: 0  CAM-ICU: (-) Delirium     Functional Assessment:  AM-PAC Daily Activity Raw Score: 18/24   Initial Eval Status  Date: 12/12/22 Treatment Status  Date:  STGs = LTGs  Time frame: 7-14 days   Feeding Setup                      Independent       Grooming SBA                      Independent        UB dressing/bathing SBA                      Independent       LB dressing/bathing Minimal Assist                      Independent        Toileting Minimal Assist                      Independent Bed Mobility  Supine to sit:   Supervision    Sit to supine:   Supervision                      Independent     Functional Transfers Sit to stand:   SBA    Stand to sit:   SBA    Stand Pivot:   SBA                      Independent     Functional Mobility SBA                      Independent        Balance Sitting:     Static:  Supervision    Dynamic:SBA  Standing: SBA  Sitting:     Static:  Independent    Dynamic:Independent  Standing: Independent                   Endurance/Activity Tolerance   Fair tolerance with light activity. Good   Visual/  Perceptual WFL          Vitals:   HR at rest: 110 bpm HR at end of session: 117 bpm   SpO2 at rest: 94% SpO2 at end of session 94%   BP at rest: 102/65 mmHg BP at end of session 88/54 mmHg   BP seated in chair at sin: 145/130 & 153/129 - RN made aware & in room. After ~5 min BP 97/56. UE Assessment:  Hand Dominance: Right [x]  Left []     ROM Strength   RUE  WFL 4+/5  : WFL  FMC: WFL  GMC: WFL   LUE WFL 4+/5  : WFL  FMC: WFL  GMC: WFL       Sensation: No c/o numbness/tingling in maximino extremities. Tone: WNL BUE  Edema: Unremarkable    Treatment: OT treatment provided this date includes:     Instruction/training on safety and adapted techniques for completion of ADLs:  to increase independence and safety in self-care. Pt attempting to stand at sink to engage in light bathing task however c/o 6/10 dizziness. Pt instructed to be seated reporting improved symptoms after ~2 minutes. Pt required continuous cuing to maintain safety awareness & EC/WS strategies d/t dizziness while engaging in light bathing task. Instruction/training on safe bed mobility/functional mobility/transfer techniques: with focus on safety, body mechanics, and precautions d/t impulsivity throughout functional activity.    Instruction/training on energy conservation/work simplification for completion of ADLs:. techniques to increase independence with self-care ADLs and IADLs, work simplification to improve endurance. Pt c/o mild dizziness throughout functional activity requiring seated rest breaks & activity modifications/adaptations to safely engage in ADLs. Proper Positioning/Alignment for optimal healing, skin integrity, to prevent breakdown, decrease edema, reduce risk of contracture, and encourage functional positioning for interaction with environment. Sitting/Standing Balance/Tolerance: to increase balance and activity tolerance during ADLs and facilitate proper posture and positioning. Line management and environmental modifications made prior to and end of session to ensure patient safety and to increase efficiency of session. Skilled monitoring of HR, O2 saturation, blood pressure and patient's response to activity performed throughout session. Comments: Pt case discussed in rounds, OK from RN to see patient. Upon arrival, patient supine in bed & agreeable to OT session. Pt pleasant and agreeable to participate in therapy session. Pt demo'd fair tolerance with fair understanding of education/techniques however requiring mod verbal cues to maintain safety throughout session d/t impulsivity. Therapist facilitated ADL tasks, functional transfers, functional mobility, bed mobility to address safety awareness, implementation of fall prevention strategies, & engagement throughout functional tasks. Pt impulsive throughout session requiring mod verbal cues to maintain safety awareness throughout ADLs. At end of session, patient properly positioned semi-supine in bed with call light within reach, all lines and tubes intact. Pt instructed on use of call light for assistance and fall prevention. Nursing notified of patient positioning. Patient presents with decreased ROM/strength, activity tolerance, dynamic balance, functional mobility limiting completion of ADLs and safety.   Pt can benefit from continued skilled OT services to increase safety, functional independence and quality of life. Rehab Potential: Good for established goals    Patient / Family Goal: to return to PLOF    Patient and/or family were instructed/educated on diagnosis, prognosis/goals and plan of care. Pt demonstrated fair understanding. Evaluation Complexity: Moderate    History: Expanded chart review of consults, imaging, and psychosocial history related to current functional performance. Exam: 5+ performance deficits identified limiting functional independence and safe return home   Assistance/Modification: Min/mod assistance or modifications required to perform tasks. May have comorbidities that affect occupational performance. Time In:10:41a             Time Out: 11:10a         Total Treatment time: 14 minutes   Min Units   OT Eval Low 29023     OT Eval Medium 03369 X    OT Eval High 42120     OT Re-Eval [id number]     Therapeutic Ex 73176     Therapeutic Activities 82521     ADL/Self Care 01810 14 1   Orthotic Management 70653     Neuro Re-Ed 39210     Non-Billable Time        Evaluation time includes thorough review of current medical information, gathering information on past medical history/social history and prior level of function, completion of standardized testing/informal observation of tasks, assessment of data and development of POC/Goals.         Evaluating OT: Kade Islas OTR/L; [id number]

## 2022-12-12 NOTE — CONSULTS
Cooperative. NEUROLOGIC:  Cranial nerves grossly intact, II through XII. BLOOD WORK:  Sodium is 120, potassium 5.2. ASSESSMENT AND PLAN:  List of problems:  1. Hyponatremia, euvolemic, likely related to a component of beer  potomania along with volume depletion supported by urine lytes showing  high avidity for urine sodium and urine chloride. 2.  Sodium initially corrected rapidly, and she was started on D5W and  given a dose of DDAVP. Sodium now down to 120 and the rate of  correction is appropriate. 3.  I will go ahead and stop D5W and allow sodium to correct on its own. 4.  She was advised to abstain completely from drinking alcohol. 5.  Looking in record, she does have a degree of chronic hyponatremia  and her baseline sodium appeared to be anywhere from 125 to 135. We will follow along with you.         Bo Gaston MD    D: 12/11/2022 22:20:41       T: 12/11/2022 23:59:41     [email or web address]  Job#: [id number]     Doc#: [id number]    CC:

## 2022-12-12 NOTE — PROGRESS NOTES
[address] [facility]  Department of Internal Medicine   Internal Medicine Residency   MICU Progress Note    Patient:  Marc Marcelino 58 y.o. female  MRN: [id number]     Date of Service: 12/12/2022    Allergy: Ibuprofen and Morphine    Subjective     Patient seen and examined at bedside. She is in no acute distress. She is requesting a food tray, as she was not able to eat yesterday due to her sodium levels increasing overnight. Patient was reassured that she was ordered a diet this morning. She states this makes her feel much better. Neuro symptoms (dizziness, weakness, tremors) have completely resolved today. She is open to discussing her drinking and states she usually follows at [place] and attends meetings when she is sober. She states that drinking has been an off and on problem for several years. She plans to return to [place] outpatient to seek assistance in alcohol cessation. 24h change: D5 stopped overnight due to Na dropping. ROS: Denies Fever/chills/CP/SOB/N/V/D/C/Dysuria/Blood in stool or urine  Objective     VS: BP (!) 82/57   Pulse (!) 104   Temp 98.9 °F (37.2 °C) (Oral)   Resp 17   Ht 5' 1\" (1.549 m)   Wt 96 lb 4.8 oz (43.7 kg)   SpO2 93%   BMI 18.20 kg/m²           I & O - 24hr:   Intake/Output Summary (Last 24 hours) at 12/12/2022 1556  Last data filed at 12/12/2022 1500  Gross per 24 hour   Intake 372.08 ml   Output 1735 ml   Net -1362.92 ml       Physical Exam:  General Appearance: alert, appears stated age, and cooperative  Neck: supple, symmetrical, trachea midline  Lung: clear to auscultation bilaterally  Heart: regular rate and rhythm, S1, S2 normal, no murmur, click, rub or gallop  Abdomen: soft, non-tender; bowel sounds normal; no masses,  no organomegaly  Extremities:  edema -none  Musculoskeletal: No joint swelling, no muscle tenderness. ROM normal in all joints of extremities.    Neurologic: Mental status: Alert, oriented, thought content appropriate    Lines     site day    Art line   None    TLC None    PICC None    Hemoaccess None    Oxygen:    Room air     Medications       Nutrition:   Dry tray, low sodium     Patient currently has   DVT prophylaxis/ GI prophylaxis   Labs   CBC:   Lab Results   Component Value Date/Time    WBC 5.5 12/12/2022 06:03 AM    RBC 2.73 12/12/2022 06:03 AM    HGB 10.5 12/12/2022 06:03 AM    HCT 27.9 12/12/2022 06:03 AM    .2 12/12/2022 06:03 AM    MCH 38.5 12/12/2022 06:03 AM    MCHC 37.6 12/12/2022 06:03 AM    RDW 15.9 12/12/2022 06:03 AM     12/12/2022 06:03 AM    MPV 10.2 12/12/2022 06:03 AM     CMP:    Lab Results   Component Value Date/Time     12/12/2022 06:19 PM    K 3.4 12/12/2022 06:19 PM    K 3.0 12/10/2022 09:45 PM    CL 92 12/12/2022 06:19 PM    CO2 21 12/12/2022 06:19 PM    BUN 7 12/12/2022 06:19 PM    CREATININE 0.9 12/12/2022 06:19 PM    GFRAA >60 08/06/2022 12:01 PM    LABGLOM >60 12/12/2022 06:19 PM    GLUCOSE 128 12/12/2022 06:19 PM    PROT 6.8 12/10/2022 08:10 PM    LABALBU 3.3 12/10/2022 08:10 PM    CALCIUM 8.7 12/12/2022 06:19 PM    BILITOT 1.1 12/10/2022 08:10 PM    ALKPHOS 120 12/10/2022 08:10 PM    AST 49 12/10/2022 08:10 PM    ALT 20 12/10/2022 08:10 PM         Resident's Assessment and Plan     Neuro   AMS due to hyponatremia - improving  Na 117 on admission  Confused on admission now completely resolved  Monitor mental status  Pulmonary  COPD Gold Stage 3   Patient on singulair, flovent, spiriva, and albuterol at home  Per outpatient records, last PFTs in 2021 FEV1/FVC 58%, FEV1 49% predicted   Pulmicort and Duoneb PRN ordered  Pulmonary nodule  Patient smokes 0.5 packs per day for 40 years - 20 pack year history  Last CT lung screening in August 2021 showing 3 mm nodule in the right upper lobe  Continue yearly lung screening  GI   Concern for liver fibrosis in the setting of alcohol use   Elevated AST   RUQ US show mildly coarsened liver parenchyma   FIB-4 scan elevated at 2.83   Fibroscan showing S1, F3 - less than 1/3 steatosis, severe scarring  Renal   Hyponatremia due to beer potomania  Correcting without direct intervention  Continue dry tray and sodium restriction  Monitor BMP q4 hrs  Monitor Na closely  Hypokalemia  Patient states she has had multiple issues with low potassium in the past   3.0 on admission  Low again today 3.8, replaced   Endocrine   Possible hx of hypothyroidism  Patient states she has hypothyroidism and takes a medication daily   Unable to tell dose or name of med  Chart review unrevealing for synthroid prescription  TSH in the past normal  Repeat TSH normal  Monitor off synthroid  Heme/Onc  Macrocytic anemia   Hgb 10.5, .2  In the setting of alcohol abuse  Continue thiamine and folic acid supplementation  Psych   Alcohol abuse  Currently drinks 1 can (16 oz) daily   Started daily drinking in July, but has been drinking off and on regularly for years  States she had no acute changes in her life, no trauma, no psychiatric concerns  Follows at [facility], plans to return when discharged   Other     PT/OT evaluation: ordered   DVT prophylaxis/ GI prophylaxis: Lovenox   Code status: Full    Disposition: Continue current management    Cary Rizzo DO, PGY-1    Attending physician: Dr. Afshin Zurita

## 2022-12-12 NOTE — PROGRESS NOTES
Physical Therapy    Physical Therapy Initial Assessment     Name: Jorje Walter  : 1960  MRN: [id number]      Date of Service: 2022    Evaluating PT:  Puja Suárez PT, DPT  [id number]     Room #:  9840/4144-S  Diagnosis:  Hyponatremia [E87.1]  PMHx/PSHx:   has a past medical history of Carpal tunnel syndrome, Pulmonary nodule, and Thyroid disease. Procedure/Surgery:  NA  Precautions:  Falls, Impulsive, Monitor BP  Equipment Needs:  NA    SUBJECTIVE:    Pt lives with her mother in a 2 story home with 5 stairs and 1 rail to enter. Bedroom and bathroom are on the 2nd level with full flight and 1 rail. Pt ambulated with no AD, independent, works as a health aide PTA. OBJECTIVE:   Initial Evaluation  Date: 22 Treatment Short Term/ Long Term   Goals   AM-PAC 6 Clicks 75/50     Was pt agreeable to Eval/treatment? Yes      Does pt have pain? No c/o pain      Bed Mobility  Rolling: Supervision  Supine to sit: Supervision  Sit to supine: Supervision  Scooting: Supervision   Rolling: Independent   Supine to sit: Independent   Sit to supine: Independent   Scooting: Independent    Transfers Sit to stand: SBA  Stand to sit: SBA  Stand pivot: SBA  Sit to stand: Independent   Stand to sit:  Independent   Stand pivot: Independent    Ambulation    20 feet x2 with no AD SBA  >300 feet with no AD Independent    Stair negotiation: ascended and descended  NT  >8 steps with 1 rail Modified Independent     ROM BUE:  Per OT eval   BLE:  WFL     Strength BUE:  Per OT eval   BLE:  5/5     Balance Sitting EOB:  SBA  Dynamic Standing:  SBA  Sitting EOB:  Independent   Dynamic Standing:  Independent      Pt is A & O x 4  RASS:  0  CAM-ICU:  -  Sensation:  Pt denies  numbness and tingling to extremities  Edema:  Unremarkable    Vitals:  Blood Pressure at rest 102/65 mmHg  Blood Pressure post session 88/54 mmHg   Heart Rate at rest 110 bpm  Heart Rate post session 117 bpm    SPO2 at rest 94% on RA SPO2 post session 94% on RA   BP sitting in chair at sink:  145/130, 153/129  BP still in chair, RN present:  79/56       Therapeutic Exercises:    BLE AROM  STS >5 reps throughout session     Patient education  Pt educated on PT role, safety during functional mobility    Patient response to education:   Pt verbalized understanding Pt demonstrated skill Pt requires further education in this area   Yes  Yes  Yes      ASSESSMENT:    Conditions Requiring Skilled Therapeutic Intervention:    []Decreased strength     []Decreased ROM  [x]Decreased functional mobility  [x]Decreased balance   [x]Decreased endurance   []Decreased posture  []Decreased sensation  []Decreased coordination   []Decreased vision  [x]Decreased safety awareness   []Increased pain       Comments:  Pt received supine and agreeable to PT evaluation with OT collaboration. Pt cleared for participation by RN prior to session. Vitals monitored during session. Pt is pleasant and motivated. She is a bit impulsive and quick moving. Limited by labile BP. Pt reported dizziness after ambulating to sink. Pt given seated rest.  BP assessed x2 with cuff adjustment and arm held at heart level for accurate assessment. Pt's diastolic was reading high with PT/OT questioning the accuracy. RN was called to room. BP assessed again. Pt was now hypotensive, 79/56. Pt was assisted back to bed d/t labile BP readings with questionable accuracy. Pt placed In chair position and bed alarm was placed on. Pt left with call button in reach, lines attached, and needs met.     Treatment:  Patient practiced and was instructed in the following treatment:    STS and pivot transfer training - pt educated on proper hand and foot placement, safety and sequencing, and use of no AD to safely complete sit<>stand and pivot transfers with hands on assistance to complete task safely    Gait training- pt was given verbal and tactile cues to facilitate safety/balance during ambulation as well as provided with physical assistance. Pt's/ family goals   1. Home     Prognosis is good for reaching above PT goals. Patient and or family understand(s) diagnosis, prognosis, and plan of care. yes    PHYSICAL THERAPY PLAN OF CARE:    PT POC is established based on physician order and patient diagnosis     Referring provider/PT Order:  Carrington Reddy, DO / PT eval and treat   Diagnosis:  Hyponatremia [E87.1]  Specific instructions for next treatment:  gait training/ stair training, safety    Current Treatment Recommendations:     [x] Strengthening to improve independence with functional mobility   [] ROM to improve independence with functional mobility   [x] Balance Training to improve static/dynamic balance and to reduce fall risk  [x] Endurance Training to improve activity tolerance during functional mobility   [x] Transfer Training to improve safety and independence with all functional transfers   [x] Gait Training to improve gait mechanics, endurance and asses need for appropriate assistive device  [x] Stair Training in preparation for safe discharge home and/or into the community   [x] Positioning to prevent skin breakdown and contractures  [x] Safety and Education Training   [x] Patient/Caregiver Education   [x] HEP  [] Other     PT long term treatment goals are located in above grid    Frequency of treatments: 2-5x/week x 1-2 weeks. Time in  1045  Time out  1110    Total Treatment Time  10 minutes     Evaluation Time includes thorough review of current medical information, gathering information on past medical history/social history and prior level of function, completion of standardized testing/informal observation of tasks, assessment of data and education on plan of care and goals.     CPT codes:  [] Low Complexity PT evaluation 47199  [x] Moderate Complexity PT evaluation 30511  [] High Complexity PT evaluation 09088  [] PT Re-evaluation 58029  [] Gait training 81529 -- minutes  [] Manual therapy [id number] -- minutes  [x] Therapeutic activities 84809 10 minutes  [] Therapeutic exercises 28126 - minutes  [] Neuromuscular reeducation 09930 -- minutes     love Miller, PT, DPT  [id number]

## 2022-12-12 NOTE — PROGRESS NOTES
[address] [place]  Family Medicine Attending    S: 58 y.o. female with a history of COPD/emphysema, hypothyroidism, alcohol and tobacco abuse as well as some chronic issues with hyponatremia presented to the hospital with significant hyponatremia to 115, dizzines s/p fall at home with seizure -flavia activity/tremors of the right hand. Today, the patient notes that she is feeling much better than when she came in to the hospital.  She is no longer having the right tremor. Some mild dizziness and bp changes. Pt reports home alcohol use is two 16 oz cans of beer daily and her last drink was on 12/8. O: VS- Blood pressure 87/65, pulse (!) 111, temperature 98.9 °F (37.2 °C), temperature source Oral, resp. rate 17, height 5' 1\" (1.549 m), weight 96 lb 4.8 oz (43.7 kg), SpO2 97 %. Exam is as noted by resident with the following changes, additions or corrections:  Gen:  AAO NAD; cuts noted on the left face/temple region, without active bleeding  Lungs:  CTAB  CVS-RRR systolic murmur 2/6 lsb  Ext:  no CCE  Abs:  soft, non tender    Impressions/Plans: Appreciate MICU management  Hyponatremia-Beer potomania, renal following; slow correction; received DDAVP over the weekend; BMPs Q2 hours. Electrolyte repletion. BP fluctuations-continue to monitor    Seizure like activity/dizziness/falls-likely due to hyponatremia; could also consider alcohol withdrawal; CT no fractures or hemorrhage; could consider neuro consult if further seizure like activity occurs    Anemia, mild, macrocytic-newly noted; B12/folate normal; check FOBT; will need outpatient follow up scopes; monitor H/H. On thiamine and folate    Mild lft elevation/hepatic fibrosis (F3)-outpatient [place] services for alcohol counseling    COPD stage 3-home meds     Elevated CK-continue to monitor    PT/OT/nutrition supplements for low albumin     Attending Physician Statement  I have reviewed the chart and seen the patient with the resident(s).   I personally reviewed images, EKG's and similar tests, if present. I personally reviewed and performed key elements of the history and exam.  I have reviewed and confirmed student and/or resident history and exam with changes as indicated above. I agree with the assessment, plan and orders as documented by the resident. Please refer to the resident progress note today and admission history and physical  for additional information.       Simon Doherty MD

## 2022-12-12 NOTE — PROGRESS NOTES
[facility] Inpatient   Resident Progress Note    S:  Hospital day: 2    Brief Synopsis: Marko Guaman is a 58 y.o. female with a PMH of COPD, alcohol and tobacco abuse. The patient presented for dizziness, fall and seizure like activity. Dizziness started on 12/10/2022, when she was at home. She felt dizzy the next day and fell twice. She did LOC for less than a minute and regained it with no post-ictal state. She denies any loss of control in her bladder or bowel movements. She presented to the ED after her 2nd fall and had seizure like activity with some tremors on the left upper extremity. In the ED, her Na was 115, Mg 1.4 and K at 3.3. CK in 500s, Hb was 11.4. CT of head, facial bones and cervical spine showed no acute processes. Nephrology, Dr. Taina Scott was consulted from the ED and pt will be admitted to ICU for hyponatremia management. Serum Osm came back at 240 mOsm/kg, urine Osm 88 and urine sodium <20. She was given D5W. Latest Na 12 hours later was 123. Desmopressin x1 and D5W infusion given. Overnight, no acute events. Patient seen and examined at bedside this morning. States she feels better than yesterday, denies dizziness, tremors. She does have an appetite but has been on n.p.o. and fluid restriction. Patient awake and oriented x3.     Cont meds:    dextrose      sodium chloride      dextrose       Scheduled meds:    ipratropium-albuterol  1 ampule Inhalation H9A WA    folic acid  1 mg Oral Daily    budesonide  0.5 mg Nebulization BID    pantoprazole  40 mg Oral QAM AC    enoxaparin  30 mg SubCUTAneous Daily    thiamine (VITAMIN B1) IVPB  500 mg IntraVENous Q8H    Followed by    Raisa Diallo ON 12/13/2022] thiamine (VITAMIN B1) IVPB  250 mg IntraVENous Q24H    Followed by    Raisa Diallo ON 12/18/2022] thiamine  100 mg Oral Daily    LORazepam  1 mg IntraVENous Once    folic acid  1 mg IntraVENous Once    sodium chloride flush  5-40 mL IntraVENous 2 times per day     PRN meds: LORazepam **OR** LORazepam **OR** LORazepam **OR** LORazepam **OR** LORazepam **OR** LORazepam **OR** LORazepam **OR** LORazepam, ondansetron **OR** ondansetron, sodium chloride, acetaminophen **OR** acetaminophen, glucose, dextrose bolus **OR** dextrose bolus, glucagon (rDNA), dextrose, polyethylene glycol, sodium chloride flush     I reviewed the patient's Past Medical and Surgical History, Medications and Allergies. O:  VS: BP 87/65   Pulse (!) 111   Temp 98.9 °F (37.2 °C) (Oral)   Resp 17   Ht 5' 1\" (1.549 m)   Wt 96 lb 4.8 oz (43.7 kg)   SpO2 97%   BMI 18.20 kg/m²     Physical Exam:  Physical Exam  Vitals reviewed. Constitutional:       General: She is not in acute distress. HENT:      Head: Normocephalic. Comments: Laceration on left cheek bone  Eyes:      Extraocular Movements: Extraocular movements intact. Pupils: Pupils are equal, round, and reactive to light. Cardiovascular:      Rate and Rhythm: Normal rate and regular rhythm. Pulmonary:      Comments: Wheezing present  Abdominal:      General: Abdomen is flat. There is no distension. Palpations: Abdomen is soft. Tenderness: There is no abdominal tenderness. There is no guarding. Musculoskeletal:      Cervical back: Normal range of motion. Right lower leg: No edema. Left lower leg: No edema. Neurological:      General: No focal deficit present. Mental Status: She is alert and oriented to person, place, and time. Cranial Nerves: No cranial nerve deficit. Sensory: No sensory deficit. Motor: No weakness. Psychiatric:         Mood and Affect: Mood normal.          Labs:  Na/K/Cl/CO2:  129/2.9/97/23 (12/12 1040)  BUN/Cr/glu/ALT/AST/amyl/lip:  4/0.6/--/--/--/--/-- (12/12 1040)  WBC/Hgb/Hct/Plts:  5.5/10.5/27.9/240 (12/12 0603)  estimated creatinine clearance is 67 mL/min (based on SCr of 0.6 mg/dL). Other pertinent labs as noted below    New Imaging:  US FIBROSCAN   Final Result   Hepatic fibrosis. Steatosis score of S0-S1      Fibrosis score of F3      Additional Info:   [email or web address]   an-results         260 to 290 dB/m   * S1: S2   * 11% to 33%: 34% to 66%      Higher than 290 dB/m   * S1: S3   * 11% to 33%: 67% or more            2 to 7 kPa   * 8 to 9 kPa: 8 to 9 kPa   * 8 to 11 kPa: 9 to 14 kPa   * 18 kPa or higher: 14 kPa or higher      2 to 7 kPa   * 8 to 9 kPa: 7 to 11 kPa   * 8 to 11 kPa: 11 to 14 kPa   * 18 kPa or higher: 14 kPa or higher      2 to 7 kPa   * 8 to 9 kPa: 7 to 9 kPa   * 8 to 11 kPa: 9 to 17 kPa   * 18 kPa or higher: 17 kPa or higher      2 to 7 kPa   * 8 to 9 kPa: 7.5 to 10 kPa   * 8 to 11 kPa: 10 to 14 kPa   * 18 kPa or higher: 14 kPa or higher      2 to 7 kPa   * 8 to 9 kPa: 7 to 11 kPa   * 8 to 11 kPa: 11 to 19 kPa   * 18 kPa or higher: 19 kPa or higher            US ABDOMEN LIMITED   Final Result   Cholecystectomy. Common bile duct at the upper limits of normal.      Mildly coarsened liver parenchyma. CT HEAD WO CONTRAST   Final Result   No acute intracranial abnormality. No acute traumatic injury of the facial bones. CT CERVICAL SPINE WO CONTRAST   Final Result   No acute fracture is identified. CT FACIAL BONES WO CONTRAST   Final Result   No acute intracranial abnormality. No acute traumatic injury of the facial bones. XR CHEST PORTABLE   Final Result   No significant injury is identified on the portableexam.             A/P:  Principal Problem:    Hyponatremia  Resolved Problems:    * No resolved hospital problems.  *      Hyponatremia and electrolyte derangements  - Chronic alcohol abuse leading to beer potomania vs \"tea and toast\" diet vs SIADH from ectopic site  - glycemia normal, not likely pseudohyponatremia  - Na on admission 115, chronic, was similar in August 2022  - Admit to ICU  - Nephro consulted from ED  - Serum Osm came back at 240 mOsm/kg, urine Osm 88 and urine sodium <20  - BMP q2h  - D5W infusion given and desmopressin x1.  - Other electrolyte derangements: Mg on admission 1.4, K was 3.3 both repleted. - Given diet and continue close monitoring     Seizure like activity/Dizziness - resolved  - likely 2/2 to above vs alcohol withdrawal vs anemia  - CT on admission showed no orbital fx and no acute hemorrhages  - Pt not on any blood thinners  - Consider neuro consult     Hx of alcohol abuse/transaminitis  - alcohol level negative today, last drink on Thursday 1 12 oz beer  - never had hx of withdrawal  - AST elevated on admission  - RUQ US showed mildly coarsened liver parenchyma, no evidence of ascites  - hepatitis panel  - Continue folate and thiamine  - [facility] protocol     Hx of COPD/Tobacco Abuse  - Lung nodule in the right upper lode seen on last CT lung screen in Aug 2021, f/u yearly  - 0.5 pack a day  - declined nicotine patches due to reaction  - continue home meds: Flovent, Ipratropium and Albuterol  - Duoneb and Pulmicort while in hospital     Anemia  - new this admission, Hb 11.4 today at 10.5, but was given D5W.   - iron panel only significant for TIBC 170  - Check FOBT, c-scope outpatient scheduled  - Hold NSAIDs, daily use of NSAIDs at home     Elevated CK  - CK 500s on admission  - Continue to monitor    DVT Prophylaxis: Lovenox  GI Prophylaxis: Protonix  Diet: Regular, Low sodium, No fluids      Electronically signed by Dano Angeles MD on 12/12/2022 at 1:38 PM  This case was discussed with attending physician Dr. Gustavo Jeffers

## 2022-12-12 NOTE — CARE COORDINATION
Care Coordination: Met with pt at the [facility] to discuss transition of care upon discharge. Lives in a 2 story home with mom, states she is completely independent. No hx of hhc, dme or nicholas. States she fell at home as she became lightheaded. Discussed need for NICHOLAS vs HHC. She pleasantly and adamantly declines need for NICHOLAS. She does not feel she has a need for hhc as she is not home bound, but if a need arises, she has no preference. She has no hx of DME, Uses [facility] [address] and follows with family practice. Her Malachy Eldon, will pickup at discharge . Discussed therapy to see her for evaluation and recommendations. She feels she will do fine and has no current needs for discharge. Discussed current hx of ETOH, she declines a Peer recovery referral and states she has followed for out pt counseling at [facility] and plans to follow up again upon discharge. Awaiting therapy erlin Ambriz    The Plan for Transition of Care is related to the following treatment goals: dc needs    The Patient was provided with a choice of provider and agrees   with the discharge plan. [x] Yes [] No    Freedom of choice list was provided with basic dialogue that supports the patient's individualized plan of care/goals, treatment preferences and shares the quality data associated with the providers.  [x] Yes [] No

## 2022-12-13 ENCOUNTER — TELEPHONE (OUTPATIENT)
Dept: FAMILY MEDICINE CLINIC | Age: 62
End: 2022-12-13

## 2022-12-13 VITALS
DIASTOLIC BLOOD PRESSURE: 44 MMHG | BODY MASS INDEX: 18.18 KG/M2 | SYSTOLIC BLOOD PRESSURE: 98 MMHG | TEMPERATURE: 98.1 F | HEART RATE: 85 BPM | WEIGHT: 96.3 LBS | RESPIRATION RATE: 16 BRPM | OXYGEN SATURATION: 98 % | HEIGHT: 61 IN

## 2022-12-13 LAB
ANION GAP SERPL CALCULATED.3IONS-SCNC: 10 MMOL/L (ref 7–16)
ANION GAP SERPL CALCULATED.3IONS-SCNC: 14 MMOL/L (ref 7–16)
ANION GAP SERPL CALCULATED.3IONS-SCNC: 9 MMOL/L (ref 7–16)
ANISOCYTOSIS: ABNORMAL
BASOPHILS ABSOLUTE: 0.07 E9/L (ref 0–0.2)
BASOPHILS RELATIVE PERCENT: 0.9 % (ref 0–2)
BUN BLDV-MCNC: 10 MG/DL (ref 6–23)
BUN BLDV-MCNC: 10 MG/DL (ref 6–23)
BUN BLDV-MCNC: 9 MG/DL (ref 6–23)
CALCIUM SERPL-MCNC: 8.8 MG/DL (ref 8.6–10.2)
CALCIUM SERPL-MCNC: 8.9 MG/DL (ref 8.6–10.2)
CALCIUM SERPL-MCNC: 9.2 MG/DL (ref 8.6–10.2)
CHLORIDE BLD-SCNC: 93 MMOL/L (ref 98–107)
CHLORIDE BLD-SCNC: 94 MMOL/L (ref 98–107)
CHLORIDE BLD-SCNC: 95 MMOL/L (ref 98–107)
CO2: 22 MMOL/L (ref 22–29)
CO2: 23 MMOL/L (ref 22–29)
CO2: 23 MMOL/L (ref 22–29)
CREAT SERPL-MCNC: 0.8 MG/DL (ref 0.5–1)
CREAT SERPL-MCNC: 0.8 MG/DL (ref 0.5–1)
CREAT SERPL-MCNC: 0.9 MG/DL (ref 0.5–1)
EOSINOPHILS ABSOLUTE: 0 E9/L (ref 0.05–0.5)
EOSINOPHILS RELATIVE PERCENT: 0.8 % (ref 0–6)
GFR SERPL CREATININE-BSD FRML MDRD: >60 ML/MIN/1.73
GLUCOSE BLD-MCNC: 100 MG/DL (ref 74–99)
GLUCOSE BLD-MCNC: 89 MG/DL (ref 74–99)
GLUCOSE BLD-MCNC: 94 MG/DL (ref 74–99)
HCT VFR BLD CALC: 27.8 % (ref 34–48)
HEMOGLOBIN: 10.1 G/DL (ref 11.5–15.5)
LYMPHOCYTES ABSOLUTE: 1.37 E9/L (ref 1.5–4)
LYMPHOCYTES RELATIVE PERCENT: 18.2 % (ref 20–42)
MAGNESIUM: 2 MG/DL (ref 1.6–2.6)
MCH RBC QN AUTO: 38.5 PG (ref 26–35)
MCHC RBC AUTO-ENTMCNC: 36.3 % (ref 32–34.5)
MCV RBC AUTO: 106.1 FL (ref 80–99.9)
MONOCYTES ABSOLUTE: 1.6 E9/L (ref 0.1–0.95)
MONOCYTES RELATIVE PERCENT: 20.9 % (ref 2–12)
NEUTROPHILS ABSOLUTE: 4.56 E9/L (ref 1.8–7.3)
NEUTROPHILS RELATIVE PERCENT: 60 % (ref 43–80)
OVALOCYTES: ABNORMAL
PDW BLD-RTO: 16.4 FL (ref 11.5–15)
PHOSPHORUS: 3.1 MG/DL (ref 2.5–4.5)
PLATELET # BLD: 264 E9/L (ref 130–450)
PMV BLD AUTO: 9.9 FL (ref 7–12)
POIKILOCYTES: ABNORMAL
POTASSIUM SERPL-SCNC: 4.3 MMOL/L (ref 3.5–5)
POTASSIUM SERPL-SCNC: 4.6 MMOL/L (ref 3.5–5)
POTASSIUM SERPL-SCNC: 4.6 MMOL/L (ref 3.5–5)
RBC # BLD: 2.62 E12/L (ref 3.5–5.5)
SCHISTOCYTES: ABNORMAL
SMUDGE CELLS: ABNORMAL
SODIUM BLD-SCNC: 126 MMOL/L (ref 132–146)
SODIUM BLD-SCNC: 128 MMOL/L (ref 132–146)
SODIUM BLD-SCNC: 129 MMOL/L (ref 132–146)
TARGET CELLS: ABNORMAL
WBC # BLD: 7.6 E9/L (ref 4.5–11.5)

## 2022-12-13 PROCEDURE — 2580000003 HC RX 258: Performed by: STUDENT IN AN ORGANIZED HEALTH CARE EDUCATION/TRAINING PROGRAM

## 2022-12-13 PROCEDURE — 85025 COMPLETE CBC W/AUTO DIFF WBC: CPT

## 2022-12-13 PROCEDURE — 84100 ASSAY OF PHOSPHORUS: CPT

## 2022-12-13 PROCEDURE — 6360000002 HC RX W HCPCS: Performed by: INTERNAL MEDICINE

## 2022-12-13 PROCEDURE — 94640 AIRWAY INHALATION TREATMENT: CPT

## 2022-12-13 PROCEDURE — 36415 COLL VENOUS BLD VENIPUNCTURE: CPT

## 2022-12-13 PROCEDURE — 6360000002 HC RX W HCPCS: Performed by: FAMILY MEDICINE

## 2022-12-13 PROCEDURE — 99232 SBSQ HOSP IP/OBS MODERATE 35: CPT | Performed by: FAMILY MEDICINE

## 2022-12-13 PROCEDURE — 83735 ASSAY OF MAGNESIUM: CPT

## 2022-12-13 PROCEDURE — 2580000003 HC RX 258: Performed by: INTERNAL MEDICINE

## 2022-12-13 PROCEDURE — 99233 SBSQ HOSP IP/OBS HIGH 50: CPT | Performed by: INTERNAL MEDICINE

## 2022-12-13 PROCEDURE — 6370000000 HC RX 637 (ALT 250 FOR IP): Performed by: FAMILY MEDICINE

## 2022-12-13 PROCEDURE — 80048 BASIC METABOLIC PNL TOTAL CA: CPT

## 2022-12-13 RX ORDER — DEXTROSE MONOHYDRATE 50 MG/ML
INJECTION, SOLUTION INTRAVENOUS CONTINUOUS
Status: DISCONTINUED | OUTPATIENT
Start: 2022-12-13 | End: 2022-12-13 | Stop reason: HOSPADM

## 2022-12-13 RX ADMIN — THIAMINE HYDROCHLORIDE 500 MG: 100 INJECTION, SOLUTION INTRAMUSCULAR; INTRAVENOUS at 03:00

## 2022-12-13 RX ADMIN — PANTOPRAZOLE SODIUM 40 MG: 40 TABLET, DELAYED RELEASE ORAL at 10:59

## 2022-12-13 RX ADMIN — BUDESONIDE 500 MCG: 0.5 SUSPENSION RESPIRATORY (INHALATION) at 07:52

## 2022-12-13 RX ADMIN — Medication 10 ML: at 11:00

## 2022-12-13 RX ADMIN — ENOXAPARIN SODIUM 30 MG: 100 INJECTION SUBCUTANEOUS at 10:59

## 2022-12-13 RX ADMIN — FOLIC ACID 1 MG: 1 TABLET ORAL at 11:00

## 2022-12-13 ASSESSMENT — PAIN SCALES - GENERAL: PAINLEVEL_OUTOF10: 0

## 2022-12-13 NOTE — TELEPHONE ENCOUNTER
Hi , confirmed with rounding team  Patient was not released , apparently she left AMA  She was advised not to leave this morning per medical staff and team    Please advise patient to go back to the ER if she is still symptomatic    Per chart review, a code brown was called because she was not found in her room. Thanks!     Baylee Perez MD  12/13/2022  4:14 PM

## 2022-12-13 NOTE — PLAN OF CARE
Problem: Discharge Planning  Goal: Discharge to home or other facility with appropriate resources  12/13/2022 1254 by Lashell Esqueda RN  Outcome: Progressing     Problem: ABCDS Injury Assessment  Goal: Absence of physical injury  12/13/2022 1254 by Lashell Esqueda RN  Outcome: Progressing     Problem: Safety - Adult  Goal: Free from fall injury  12/13/2022 1254 by Lashell Esqueda RN  Outcome: Progressing  Flowsheets (Taken 12/13/2022 1252)  Free From Fall Injury: Based on caregiver fall risk screen, instruct family/caregiver to ask for assistance with transferring infant if caregiver noted to have fall risk factors     Problem: Metabolic/Fluid and Electrolytes - Adult  Goal: Electrolytes maintained within normal limits  12/13/2022 1254 by Lashell Esqueda RN  Outcome: Progressing     Problem: Metabolic/Fluid and Electrolytes - Adult  Goal: Electrolytes maintained within normal limits  12/13/2022 1254 by Lashell Esqueda RN  Outcome: Progressing

## 2022-12-13 NOTE — PROGRESS NOTES
[facility] Inpatient   Resident Progress Note    S:  Hospital day: 3    Brief Synopsis: Tutu Campbell is a 58 y.o. female with a PMH of COPD, alcohol and tobacco abuse. The patient presented for dizziness, fall and seizure like activity. Dizziness started on 12/10/2022, when she was at home. She felt dizzy the next day and fell twice. She did LOC for less than a minute and regained it with no post-ictal state. She denies any loss of control in her bladder or bowel movements. She presented to the ED after her 2nd fall and had seizure like activity with some tremors on the left upper extremity. In the ED, her Na was 115, Mg 1.4 and K at 3.3. CK in 500s, Hb was 11.4. CT of head, facial bones and cervical spine showed no acute processes. Nephrology, Dr. Evin Montelongo was consulted from the ED and pt will be admitted to ICU for hyponatremia management. Serum Osm came back at 240 mOsm/kg, urine Osm 88 and urine sodium <20. She was given D5W. Latest Na 12 hours later was 123. Desmopressin x1 and D5W infusion given. Overnight, no acute events. Patient seen and examined at bedside this morning. States she feels better and would like to go home. Advised patient still needs close monitoring. Updated by staff that patient has eloped.     Cont meds:    dextrose      sodium chloride      dextrose       Scheduled meds:    folic acid  1 mg Oral Daily    budesonide  0.5 mg Nebulization BID    pantoprazole  40 mg Oral QAM AC    enoxaparin  30 mg SubCUTAneous Daily    thiamine (VITAMIN B1) IVPB  250 mg IntraVENous Q24H    Followed by    Saintclair Muskrat ON 12/18/2022] thiamine  100 mg Oral Daily    LORazepam  1 mg IntraVENous Once    folic acid  1 mg IntraVENous Once    sodium chloride flush  5-40 mL IntraVENous 2 times per day    lidocaine  2 mL IntraDERmal Once     PRN meds: ipratropium-albuterol, LORazepam **OR** LORazepam **OR** LORazepam **OR** LORazepam **OR** LORazepam **OR** LORazepam **OR** LORazepam **OR** LORazepam, ondansetron **OR** ondansetron, sodium chloride, acetaminophen **OR** acetaminophen, glucose, dextrose bolus **OR** dextrose bolus, glucagon (rDNA), dextrose, polyethylene glycol, sodium chloride flush     I reviewed the patient's Past Medical and Surgical History, Medications and Allergies. O:  VS: BP (!) 98/44   Pulse 85   Temp 98.1 °F (36.7 °C) (Oral)   Resp 16   Ht 5' 1\" (1.549 m)   Wt 96 lb 4.8 oz (43.7 kg)   SpO2 98%   BMI 18.20 kg/m²     Physical Exam:  Physical Exam  Vitals reviewed. Constitutional:       General: She is not in acute distress. HENT:      Head: Normocephalic. Comments: Laceration on left cheek bone  Eyes:      Extraocular Movements: Extraocular movements intact. Pupils: Pupils are equal, round, and reactive to light. Cardiovascular:      Rate and Rhythm: Normal rate and regular rhythm. Pulmonary:      Comments: Wheezing present  Abdominal:      General: Abdomen is flat. There is no distension. Palpations: Abdomen is soft. Tenderness: There is no abdominal tenderness. There is no guarding. Musculoskeletal:      Cervical back: Normal range of motion. Right lower leg: No edema. Left lower leg: No edema. Neurological:      General: No focal deficit present. Mental Status: She is alert and oriented to person, place, and time. Cranial Nerves: No cranial nerve deficit. Sensory: No sensory deficit. Motor: No weakness. Psychiatric:         Mood and Affect: Mood normal.          Labs:  Na/K/Cl/CO2:  129/4.6/93/22 (12/13 1101)  BUN/Cr/glu/ALT/AST/amyl/lip:  10/0.8/--/--/--/--/-- (12/13 1101)  WBC/Hgb/Hct/Plts:  7.6/10.1/27.8/264 (12/13 0743)  estimated creatinine clearance is 50 mL/min (based on SCr of 0.8 mg/dL). Other pertinent labs as noted below    New Imaging:  US FIBROSCAN   Final Result   Hepatic fibrosis.       Steatosis score of S0-S1      Fibrosis score of F3      Additional Info: [email or web address]   an-results         260 to 290 dB/m   * S1: S2   * 11% to 33%: 34% to 66%      Higher than 290 dB/m   * S1: S3   * 11% to 33%: 67% or more            2 to 7 kPa   * 8 to 9 kPa: 8 to 9 kPa   * 8 to 11 kPa: 9 to 14 kPa   * 18 kPa or higher: 14 kPa or higher      2 to 7 kPa   * 8 to 9 kPa: 7 to 11 kPa   * 8 to 11 kPa: 11 to 14 kPa   * 18 kPa or higher: 14 kPa or higher      2 to 7 kPa   * 8 to 9 kPa: 7 to 9 kPa   * 8 to 11 kPa: 9 to 17 kPa   * 18 kPa or higher: 17 kPa or higher      2 to 7 kPa   * 8 to 9 kPa: 7.5 to 10 kPa   * 8 to 11 kPa: 10 to 14 kPa   * 18 kPa or higher: 14 kPa or higher      2 to 7 kPa   * 8 to 9 kPa: 7 to 11 kPa   * 8 to 11 kPa: 11 to 19 kPa   * 18 kPa or higher: 19 kPa or higher            US ABDOMEN LIMITED   Final Result   Cholecystectomy. Common bile duct at the upper limits of normal.      Mildly coarsened liver parenchyma. CT HEAD WO CONTRAST   Final Result   No acute intracranial abnormality. No acute traumatic injury of the facial bones. CT CERVICAL SPINE WO CONTRAST   Final Result   No acute fracture is identified. CT FACIAL BONES WO CONTRAST   Final Result   No acute intracranial abnormality. No acute traumatic injury of the facial bones. XR CHEST PORTABLE   Final Result   No significant injury is identified on the portableexam.             A/P:  Principal Problem:    Hyponatremia  Active Problems:    Severe protein-calorie malnutrition ([address]  Resolved Problems:    * No resolved hospital problems.  *      Hyponatremia and electrolyte derangements  - Chronic alcohol abuse leading to beer potomania vs \"tea and toast\" diet vs SIADH from ectopic site  - glycemia normal, not likely pseudohyponatremia  - Na on admission 115, chronic, was similar in August 2022  - Admit to ICU  - Nephro consulted from ED  - Serum Osm came back at 240 mOsm/kg, urine Osm 88 and urine sodium <20  - BMP q2h  - D5W infusion given and desmopressin x1.  - Other electrolyte derangements: Mg on admission 1.4, K was 3.3 both repleted. - Continue close monitoring     Seizure like activity/Dizziness - resolved  - likely 2/2 to above vs alcohol withdrawal vs anemia  - CT on admission showed no orbital fx and no acute hemorrhages  - Pt not on any blood thinners  - Consider neuro consult     Hx of alcohol abuse/transaminitis  - alcohol level negative today, last drink on Thursday 1 12 oz beer  - never had hx of withdrawal  - AST elevated on admission  - RUQ US showed mildly coarsened liver parenchyma, no evidence of ascites  - hepatitis panel  - Continue folate and thiamine  - [facility] protocol     Hx of COPD/Tobacco Abuse  - Lung nodule in the right upper lode seen on last CT lung screen in Aug 2021, f/u yearly  - 0.5 pack a day  - declined nicotine patches due to reaction  - continue home meds: Flovent, Ipratropium and Albuterol  - Duoneb and Pulmicort while in hospital     Anemia  - new this admission. Hb on admission 11. Today at 10.1, but was given D5W.   - iron panel only significant for TIBC 170  - Check FOBT, c-scope outpatient scheduled  - Hold NSAIDs, daily use of NSAIDs at home  - Continue to monitor     Elevated CK  - CK 500s on admission  - Continue to monitor    DVT Prophylaxis: Lovenox  GI Prophylaxis: Protonix  Diet: Regular, Low sodium, No fluids      Electronically signed by Natalie Nath MD on 12/13/2022 at 4:45 PM  This case was discussed with attending physician Dr. Bevin Ahumada

## 2022-12-13 NOTE — PROGRESS NOTES
[facility]  Family Medicine Attending    S: 58 y.o. female with a history of COPD/emphysema, hypothyroidism, alcohol and tobacco abuse as well as some chronic issues with hyponatremia presented to the hospital with significant hyponatremia to 115, dizzines s/p fall at home with seizure -flavia activity/tremors of the right hand. Today, the patient states that she wants to leave. No symptoms or concerns. O: VS- Blood pressure 100/67, pulse 76, temperature 99.1 °F (37.3 °C), temperature source Oral, resp. rate 15, height 5' 1\" (1.549 m), weight 96 lb 4.8 oz (43.7 kg), SpO2 99 %. Exam is as noted by resident with the following changes, additions or corrections:  Gen:  AAO NAD; cuts noted on the left face/temple region, without active bleeding  Lungs:  CTAB  CVS-RRR systolic murmur 2/6 lsb  Ext:  no CCE  Abs:  soft, non tender    Impressions/Plans: Appreciate MICU management  Hyponatremia-Beer potomania, renal following; slow correction; received DDAVP over the weekend; BMPs Q2 hours. Electrolyte repletion. BP-ok    Seizure like activity/dizziness/falls-likely due to hyponatremia; could also consider alcohol withdrawal; CT no fractures or hemorrhage; could consider neuro consult if further seizure like activity occurs    Anemia, mild, macrocytic-newly noted; B12/folate normal; check FOBT; will need outpatient follow up scopes; monitor H/H. On thiamine and folate    Mild lft elevation/hepatic fibrosis (F3)-outpatient [place] services for alcohol counseling    COPD stage 3-home meds     Elevated CK-continue to monitor    PT/OT/nutrition supplements for low albumin    Advised patient that we are concerned about her sodium level and it would be best if she stays in the hospital and follows the recommendations of the renal team.     Attending Physician Statement  I have reviewed the chart and seen the patient with the resident(s). I personally reviewed images, EKG's and similar tests, if present. I personally reviewed and performed key elements of the history and exam.  I have reviewed and confirmed student and/or resident history and exam with changes as indicated above. I agree with the assessment, plan and orders as documented by the resident. Please refer to the resident progress note today and admission history and physical  for additional information.       Christy Houser MD

## 2022-12-13 NOTE — PLAN OF CARE
Problem: Discharge Planning  Goal: Discharge to home or other facility with appropriate resources  Outcome: Progressing  Flowsheets (Taken 12/12/2022 2000)  Discharge to home or other facility with appropriate resources:   Identify barriers to discharge with patient and caregiver   Arrange for needed discharge resources and transportation as appropriate   Identify discharge learning needs (meds, wound care, etc)   Arrange for interpreters to assist at discharge as needed   Refer to discharge planning if patient needs post-hospital services based on physician order or complex needs related to functional status, cognitive ability or social support system     Problem: ABCDS Injury Assessment  Goal: Absence of physical injury  Outcome: Progressing     Problem: Safety - Adult  Goal: Free from fall injury  12/13/2022 0533 by Jessica Mayers RN  Outcome: Progressing     Problem: Metabolic/Fluid and Electrolytes - Adult  Goal: Electrolytes maintained within normal limits  12/13/2022 0533 by Jessica Mayers RN  Outcome: Progressing  Flowsheets (Taken 12/12/2022 2000)  Electrolytes maintained within normal limits:   Monitor response to electrolyte replacements, including repeat lab results as appropriate   Administer electrolyte replacement as ordered   Monitor labs and assess patient for signs and symptoms of electrolyte imbalances   Instruct patient on fluid and nutrition restrictions as appropriate   Fluid restriction as ordered     Problem: Nutrition Deficit:  Goal: Optimize nutritional status  12/13/2022 0533 by Jessica Mayers RN  Outcome: Progressing     Problem: Pain  Goal: Verbalizes/displays adequate comfort level or baseline comfort level  Outcome: Progressing

## 2022-12-13 NOTE — PROGRESS NOTES
[address]   Department of Internal Medicine   Internal Medicine Residency   MICU Progress Note    Patient:  Yolis Fleming 58 y.o. female  MRN: [id number]     Date of Service: 12/13/2022    Allergy: Ibuprofen and Morphine    Subjective     Patient asked me if she can leave today. I reassured her that even though her Na level is good today, but she needs to be in the hospital until she fully recovers. She is doing well. Alert and oriented. 24h change: Nothing significant. ROS: Denies Fever/chills/CP/SOB/N/V/D/C/Dysuria/Blood in stool or urine  Objective     VS: BP (!) 98/44   Pulse 85   Temp 98.1 °F (36.7 °C) (Oral)   Resp 16   Ht 5' 1\" (1.549 m)   Wt 96 lb 4.8 oz (43.7 kg)   SpO2 98%   BMI 18.20 kg/m²           I & O - 24hr:   Intake/Output Summary (Last 24 hours) at 12/13/2022 1735  Last data filed at 12/13/2022 1100  Gross per 24 hour   Intake 684.35 ml   Output --   Net 684.35 ml       Physical Exam:  General Appearance: alert, appears stated age, and cooperative  Neck: no adenopathy, no carotid bruit, no JVD, supple, symmetrical, trachea midline, and thyroid not enlarged, symmetric, no tenderness/mass/nodules  Lung: clear to auscultation bilaterally  Heart: regular rate and rhythm, S1, S2 normal, no murmur, click, rub or gallop  Abdomen: soft, non-tender; bowel sounds normal; no masses,  no organomegaly  Extremities:  extremities normal, atraumatic, no cyanosis or edema  Musculoskeletal: No joint swelling, no muscle tenderness. ROM normal in all joints of extremities. Neurologic: Mental status: Alert, oriented, thought content appropriate    Lines     site day    Art line   None    TLC None    PICC None    Hemoaccess None      Medications     Labs       Resident's Assessment and Plan     Neurology:   AMS likely 2/2 hyponatremia, resolved  -Na on 12/13 128    Pulmonary:   COPD gold stage 3  -On breathing treatment and asymptomatic    2.  Pulmonary nodule   -Patient has 3 mm nodule in the right upper lobe  -Continue yearly screening    Nephrology (Fluids/ Electrolytes & Nutrition):   Hyponatremia due to beer potomania  -Continue dry tray and water restriction  -Monitor na closely    2.  Hypokalemia, resolved     Gastroenterology:   Concern for liver fibrosis in the setting of alcohol use  -Elevated AST  -RUQ US show mildly coarsened liver parenchyma  -FIB-4 scan elevated at 2.83  -Fibro scan showing s!, F3-less than 1/3 steatosis, severe scarring    Hematology/ Oncology:   Macrocytic anemia   -hb 10.5 with .2  -Continue thiamine and folic acid supplementation    History of alcohol abuse:     PT/OT: evaluated    Bessy Gallego MD, PGY-1    Attending physician: Dr. Riley Gerardo

## 2022-12-13 NOTE — PLAN OF CARE
Nurse informed around 3:30 pm that patient eloped. Nurse informed she went to check patient if she finished her lunch. Patient was not at her room. Patient went to family medicine, and informed that morning doctor told her to leave, which was not apparently true. Patient asked me if she can leave today. I reassured her that even though her Na level is good today, but she needs to be in the hospital until she fully recovers. She agrees with plan that time, and later on the day eloped.

## 2022-12-13 NOTE — TELEPHONE ENCOUNTER
----- Message from [facility] sent at 12/13/2022  2:41 PM EST -----  Subject: Message to Provider    QUESTIONS  Information for Provider? Henry Truong is calling she said she was in the   Hospital at [facility] and she was there for 3 days for   Hyponatremia. She said they told her she was released but then got a call   from a family member saying that she was not released. She would like a   call from the office regarding this.   ---------------------------------------------------------------------------  --------------  [id number] [employer]  [phone]; OK to leave message on voicemail  ---------------------------------------------------------------------------  --------------  SCRIPT ANSWERS  Relationship to Patient?  Self

## 2022-12-13 NOTE — PROGRESS NOTES
Notified by respiratory therapist that patient was not in her room; patient and her belongings were gone. We quickly searched the unit and called a code brown. [employer] dispatcher will notify her son Richardson Araujo. Description of patient given to police. Later notified by police that they located patient and she is home. Map Statment: See Attach Map for Complete Details

## 2022-12-13 NOTE — PROGRESS NOTES
[facility] MD Seth Nicholson MD. Kihei Session MD   Progress Note    12/13/2022    SUBJECTIVE:   Comfortable on RA stable vitals    12/13: Seen ambulating in her room. On room air. No acute distress. Upset about her fluid restriction. Denies confusion, dizziness, or dyspnea. Wants to go home. PROBLEM LIST:    Principal Problem:    Hyponatremia  Active Problems:    Severe protein-calorie malnutrition ([address]  Resolved Problems:    * No resolved hospital problems. *       DIET:    ADULT DIET; Regular; Low Sodium (2 gm); No Fluids on Tray  ADULT ORAL NUTRITION SUPPLEMENT; Breakfast, Dinner; Fortified Pudding Oral Supplement  ADULT ORAL NUTRITION SUPPLEMENT; Lunch; Other Oral Supplement; Gelatein       Allergies : Ibuprofen and Morphine    Past Medical History:   Diagnosis Date    Carpal tunnel syndrome     Pulmonary nodule     Thyroid disease        No past surgical history on file. No family history on file. reports that she has been smoking cigarettes. She started smoking about 42 years ago. She has a 20.00 pack-year smoking history. She has never used smokeless tobacco. She reports current alcohol use of about 2.0 standard drinks per week. She reports that she does not use drugs. Review of Systems:   Constitutional: no fevers , no chills , feels ok   Eyes: no eye pain , no itching , no drainage  Ears, nose, mouth, throat, and face: no ear ,nose pain , hearing is ok ,no nasal drainage   Respiratory: no sob ,no cough ,no wheezing . Cardiovascular: no chest pain , no palpitation ,no sob . Gastrointestinal: no nausea, vomiting , constipation , no abdominal pain . Genitourinary:no urinary retention , no burning , dysuria . No polyuria   Hematologic/lymphatic: no bleeding , no cougulation issues . Musculoskeletal:no joint pain , no swelling . Neurological: no headaches ,no weakness , no numbness . Endocrine: no thirst , no weight issues .      MEDS (scheduled):    folic acid  1 mg Oral Daily    budesonide  0.5 mg Nebulization BID    pantoprazole  40 mg Oral QAM AC    enoxaparin  30 mg SubCUTAneous Daily    thiamine (VITAMIN B1) IVPB  250 mg IntraVENous Q24H    Followed by    Sonya Palacio ON 12/18/2022] thiamine  100 mg Oral Daily    LORazepam  1 mg IntraVENous Once    folic acid  1 mg IntraVENous Once    sodium chloride flush  5-40 mL IntraVENous 2 times per day       MEDS (infusions):   sodium chloride      dextrose         MEDS (prn):  ipratropium-albuterol, LORazepam **OR** LORazepam **OR** LORazepam **OR** LORazepam **OR** LORazepam **OR** LORazepam **OR** LORazepam **OR** LORazepam, ondansetron **OR** ondansetron, sodium chloride, acetaminophen **OR** acetaminophen, glucose, dextrose bolus **OR** dextrose bolus, glucagon (rDNA), dextrose, polyethylene glycol, sodium chloride flush    PHYSICAL EXAM:     Patient Vitals for the past 24 hrs:   BP Temp Temp src Pulse Resp SpO2 Height   12/13/22 0754 -- -- -- 76 15 -- --   12/13/22 0600 100/67 -- -- 94 18 -- --   12/13/22 0400 111/73 99.1 °F (37.3 °C) Oral 87 18 99 % --   12/13/22 0300 112/76 -- -- (!) 138 23 -- --   12/13/22 0200 89/65 -- -- 94 15 96 % --   12/13/22 0100 98/68 -- -- 93 23 99 % --   12/13/22 0000 103/65 97.7 °F (36.5 °C) Oral 89 16 96 % --   12/12/22 2300 102/66 -- -- 86 15 100 % --   12/12/22 2200 88/61 -- -- 90 14 96 % --   12/12/22 2100 108/63 -- -- 91 19 99 % --   12/12/22 2000 106/69 98.9 °F (37.2 °C) Oral (!) 118 17 100 % --   12/12/22 1900 -- -- -- -- -- 95 % --   12/12/22 1800 (!) 84/51 -- -- (!) 112 21 99 % --   12/12/22 1700 100/64 -- -- (!) 112 22 93 % --   12/12/22 1622 -- -- -- (!) 103 24 98 % --   12/12/22 1600 (!) 92/59 99 °F (37.2 °C) Oral (!) 104 19 98 % --   12/12/22 1500 (!) 82/57 -- -- (!) 104 17 93 % --   12/12/22 1437 -- -- -- -- -- -- 5' 1\" (1.549 m)   12/12/22 1357 -- -- -- (!) 114 21 97 % --   12/12/22 1300 -- -- -- 99 18 99 % --     @      Intake/Output Summary (Last 24 hours) at 12/13/2022 1207  Last data filed at 12/12/2022 1834  Gross per 24 hour   Intake 324.35 ml   Output --   Net 324.35 ml           Wt Readings from Last 3 Encounters:   12/12/22 96 lb 4.8 oz (43.7 kg)   08/05/22 101 lb (45.8 kg)   04/15/22 103 lb (46.7 kg)       Constitutional:  in no acute distress  Oral: mucus membranes moist  Neck: no JVD  Cardiovascular: S1, S2 regular rhythm, no murmur,or rub  Respiratory:  CTAB. No crackles, no wheeze  Gastrointestinal:  Soft, nontender, nondistended, NABS  Ext: no edema, feet warm  Skin: dry, no rash  Neuro: awake, alert, interactive      DATA:    Recent Labs     12/11/22  0609 12/12/22  0603 12/13/22  0543   WBC 6.7 5.5 7.6   HGB 11.1* 10.5* 10.1*   HCT 29.8* 27.9* 27.8*   .1* 102.2* 106.1*    240 264       Recent Labs     12/10/22  2010 12/10/22  2046 12/12/22  0603 12/12/22  1040 12/12/22  1245 12/12/22  1819 12/12/22  2245 12/13/22  0220 12/13/22  0543   *   < > 120*   < > 121*   < > 126* 126* 128*   K 3.3*   < > 3.8   < > 3.3*   < > 4.3 4.3 4.6   CL 75*   < > 88*   < > 85*   < > 93* 94* 95*   CO2 29   < > 24   < > 26   < > 23 23 23   MG 1.4*   < > 1.7  --  2.8*  --   --   --  2.0   PHOS  --   --  2.6  --   --   --   --   --  3.1   BUN 12   < > 4*   < > 6   < > 9 9 10   CREATININE 0.9   < > 0.6   < > 0.8   < > 0.9 0.9 0.8   ALT 20  --   --   --   --   --   --   --   --    AST 49*  --   --   --   --   --   --   --   --    BILITOT 1.1  --   --   --   --   --   --   --   --    ALKPHOS 120*  --   --   --   --   --   --   --   --     < > = values in this interval not displayed. Lab Results   Component Value Date    LABPROT 0.1 08/06/2022    LABPROT 0.1 08/06/2022       ASSESSMENT / RECOMMENDATIONS:      1. Hyponatremia,  likely related to a component of beer  potomania along with volume depletion component     Sodium initially corrected rapidly, and she was started on D5W and  given a dose of DDAVP.   Sodium now down to 120 and the rate of  correction is appropriate.     Na 128-->129 (8 mmol increase in 24 hours)      PLAN:    D5W @ 50cc/hr  Repeat BMP  Continue on fluid restriction with meal trays  Abstain from drinking  Fu serial bmps     Electronically signed by TORI Mora CNP on 12/13/2022 at 12:07 PM

## 2022-12-14 NOTE — DISCHARGE SUMMARY
Discharge Summary    Dirk Real  :  1960  MRN:  [id number]    Admit date:  12/10/2022  Discharge date:  2022    Admitting Physician:  Mark Linares MD    Discharge Diagnoses:    Patient Active Problem List   Diagnosis    Other emphysema ([facility] [address]    Hyponatremia    Severe protein-calorie malnutrition ([facility] [address]       Admission Condition:  poor    Discharged Condition:  stable but still needed further monitoring    Hospital Course: The patient presented for dizziness, fall and seizure like activity. Dizziness started on 12/10/2022, when she was at home. She felt dizzy the next day and fell twice. She did lose consciousness for less than a minute and regained it with no post-ictal state. She denies any loss of control in her bladder or bowel movements. She presented to the ED after her 2nd fall and had seizure like activity with some tremors on the left upper extremity. In the ED, her Na was 115, Mg 1.4 and K at 3.3. CK in 500s, Hb was 11.4. CT of head, facial bones and cervical spine showed no acute processes. Nephrology, Dr. Anais Mendoza was consulted from the ED and pt will be admitted to ICU for hyponatremia management. Serum Osm came back at 240 mOsm/kg, urine Osm 88 and urine sodium <20. She was given D5W. Latest Na 12 hours later was 123. Desmopressin x1 and D5W infusion given. Na reached 125 and fluctuated, it was at 120 24 hours since admission. PT/OT evaluated patient. Patient left AMA after 3 days. Patient should go back to ED or be seen in clinic as soon as possible, especially if symptoms reappear or worsen. Follow up on BMP and CBC. Address diet habits, alcohol and tobacco cessation.       Discharge Medications:         Medication List        ASK your doctor about these medications      albuterol sulfate  (90 Base) MCG/ACT inhaler  Commonly known as: PROVENTIL;VENTOLIN;PROAIR  INHALE 2 PUFFS INTO THE LUNGS FOUR TIMES DAILY AS NEEDED FOR WHEEZING     cetirizine 10 MG tablet  Commonly known as: ZYRTEC  Take 1 tablet by mouth daily     cyclobenzaprine 5 MG tablet  Commonly known as: FLEXERIL  TAKE 1 TABLET BY MOUTH EVERY EVENING AS NEEDED FOR MUSCLE SPASMS     Flovent  MCG/ACT inhaler  Generic drug: fluticasone  Inhale 1 puff into the lungs 2 times daily     Mapap Arthritis Pain 650 MG extended release tablet  Generic drug: acetaminophen  TAKE 1 TABLET BY MOUTH EVERY 6 HOURS AS NEEDED FOR PAIN     montelukast 10 MG tablet  Commonly known as: SINGULAIR  TAKE 1 TABLET BY MOUTH DAILY     naproxen 250 MG tablet  Commonly known as: NAPROSYN  TAKE 1 TABLET BY MOUTH TWICE DAILY WITH MEALS     omeprazole 40 MG delayed release capsule  Commonly known as: PRILOSEC  Take 1 capsule by mouth every morning (before breakfast)     tiotropium 2.5 MCG/ACT Aers inhaler  Commonly known as: Spiriva Respimat  INHALE 2 PUFFS INTO THE LUNGS DAILY              Consults:  pulmonary/intensive care and nephrology    Significant Diagnostic Studies:  labs: Na    Labs:  Na/K/Cl/CO2:  129/4.6/93/22 (12/13 1101)  BUN/Cr/glu/ALT/AST/amyl/lip:  10/0.8/--/--/--/--/-- (12/13 1101)  WBC/Hgb/Hct/Plts:  7.6/10.1/27.8/264 (12/13 0522)  estimated creatinine clearance is 50 mL/min (based on SCr of 0.8 mg/dL). New Imaging:  CT HEAD WO CONTRAST    Result Date: 12/10/2022  EXAMINATION: CT OF THE HEAD WITHOUT CONTRAST; CT OF THE FACE WITHOUT CONTRAST 12/10/2022 7:55 pm TECHNIQUE: CT of the head was performed without the administration of intravenous contrast. Automated exposure control, iterative reconstruction, and/or weight based adjustment of the mA/kV was utilized to reduce the radiation dose to as low as reasonably achievable.; CT of the face was performed without the administration of intravenous contrast. Multiplanar reformatted images are provided for review. Automated exposure control, iterative reconstruction, and/or weight based adjustment of the mA/kV was utilized to reduce the radiation dose to as low as reasonably achievable. COMPARISON: None. HISTORY: ORDERING SYSTEM PROVIDED HISTORY: fall seizure TECHNOLOGIST PROVIDED HISTORY: Has a \"code stroke\" or \"stroke alert\" been called? ->No Reason for exam:->fall seizure Decision Support Exception - unselect if not a suspected or confirmed emergency medical condition->Emergency Medical Condition (MA) What reading provider will be dictating this exam?->CRC; ORDERING SYSTEM PROVIDED HISTORY: fall TECHNOLOGIST PROVIDED HISTORY: Reason for exam:->fall Decision Support Exception - unselect if not a suspected or confirmed emergency medical condition->Emergency Medical Condition (MA) What reading provider will be dictating this exam?->CRC FINDINGS: CT HEAD: BRAIN/VENTRICLES: There is no acute intracranial hemorrhage, mass effect or midline shift. No abnormal extra-axial fluid collection. The gray-white differentiation is maintained without evidence of an acute infarct. 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 There is no evidence of hydrocephalus. There are nonspecific hypoattenuating foci in the subcortical and periventricular white matter that most likely represent chronic microangiopathic ischemic changes in a patient of this age. There are atherosclerotic calcifications of the intracranial vessels. CT FACIAL BONES: FACIAL BONES: The maxilla, pterygoid plates and zygomatic arches are intact. The mandible is intact. The mandibular condyles are normally situated. The nasal bones and maxillary nasal processes are intact. ORBITS: The globes appear intact. The extraocular muscles, optic nerve sheath complexes and lacrimal glands appear unremarkable. No retrobulbar hematoma or mass is seen. The orbital walls and rims are intact. SINUSES/MASTOIDS: The paranasal sinuses and mastoid air cells are well aerated. No acute fracture is seen. SOFT TISSUES: No acute abnormality of the visualized skull or soft tissues. BRAIN/VENTRICLES: .     No acute intracranial abnormality.  No acute traumatic injury of the facial bones. CT FACIAL BONES WO CONTRAST    Result Date: 12/10/2022  EXAMINATION: CT OF THE HEAD WITHOUT CONTRAST; CT OF THE FACE WITHOUT CONTRAST 12/10/2022 7:55 pm TECHNIQUE: CT of the head was performed without the administration of intravenous contrast. Automated exposure control, iterative reconstruction, and/or weight based adjustment of the mA/kV was utilized to reduce the radiation dose to as low as reasonably achievable.; CT of the face was performed without the administration of intravenous contrast. Multiplanar reformatted images are provided for review. Automated exposure control, iterative reconstruction, and/or weight based adjustment of the mA/kV was utilized to reduce the radiation dose to as low as reasonably achievable. COMPARISON: None. HISTORY: ORDERING SYSTEM PROVIDED HISTORY: fall seizure TECHNOLOGIST PROVIDED HISTORY: Has a \"code stroke\" or \"stroke alert\" been called? ->No Reason for exam:->fall seizure Decision Support Exception - unselect if not a suspected or confirmed emergency medical condition->Emergency Medical Condition (MA) What reading provider will be dictating this exam?->CRC; ORDERING SYSTEM PROVIDED HISTORY: fall TECHNOLOGIST PROVIDED HISTORY: Reason for exam:->fall Decision Support Exception - unselect if not a suspected or confirmed emergency medical condition->Emergency Medical Condition (MA) What reading provider will be dictating this exam?->CRC FINDINGS: CT HEAD: BRAIN/VENTRICLES: There is no acute intracranial hemorrhage, mass effect or midline shift. No abnormal extra-axial fluid collection. The gray-white differentiation is maintained without evidence of an acute infarct. 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 There is no evidence of hydrocephalus. There are nonspecific hypoattenuating foci in the subcortical and periventricular white matter that most likely represent chronic microangiopathic ischemic changes in a patient of this age.  There are XRAY VIEW OF THE CHEST 12/10/2022 6:48 pm COMPARISON: None. HISTORY: ORDERING SYSTEM PROVIDED HISTORY: fall seizure pna TECHNOLOGIST PROVIDED HISTORY: Reason for exam:->fall seizure pna What reading provider will be dictating this exam?->CRC FINDINGS: Cardiomediastinal silhouette: No cardiomegaly or obvious acute process is identified. Lungs/pleura: No acute pulmonary infiltrate, pleural effusion, or pneumothorax is identified. Other: No displaced fracture. No significant injury is identified on the portableexam.     US ABDOMEN LIMITED    Result Date: 12/11/2022  EXAMINATION: RIGHT UPPER QUADRANT ULTRASOUND 12/11/2022 6:52 am COMPARISON: None. HISTORY: ORDERING SYSTEM PROVIDED HISTORY: transaminitis TECHNOLOGIST PROVIDED HISTORY: Reason for exam:->transaminitis What reading provider will be dictating this exam?->CRC FINDINGS: LIVER:  The liver demonstrates mildly coarsened liver parenchyma without evidence of intrahepatic biliary ductal dilatation. BILIARY SYSTEM:  Cholecystectomy. Common bile duct is within normal limits measuring 7 mm. RIGHT KIDNEY: The right kidney is grossly unremarkable without evidence of hydronephrosis. PANCREAS:  Visualized portions of the pancreas are unremarkable. OTHER: No evidence of right upper quadrant ascites. Cholecystectomy. Common bile duct at the upper limits of normal. Mildly coarsened liver parenchyma. US FIBROSCAN    Result Date: 12/11/2022  EXAMINATION: LIVER ULTRASOUND WITH ELASTOGRAPHY 12/11/2022 2:27 pm TECHNIQUE: Ultrasound of the liver was performed. Elastography of the liver was obtained using FibroScan software from [employer]. COMPARISON: None.  HISTORY: ORDERING SYSTEM PROVIDED HISTORY: High FIB-4 scan, concern for cirrhosis/advance liver fibrosis TECHNOLOGIST PROVIDED HISTORY: Reason for exam:->High FIB-4 scan, concern for cirrhosis/advance liver fibrosis What reading provider will be dictating this exam?->CRC FINDINGS: LIVER: No focal hepatic lesions detected. Fibroscan steatosis result (CAP score): 230 decibels per meter Steatosis score: S0-S1 Fiber scan fibrosis result: 12.3 kPa Fibrosis score: F3     Hepatic fibrosis. Steatosis score of S0-S1 Fibrosis score of F3 *Additional Info: [email or web address] an-results 260 to 290 dB/m * S1: S2 * 11% to 33%: 34% to 66% Higher than 290 dB/m * S1: S3 * 11% to 33%: 67% or more 2 to 7 kPa * 8 to 9 kPa: 8 to 9 kPa * 8 to 11 kPa: 9 to 14 kPa * 18 kPa or higher: 14 kPa or higher 2 to 7 kPa * 8 to 9 kPa: 7 to 11 kPa * 8 to 11 kPa: 11 to 14 kPa * 18 kPa or higher: 14 kPa or higher 2 to 7 kPa * 8 to 9 kPa: 7 to 9 kPa * 8 to 11 kPa: 9 to 17 kPa * 18 kPa or higher: 17 kPa or higher 2 to 7 kPa * 8 to 9 kPa: 7.5 to 10 kPa * 8 to 11 kPa: 10 to 14 kPa * 18 kPa or higher: 14 kPa or higher 2 to 7 kPa * 8 to 9 kPa: 7 to 11 kPa * 8 to 11 kPa: 11 to 19 kPa * 18 kPa or higher: 19 kPa or higher         Treatments:   as above    Discharge Exam:  Unable to perform as patient left AMA. Disposition:   AMA    Future Appointments   Date Time Provider Grover Christina   12/27/2022  9:45 AM Ponciano Peabody, MD [facility]       More than 30 minutes was spent in preparation of this patient's discharge including, but not limited to, examination, preparation of documents, prescription preparation, counseling and coordination.     Georgina Garcia MD  12/14/2022, 1:19 PM

## 2022-12-15 LAB
BLOOD CULTURE, ROUTINE: NORMAL
CULTURE, BLOOD 2: NORMAL

## 2022-12-27 ENCOUNTER — APPOINTMENT (OUTPATIENT)
Dept: GENERAL RADIOLOGY | Age: 62
DRG: 720 | End: 2022-12-27
Payer: MEDICAID

## 2022-12-27 ENCOUNTER — TELEPHONE (OUTPATIENT)
Dept: OTHER | Facility: CLINIC | Age: 62
End: 2022-12-27

## 2022-12-27 ENCOUNTER — APPOINTMENT (OUTPATIENT)
Dept: CT IMAGING | Age: 62
DRG: 720 | End: 2022-12-27
Payer: MEDICAID

## 2022-12-27 ENCOUNTER — HOSPITAL ENCOUNTER (INPATIENT)
Age: 62
DRG: 720 | End: 2022-12-27
Attending: EMERGENCY MEDICINE | Admitting: FAMILY MEDICINE
Payer: MEDICAID

## 2022-12-27 DIAGNOSIS — E87.1 HYPONATREMIA: ICD-10-CM

## 2022-12-27 DIAGNOSIS — F10.10 ALCOHOL ABUSE: Primary | ICD-10-CM

## 2022-12-27 DIAGNOSIS — N17.9 AKI (ACUTE KIDNEY INJURY) (HCC): ICD-10-CM

## 2022-12-27 DIAGNOSIS — R53.81 PHYSICAL DECONDITIONING: ICD-10-CM

## 2022-12-27 LAB
ALBUMIN SERPL-MCNC: 2 G/DL (ref 3.5–5.2)
ALBUMIN SERPL-MCNC: 2.4 G/DL (ref 3.5–5.2)
ALP BLD-CCNC: 151 U/L (ref 35–104)
ALP BLD-CCNC: 177 U/L (ref 35–104)
ALT SERPL-CCNC: 18 U/L (ref 0–32)
ALT SERPL-CCNC: 23 U/L (ref 0–32)
AMMONIA: 56 UMOL/L (ref 11–51)
ANION GAP SERPL CALCULATED.3IONS-SCNC: 10 MMOL/L (ref 7–16)
ANION GAP SERPL CALCULATED.3IONS-SCNC: 12 MMOL/L (ref 7–16)
ANION GAP SERPL CALCULATED.3IONS-SCNC: 9 MMOL/L (ref 7–16)
ANION GAP SERPL CALCULATED.3IONS-SCNC: 9 MMOL/L (ref 7–16)
ANISOCYTOSIS: ABNORMAL
APTT: 35.8 SEC (ref 24.5–35.1)
AST SERPL-CCNC: 21 U/L (ref 0–31)
AST SERPL-CCNC: 30 U/L (ref 0–31)
BACTERIA: ABNORMAL /HPF
BASOPHILS ABSOLUTE: 0 E9/L (ref 0–0.2)
BASOPHILS RELATIVE PERCENT: 0.3 % (ref 0–2)
BILIRUB SERPL-MCNC: 0.8 MG/DL (ref 0–1.2)
BILIRUB SERPL-MCNC: 0.8 MG/DL (ref 0–1.2)
BILIRUBIN DIRECT: 0.4 MG/DL (ref 0–0.3)
BILIRUBIN URINE: ABNORMAL
BILIRUBIN, INDIRECT: 0.4 MG/DL (ref 0–1)
BLOOD, URINE: ABNORMAL
BUN BLDV-MCNC: 35 MG/DL (ref 6–23)
BUN BLDV-MCNC: 35 MG/DL (ref 6–23)
BUN BLDV-MCNC: 36 MG/DL (ref 6–23)
BUN BLDV-MCNC: 37 MG/DL (ref 6–23)
CALCIUM SERPL-MCNC: 8.3 MG/DL (ref 8.6–10.2)
CALCIUM SERPL-MCNC: 8.9 MG/DL (ref 8.6–10.2)
CHLORIDE BLD-SCNC: 86 MMOL/L (ref 98–107)
CHLORIDE BLD-SCNC: 90 MMOL/L (ref 98–107)
CHLORIDE BLD-SCNC: 91 MMOL/L (ref 98–107)
CHLORIDE BLD-SCNC: 92 MMOL/L (ref 98–107)
CHLORIDE URINE RANDOM: <20 MMOL/L
CLARITY: CLEAR
CO2: 19 MMOL/L (ref 22–29)
CO2: 20 MMOL/L (ref 22–29)
CO2: 20 MMOL/L (ref 22–29)
CO2: 21 MMOL/L (ref 22–29)
COLOR: YELLOW
CREAT SERPL-MCNC: 1.5 MG/DL (ref 0.5–1)
CREAT SERPL-MCNC: 1.6 MG/DL (ref 0.5–1)
CREAT SERPL-MCNC: 1.6 MG/DL (ref 0.5–1)
CREAT SERPL-MCNC: 1.8 MG/DL (ref 0.5–1)
EKG ATRIAL RATE: 132 BPM
EKG P AXIS: 70 DEGREES
EKG P-R INTERVAL: 132 MS
EKG Q-T INTERVAL: 302 MS
EKG QRS DURATION: 70 MS
EKG QTC CALCULATION (BAZETT): 447 MS
EKG R AXIS: 76 DEGREES
EKG T AXIS: 72 DEGREES
EKG VENTRICULAR RATE: 132 BPM
EOSINOPHILS ABSOLUTE: 0 E9/L (ref 0.05–0.5)
EOSINOPHILS RELATIVE PERCENT: 0.1 % (ref 0–6)
ETHANOL: <10 MG/DL (ref 0–0.08)
GFR SERPL CREATININE-BSD FRML MDRD: 31 ML/MIN/1.73
GFR SERPL CREATININE-BSD FRML MDRD: 36 ML/MIN/1.73
GFR SERPL CREATININE-BSD FRML MDRD: 36 ML/MIN/1.73
GFR SERPL CREATININE-BSD FRML MDRD: 39 ML/MIN/1.73
GLUCOSE BLD-MCNC: 105 MG/DL (ref 74–99)
GLUCOSE BLD-MCNC: 127 MG/DL (ref 74–99)
GLUCOSE BLD-MCNC: 89 MG/DL (ref 74–99)
GLUCOSE BLD-MCNC: 98 MG/DL (ref 74–99)
GLUCOSE URINE: NEGATIVE MG/DL
HCT VFR BLD CALC: 27.4 % (ref 34–48)
HEMOGLOBIN: 10.6 G/DL (ref 11.5–15.5)
HYPOCHROMIA: ABNORMAL
INR BLD: 1.2
KETONES, URINE: NEGATIVE MG/DL
LACTIC ACID, SEPSIS: 1.1 MMOL/L (ref 0.5–1.9)
LEUKOCYTE ESTERASE, URINE: ABNORMAL
LIPASE: 22 U/L (ref 13–60)
LYMPHOCYTES ABSOLUTE: 0.34 E9/L (ref 1.5–4)
LYMPHOCYTES RELATIVE PERCENT: 1.7 % (ref 20–42)
MAGNESIUM: 2 MG/DL (ref 1.6–2.6)
MCH RBC QN AUTO: 37.1 PG (ref 26–35)
MCHC RBC AUTO-ENTMCNC: 38.7 % (ref 32–34.5)
MCV RBC AUTO: 95.8 FL (ref 80–99.9)
MONOCYTES ABSOLUTE: 2.22 E9/L (ref 0.1–0.95)
MONOCYTES RELATIVE PERCENT: 13.1 % (ref 2–12)
NEUTROPHILS ABSOLUTE: 14.54 E9/L (ref 1.8–7.3)
NEUTROPHILS RELATIVE PERCENT: 85.2 % (ref 43–80)
NITRITE, URINE: NEGATIVE
NUCLEATED RED BLOOD CELLS: 0.9 /100 WBC
OSMOLALITY URINE: 346 MOSM/KG (ref 300–900)
OSMOLALITY: 263 MOSM/KG (ref 285–310)
PDW BLD-RTO: 15.5 FL (ref 11.5–15)
PH UA: 5 (ref 5–9)
PH VENOUS: 7.41 (ref 7.35–7.45)
PLATELET # BLD: 174 E9/L (ref 130–450)
PMV BLD AUTO: 11.7 FL (ref 7–12)
POIKILOCYTES: ABNORMAL
POLYCHROMASIA: ABNORMAL
POTASSIUM REFLEX MAGNESIUM: 3.4 MMOL/L (ref 3.5–5)
POTASSIUM REFLEX MAGNESIUM: 3.9 MMOL/L (ref 3.5–5)
POTASSIUM SERPL-SCNC: 3.5 MMOL/L (ref 3.5–5)
POTASSIUM SERPL-SCNC: 3.8 MMOL/L (ref 3.5–5)
POTASSIUM, UR: 12 MMOL/L
PROTEIN UA: 100 MG/DL
PROTHROMBIN TIME: 13 SEC (ref 9.3–12.4)
RBC # BLD: 2.86 E12/L (ref 3.5–5.5)
RBC UA: ABNORMAL /HPF (ref 0–2)
REASON FOR REJECTION: NORMAL
REJECTED TEST: NORMAL
SARS-COV-2, NAAT: NOT DETECTED
SODIUM BLD-SCNC: 118 MMOL/L (ref 132–146)
SODIUM BLD-SCNC: 120 MMOL/L (ref 132–146)
SODIUM BLD-SCNC: 120 MMOL/L (ref 132–146)
SODIUM BLD-SCNC: 121 MMOL/L (ref 132–146)
SODIUM URINE: <20 MMOL/L
SPECIFIC GRAVITY UA: 1.02 (ref 1–1.03)
TARGET CELLS: ABNORMAL
TOTAL PROTEIN: 5.6 G/DL (ref 6.4–8.3)
TOTAL PROTEIN: 6.5 G/DL (ref 6.4–8.3)
UROBILINOGEN, URINE: 0.2 E.U./DL
WBC # BLD: 17.1 E9/L (ref 4.5–11.5)
WBC UA: ABNORMAL /HPF (ref 0–5)

## 2022-12-27 PROCEDURE — 85025 COMPLETE CBC W/AUTO DIFF WBC: CPT

## 2022-12-27 PROCEDURE — 94640 AIRWAY INHALATION TREATMENT: CPT

## 2022-12-27 PROCEDURE — 99285 EMERGENCY DEPT VISIT HI MDM: CPT

## 2022-12-27 PROCEDURE — 6360000002 HC RX W HCPCS: Performed by: EMERGENCY MEDICINE

## 2022-12-27 PROCEDURE — 83930 ASSAY OF BLOOD OSMOLALITY: CPT

## 2022-12-27 PROCEDURE — 2500000003 HC RX 250 WO HCPCS: Performed by: INTERNAL MEDICINE

## 2022-12-27 PROCEDURE — 96365 THER/PROPH/DIAG IV INF INIT: CPT

## 2022-12-27 PROCEDURE — 6370000000 HC RX 637 (ALT 250 FOR IP): Performed by: EMERGENCY MEDICINE

## 2022-12-27 PROCEDURE — HZ2ZZZZ DETOXIFICATION SERVICES FOR SUBSTANCE ABUSE TREATMENT: ICD-10-PCS | Performed by: FAMILY MEDICINE

## 2022-12-27 PROCEDURE — 2580000003 HC RX 258: Performed by: EMERGENCY MEDICINE

## 2022-12-27 PROCEDURE — 83690 ASSAY OF LIPASE: CPT

## 2022-12-27 PROCEDURE — 2580000003 HC RX 258

## 2022-12-27 PROCEDURE — 82077 ASSAY SPEC XCP UR&BREATH IA: CPT

## 2022-12-27 PROCEDURE — 85610 PROTHROMBIN TIME: CPT

## 2022-12-27 PROCEDURE — 70450 CT HEAD/BRAIN W/O DYE: CPT

## 2022-12-27 PROCEDURE — 87635 SARS-COV-2 COVID-19 AMP PRB: CPT

## 2022-12-27 PROCEDURE — 84300 ASSAY OF URINE SODIUM: CPT

## 2022-12-27 PROCEDURE — 87088 URINE BACTERIA CULTURE: CPT

## 2022-12-27 PROCEDURE — 83935 ASSAY OF URINE OSMOLALITY: CPT

## 2022-12-27 PROCEDURE — 71045 X-RAY EXAM CHEST 1 VIEW: CPT

## 2022-12-27 PROCEDURE — 74176 CT ABD & PELVIS W/O CONTRAST: CPT

## 2022-12-27 PROCEDURE — 87186 SC STD MICRODIL/AGAR DIL: CPT

## 2022-12-27 PROCEDURE — 6370000000 HC RX 637 (ALT 250 FOR IP)

## 2022-12-27 PROCEDURE — 82800 BLOOD PH: CPT

## 2022-12-27 PROCEDURE — 93005 ELECTROCARDIOGRAM TRACING: CPT | Performed by: EMERGENCY MEDICINE

## 2022-12-27 PROCEDURE — 2000000000 HC ICU R&B

## 2022-12-27 PROCEDURE — 96361 HYDRATE IV INFUSION ADD-ON: CPT

## 2022-12-27 PROCEDURE — 6360000002 HC RX W HCPCS: Performed by: INTERNAL MEDICINE

## 2022-12-27 PROCEDURE — 36415 COLL VENOUS BLD VENIPUNCTURE: CPT

## 2022-12-27 PROCEDURE — 85730 THROMBOPLASTIN TIME PARTIAL: CPT

## 2022-12-27 PROCEDURE — 87040 BLOOD CULTURE FOR BACTERIA: CPT

## 2022-12-27 PROCEDURE — 82436 ASSAY OF URINE CHLORIDE: CPT

## 2022-12-27 PROCEDURE — 96360 HYDRATION IV INFUSION INIT: CPT

## 2022-12-27 PROCEDURE — 80076 HEPATIC FUNCTION PANEL: CPT

## 2022-12-27 PROCEDURE — 83605 ASSAY OF LACTIC ACID: CPT

## 2022-12-27 PROCEDURE — 82140 ASSAY OF AMMONIA: CPT

## 2022-12-27 PROCEDURE — 83735 ASSAY OF MAGNESIUM: CPT

## 2022-12-27 PROCEDURE — 80048 BASIC METABOLIC PNL TOTAL CA: CPT

## 2022-12-27 PROCEDURE — 81001 URINALYSIS AUTO W/SCOPE: CPT

## 2022-12-27 PROCEDURE — 80053 COMPREHEN METABOLIC PANEL: CPT

## 2022-12-27 PROCEDURE — 84133 ASSAY OF URINE POTASSIUM: CPT

## 2022-12-27 PROCEDURE — 93010 ELECTROCARDIOGRAM REPORT: CPT | Performed by: INTERNAL MEDICINE

## 2022-12-27 PROCEDURE — 6360000002 HC RX W HCPCS

## 2022-12-27 RX ORDER — SODIUM CHLORIDE 0.9 % (FLUSH) 0.9 %
5-40 SYRINGE (ML) INJECTION PRN
Status: DISCONTINUED | OUTPATIENT
Start: 2022-12-27 | End: 2023-01-06 | Stop reason: HOSPADM

## 2022-12-27 RX ORDER — THIAMINE HYDROCHLORIDE 100 MG/ML
100 INJECTION, SOLUTION INTRAMUSCULAR; INTRAVENOUS DAILY
Status: DISCONTINUED | OUTPATIENT
Start: 2022-12-30 | End: 2022-12-28 | Stop reason: SDUPTHER

## 2022-12-27 RX ORDER — MONTELUKAST SODIUM 10 MG/1
10 TABLET ORAL DAILY
Status: DISCONTINUED | OUTPATIENT
Start: 2022-12-27 | End: 2023-01-06 | Stop reason: HOSPADM

## 2022-12-27 RX ORDER — ACETAMINOPHEN 325 MG/1
650 TABLET ORAL EVERY 4 HOURS PRN
Status: DISCONTINUED | OUTPATIENT
Start: 2022-12-27 | End: 2023-01-06 | Stop reason: HOSPADM

## 2022-12-27 RX ORDER — THIAMINE HYDROCHLORIDE 100 MG/ML
500 INJECTION, SOLUTION INTRAMUSCULAR; INTRAVENOUS DAILY
Status: DISCONTINUED | OUTPATIENT
Start: 2022-12-27 | End: 2022-12-28 | Stop reason: SDUPTHER

## 2022-12-27 RX ORDER — ACETAMINOPHEN 325 MG/1
650 TABLET ORAL EVERY 6 HOURS PRN
Status: DISCONTINUED | OUTPATIENT
Start: 2022-12-27 | End: 2022-12-29 | Stop reason: SDUPTHER

## 2022-12-27 RX ORDER — LORAZEPAM 2 MG/ML
1 CONCENTRATE ORAL
Status: DISCONTINUED | OUTPATIENT
Start: 2022-12-27 | End: 2022-12-29

## 2022-12-27 RX ORDER — ONDANSETRON 2 MG/ML
4 INJECTION INTRAMUSCULAR; INTRAVENOUS EVERY 6 HOURS PRN
Status: DISCONTINUED | OUTPATIENT
Start: 2022-12-27 | End: 2023-01-06 | Stop reason: HOSPADM

## 2022-12-27 RX ORDER — 0.9 % SODIUM CHLORIDE 0.9 %
1000 INTRAVENOUS SOLUTION INTRAVENOUS ONCE
Status: DISCONTINUED | OUTPATIENT
Start: 2022-12-27 | End: 2022-12-27

## 2022-12-27 RX ORDER — LORAZEPAM 1 MG/1
4 TABLET ORAL
Status: DISCONTINUED | OUTPATIENT
Start: 2022-12-27 | End: 2022-12-29

## 2022-12-27 RX ORDER — ACETAMINOPHEN 325 MG/1
650 TABLET ORAL EVERY 6 HOURS PRN
Status: DISCONTINUED | OUTPATIENT
Start: 2022-12-27 | End: 2022-12-27 | Stop reason: SDUPTHER

## 2022-12-27 RX ORDER — CETIRIZINE HYDROCHLORIDE 10 MG/1
10 TABLET ORAL DAILY
Status: DISCONTINUED | OUTPATIENT
Start: 2022-12-27 | End: 2023-01-06 | Stop reason: HOSPADM

## 2022-12-27 RX ORDER — LORAZEPAM 1 MG/1
1 TABLET ORAL
Status: DISCONTINUED | OUTPATIENT
Start: 2022-12-27 | End: 2022-12-29

## 2022-12-27 RX ORDER — LORAZEPAM 1 MG/1
2 TABLET ORAL
Status: DISCONTINUED | OUTPATIENT
Start: 2022-12-27 | End: 2022-12-29

## 2022-12-27 RX ORDER — LORAZEPAM 2 MG/ML
4 CONCENTRATE ORAL
Status: DISCONTINUED | OUTPATIENT
Start: 2022-12-27 | End: 2022-12-29

## 2022-12-27 RX ORDER — BUDESONIDE 0.5 MG/2ML
0.5 INHALANT ORAL 2 TIMES DAILY
Status: DISCONTINUED | OUTPATIENT
Start: 2022-12-27 | End: 2023-01-06 | Stop reason: HOSPADM

## 2022-12-27 RX ORDER — IPRATROPIUM BROMIDE AND ALBUTEROL SULFATE 2.5; .5 MG/3ML; MG/3ML
1 SOLUTION RESPIRATORY (INHALATION)
Status: DISCONTINUED | OUTPATIENT
Start: 2022-12-27 | End: 2023-01-06 | Stop reason: HOSPADM

## 2022-12-27 RX ORDER — ENOXAPARIN SODIUM 100 MG/ML
30 INJECTION SUBCUTANEOUS DAILY
Status: DISCONTINUED | OUTPATIENT
Start: 2022-12-27 | End: 2023-01-06 | Stop reason: HOSPADM

## 2022-12-27 RX ORDER — LORAZEPAM 2 MG/ML
2 CONCENTRATE ORAL
Status: DISCONTINUED | OUTPATIENT
Start: 2022-12-27 | End: 2022-12-29

## 2022-12-27 RX ORDER — LORAZEPAM 1 MG/1
3 TABLET ORAL
Status: DISCONTINUED | OUTPATIENT
Start: 2022-12-27 | End: 2022-12-29

## 2022-12-27 RX ORDER — SODIUM CHLORIDE 0.9 % (FLUSH) 0.9 %
5-40 SYRINGE (ML) INJECTION EVERY 12 HOURS SCHEDULED
Status: DISCONTINUED | OUTPATIENT
Start: 2022-12-27 | End: 2023-01-06 | Stop reason: HOSPADM

## 2022-12-27 RX ORDER — LACTULOSE 10 G/15ML
20 SOLUTION ORAL 3 TIMES DAILY
Status: DISCONTINUED | OUTPATIENT
Start: 2022-12-27 | End: 2023-01-06 | Stop reason: HOSPADM

## 2022-12-27 RX ORDER — SODIUM CHLORIDE 9 MG/ML
INJECTION, SOLUTION INTRAVENOUS PRN
Status: DISCONTINUED | OUTPATIENT
Start: 2022-12-27 | End: 2023-01-06 | Stop reason: HOSPADM

## 2022-12-27 RX ORDER — ONDANSETRON 4 MG/1
4 TABLET, ORALLY DISINTEGRATING ORAL EVERY 8 HOURS PRN
Status: DISCONTINUED | OUTPATIENT
Start: 2022-12-27 | End: 2023-01-06 | Stop reason: HOSPADM

## 2022-12-27 RX ORDER — FOLIC ACID 5 MG/ML
1 INJECTION, SOLUTION INTRAMUSCULAR; INTRAVENOUS; SUBCUTANEOUS DAILY
Status: DISCONTINUED | OUTPATIENT
Start: 2022-12-27 | End: 2023-01-02 | Stop reason: ALTCHOICE

## 2022-12-27 RX ORDER — LORAZEPAM 2 MG/ML
3 CONCENTRATE ORAL
Status: DISCONTINUED | OUTPATIENT
Start: 2022-12-27 | End: 2022-12-29

## 2022-12-27 RX ORDER — PANTOPRAZOLE SODIUM 40 MG/1
40 TABLET, DELAYED RELEASE ORAL
Status: DISCONTINUED | OUTPATIENT
Start: 2022-12-28 | End: 2023-01-06 | Stop reason: HOSPADM

## 2022-12-27 RX ORDER — CYCLOBENZAPRINE HCL 5 MG
5 TABLET ORAL NIGHTLY
Status: DISCONTINUED | OUTPATIENT
Start: 2022-12-27 | End: 2023-01-06 | Stop reason: HOSPADM

## 2022-12-27 RX ADMIN — ACETAMINOPHEN 650 MG: 325 TABLET ORAL at 15:14

## 2022-12-27 RX ADMIN — SODIUM CHLORIDE, PRESERVATIVE FREE 10 ML: 5 INJECTION INTRAVENOUS at 20:59

## 2022-12-27 RX ADMIN — FOLIC ACID 1 MG: 5 INJECTION, SOLUTION INTRAMUSCULAR; INTRAVENOUS; SUBCUTANEOUS at 21:50

## 2022-12-27 RX ADMIN — MONTELUKAST 10 MG: 10 TABLET, FILM COATED ORAL at 20:59

## 2022-12-27 RX ADMIN — CETIRIZINE HYDROCHLORIDE 10 MG: 10 TABLET, FILM COATED ORAL at 20:59

## 2022-12-27 RX ADMIN — SODIUM CHLORIDE 1000 ML: 9 INJECTION, SOLUTION INTRAVENOUS at 14:54

## 2022-12-27 RX ADMIN — BUDESONIDE 500 MCG: 0.5 SUSPENSION RESPIRATORY (INHALATION) at 20:45

## 2022-12-27 RX ADMIN — PIPERACILLIN AND TAZOBACTAM 3375 MG: 3; .375 INJECTION, POWDER, FOR SOLUTION INTRAVENOUS at 15:10

## 2022-12-27 RX ADMIN — THIAMINE HYDROCHLORIDE 500 MG: 100 INJECTION, SOLUTION INTRAMUSCULAR; INTRAVENOUS at 20:57

## 2022-12-27 RX ADMIN — IPRATROPIUM BROMIDE AND ALBUTEROL SULFATE 1 AMPULE: .5; 2.5 SOLUTION RESPIRATORY (INHALATION) at 20:45

## 2022-12-27 RX ADMIN — SODIUM CHLORIDE 1000 ML: 9 INJECTION, SOLUTION INTRAVENOUS at 15:15

## 2022-12-27 ASSESSMENT — LIFESTYLE VARIABLES
HOW MANY STANDARD DRINKS CONTAINING ALCOHOL DO YOU HAVE ON A TYPICAL DAY: 1 OR 2
HOW OFTEN DO YOU HAVE A DRINK CONTAINING ALCOHOL: 2-3 TIMES A WEEK

## 2022-12-27 ASSESSMENT — PAIN SCALES - GENERAL
PAINLEVEL_OUTOF10: 0
PAINLEVEL_OUTOF10: 6

## 2022-12-27 ASSESSMENT — PAIN DESCRIPTION - PAIN TYPE: TYPE: ACUTE PAIN

## 2022-12-27 ASSESSMENT — PAIN DESCRIPTION - FREQUENCY: FREQUENCY: CONTINUOUS

## 2022-12-27 ASSESSMENT — PAIN DESCRIPTION - ONSET: ONSET: ON-GOING

## 2022-12-27 ASSESSMENT — PAIN DESCRIPTION - LOCATION: LOCATION: BACK

## 2022-12-27 ASSESSMENT — PAIN - FUNCTIONAL ASSESSMENT: PAIN_FUNCTIONAL_ASSESSMENT: PREVENTS OR INTERFERES SOME ACTIVE ACTIVITIES AND ADLS

## 2022-12-27 ASSESSMENT — PAIN DESCRIPTION - ORIENTATION: ORIENTATION: LOWER

## 2022-12-27 ASSESSMENT — PAIN DESCRIPTION - DESCRIPTORS: DESCRIPTORS: ACHING;DISCOMFORT

## 2022-12-27 NOTE — LETTER
[address] Medicaid  CERTIFICATION OF NECESSITY  FOR TRANSPORTATION   BY WHEELCHAIR VAN     Individual Information   1. Name: Elliot Marino 2. [facility] Medicaid Billing Number: [id number]    0. Address: [address] [place] [address]      Transportation Provider Information   4. Provider Name:    5. [place] Medicaid Provider Number:  National Provider Identifier (NPI):      Certification  7. Criteria:  By signing this document, the practitioner certifies that two statements are true:  A. This individual must be accompanied by a mobility-related assistive device from the point of pick-up to the point of drop-off. B. Transport of this individual by standard passenger vehicle or common carrier is precluded or contraindicated. 8. Period Beginning Date:    9. Length  [x] Not more than 1 day(s)  [] One Year     Additional Information Relevant to Certification   [facility] Practitioner Information   11. Name of Practitioner: Mu Esteves M.D.    12. [facility] Provider Number, If Applicable:   [address] Provider Identifier (NPI):       Signature Information   14. Date of Signature:  15. Name of Person Signing: Dea Phanipiper    12. Signature and Professional Designation:      OD [id number]  Rev. 2015  #           Name: Elliot Marino : 1960 (62 yrs)   Address: [place] [address] Sex: Female   [facility] [address]         Marital Status:    Employer: EMPLOYED         Druze: Yaz[employer]ism   Primary Care Provider: Mecca Smyth MD         Primary Phone: [phone]   EMERGENCY CONTACT   Contact Name Legal Guardian? Relationship to Patient Home Phone Work Phone   1.  Jenae Roth  2. *No Contact Specified*      Parent    [phone]                 GUARANTOR            Guarantor: Elliot Marino     : 1960   Address: [address] [place] [address] Sex: Female   [place] [address]     Relation to Patient: Self       Home Phone: [phone] Guarantor ID: [id number]       Work Phone:     Guarantor Employer: [employer]         Status: FULL TIME      COVERAGE  PRIMARY INSURANCE   Payor: [employer] C* Plan: Mobile COMMUN*   Payor Address: [address] [facility]       Group Number: [facility] Insurance Type: INDEMNITY   Subscriber Name: Stewart Cathleen : 1960   Subscriber ID: [id number] Pat.  Rel. to Sub: Self

## 2022-12-27 NOTE — H&P
[facility]  [facility]  History and Physical    Patient:  Amos Joseph 58 y.o. female MRN: [id number]     Date of Service: 12/27/2022    Hospital Day: 1      Chief complaint: had concerns including Altered Mental Status. History of Present Illness   The patient is a 58 y.o. female with a PMH of COPD, Tobacco Abuse, Alcohol Abuse, Cirrhosis, Hyponatremia, and Thyroid Disease. She presented to the ED by the recommendation of her friend due to altered mental status. Patient reports that she has been having lower abdominal and lower back pain. She attributes these symptoms to a UTI. It has been ongoing for the past week, but felt worst today. She did not take any medications or antibiotics during that time. Denies blood in urine, rash, recent ill contacts, and chest pain. Associated symptoms include fever, headache, nausea, dizziness, shortness of breath, nonproductive cough, diarrhea, and fatigue. She does report smoking 1.5 packs of tobacco daily and drinking 1 to 2 cans of 12/15 ounce cans of beer. Denies illicit drug use. Patient would like to remain full code. ER COURSE:  In the ED, patient's vitals were significant for fever of 100 F, tachycardia, and hypotension. On room air only. She was given a normal saline bolus twice and Zosyn IV once. Labs:  UA: blood, protein, LE, WBC, and Bacteria    Albumin: 2.4    ALP: 177   Sodium: 118   Chloride: 86   Bun: 37   Creatinine: 1.8  Glucose: 105  WBC: 17.1    Images: CT Head: Old lacunar infarcts in the left basal ganglia. Recommends F/U MRI    EKG: Sinus tachycardia with right atrial enlargement     Admitted for AMS likely due to sepsis. Past Medical History:      Diagnosis Date    Carpal tunnel syndrome     Pulmonary nodule     Thyroid disease        Past Surgical History:    No past surgical history on file.     Allergies:  Ibuprofen and Morphine    Social History:   TOBACCO:   reports that she has been smoking cigarettes. She started smoking about 42 years ago. She has a 20.00 pack-year smoking history. She has never used smokeless tobacco.  ETOH:   reports current alcohol use of about 2.0 standard drinks per week. REVIEW OF SYSTEMS:    Constitutional: Fever, no chills, no change in weight; good appetite  HEENT: No blurred vision, no ear problems, no sore throat, no rhinorrhea. Dizziness and Headache   Respiratory: Cough, no sputum production, no pleuritic chest pain, Shortness of breath  Cardiology: No angina, no dyspnea on exertion, no paroxysmal nocturnal dyspnea, no orthopnea, no palpitation, no leg swelling. Gastroenterology: No dysphagia, no reflux; RUQ and lower abdominal pain, Nausea or vomiting; no constipation, Diarrhea. No hematochezia   Genitourinary: Dysuria, increased frequency, hesitancy; no hematuria  Musculoskeletal: no joint pain, no myalgia, no change in range of movement. Low back pain  Neurology: no focal weakness in extremities, no slurred speech, no double vision, no tingling or numbness sensation  Endocrinology: no temperature intolerance, no polyphagia, polydipsia or polyuria  Hematology: no increased bleeding, no bruising, no lymphadenopathy  Skin: no skin changes noticed by patient  Psychology: no depressed mood, no suicidal ideation    Physical Exam   Vitals: /75   Pulse (!) 117   Temp 98.9 °F (37.2 °C) (Oral)   Resp 16   SpO2 98%     Physical Exam  Constitutional:       Appearance: She is not ill-appearing or toxic-appearing. Comments: AMS    HENT:      Head: Normocephalic. Mouth/Throat:      Mouth: Mucous membranes are moist.   Eyes:      Conjunctiva/sclera: Conjunctivae normal.   Cardiovascular:      Rate and Rhythm: Normal rate and regular rhythm. Pulses: Normal pulses. Heart sounds: Normal heart sounds. Pulmonary:      Effort: Pulmonary effort is normal.      Breath sounds: Wheezing (expiratory bilaterally) present.    Abdominal:      General: Abdomen is flat. Bowel sounds are normal.      Palpations: Abdomen is soft. There is no mass. Tenderness: There is abdominal tenderness (lower abdominal/suprapubic and RUQ). Hernia: No hernia is present. Comments: + CVA Tenderness BX   RUQ tenderness    Musculoskeletal:      Right lower leg: No edema. Left lower leg: No edema. Skin:     General: Skin is warm. Coloration: Skin is not jaundiced. Findings: No rash. Neurological:      Mental Status: She is disoriented. Comments: Alert/Oriented to place and person only. Labs and Imaging Studies   Basic Labs  Recent Labs     12/27/22  1133   *   K 3.9   CL 86*   CO2 20*   BUN 37*   CREATININE 1.8*   GLUCOSE 105*   CALCIUM 8.9       Recent Labs     12/27/22  1133   WBC 17.1*   RBC 2.86*   HGB 10.6*   HCT 27.4*   MCV 95.8   MCH 37.1*   MCHC 38.7*   RDW 15.5*      MPV 11.7     Resident's Assessment and Plan     Sepsis   -Secondary to UTI vs Pyelonephritis vs Aspiration Pneumonia vs Abdominal infection   - EKG in the ED: Sinus tachycardia with right atrial enlargement   -Given Zosyn in the ED   -Blood cultures pending   -Procal/ESR pending  -Chest Xray pending   -Repeat EKG pending   -Lactic Acid pending. Trend daily   -Pending UDS   -Pending Flu and Covid tests  -Continue Zosyn pending cultures   -non contrast ct abdomen ordered  -Consult to ICU     Hyponatremia  -118 in the ED   -likely due to beer potomania from last visit   -AMS present   -CMP daily   -BMP Q 2 hours   -fluid restrict   -urine studies pending   -nephrology consult   -Consult to ICU      UTI  -UA positive   -Zosyn in the ED   -Urine Studies Pending   -Pending Urine Culture      AMS  -likely due to sepsis vs hyponatremia   -CT Head: Old lacunar infarcts in the left basal ganglia.  Recommends F/U MRI  -Consider MRI Head   -pending ABG  -pending ammonia     Cirrhosis  -pending ammonia   -daily CMP   -PT/APTT pending     RYLEE  -Bun: 37 and Creatinine: 1.8 in the ED  -Daily CMP  -Nephrology consulted     COPD  -Daily vitals and oxygen saturation    -Pulmicort, Duonebs, and Brovana  -Oxygen as needed   -Pending ABG  -PFT 2021: FEV1/FVC 58%, FEV1 49% predicted.  GOLD Stage 3      Tobacco Abuse   -Nicotine patch      Alcohol Abuse  -[facility] Protocol   -Ethanol level in the ED <10      Pulmonary Nodule  -Incidental finding on CT Chest on August 2021 showing 3 mm nodule in the right upper lobe  -Smokes 1.5 packs of tobacco per day for the past 20 years     DVT Prophylaxis: Lovenox   Diet: Fluid restriction/Adult regular   GI prophylaxis:  Disposition: pending     Lalit Husain MD, PGY-1  Attending physician: Dr. Cresencio Sofia

## 2022-12-27 NOTE — ED PROVIDER NOTES
Department of Emergency Medicine   ED  Provider Note  Admit Date/RoomTime: No admission date for patient encounter. ED Room: Room/bed info not found              12/27/22  10:44 AM EST    History of Present Illness:   Jean Pierre Vee is a 58 y.o. female presenting to the ED for AMS. Provoking factors - recent discharge from hospital for alcohol withdrawal and hyponatremia. Quality - friend/EMS states she is \"slow and spacey\"  Region - generalized  Severity - moderate  Timing - 1 day  Associated symptoms -       Review of Systems:   A complete review of systems was performed and pertinent positives and negatives are stated within HPI, all other systems reviewed and are negative.          --------------------------------------------- PAST HISTORY ---------------------------------------------  Past Medical History:  has a past medical history of Carpal tunnel syndrome, Pulmonary nodule, and Thyroid disease. Past Surgical History:  has no past surgical history on file. Social History:  reports that she has been smoking cigarettes. She started smoking about 42 years ago. She has a 20.00 pack-year smoking history. She has never used smokeless tobacco. She reports current alcohol use of about 2.0 standard drinks per week. She reports that she does not use drugs. Family History: family history is not on file. Unless otherwise noted, family history is non contributory    The patients home medications have been reviewed. Allergies: Ibuprofen and Morphine    -------------------------------------------------- RESULTS -------------------------------------------------  All laboratory and radiology results have been personally reviewed by myself   LABS:  No results found for this visit on 12/27/22.     RADIOLOGY:  Interpreted by Radiologist.  No orders to display       ------------------------- NURSING NOTES AND VITALS REVIEWED ---------------------------   The nursing notes within the ED encounter and vital signs as below have been reviewed. There were no vitals taken for this visit. Oxygen Saturation Interpretation: Normal      ---------------------------------------------------PHYSICAL EXAM--------------------------------------    Constitutional/General: Alert and oriented x3, well appearing, non toxic in NAD  Head: Normocephalic and atraumatic  Eyes: PERRL, EOMI, conjunctiva normal, sclera non icteric  Mouth: Oropharynx clear, handling secretions, no trismus, no asymmetry of the posterior oropharynx or uvular edema  Neck: Supple, full ROM, non tender to palpation in the midline, no stridor, no crepitus, no meningeal signs  Respiratory: Lungs clear to auscultation bilaterally, no wheezes, rales, or rhonchi. Not in respiratory distress  Cardiovascular:  Regular rate. Regular rhythm. No murmurs, gallops, or rubs. 2+ distal pulses  Chest: No chest wall tenderness  GI:  Abdomen Soft, Non tender, Non distended. +BS. No organomegaly, no palpable masses,  No rebound, guarding, or rigidity. Musculoskeletal: Moves all extremities x 4. Warm and well perfused, no clubbing, cyanosis, or edema. Capillary refill <3 seconds  Integument: skin warm and dry. No rashes. Lymphatic: no lymphadenopathy noted  Neurologic: GCS 15, no focal deficits, symmetric strength 5/5 in the upper and lower extremities bilaterally  Psychiatric: Normal Affect      ------------------------------ ED COURSE/MEDICAL DECISION MAKING----------------------  Medications - No data to display      Medical Decision Making:    Patient seen and examined. She was recently discharged from hospital for hyponatremia and alcohol withdrawal.   Patient AMS in ER  Patient had IV placed, labs drawn and [facility]      Counseling: The emergency provider has spoken with the patient and discussed todays results, in addition to providing specific details for the plan of care and counseling regarding the diagnosis and prognosis.   Questions are answered at this time and they are agreeable with the plan.      --------------------------------- IMPRESSION AND DISPOSITION ---------------------------------    IMPRESSION  No diagnosis found.     DISPOSITION  Disposition: {Plan; disposition:90030}  Patient condition is {condition:57359} Bilirubin, Direct 0.4 (H) 0.0 - 0.3 mg/dL    Bilirubin, Indirect 0.4 0.0 - 1.0 mg/dL   Urinalysis with Microscopic   Result Value Ref Range    Color, UA Yellow Straw/Yellow    Clarity, UA Clear Clear    Glucose, Ur Negative Negative mg/dL    Bilirubin Urine SMALL (A) Negative    Ketones, Urine Negative Negative mg/dL    Specific Gravity, UA 1.020 1.005 - 1.030    Blood, Urine MODERATE (A) Negative    pH, UA 5.0 5.0 - 9.0    Protein,  (A) Negative mg/dL    Urobilinogen, Urine 0.2 <2.0 E.U./dL    Nitrite, Urine Negative Negative    Leukocyte Esterase, Urine MODERATE (A) Negative    WBC, UA 10-20 (A) 0 - 5 /HPF    RBC, UA 10-20 (A) 0 - 2 /HPF    Bacteria, UA FEW (A) None Seen /HPF   Ethanol   Result Value Ref Range    Ethanol Lvl <10 mg/dL   Lactate, Sepsis   Result Value Ref Range    Lactic Acid, Sepsis 1.1 0.5 - 1.9 mmol/L   Comprehensive Metabolic Panel   Result Value Ref Range    Sodium 120 (L) 132 - 146 mmol/L    Potassium 3.5 3.5 - 5.0 mmol/L    Chloride 90 (L) 98 - 107 mmol/L    CO2 21 (L) 22 - 29 mmol/L    Anion Gap 9 7 - 16 mmol/L    Glucose 89 74 - 99 mg/dL    BUN 36 (H) 6 - 23 mg/dL    Creatinine 1.6 (H) 0.5 - 1.0 mg/dL    Est, Glom Filt Rate 36 >=60 mL/min/1.73    Calcium 8.3 (L) 8.6 - 10.2 mg/dL    Total Protein 5.6 (L) 6.4 - 8.3 g/dL    Albumin 2.0 (L) 3.5 - 5.2 g/dL    Total Bilirubin 0.8 0.0 - 1.2 mg/dL    Alkaline Phosphatase 151 (H) 35 - 104 U/L    ALT 18 0 - 32 U/L    AST 21 0 - 31 U/L   OSMOLALITY, URINE   Result Value Ref Range    Osmolality, Ur 346 300 - 900 mOsm/kg   OSMOLALITY, SERUM   Result Value Ref Range    Osmolality 263 (L) 285 - 310 mOsm/Kg   Lipase   Result Value Ref Range    Lipase 22 13 - 60 U/L   Basic Metabolic Panel w/ Reflex to MG   Result Value Ref Range    Sodium 121 (L) 132 - 146 mmol/L    Potassium reflex Magnesium 3.4 (L) 3.5 - 5.0 mmol/L    Chloride 91 (L) 98 - 107 mmol/L    CO2 20 (L) 22 - 29 mmol/L    Anion Gap 10 7 - 16 mmol/L    Glucose 98 74 - 99 mg/dL    BUN 35 (H) 6 - 23 mg/dL    Creatinine 1.6 (H) 0.5 - 1.0 mg/dL    Est, Glom Filt Rate 36 >=60 mL/min/1.73    Calcium 8.3 (L) 8.6 - 10.2 mg/dL   Ammonia   Result Value Ref Range    Ammonia 56.0 (H) 11.0 - 51.0 umol/L   pH, venous   Result Value Ref Range    pH, Rivera 7.41 7.35 - 7.45   APTT   Result Value Ref Range    aPTT 35.8 (H) 24.5 - 35.1 sec   Protime-INR   Result Value Ref Range    Protime 13.0 (H) 9.3 - 12.4 sec    INR 1.2    Basic Metabolic Panel w/ Reflex to MG   Result Value Ref Range    Sodium 125 (L) 132 - 146 mmol/L    Potassium reflex Magnesium 3.2 (L) 3.5 - 5.0 mmol/L    Chloride 91 (L) 98 - 107 mmol/L    CO2 21 (L) 22 - 29 mmol/L    Anion Gap 13 7 - 16 mmol/L    Glucose 113 (H) 74 - 99 mg/dL    BUN 34 (H) 6 - 23 mg/dL    Creatinine 1.4 (H) 0.5 - 1.0 mg/dL    Est, Glom Filt Rate 42 >=60 mL/min/1.73    Calcium 8.4 (L) 8.6 - 10.2 mg/dL   Basic Metabolic Panel w/ Reflex to MG   Result Value Ref Range    Sodium 127 (L) 132 - 146 mmol/L    Potassium reflex Magnesium 3.4 (L) 3.5 - 5.0 mmol/L    Chloride 95 (L) 98 - 107 mmol/L    CO2 21 (L) 22 - 29 mmol/L    Anion Gap 11 7 - 16 mmol/L    Glucose 92 74 - 99 mg/dL    BUN 32 (H) 6 - 23 mg/dL    Creatinine 1.3 (H) 0.5 - 1.0 mg/dL    Est, Glom Filt Rate 46 >=60 mL/min/1.73    Calcium 8.6 8.6 - 10.2 mg/dL   Magnesium   Result Value Ref Range    Magnesium 2.0 1.6 - 2.6 mg/dL   Basic Metabolic Panel w/ Reflex to MG   Result Value Ref Range    Sodium 129 (L) 132 - 146 mmol/L    Potassium reflex Magnesium 3.4 (L) 3.5 - 5.0 mmol/L    Chloride 96 (L) 98 - 107 mmol/L    CO2 21 (L) 22 - 29 mmol/L    Anion Gap 12 7 - 16 mmol/L    Glucose 90 74 - 99 mg/dL    BUN 30 (H) 6 - 23 mg/dL    Creatinine 1.3 (H) 0.5 - 1.0 mg/dL    Est, Glom Filt Rate 46 >=60 mL/min/1.73    Calcium 9.0 8.6 - 10.2 mg/dL   URINE ELECTROLYTES   Result Value Ref Range    Sodium, Ur <20 Not Established mmol/L    Potassium, Ur 12.0 Not Established mmol/L    Chloride <20 Not Established mmol/L   SPECIMEN REJECTION Result Value Ref Range    Rejected Test [facility]CRER     Reason for Rejection see below    SPECIMEN REJECTION   Result Value Ref Range    Rejected Test UDRGS     Reason for Rejection see below    SPECIMEN REJECTION   Result Value Ref Range    Rejected Test [facility]     Reason for Rejection see below    Basic Metabolic Panel   Result Value Ref Range    Sodium 120 (L) 132 - 146 mmol/L    Potassium 3.8 3.5 - 5.0 mmol/L    Chloride 92 (L) 98 - 107 mmol/L    CO2 19 (L) 22 - 29 mmol/L    Anion Gap 9 7 - 16 mmol/L    Glucose 127 (H) 74 - 99 mg/dL    BUN 35 (H) 6 - 23 mg/dL    Creatinine 1.5 (H) 0.5 - 1.0 mg/dL    Est, Glom Filt Rate 39 >=60 mL/min/1.73    Calcium 8.3 (L) 8.6 - 10.2 mg/dL   Lactic Acid   Result Value Ref Range    Lactic Acid 1.3 0.5 - 2.2 mmol/L   TSH   Result Value Ref Range    TSH 0.578 0.270 - 4.200 uIU/mL   Magnesium   Result Value Ref Range    Magnesium 1.9 1.6 - 2.6 mg/dL   Basic Metabolic Panel w/ Reflex to MG   Result Value Ref Range    Sodium 126 (L) 132 - 146 mmol/L    Potassium reflex Magnesium 4.1 3.5 - 5.0 mmol/L    Chloride 98 98 - 107 mmol/L    CO2 18 (L) 22 - 29 mmol/L    Anion Gap 10 7 - 16 mmol/L    Glucose 118 (H) 74 - 99 mg/dL    BUN 30 (H) 6 - 23 mg/dL    Creatinine 1.3 (H) 0.5 - 1.0 mg/dL    Est, Glom Filt Rate 46 >=60 mL/min/1.73    Calcium 8.7 8.6 - 10.2 mg/dL   Magnesium   Result Value Ref Range    Magnesium 2.0 1.6 - 2.6 mg/dL   SPECIMEN REJECTION   Result Value Ref Range    Rejected Test [facility]     Reason for Rejection see below    CBC with Auto Differential   Result Value Ref Range    WBC 16.6 (H) 4.5 - 11.5 E9/L    RBC 2.70 (L) 3.50 - 5.50 E12/L    Hemoglobin 10.1 (L) 11.5 - 15.5 g/dL    Hematocrit 26.2 (L) 34.0 - 48.0 %    MCV 97.0 80.0 - 99.9 fL    MCH 37.4 (H) 26.0 - 35.0 pg    MCHC 38.5 (H) 32.0 - 34.5 %    RDW 16.0 (H) 11.5 - 15.0 fL    Platelets 420 343 - 414 E9/L    MPV 11.1 7.0 - 12.0 fL    Neutrophils % 88.6 (H) 43.0 - 80.0 %    Lymphocytes % 1.8 (L) 20.0 - 42.0 %    Monocytes % 9.6 2.0 - 12.0 %    Neutrophils Absolute 14.77 (H) 1.80 - 7.30 E9/L    Lymphocytes Absolute 0.33 (L) 1.50 - 4.00 E9/L    Monocytes Absolute 1.66 (H) 0.10 - 0.95 E9/L    Eosinophils Absolute 0.00 (L) 0.05 - 0.50 E9/L    Basophils Absolute 0.00 0.00 - 0.20 E9/L    nRBC 0.9 /100 WBC    Anisocytosis 3+     Poikilocytes 2+     Target Cells 2+    Phosphorus   Result Value Ref Range    Phosphorus 2.7 2.5 - 4.5 mg/dL   Magnesium   Result Value Ref Range    Magnesium 1.9 1.6 - 2.6 mg/dL   Basic Metabolic Panel w/ Reflex to MG   Result Value Ref Range    Sodium 132 132 - 146 mmol/L    Potassium reflex Magnesium 3.9 3.5 - 5.0 mmol/L    Chloride 101 98 - 107 mmol/L    CO2 21 (L) 22 - 29 mmol/L    Anion Gap 10 7 - 16 mmol/L    Glucose 133 (H) 74 - 99 mg/dL    BUN 25 (H) 6 - 23 mg/dL    Creatinine 1.2 (H) 0.5 - 1.0 mg/dL    Est, Glom Filt Rate 51 >=60 mL/min/1.73    Calcium 8.7 8.6 - 10.2 mg/dL   Ammonia   Result Value Ref Range    Ammonia 46.0 11.0 - 51.0 umol/L   CBC with Auto Differential   Result Value Ref Range    WBC 15.7 (H) 4.5 - 11.5 E9/L    RBC 2.34 (L) 3.50 - 5.50 E12/L    Hemoglobin 8.8 (L) 11.5 - 15.5 g/dL    Hematocrit 23.4 (L) 34.0 - 48.0 %    .0 (H) 80.0 - 99.9 fL    MCH 37.6 (H) 26.0 - 35.0 pg    MCHC 37.6 (H) 32.0 - 34.5 %    RDW 16.6 (H) 11.5 - 15.0 fL    Platelets 267 869 - 276 E9/L    MPV 10.7 7.0 - 12.0 fL    Neutrophils % 81.7 (H) 43.0 - 80.0 %    Lymphocytes % 7.0 (L) 20.0 - 42.0 %    Monocytes % 9.6 2.0 - 12.0 %    Eosinophils % 1.7 0.0 - 6.0 %    Basophils % 0.3 0.0 - 2.0 %    Neutrophils Absolute 12.87 (H) 1.80 - 7.30 E9/L    Lymphocytes Absolute 1.10 (L) 1.50 - 4.00 E9/L    Monocytes Absolute 1.57 (H) 0.10 - 0.95 E9/L    Eosinophils Absolute 0.27 0.05 - 0.50 E9/L    Basophils Absolute 0.00 0.00 - 0.20 E9/L    nRBC 0.9 /100 WBC    Anisocytosis 3+     Polychromasia 1+     Poikilocytes 1+     Schistocytes 1+     Target Cells 2+     Basophilic Stippling 1+    Phosphorus   Result Value Ref Range Phosphorus 2.7 2.5 - 4.5 mg/dL   Magnesium   Result Value Ref Range    Magnesium 1.7 1.6 - 2.6 mg/dL   Basic Metabolic Panel   Result Value Ref Range    Sodium 131 (L) 132 - 146 mmol/L    Potassium 4.1 3.5 - 5.0 mmol/L    Chloride 99 98 - 107 mmol/L    CO2 20 (L) 22 - 29 mmol/L    Anion Gap 12 7 - 16 mmol/L    Glucose 112 (H) 74 - 99 mg/dL    BUN 28 (H) 6 - 23 mg/dL    Creatinine 1.3 (H) 0.5 - 1.0 mg/dL    Est, Glom Filt Rate 46 >=60 mL/min/1.73    Calcium 9.1 8.6 - 10.2 mg/dL   Magnesium   Result Value Ref Range    Magnesium 1.9 1.6 - 2.6 mg/dL   Phosphorus   Result Value Ref Range    Phosphorus 2.7 2.5 - 4.5 mg/dL   Phosphorus   Result Value Ref Range    Phosphorus 2.0 (L) 2.5 - 4.5 mg/dL   Magnesium   Result Value Ref Range    Magnesium 1.7 1.6 - 2.6 mg/dL   Basic Metabolic Panel w/ Reflex to MG   Result Value Ref Range    Sodium 133 132 - 146 mmol/L    Potassium reflex Magnesium 4.6 3.5 - 5.0 mmol/L    Chloride 101 98 - 107 mmol/L    CO2 17 (L) 22 - 29 mmol/L    Anion Gap 15 7 - 16 mmol/L    Glucose 97 74 - 99 mg/dL    BUN 24 (H) 6 - 23 mg/dL    Creatinine 1.0 0.5 - 1.0 mg/dL    Est, Glom Filt Rate >60 >=60 mL/min/1.73    Calcium 8.8 8.6 - 10.2 mg/dL   Basic Metabolic Panel   Result Value Ref Range    Sodium 134 132 - 146 mmol/L    Potassium 3.8 3.5 - 5.0 mmol/L    Chloride 103 98 - 107 mmol/L    CO2 21 (L) 22 - 29 mmol/L    Anion Gap 10 7 - 16 mmol/L    Glucose 112 (H) 74 - 99 mg/dL    BUN 17 6 - 23 mg/dL    Creatinine 1.0 0.5 - 1.0 mg/dL    Est, Glom Filt Rate >60 >=60 mL/min/1.73    Calcium 9.3 8.6 - 10.2 mg/dL   Phosphorus   Result Value Ref Range    Phosphorus 3.8 2.5 - 4.5 mg/dL   Magnesium   Result Value Ref Range    Magnesium 1.5 (L) 1.6 - 2.6 mg/dL   Blood Gas, Arterial   Result Value Ref Range    Date Analyzed 20221229     Time Analyzed 1052     Source: Blood Arterial     pH, Blood Gas 7.444 7.350 - 7.450    PCO2 33.5 (L) 35.0 - 45.0 mmHg    PO2 59.7 (L) 75.0 - 100.0 mmHg    HCO3 22.4 22.0 - 26.0 mmol/L    B.E. -1.2 -3.0 - 3.0 mmol/L    O2 Sat 91.3 (L) 92.0 - 98.5 %    O2Hb 89.1 (L) 94.0 - 97.0 %    COHb 2.1 (H) 0.0 - 1.5 %    MetHb 0.3 0.0 - 1.5 %    HHb 8.5 (H) 0.0 - 5.0 %    tHb (est) 9.6 (L) 11.5 - 16.5 g/dL    Mode RA     Date Of Collection      Time Collected      Pt Temp 37.0 C     ID 1112     Lab [id number]     Critical(s) Notified .  No Critical Values    CBC with Auto Differential   Result Value Ref Range    WBC 17.0 (H) 4.5 - 11.5 E9/L    RBC 2.30 (L) 3.50 - 5.50 E12/L    Hemoglobin 8.6 (L) 11.5 - 15.5 g/dL    Hematocrit 22.7 (L) 34.0 - 48.0 %    MCV 98.7 80.0 - 99.9 fL    MCH 37.4 (H) 26.0 - 35.0 pg    MCHC 37.9 (H) 32.0 - 34.5 %    RDW 16.7 (H) 11.5 - 15.0 fL    Platelets 711 960 - 354 E9/L    MPV 10.4 7.0 - 12.0 fL    Neutrophils % 82.6 (H) 43.0 - 80.0 %    Lymphocytes % 3.5 (L) 20.0 - 42.0 %    Monocytes % 11.3 2.0 - 12.0 %    Eosinophils % 1.7 0.0 - 6.0 %    Basophils % 0.9 0.0 - 2.0 %    Neutrophils Absolute 14.11 (H) 1.80 - 7.30 E9/L    Lymphocytes Absolute 0.68 (L) 1.50 - 4.00 E9/L    Monocytes Absolute 1.87 (H) 0.10 - 0.95 E9/L    Eosinophils Absolute 0.29 0.05 - 0.50 E9/L    Basophils Absolute 0.15 0.00 - 0.20 E9/L    nRBC 0.9 /100 WBC    Anisocytosis 3+     Polychromasia 1+     Poikilocytes 3+     Target Cells 3+    Basic Metabolic Panel   Result Value Ref Range    Sodium 135 132 - 146 mmol/L    Potassium 3.6 3.5 - 5.0 mmol/L    Chloride 100 98 - 107 mmol/L    CO2 24 22 - 29 mmol/L    Anion Gap 11 7 - 16 mmol/L    Glucose 104 (H) 74 - 99 mg/dL    BUN 18 6 - 23 mg/dL    Creatinine 0.9 0.5 - 1.0 mg/dL    Est, Glom Filt Rate >60 >=60 mL/min/1.73    Calcium 8.9 8.6 - 10.2 mg/dL   Phosphorus   Result Value Ref Range    Phosphorus 4.2 2.5 - 4.5 mg/dL   Magnesium   Result Value Ref Range    Magnesium 1.4 (L) 1.6 - 2.6 mg/dL   Basic Metabolic Panel   Result Value Ref Range    Sodium 132 132 - 146 mmol/L    Potassium 3.4 (L) 3.5 - 5.0 mmol/L    Chloride 98 98 - 107 mmol/L    CO2 22 22 - 29 mmol/L    Anion Gap 12 7 - 16 mmol/L    Glucose 136 (H) 74 - 99 mg/dL    BUN 19 6 - 23 mg/dL    Creatinine 1.1 (H) 0.5 - 1.0 mg/dL    Est Glonirav Filt Rate 57 >=60 mL/min/1.73    Calcium 8.8 8.6 - 10.2 mg/dL   Hepatic Function Panel   Result Value Ref Range    Total Protein 6.4 6.4 - 8.3 g/dL    Albumin 2.0 (L) 3.5 - 5.2 g/dL    Alkaline Phosphatase 165 (H) 35 - 104 U/L    ALT 16 0 - 32 U/L    AST 46 (H) 0 - 31 U/L    Total Bilirubin 1.2 0.0 - 1.2 mg/dL    Bilirubin, Direct 0.5 (H) 0.0 - 0.3 mg/dL    Bilirubin, Indirect 0.7 0.0 - 1.0 mg/dL   TSH   Result Value Ref Range    TSH 1.450 0.270 - 4.200 uIU/mL   T4, Free   Result Value Ref Range    T4 Free 1.01 0.93 - 1.70 ng/dL   Procalcitonin   Result Value Ref Range    Procalcitonin 2.22 (H) 0.00 - 0.08 ng/mL   Phosphorus   Result Value Ref Range    Phosphorus 3.0 2.5 - 4.5 mg/dL   Magnesium   Result Value Ref Range    Magnesium 2.0 1.6 - 2.6 mg/dL   CBC with Auto Differential   Result Value Ref Range    WBC 14.2 (H) 4.5 - 11.5 E9/L    RBC 1.92 (L) 3.50 - 5.50 E12/L    Hemoglobin 6.9 (LL) 11.5 - 15.5 g/dL    Hematocrit 18.4 (L) 34.0 - 48.0 %    MCV 95.8 80.0 - 99.9 fL    MCH 35.9 (H) 26.0 - 35.0 pg    MCHC 37.5 (H) 32.0 - 34.5 %    RDW 17.2 (H) 11.5 - 15.0 fL    Platelets 173 130 - 450 E9/L    MPV 11.0 7.0 - 12.0 fL    Neutrophils % 83.5 (H) 43.0 - 80.0 %    Lymphocytes % 3.5 (L) 20.0 - 42.0 %    Monocytes % 9.5 2.0 - 12.0 %    Eosinophils % 2.6 0.0 - 6.0 %    Basophils % 0.9 0.0 - 2.0 %    Neutrophils Absolute 11.93 (H) 1.80 - 7.30 E9/L    Lymphocytes Absolute 0.57 (L) 1.50 - 4.00 E9/L    Monocytes Absolute 1.42 (H) 0.10 - 0.95 E9/L    Eosinophils Absolute 0.37 0.05 - 0.50 E9/L    Basophils Absolute 0.13 0.00 - 0.20 E9/L    nRBC 0.9 /100 WBC    Anisocytosis 3+     Polychromasia 3+     Hypochromia 1+     Poikilocytes 1+     Schistocytes 1+     Ovalocytes 1+     Target Cells 2+     Tear Drop Cells 1+    Basic Metabolic Panel   Result Value Ref Range    Sodium 132 132 - 146  mmol/L    Potassium 4.3 3.5 - 5.0 mmol/L    Chloride 99 98 - 107 mmol/L    CO2 25 22 - 29 mmol/L    Anion Gap 8 7 - 16 mmol/L    Glucose 99 74 - 99 mg/dL    BUN 16 6 - 23 mg/dL    Creatinine 0.9 0.5 - 1.0 mg/dL    Est, Glom Filt Rate >60 >=60 mL/min/1.73    Calcium 8.5 (L) 8.6 - 10.2 mg/dL   Basic Metabolic Panel   Result Value Ref Range    Sodium 136 132 - 146 mmol/L    Potassium 3.6 3.5 - 5.0 mmol/L    Chloride 101 98 - 107 mmol/L    CO2 23 22 - 29 mmol/L    Anion Gap 12 7 - 16 mmol/L    Glucose 120 (H) 74 - 99 mg/dL    BUN 13 6 - 23 mg/dL    Creatinine 0.9 0.5 - 1.0 mg/dL    Est, Glom Filt Rate >60 >=60 mL/min/1.73    Calcium 8.8 8.6 - 10.2 mg/dL   Magnesium   Result Value Ref Range    Magnesium 1.7 1.6 - 2.6 mg/dL   Phosphorus   Result Value Ref Range    Phosphorus 3.0 2.5 - 4.5 mg/dL   Hemoglobin and Hematocrit   Result Value Ref Range    Hemoglobin 7.6 (L) 11.5 - 15.5 g/dL    Hematocrit 20.3 (L) 34.0 - 48.0 %   BLOOD OCCULT STOOL SCREEN #1   Result Value Ref Range    Occult Blood Screening       Occult Blood test result is negative.   *  *  Normal range: negative     CBC with Auto Differential   Result Value Ref Range    WBC 15.4 (H) 4.5 - 11.5 E9/L    RBC 2.06 (L) 3.50 - 5.50 E12/L    Hemoglobin 7.7 (L) 11.5 - 15.5 g/dL    Hematocrit 20.6 (L) 34.0 - 48.0 %    .0 (H) 80.0 - 99.9 fL    MCH 37.4 (H) 26.0 - 35.0 pg    MCHC 37.4 (H) 32.0 - 34.5 %    RDW 17.2 (H) 11.5 - 15.0 fL    Platelets 948 485 - 703 E9/L    MPV 11.3 7.0 - 12.0 fL    Neutrophils % 73.1 43.0 - 80.0 %    Immature Granulocytes % 1.2 0.0 - 5.0 %    Lymphocytes % 10.4 (L) 20.0 - 42.0 %    Monocytes % 14.1 (H) 2.0 - 12.0 %    Eosinophils % 0.9 0.0 - 6.0 %    Basophils % 0.3 0.0 - 2.0 %    Neutrophils Absolute 11.22 (H) 1.80 - 7.30 E9/L    Immature Granulocytes # 0.19 E9/L    Lymphocytes Absolute 1.60 1.50 - 4.00 E9/L    Monocytes Absolute 2.17 (H) 0.10 - 0.95 E9/L    Eosinophils Absolute 0.14 0.05 - 0.50 E9/L    Basophils Absolute 0.05 0.00 - 0.20 E9/L    Anisocytosis 3+     Polychromasia 1+     Hypochromia 1+     Target Cells 2+    Basic Metabolic Panel   Result Value Ref Range    Sodium 134 132 - 146 mmol/L    Potassium 3.8 3.5 - 5.0 mmol/L    Chloride 100 98 - 107 mmol/L    CO2 26 22 - 29 mmol/L    Anion Gap 8 7 - 16 mmol/L    Glucose 92 74 - 99 mg/dL    BUN 11 6 - 23 mg/dL    Creatinine 0.9 0.5 - 1.0 mg/dL    Est, Glom Filt Rate >60 >=60 mL/min/1.73    Calcium 8.8 8.6 - 10.2 mg/dL   Magnesium   Result Value Ref Range    Magnesium 1.6 1.6 - 2.6 mg/dL   Phosphorus   Result Value Ref Range    Phosphorus 2.8 2.5 - 4.5 mg/dL   D-Dimer, Quantitative   Result Value Ref Range    D-Dimer, Quant 1931 ng/mL DDU   CBC with Auto Differential   Result Value Ref Range    WBC 14.7 (H) 4.5 - 11.5 E9/L    RBC 1.97 (L) 3.50 - 5.50 E12/L    Hemoglobin 7.1 (L) 11.5 - 15.5 g/dL    Hematocrit 19.4 (L) 34.0 - 48.0 %    MCV 98.5 80.0 - 99.9 fL    MCH 36.0 (H) 26.0 - 35.0 pg    MCHC 36.6 (H) 32.0 - 34.5 %    RDW 17.2 (H) 11.5 - 15.0 fL    Platelets 494 475 - 689 E9/L    MPV 11.5 7.0 - 12.0 fL    Neutrophils % 68.7 43.0 - 80.0 %    Lymphocytes % 9.5 (L) 20.0 - 42.0 %    Monocytes % 20.9 (H) 2.0 - 12.0 %    Eosinophils % 0.8 0.0 - 6.0 %    Basophils % 0.9 0.0 - 2.0 %    Neutrophils Absolute 10.14 (H) 1.80 - 7.30 E9/L    Lymphocytes Absolute 1.47 (L) 1.50 - 4.00 E9/L    Monocytes Absolute 3.09 (H) 0.10 - 0.95 E9/L    Eosinophils Absolute 0.00 (L) 0.05 - 0.50 E9/L    Basophils Absolute 0.13 0.00 - 0.20 E9/L    Anisocytosis 3+     Hypochromia 1+     Poikilocytes 2+     Target Cells 2+    Magnesium   Result Value Ref Range    Magnesium 1.8 1.6 - 2.6 mg/dL   Phosphorus   Result Value Ref Range    Phosphorus 2.5 2.5 - 4.5 mg/dL   Comprehensive Metabolic Panel   Result Value Ref Range    Sodium 130 (L) 132 - 146 mmol/L    Potassium 4.5 3.5 - 5.0 mmol/L    Chloride 97 (L) 98 - 107 mmol/L    CO2 22 22 - 29 mmol/L    Anion Gap 11 7 - 16 mmol/L    Glucose 117 (H) 74 - 99 mg/dL    BUN 7 6 - 23 mg/dL    Creatinine 1.0 0.5 - 1.0 mg/dL    Est, Glom Filt Rate >60 >=60 mL/min/1.73    Calcium 9.0 8.6 - 10.2 mg/dL    Total Protein 6.3 (L) 6.4 - 8.3 g/dL    Albumin 2.6 (L) 3.5 - 5.2 g/dL    Total Bilirubin 0.5 0.0 - 1.2 mg/dL    Alkaline Phosphatase 133 (H) 35 - 104 U/L    ALT 23 0 - 32 U/L    AST 30 0 - 31 U/L   Magnesium   Result Value Ref Range    Magnesium 1.6 1.6 - 2.6 mg/dL   Phosphorus   Result Value Ref Range    Phosphorus 3.0 2.5 - 4.5 mg/dL   Basic Metabolic Panel   Result Value Ref Range    Sodium 123 (L) 132 - 146 mmol/L    Potassium 4.9 3.5 - 5.0 mmol/L    Chloride 95 (L) 98 - 107 mmol/L    CO2 18 (L) 22 - 29 mmol/L    Anion Gap 10 7 - 16 mmol/L    Glucose 84 74 - 99 mg/dL    BUN 5 (L) 6 - 23 mg/dL    Creatinine 0.9 0.5 - 1.0 mg/dL    Est, Glom Filt Rate >60 >=60 mL/min/1.73    Calcium 8.8 8.6 - 10.2 mg/dL   CBC with Auto Differential   Result Value Ref Range    WBC 13.7 (H) 4.5 - 11.5 E9/L    RBC 1.99 (L) 3.50 - 5.50 E12/L    Hemoglobin 7.1 (L) 11.5 - 15.5 g/dL    Hematocrit 20.0 (L) 34.0 - 48.0 %    .5 (H) 80.0 - 99.9 fL    MCH 35.7 (H) 26.0 - 35.0 pg    MCHC 35.5 (H) 32.0 - 34.5 %    RDW 17.4 (H) 11.5 - 15.0 fL    Platelets 046 641 - 149 E9/L    MPV 10.6 7.0 - 12.0 fL    Neutrophils % 74.4 43.0 - 80.0 %    Immature Granulocytes % 1.2 0.0 - 5.0 %    Lymphocytes % 12.1 (L) 20.0 - 42.0 %    Monocytes % 10.9 2.0 - 12.0 %    Eosinophils % 0.7 0.0 - 6.0 %    Basophils % 0.7 0.0 - 2.0 %    Neutrophils Absolute 10.21 (H) 1.80 - 7.30 E9/L    Immature Granulocytes # 0.16 E9/L    Lymphocytes Absolute 1.66 1.50 - 4.00 E9/L    Monocytes Absolute 1.50 (H) 0.10 - 0.95 E9/L    Eosinophils Absolute 0.09 0.05 - 0.50 E9/L    Basophils Absolute 0.10 0.00 - 0.20 E9/L   Reticulocytes   Result Value Ref Range    Retic Ct Pct 3.7 (H) 0.4 - 1.9 %    Retic Ct Abs 0.070 E12/L    Immature Retic Fract 28.6 (H) 3.0 - 15.9 %    Hematocrit 20.1 (L) 34.0 - 48.0 %    Retic HGB Equivalent 36.5 28.2 - 36.6 pg OSMOLALITY, SERUM   Result Value Ref Range    Osmolality 260 (L) 285 - 310 mOsm/Kg   URINE ELECTROLYTES   Result Value Ref Range    Sodium, Ur 85 Not Established mmol/L    Potassium, Ur 26.3 Not Established mmol/L    Chloride 67 Not Established mmol/L   Osmolality, urine   Result Value Ref Range    Osmolality, Ur 295 (L) 300 - 900 mOsm/kg   Basic Metabolic Panel   Result Value Ref Range    Sodium 123 (L) 132 - 146 mmol/L    Potassium 4.8 3.5 - 5.0 mmol/L    Chloride 96 (L) 98 - 107 mmol/L    CO2 14 (L) 22 - 29 mmol/L    Anion Gap 13 7 - 16 mmol/L    Glucose 103 (H) 74 - 99 mg/dL    BUN 5 (L) 6 - 23 mg/dL    Creatinine 0.9 0.5 - 1.0 mg/dL    Est, Glom Filt Rate >60 >=60 mL/min/1.73    Calcium 9.0 8.6 - 10.2 mg/dL   Magnesium   Result Value Ref Range    Magnesium 2.1 1.6 - 2.6 mg/dL   Phosphorus   Result Value Ref Range    Phosphorus 3.8 2.5 - 4.5 mg/dL   CBC with Auto Differential   Result Value Ref Range    WBC 13.5 (H) 4.5 - 11.5 E9/L    RBC 2.19 (L) 3.50 - 5.50 E12/L    Hemoglobin 8.1 (L) 11.5 - 15.5 g/dL    Hematocrit 23.1 (L) 34.0 - 48.0 %    .5 (H) 80.0 - 99.9 fL    MCH 37.0 (H) 26.0 - 35.0 pg    MCHC 35.1 (H) 32.0 - 34.5 %    RDW 17.5 (H) 11.5 - 15.0 fL    Platelets 846 (H) 449 - 450 E9/L    MPV 11.5 7.0 - 12.0 fL    Neutrophils % 76.6 43.0 - 80.0 %    Immature Granulocytes % 1.2 0.0 - 5.0 %    Lymphocytes % 11.5 (L) 20.0 - 42.0 %    Monocytes % 9.6 2.0 - 12.0 %    Eosinophils % 0.5 0.0 - 6.0 %    Basophils % 0.6 0.0 - 2.0 %    Neutrophils Absolute 10.32 (H) 1.80 - 7.30 E9/L    Immature Granulocytes # 0.16 E9/L    Lymphocytes Absolute 1.55 1.50 - 4.00 E9/L    Monocytes Absolute 1.29 (H) 0.10 - 0.95 E9/L    Eosinophils Absolute 0.07 0.05 - 0.50 E9/L    Basophils Absolute 0.08 0.00 - 0.20 G3/D   Basic Metabolic Panel   Result Value Ref Range    Sodium 123 (L) 132 - 146 mmol/L    Potassium 5.8 (H) 3.5 - 5.0 mmol/L    Chloride 96 (L) 98 - 107 mmol/L    CO2 16 (L) 22 - 29 mmol/L    Anion Gap 11 7 - 16 mmol/L    Glucose 82 74 - 99 mg/dL    BUN 6 6 - 23 mg/dL    Creatinine 0.9 0.5 - 1.0 mg/dL    Est, Glom Filt Rate >60 >=60 mL/min/1.73    Calcium 9.6 8.6 - 10.2 mg/dL   Basic Metabolic Panel   Result Value Ref Range    Sodium 126 (L) 132 - 146 mmol/L    Potassium 5.2 (H) 3.5 - 5.0 mmol/L    Chloride 97 (L) 98 - 107 mmol/L    CO2 16 (L) 22 - 29 mmol/L    Anion Gap 13 7 - 16 mmol/L    Glucose 96 74 - 99 mg/dL    BUN 7 6 - 23 mg/dL    Creatinine 1.2 (H) 0.5 - 1.0 mg/dL    Est, Glom Filt Rate 51 >=60 mL/min/1.73    Calcium 9.4 8.6 - 10.2 mg/dL   Basic Metabolic Panel   Result Value Ref Range    Sodium 126 (L) 132 - 146 mmol/L    Potassium 5.2 (H) 3.5 - 5.0 mmol/L    Chloride 97 (L) 98 - 107 mmol/L    CO2 18 (L) 22 - 29 mmol/L    Anion Gap 11 7 - 16 mmol/L    Glucose 93 74 - 99 mg/dL    BUN 7 6 - 23 mg/dL    Creatinine 1.2 (H) 0.5 - 1.0 mg/dL    Est, Glom Filt Rate 51 >=60 mL/min/1.73    Calcium 9.4 8.6 - 10.2 mg/dL   URINE ELECTROLYTES   Result Value Ref Range    Sodium, Ur 111 Not Established mmol/L    Potassium, Ur 16.2 Not Established mmol/L    Chloride 87 Not Established mmol/L   Creatinine, urine, random   Result Value Ref Range    Creatinine, Ur 47 29 - 226 mg/dL   Magnesium   Result Value Ref Range    Magnesium 1.7 1.6 - 2.6 mg/dL   Phosphorus   Result Value Ref Range    Phosphorus 3.6 2.5 - 4.5 mg/dL   CBC with Auto Differential   Result Value Ref Range    WBC 13.6 (H) 4.5 - 11.5 E9/L    RBC 2.12 (L) 3.50 - 5.50 E12/L    Hemoglobin 7.8 (L) 11.5 - 15.5 g/dL    Hematocrit 22.8 (L) 34.0 - 48.0 %    .5 (H) 80.0 - 99.9 fL    MCH 36.8 (H) 26.0 - 35.0 pg    MCHC 34.2 32.0 - 34.5 %    RDW 17.2 (H) 11.5 - 15.0 fL    Platelets 504 (H) 107 - 450 E9/L    MPV 10.0 7.0 - 12.0 fL    Neutrophils % 78.0 43.0 - 80.0 %    Immature Granulocytes % 1.2 0.0 - 5.0 %    Lymphocytes % 10.0 (L) 20.0 - 42.0 %    Monocytes % 9.6 2.0 - 12.0 %    Eosinophils % 0.4 0.0 - 6.0 %    Basophils % 0.8 0.0 - 2.0 %    Neutrophils Absolute 10.59 (H) 1.80 - 7.30 E9/L    Immature Granulocytes # 0.16 E9/L    Lymphocytes Absolute 1.36 (L) 1.50 - 4.00 E9/L    Monocytes Absolute 1.30 (H) 0.10 - 0.95 E9/L    Eosinophils Absolute 0.05 0.05 - 0.50 E9/L    Basophils Absolute 0.11 0.00 - 0.20 F4/C   Basic Metabolic Panel   Result Value Ref Range    Sodium 129 (L) 132 - 146 mmol/L    Potassium 5.9 (H) 3.5 - 5.0 mmol/L    Chloride 102 98 - 107 mmol/L    CO2 17 (L) 22 - 29 mmol/L    Anion Gap 10 7 - 16 mmol/L    Glucose 81 74 - 99 mg/dL    BUN 7 6 - 23 mg/dL    Creatinine 1.1 (H) 0.5 - 1.0 mg/dL    Est, Glom Filt Rate 57 >=60 mL/min/1.73    Calcium 8.9 8.6 - 10.2 mg/dL   Procalcitonin   Result Value Ref Range    Procalcitonin 0.31 (H) 0.00 - 0.08 ng/mL   Cortisol, 0 minutes   Result Value Ref Range    Cortisol 14.32 2.68 - 18.40 mcg/dL   Cortisol, 60 minutes   Result Value Ref Range    Cortisol 16.12 2.68 - 18.40 mcg/dL   Basic Metabolic Panel   Result Value Ref Range    Sodium 126 (L) 132 - 146 mmol/L    Potassium 5.0 3.5 - 5.0 mmol/L    Chloride 101 98 - 107 mmol/L    CO2 13 (L) 22 - 29 mmol/L    Anion Gap 12 7 - 16 mmol/L    Glucose 67 (L) 74 - 99 mg/dL    BUN 6 6 - 23 mg/dL    Creatinine 1.0 0.5 - 1.0 mg/dL    Est, Glom Filt Rate >60 >=60 mL/min/1.73    Calcium 8.9 8.6 - 10.2 mg/dL   Basic Metabolic Panel   Result Value Ref Range    Sodium 124 (L) 132 - 146 mmol/L    Potassium 5.7 (H) 3.5 - 5.0 mmol/L    Chloride 101 98 - 107 mmol/L    CO2 13 (L) 22 - 29 mmol/L    Anion Gap 10 7 - 16 mmol/L    Glucose 88 74 - 99 mg/dL    BUN 6 6 - 23 mg/dL    Creatinine 1.0 0.5 - 1.0 mg/dL    Est, Glom Filt Rate >60 >=60 mL/min/1.73    Calcium 8.9 8.6 - 10.2 mg/dL   Path Review, Smear   Result Value Ref Range    Pathologist Review SEE BELOW    CBC with Auto Differential   Result Value Ref Range    WBC 13.4 (H) 4.5 - 11.5 E9/L    RBC 2.29 (L) 3.50 - 5.50 E12/L    Hemoglobin 8.3 (L) 11.5 - 15.5 g/dL    Hematocrit 25.0 (L) 34.0 - 48.0 %    .2 (H) 80.0 - 99.9 fL    MCH 36.2 (H) 26.0 - 35.0 pg    MCHC 33.2 32.0 - 34.5 %    RDW 17.3 (H) 11.5 - 15.0 fL    Platelets 089 (H) 628 - 450 E9/L    MPV 10.8 7.0 - 12.0 fL    Neutrophils % 80.6 (H) 43.0 - 80.0 %    Immature Granulocytes % 0.9 0.0 - 5.0 %    Lymphocytes % 8.2 (L) 20.0 - 42.0 %    Monocytes % 9.1 2.0 - 12.0 %    Eosinophils % 0.5 0.0 - 6.0 %    Basophils % 0.7 0.0 - 2.0 %    Neutrophils Absolute 10.81 (H) 1.80 - 7.30 E9/L    Immature Granulocytes # 0.12 E9/L    Lymphocytes Absolute 1.10 (L) 1.50 - 4.00 E9/L    Monocytes Absolute 1.22 (H) 0.10 - 0.95 E9/L    Eosinophils Absolute 0.07 0.05 - 0.50 E9/L    Basophils Absolute 0.09 0.00 - 0.20 G6/C   Basic Metabolic Panel   Result Value Ref Range    Sodium 127 (L) 132 - 146 mmol/L    Potassium 5.3 (H) 3.5 - 5.0 mmol/L    Chloride 98 98 - 107 mmol/L    CO2 13 (L) 22 - 29 mmol/L    Anion Gap 16 7 - 16 mmol/L    Glucose 49 (L) 74 - 99 mg/dL    BUN 6 6 - 23 mg/dL    Creatinine 1.1 (H) 0.5 - 1.0 mg/dL    Est, Glom Filt Rate 57 >=60 mL/min/1.73    Calcium 9.2 8.6 - 10.2 mg/dL   Cortisol, 0 minutes   Result Value Ref Range    Cortisol 29.73 (H) 2.68 - 18.40 mcg/dL   Cortisol, 30 minutes   Result Value Ref Range    Cortisol 32.77 (H) 2.68 - 18.40 mcg/dL   Cortisol, 60 minutes   Result Value Ref Range    Cortisol 36.16 (H) 2.68 - 18.40 mcg/dL   Potassium   Result Value Ref Range    Potassium 5.0 3.5 - 5.0 mmol/L   Magnesium   Result Value Ref Range    Magnesium 1.5 (L) 1.6 - 2.6 mg/dL   Phosphorus   Result Value Ref Range    Phosphorus 2.7 2.5 - 4.5 mg/dL   CBC with Auto Differential   Result Value Ref Range    WBC 13.0 (H) 4.5 - 11.5 E9/L    RBC 1.93 (L) 3.50 - 5.50 E12/L    Hemoglobin 7.1 (L) 11.5 - 15.5 g/dL    Hematocrit 19.8 (L) 34.0 - 48.0 %    .6 (H) 80.0 - 99.9 fL    MCH 36.8 (H) 26.0 - 35.0 pg    MCHC 35.9 (H) 32.0 - 34.5 %    RDW 16.5 (H) 11.5 - 15.0 fL    Platelets 935 (H) 942 - 450 E9/L    MPV 9.8 7.0 - 12.0 fL    Neutrophils % 73.0 43.0 - 80.0 %    Immature Granulocytes % 1.1 0.0 - 5.0 %    Lymphocytes % 12.9 (L) 20.0 - 42.0 %    Monocytes % 12.2 (H) 2.0 - 12.0 %    Eosinophils % 0.3 0.0 - 6.0 %    Basophils % 0.5 0.0 - 2.0 %    Neutrophils Absolute 9.53 (H) 1.80 - 7.30 E9/L    Immature Granulocytes # 0.14 E9/L    Lymphocytes Absolute 1.68 1.50 - 4.00 E9/L    Monocytes Absolute 1.59 (H) 0.10 - 0.95 E9/L    Eosinophils Absolute 0.04 (L) 0.05 - 0.50 E9/L    Basophils Absolute 0.06 0.00 - 0.20 E9/L    Anisocytosis 3+     Polychromasia 1+     Hypochromia 1+     Target Cells 2+    Basic Metabolic Panel   Result Value Ref Range    Sodium 127 (L) 132 - 146 mmol/L    Potassium 4.5 3.5 - 5.0 mmol/L    Chloride 98 98 - 107 mmol/L    CO2 18 (L) 22 - 29 mmol/L    Anion Gap 11 7 - 16 mmol/L    Glucose 89 74 - 99 mg/dL    BUN 5 (L) 6 - 23 mg/dL    Creatinine 1.0 0.5 - 1.0 mg/dL    Est, Glom Filt Rate >60 >=60 mL/min/1.73    Calcium 9.0 8.6 - 10.2 mg/dL   POCT Glucose   Result Value Ref Range    Meter Glucose 136 (H) 74 - 99 mg/dL   POCT Glucose   Result Value Ref Range    Meter Glucose 138 (H) 74 - 99 mg/dL   POCT Glucose   Result Value Ref Range    Meter Glucose 104 (H) 74 - 99 mg/dL   POCT Glucose   Result Value Ref Range    Meter Glucose 95 74 - 99 mg/dL   POCT Glucose   Result Value Ref Range    Meter Glucose 134 (H) 74 - 99 mg/dL   EKG 12 Lead   Result Value Ref Range    Ventricular Rate 132 BPM    Atrial Rate 132 BPM    P-R Interval 132 ms    QRS Duration 70 ms    Q-T Interval 302 ms    QTc Calculation (Bazett) 447 ms    P Axis 70 degrees    R Axis 76 degrees    T Axis 72 degrees   EKG 12 Lead   Result Value Ref Range    Ventricular Rate 128 BPM    Atrial Rate 128 BPM    P-R Interval 128 ms    QRS Duration 68 ms    Q-T Interval 314 ms    QTc Calculation (Bazett) 458 ms    P Axis 65 degrees    R Axis 63 degrees    T Axis 60 degrees   EKG 12 Lead   Result Value Ref Range    Ventricular Rate 125 BPM    Atrial Rate 125 BPM    P-R Interval 130 ms    QRS Duration 62 ms    Q-T Interval 316 ms    QTc Calculation (Bazett) 456 ms    P Axis 64 degrees    R Axis 60 degrees    T Axis 53 degrees   EKG 12 Lead   Result Value Ref Range    Ventricular Rate 93 BPM    Atrial Rate 93 BPM    P-R Interval 124 ms    QRS Duration 60 ms    Q-T Interval 372 ms    QTc Calculation (Bazett) 462 ms    P Axis 73 degrees    R Axis 78 degrees    T Axis 73 degrees   EKG 12 Lead   Result Value Ref Range    Ventricular Rate 88 BPM    Atrial Rate 88 BPM    P-R Interval 134 ms    QRS Duration 60 ms    Q-T Interval 364 ms    QTc Calculation (Bazett) 440 ms    P Axis 72 degrees    R Axis 76 degrees    T Axis 74 degrees       RADIOLOGY:  Interpreted by Radiologist.  CTA PULMONARY W CONTRAST   Final Result   1. No pulmonary embolism or other acute finding in the chest.   2. Emphysema. 3. Stable small lung nodules which demonstrate 16 months of stability and are   believed to be benign. 4. Mild central pulmonary arterial enlargement suggesting pulmonary   hypertension. 5. Borderline cardiomegaly. XR CHEST PORTABLE   Final Result   No focal pneumonia or pleural effusion. CT ABDOMEN PELVIS WO CONTRAST Additional Contrast? None   Final Result   1. Nonspecific bilateral perinephric fat stranding possibly related to   chronic medical renal disease with appropriate clinical history. 2. Mild prominence of bilateral renal pelves which may be physiologic. No   evidence of renal or ureteral calculus. 3. Diverticulosis. XR CHEST PORTABLE   Final Result   Coarse interstitial markings and atherosclerotic disease. No new   cardiopulmonary pathology. CT head without contrast   Final Result   No acute intracranial abnormality. Specifically, there is no acute   intracranial hemorrhage      Old lacunar infarct within the left basal ganglia. If the patient's symptoms persist follow-up MRI is recommended.              ------------------------- NURSING NOTES AND VITALS REVIEWED ---------------------------   The nursing notes within the ED encounter and vital signs as below have been reviewed. BP (!) 135/91   Pulse 99   Temp 97.5 °F (36.4 °C)   Resp 18   Ht 5' 1\" (1.549 m)   Wt 100 lb (45.4 kg)   SpO2 94%   BMI 18.89 kg/m²   Oxygen Saturation Interpretation: Normal      ---------------------------------------------------PHYSICAL EXAM--------------------------------------    Constitutional/General: Alert and oriented x3, well appearing, non toxic in NAD  Head: Normocephalic and atraumatic  Eyes: PERRL, EOMI, conjunctiva normal, sclera non icteric  Mouth: Oropharynx clear, handling secretions, no trismus, no asymmetry of the posterior oropharynx or uvular edema  Neck: Supple, full ROM, non tender to palpation in the midline, no stridor, no crepitus, no meningeal signs  Respiratory: Lungs clear to auscultation bilaterally, no wheezes, rales, or rhonchi. Not in respiratory distress  Cardiovascular:  Regular rate. Regular rhythm. No murmurs, gallops, or rubs. 2+ distal pulses  Chest: No chest wall tenderness  GI:  Abdomen Soft, Non tender, Non distended. +BS. No organomegaly, no palpable masses,  No rebound, guarding, or rigidity. Musculoskeletal: Moves all extremities x 4. Warm and well perfused, no clubbing, cyanosis, or edema. Capillary refill <3 seconds  Integument: skin warm and dry. No rashes.    Lymphatic: no lymphadenopathy noted  Neurologic: GCS 14 no focal deficits, symmetric strength 5/5 in the upper and lower extremities bilaterally  Psychiatric: Normal Affect      ------------------------------ ED COURSE/MEDICAL DECISION MAKING----------------------  Medications   potassium & sodium phosphates (PHOS-NAK) 280-160-250 MG packet 250 mg (250 mg Oral Given 12/30/22 0846)   piperacillin-tazobactam (ZOSYN) 3,375 mg in sodium chloride 0.9 % 50 mL IVPB ([id number]) (0 mg IntraVENous Stopped 12/27/22 1613)   potassium chloride 10 mEq/100 mL IVPB (Peripheral Line) (10 mEq IntraVENous New Bag 12/28/22 0850)     Followed by   potassium chloride 10 mEq/100 mL IVPB (Peripheral Line) ( IntraVENous Stopped 12/28/22 1058)   thiamine (B-1) 500 mg in sodium chloride 0.9 % 100 mL IVPB (0 mg IntraVENous Stopped 12/29/22 2129)     Followed by   thiamine (B-1) injection 250 mg (250 mg IntraVENous Given 1/3/23 0956)   potassium chloride (KLOR-CON M) extended release tablet 20 mEq (20 mEq Oral Given 12/28/22 1108)   potassium chloride (KLOR-CON M) extended release tablet 20 mEq (20 mEq Oral Given 12/28/22 1655)   magnesium sulfate 2000 mg in 50 mL IVPB premix (0 mg IntraVENous Stopped 12/30/22 1115)   magnesium sulfate 2000 mg in 50 mL IVPB premix (0 mg IntraVENous Stopped 1/1/23 1330)   potassium chloride (KLOR-CON M) extended release tablet 20 mEq (20 mEq Oral Given 1/1/23 1403)   iopamidol (ISOVUE-370) 76 % injection 75 mL (75 mLs IntraVENous Given 1/1/23 1742)   calcium gluconate 1,000 mg in dextrose 5 % 100 mL IVPB (0 mg IntraVENous Stopped 1/4/23 1315)   cosyntropin (CORTROSYN) injection 250 mcg (250 mcg IntraVENous Given 1/5/23 0857)   insulin regular (HUMULIN R;NOVOLIN R) injection 10 Units (10 Units IntraVENous Given 1/5/23 1135)   dextrose 50 % IV solution (25 g IntraVENous Given 1/5/23 1134)   sodium zirconium cyclosilicate (LOKELMA) oral suspension 10 g (10 g Oral Given 1/6/23 0827)   calcium gluconate 10 % injection 1,000 mg (1,000 mg IntraVENous Given 1/5/23 1134)   cosyntropin (CORTROSYN) injection 250 mcg (250 mcg IntraVENous Given 1/5/23 1331)         Medical Decision Making:    Patient seen and examined. She was recently discharged from hospital for hyponatremia and alcohol withdrawal.   Patient AMS in ER  Patient had IV placed, labs drawn and CTH.   Patient was febrile and tachycardic in ER --> patient was given IV fluids and ABX  Patient severe hyponatremia in ER  Patient will be admitted to medicine/ICU for severe hyponatremia/sepsis      Counseling:   The emergency provider has spoken with the patient and discussed today’s  results, in addition to providing specific details for the plan of care and counseling regarding the diagnosis and prognosis. Questions are answered at this time and they are agreeable with the plan.      --------------------------------- IMPRESSION AND DISPOSITION ---------------------------------    IMPRESSION  1. Alcohol abuse    2. Hyponatremia    3. RYLEE (acute kidney injury) ([facility] [address]    4.  Physical deconditioning        DISPOSITION  Disposition: Admit to CCU/ICU  Patient condition is serious                Jerona Sacks, MD  01/10/23 0324

## 2022-12-27 NOTE — TELEPHONE ENCOUNTER
Writer contacted Dr Romain Kat to inform of 30 day readmission risk. No Decision on disposition at this time.     Call Back: If you need to call back to inform of disposition you can contact me at [phone]

## 2022-12-27 NOTE — ED NOTES
Pt presnted to ed for AMS. Pt is alert to self but does not know the year she was born. Pt states that the year is \"[age]something\". Pt does not know why she is here. States that she stays with her friend who said she needed checked out so she agreed. Pt reports that she drinks wine daily, last drink being today. Pt denies pain.       Bobo Pagan RN  12/27/22 9458

## 2022-12-28 PROBLEM — F10.10 ALCOHOL ABUSE: Status: ACTIVE | Noted: 2022-12-28

## 2022-12-28 PROBLEM — J41.0 SIMPLE CHRONIC BRONCHITIS (HCC): Status: ACTIVE | Noted: 2022-12-28

## 2022-12-28 PROBLEM — N17.9 AKI (ACUTE KIDNEY INJURY) (HCC): Status: ACTIVE | Noted: 2022-12-28

## 2022-12-28 PROBLEM — R41.0 DISORIENTATION: Status: ACTIVE | Noted: 2022-12-28

## 2022-12-28 LAB
ADENOVIRUS BY PCR: NOT DETECTED
AMMONIA: 46 UMOL/L (ref 11–51)
ANION GAP SERPL CALCULATED.3IONS-SCNC: 10 MMOL/L (ref 7–16)
ANION GAP SERPL CALCULATED.3IONS-SCNC: 11 MMOL/L (ref 7–16)
ANION GAP SERPL CALCULATED.3IONS-SCNC: 12 MMOL/L (ref 7–16)
ANION GAP SERPL CALCULATED.3IONS-SCNC: 12 MMOL/L (ref 7–16)
ANION GAP SERPL CALCULATED.3IONS-SCNC: 13 MMOL/L (ref 7–16)
ANISOCYTOSIS: ABNORMAL
BASOPHILS ABSOLUTE: 0 E9/L (ref 0–0.2)
BORDETELLA PARAPERTUSSIS BY PCR: NOT DETECTED
BORDETELLA PERTUSSIS BY PCR: NOT DETECTED
BUN BLDV-MCNC: 28 MG/DL (ref 6–23)
BUN BLDV-MCNC: 30 MG/DL (ref 6–23)
BUN BLDV-MCNC: 30 MG/DL (ref 6–23)
BUN BLDV-MCNC: 32 MG/DL (ref 6–23)
BUN BLDV-MCNC: 34 MG/DL (ref 6–23)
CALCIUM SERPL-MCNC: 8.4 MG/DL (ref 8.6–10.2)
CALCIUM SERPL-MCNC: 8.6 MG/DL (ref 8.6–10.2)
CALCIUM SERPL-MCNC: 8.7 MG/DL (ref 8.6–10.2)
CALCIUM SERPL-MCNC: 9 MG/DL (ref 8.6–10.2)
CALCIUM SERPL-MCNC: 9.1 MG/DL (ref 8.6–10.2)
CHLAMYDOPHILIA PNEUMONIAE BY PCR: NOT DETECTED
CHLORIDE BLD-SCNC: 91 MMOL/L (ref 98–107)
CHLORIDE BLD-SCNC: 95 MMOL/L (ref 98–107)
CHLORIDE BLD-SCNC: 96 MMOL/L (ref 98–107)
CHLORIDE BLD-SCNC: 98 MMOL/L (ref 98–107)
CHLORIDE BLD-SCNC: 99 MMOL/L (ref 98–107)
CO2: 18 MMOL/L (ref 22–29)
CO2: 20 MMOL/L (ref 22–29)
CO2: 21 MMOL/L (ref 22–29)
CORONAVIRUS 229E BY PCR: NOT DETECTED
CORONAVIRUS HKU1 BY PCR: NOT DETECTED
CORONAVIRUS NL63 BY PCR: NOT DETECTED
CORONAVIRUS OC43 BY PCR: NOT DETECTED
CREAT SERPL-MCNC: 1.3 MG/DL (ref 0.5–1)
CREAT SERPL-MCNC: 1.4 MG/DL (ref 0.5–1)
EKG ATRIAL RATE: 128 BPM
EKG P AXIS: 65 DEGREES
EKG P-R INTERVAL: 128 MS
EKG Q-T INTERVAL: 314 MS
EKG QRS DURATION: 68 MS
EKG QTC CALCULATION (BAZETT): 458 MS
EKG R AXIS: 63 DEGREES
EKG T AXIS: 60 DEGREES
EKG VENTRICULAR RATE: 128 BPM
EOSINOPHILS ABSOLUTE: 0 E9/L (ref 0.05–0.5)
GFR SERPL CREATININE-BSD FRML MDRD: 42 ML/MIN/1.73
GFR SERPL CREATININE-BSD FRML MDRD: 46 ML/MIN/1.73
GLUCOSE BLD-MCNC: 112 MG/DL (ref 74–99)
GLUCOSE BLD-MCNC: 113 MG/DL (ref 74–99)
GLUCOSE BLD-MCNC: 118 MG/DL (ref 74–99)
GLUCOSE BLD-MCNC: 90 MG/DL (ref 74–99)
GLUCOSE BLD-MCNC: 92 MG/DL (ref 74–99)
HCT VFR BLD CALC: 26.2 % (ref 34–48)
HEMOGLOBIN: 10.1 G/DL (ref 11.5–15.5)
HUMAN METAPNEUMOVIRUS BY PCR: NOT DETECTED
HUMAN RHINOVIRUS/ENTEROVIRUS BY PCR: NOT DETECTED
INFLUENZA A BY PCR: NOT DETECTED
INFLUENZA B BY PCR: NOT DETECTED
LACTIC ACID: 1.3 MMOL/L (ref 0.5–2.2)
LYMPHOCYTES ABSOLUTE: 0.33 E9/L (ref 1.5–4)
LYMPHOCYTES RELATIVE PERCENT: 1.8 % (ref 20–42)
MAGNESIUM: 1.9 MG/DL (ref 1.6–2.6)
MAGNESIUM: 2 MG/DL (ref 1.6–2.6)
MCH RBC QN AUTO: 37.4 PG (ref 26–35)
MCHC RBC AUTO-ENTMCNC: 38.5 % (ref 32–34.5)
MCV RBC AUTO: 97 FL (ref 80–99.9)
METER GLUCOSE: 136 MG/DL (ref 74–99)
METER GLUCOSE: 138 MG/DL (ref 74–99)
MONOCYTES ABSOLUTE: 1.66 E9/L (ref 0.1–0.95)
MONOCYTES RELATIVE PERCENT: 9.6 % (ref 2–12)
MYCOPLASMA PNEUMONIAE BY PCR: NOT DETECTED
NEUTROPHILS ABSOLUTE: 14.77 E9/L (ref 1.8–7.3)
NEUTROPHILS RELATIVE PERCENT: 88.6 % (ref 43–80)
NUCLEATED RED BLOOD CELLS: 0.9 /100 WBC
PARAINFLUENZA VIRUS 1 BY PCR: NOT DETECTED
PARAINFLUENZA VIRUS 2 BY PCR: NOT DETECTED
PARAINFLUENZA VIRUS 3 BY PCR: NOT DETECTED
PARAINFLUENZA VIRUS 4 BY PCR: NOT DETECTED
PDW BLD-RTO: 16 FL (ref 11.5–15)
PHOSPHORUS: 2.7 MG/DL (ref 2.5–4.5)
PHOSPHORUS: 2.7 MG/DL (ref 2.5–4.5)
PLATELET # BLD: 213 E9/L (ref 130–450)
PMV BLD AUTO: 11.1 FL (ref 7–12)
POIKILOCYTES: ABNORMAL
POTASSIUM REFLEX MAGNESIUM: 3.2 MMOL/L (ref 3.5–5)
POTASSIUM REFLEX MAGNESIUM: 3.4 MMOL/L (ref 3.5–5)
POTASSIUM REFLEX MAGNESIUM: 3.4 MMOL/L (ref 3.5–5)
POTASSIUM REFLEX MAGNESIUM: 4.1 MMOL/L (ref 3.5–5)
POTASSIUM SERPL-SCNC: 4.1 MMOL/L (ref 3.5–5)
RBC # BLD: 2.7 E12/L (ref 3.5–5.5)
REASON FOR REJECTION: NORMAL
REJECTED TEST: NORMAL
RESPIRATORY SYNCYTIAL VIRUS BY PCR: NOT DETECTED
SARS-COV-2, PCR: NOT DETECTED
SODIUM BLD-SCNC: 125 MMOL/L (ref 132–146)
SODIUM BLD-SCNC: 126 MMOL/L (ref 132–146)
SODIUM BLD-SCNC: 127 MMOL/L (ref 132–146)
SODIUM BLD-SCNC: 129 MMOL/L (ref 132–146)
SODIUM BLD-SCNC: 131 MMOL/L (ref 132–146)
TARGET CELLS: ABNORMAL
TSH SERPL DL<=0.05 MIU/L-ACNC: 0.58 UIU/ML (ref 0.27–4.2)
WBC # BLD: 16.6 E9/L (ref 4.5–11.5)

## 2022-12-28 PROCEDURE — 94640 AIRWAY INHALATION TREATMENT: CPT

## 2022-12-28 PROCEDURE — 93010 ELECTROCARDIOGRAM REPORT: CPT | Performed by: INTERNAL MEDICINE

## 2022-12-28 PROCEDURE — 85025 COMPLETE CBC W/AUTO DIFF WBC: CPT

## 2022-12-28 PROCEDURE — 6360000002 HC RX W HCPCS: Performed by: INTERNAL MEDICINE

## 2022-12-28 PROCEDURE — 36415 COLL VENOUS BLD VENIPUNCTURE: CPT

## 2022-12-28 PROCEDURE — 6370000000 HC RX 637 (ALT 250 FOR IP): Performed by: INTERNAL MEDICINE

## 2022-12-28 PROCEDURE — 82140 ASSAY OF AMMONIA: CPT

## 2022-12-28 PROCEDURE — 2580000003 HC RX 258

## 2022-12-28 PROCEDURE — 82962 GLUCOSE BLOOD TEST: CPT

## 2022-12-28 PROCEDURE — 2580000003 HC RX 258: Performed by: INTERNAL MEDICINE

## 2022-12-28 PROCEDURE — 6370000000 HC RX 637 (ALT 250 FOR IP)

## 2022-12-28 PROCEDURE — 99223 1ST HOSP IP/OBS HIGH 75: CPT | Performed by: FAMILY MEDICINE

## 2022-12-28 PROCEDURE — 93005 ELECTROCARDIOGRAM TRACING: CPT | Performed by: INTERNAL MEDICINE

## 2022-12-28 PROCEDURE — 80048 BASIC METABOLIC PNL TOTAL CA: CPT

## 2022-12-28 PROCEDURE — 84443 ASSAY THYROID STIM HORMONE: CPT

## 2022-12-28 PROCEDURE — 84100 ASSAY OF PHOSPHORUS: CPT

## 2022-12-28 PROCEDURE — 0202U NFCT DS 22 TRGT SARS-COV-2: CPT

## 2022-12-28 PROCEDURE — 83605 ASSAY OF LACTIC ACID: CPT

## 2022-12-28 PROCEDURE — 2500000003 HC RX 250 WO HCPCS: Performed by: INTERNAL MEDICINE

## 2022-12-28 PROCEDURE — 2060000000 HC ICU INTERMEDIATE R&B

## 2022-12-28 PROCEDURE — 99233 SBSQ HOSP IP/OBS HIGH 50: CPT | Performed by: INTERNAL MEDICINE

## 2022-12-28 PROCEDURE — 83735 ASSAY OF MAGNESIUM: CPT

## 2022-12-28 PROCEDURE — 6360000002 HC RX W HCPCS

## 2022-12-28 RX ORDER — DEXTROSE MONOHYDRATE 100 MG/ML
INJECTION, SOLUTION INTRAVENOUS CONTINUOUS PRN
Status: DISCONTINUED | OUTPATIENT
Start: 2022-12-28 | End: 2023-01-06 | Stop reason: HOSPADM

## 2022-12-28 RX ORDER — POTASSIUM CHLORIDE 7.45 MG/ML
10 INJECTION INTRAVENOUS ONCE
Status: COMPLETED | OUTPATIENT
Start: 2022-12-28 | End: 2022-12-28

## 2022-12-28 RX ORDER — THIAMINE HYDROCHLORIDE 100 MG/ML
100 INJECTION, SOLUTION INTRAMUSCULAR; INTRAVENOUS DAILY
Status: DISCONTINUED | OUTPATIENT
Start: 2022-12-30 | End: 2022-12-28

## 2022-12-28 RX ORDER — POTASSIUM CHLORIDE 20 MEQ/1
20 TABLET, EXTENDED RELEASE ORAL ONCE
Status: COMPLETED | OUTPATIENT
Start: 2022-12-28 | End: 2022-12-28

## 2022-12-28 RX ORDER — LANOLIN ALCOHOL/MO/W.PET/CERES
100 CREAM (GRAM) TOPICAL DAILY
Status: DISCONTINUED | OUTPATIENT
Start: 2023-01-04 | End: 2023-01-06 | Stop reason: HOSPADM

## 2022-12-28 RX ORDER — DEXTROSE MONOHYDRATE 50 MG/ML
INJECTION, SOLUTION INTRAVENOUS CONTINUOUS
Status: DISCONTINUED | OUTPATIENT
Start: 2022-12-28 | End: 2022-12-29

## 2022-12-28 RX ORDER — THIAMINE HYDROCHLORIDE 100 MG/ML
250 INJECTION, SOLUTION INTRAMUSCULAR; INTRAVENOUS DAILY
Status: COMPLETED | OUTPATIENT
Start: 2022-12-30 | End: 2023-01-03

## 2022-12-28 RX ADMIN — CETIRIZINE HYDROCHLORIDE 10 MG: 10 TABLET, FILM COATED ORAL at 21:30

## 2022-12-28 RX ADMIN — SODIUM CHLORIDE, PRESERVATIVE FREE 10 ML: 5 INJECTION INTRAVENOUS at 09:00

## 2022-12-28 RX ADMIN — IPRATROPIUM BROMIDE AND ALBUTEROL SULFATE 1 AMPULE: .5; 2.5 SOLUTION RESPIRATORY (INHALATION) at 13:05

## 2022-12-28 RX ADMIN — IPRATROPIUM BROMIDE AND ALBUTEROL SULFATE 1 AMPULE: .5; 2.5 SOLUTION RESPIRATORY (INHALATION) at 16:36

## 2022-12-28 RX ADMIN — POTASSIUM CHLORIDE 10 MEQ: 7.46 INJECTION, SOLUTION INTRAVENOUS at 08:50

## 2022-12-28 RX ADMIN — BUDESONIDE 500 MCG: 0.5 SUSPENSION RESPIRATORY (INHALATION) at 09:53

## 2022-12-28 RX ADMIN — LORAZEPAM 2 MG: 1 TABLET ORAL at 00:34

## 2022-12-28 RX ADMIN — DEXTROSE MONOHYDRATE: 50 INJECTION, SOLUTION INTRAVENOUS at 08:14

## 2022-12-28 RX ADMIN — THIAMINE HYDROCHLORIDE 500 MG: 100 INJECTION, SOLUTION INTRAMUSCULAR; INTRAVENOUS at 11:06

## 2022-12-28 RX ADMIN — FOLIC ACID 1 MG: 5 INJECTION, SOLUTION INTRAMUSCULAR; INTRAVENOUS; SUBCUTANEOUS at 08:21

## 2022-12-28 RX ADMIN — POTASSIUM CHLORIDE 20 MEQ: 1500 TABLET, EXTENDED RELEASE ORAL at 16:55

## 2022-12-28 RX ADMIN — IPRATROPIUM BROMIDE AND ALBUTEROL SULFATE 1 AMPULE: .5; 2.5 SOLUTION RESPIRATORY (INHALATION) at 21:20

## 2022-12-28 RX ADMIN — LACTULOSE 20 G: 20 SOLUTION ORAL at 00:47

## 2022-12-28 RX ADMIN — ENOXAPARIN SODIUM 30 MG: 100 INJECTION SUBCUTANEOUS at 11:04

## 2022-12-28 RX ADMIN — LACTULOSE 20 G: 20 SOLUTION ORAL at 15:00

## 2022-12-28 RX ADMIN — BUDESONIDE 500 MCG: 0.5 SUSPENSION RESPIRATORY (INHALATION) at 21:20

## 2022-12-28 RX ADMIN — THIAMINE HYDROCHLORIDE 500 MG: 100 INJECTION, SOLUTION INTRAMUSCULAR; INTRAVENOUS at 16:56

## 2022-12-28 RX ADMIN — CEFTRIAXONE 1000 MG: 1 INJECTION, POWDER, FOR SOLUTION INTRAMUSCULAR; INTRAVENOUS at 00:47

## 2022-12-28 RX ADMIN — POTASSIUM CHLORIDE 20 MEQ: 1500 TABLET, EXTENDED RELEASE ORAL at 11:08

## 2022-12-28 RX ADMIN — SODIUM CHLORIDE, PRESERVATIVE FREE 10 ML: 5 INJECTION INTRAVENOUS at 21:30

## 2022-12-28 RX ADMIN — POTASSIUM CHLORIDE 10 MEQ: 7.46 INJECTION, SOLUTION INTRAVENOUS at 10:00

## 2022-12-28 RX ADMIN — ACETAMINOPHEN 650 MG: 325 TABLET ORAL at 22:33

## 2022-12-28 RX ADMIN — THIAMINE HYDROCHLORIDE 500 MG: 100 INJECTION, SOLUTION INTRAMUSCULAR; INTRAVENOUS at 21:33

## 2022-12-28 RX ADMIN — CEFTRIAXONE 1000 MG: 1 INJECTION, POWDER, FOR SOLUTION INTRAMUSCULAR; INTRAVENOUS at 23:31

## 2022-12-28 RX ADMIN — ACETAMINOPHEN 650 MG: 325 TABLET ORAL at 09:47

## 2022-12-28 RX ADMIN — MONTELUKAST 10 MG: 10 TABLET, FILM COATED ORAL at 11:01

## 2022-12-28 RX ADMIN — LACTULOSE 20 G: 20 SOLUTION ORAL at 09:51

## 2022-12-28 RX ADMIN — PANTOPRAZOLE SODIUM 40 MG: 40 TABLET, DELAYED RELEASE ORAL at 09:47

## 2022-12-28 RX ADMIN — IPRATROPIUM BROMIDE AND ALBUTEROL SULFATE 1 AMPULE: .5; 2.5 SOLUTION RESPIRATORY (INHALATION) at 09:53

## 2022-12-28 ASSESSMENT — PAIN DESCRIPTION - DESCRIPTORS: DESCRIPTORS: ACHING;DISCOMFORT

## 2022-12-28 ASSESSMENT — PAIN SCALES - GENERAL
PAINLEVEL_OUTOF10: 0
PAINLEVEL_OUTOF10: 3

## 2022-12-28 ASSESSMENT — PAIN DESCRIPTION - ORIENTATION: ORIENTATION: LOWER

## 2022-12-28 ASSESSMENT — PAIN - FUNCTIONAL ASSESSMENT: PAIN_FUNCTIONAL_ASSESSMENT: ACTIVITIES ARE NOT PREVENTED

## 2022-12-28 ASSESSMENT — PAIN DESCRIPTION - LOCATION: LOCATION: BACK

## 2022-12-28 NOTE — PLAN OF CARE
Problem: Discharge Planning  Goal: Discharge to home or other facility with appropriate resources  Outcome: Progressing     Problem: Pain  Goal: Verbalizes/displays adequate comfort level or baseline comfort level  Outcome: Progressing     Problem: Safety - Adult  Goal: Free from fall injury  Outcome: Progressing     Problem: Neurosensory - Adult  Goal: Achieves stable or improved neurological status  Outcome: Progressing  Flowsheets (Taken 12/28/2022 0653)  Achieves stable or improved neurological status:   Assess for and report changes in neurological status   Initiate measures to prevent increased intracranial pressure   Maintain blood pressure and fluid volume within ordered parameters to optimize cerebral perfusion and minimize risk of hemorrhage   Monitor temperature, glucose, and sodium.  Initiate appropriate interventions as ordered     Problem: Gastrointestinal - Adult  Goal: Minimal or absence of nausea and vomiting  Outcome: Progressing  Flowsheets (Taken 12/28/2022 0653)  Minimal or absence of nausea and vomiting:   Administer IV fluids as ordered to ensure adequate hydration   Maintain NPO status until nausea and vomiting are resolved   Administer ordered antiemetic medications as needed     Problem: Metabolic/Fluid and Electrolytes - Adult  Goal: Electrolytes maintained within normal limits  Outcome: Progressing  Flowsheets (Taken 12/28/2022 0653)  Electrolytes maintained within normal limits:   Monitor labs and assess patient for signs and symptoms of electrolyte imbalances   Administer electrolyte replacement as ordered   Monitor response to electrolyte replacements, including repeat lab results as appropriate   Fluid restriction as ordered   Instruct patient on fluid and nutrition restrictions as appropriate

## 2022-12-28 NOTE — CARE COORDINATION
12/28 Care Coordination:Pt  presented to the ED by the recommendation of her friend due to altered mental status. [place] [facility] [place] Was in for hyponatremia . Patient left the MICU AMA. Sodium was 129. Hx ETOH Abuse. she has followed for out pt counseling at [facility] in the past.Spoke with pt in Room, Pt is lethargic, able to answer some questions. States she lives with her mom. Has been Independent. Tried to discuss discharge planning, Pt does agree to [facility] if needed. Was unable to get choice d/t her lethargy. Will try to call her mom. Spoke with her mom, Pt at times is living with her, she has a drinking problem and will not get help per her mom. Discussed discharge plan/[facility] with Mom, She wants her to go where ever she can get help. States she is 80 and can not care for her at home. To go back has to care for her self. Her mom also states she has two sons, she will try to contact them. We have no numbers or names. Will place referral with [facility]. Referral given to Timur Culver. Will need PT/OT. Will need to see when pt more awake if will sign her self out again. CM/SW will continue to follow for discharge planning.    Mahi JOYNER,RN-CV-BC  [phone]

## 2022-12-28 NOTE — PROGRESS NOTES
[facility]  Family Medicine Attending    S: 58 y.o. female with PMH of COPD, Tobacco Abuse, Alcohol Abuse, Cirrhosis, Hyponatremia, and Thyroid Disease presents with altered mental status. Recent admission with hyponatremia during which patient left AMA from MICU. Had been complaining of lower abdominal and back pain. Continues to smoke and drink EtOH. In ER, noted to have tachycardia, hypotension, low-grade fever. Today, wakes to voice. Answers questions, but somewhat disoriented and speech is slow    O: VS- Blood pressure (!) 160/93, pulse (!) 130, temperature (!) 100.6 °F (38.1 °C), temperature source Oral, resp. rate (!) 46, height 5' 1\" (1.549 m), weight 93 lb 11.1 oz (42.5 kg), SpO2 92 %. Exam is as noted by resident with the following changes, additions or corrections:  Awake, alert, not oriented to time  Heart- RRR  Lungs - diffuse wheezing  Abdomen- Nontender today  Ext - no edema    Impressions:   Principal Problem:    Hyponatremia  Active Problems:    Simple chronic bronchitis (HCC)    Alcohol abuse    Disorientation    RYLEE (acute kidney injury) ([facility] [address]  Resolved Problems:    * No resolved hospital problems. *      Plan:   MICU admit - will follow   Nephrology consulted -appreciate recs   Gradual resolution of hyponatremia   Abx for possible sepsis   [facility] protocol - check ammonia levels   Await cultures, follow labs closely     Attending Physician Statement  I have reviewed the chart and seen the patient with the resident(s). I personally reviewed images, EKG's and similar tests, if present. I personally reviewed and performed key elements of the history and exam.  I have reviewed and confirmed student and/or resident history and exam with changes as indicated above. I agree with the assessment, plan and orders as documented by the resident. Please refer to the resident and/or student note for additional information.       Costa Vasquez MD

## 2022-12-28 NOTE — PROGRESS NOTES
[facility]   Resident Progress Note    S:  Hospital day: 1    Brief Synopsis: Alexandro Gonzales is a 58 y.o. female with a PMH of COPD, current tobacco and alcohol abuse, chronic hyponatremia, thyroid disease, pulmonary nodule and carpal tunnel syndrome. The patient presented for AMS. Patient was urged to go to ED by boyfriend who said she has been acting weird the last few days. In the ED, she complained of dysuria, suprapubic pain and CVA tenderness bilaterally. On presentation she was tachycardic, hypotensive and febrile. UA is positive for LE, WBC and rare bacteria. Culture came back to be an E. coli. She was started on 2 L of fluids and Zosyn 1 dose. Her sodium was found to be 118 as well. She was admitted for sepsis 2/2 UTI and hyponatremia. ICU was consulted from ED. She was transferred to the ICU overnight. Lactic acid came back to be 1.3 and 1.1. CT abdomen showed nonspecific fat stranding perinephric only and diverticulosis. Zosyn was switched to Rocephin. And she was put on D5W. She was started on CIWA protocol and needed 2 mg Ativan x1. Latest NA was 127. Patient was seen and examined this morning. She was drowsy and only alert to self. She had a temperature of 100.6, respiratory rate 46 and pulse 130. Patient not cooperative physical exam was limited.     Cont meds:    dextrose 50 mL/hr at 12/28/22 0814    dextrose      sodium chloride       Scheduled meds:    thiamine (VITAMIN B1) IVPB  500 mg IntraVENous TID    Followed by    Ramy Thomas ON 12/30/2022] thiamine  250 mg IntraVENous Daily    Followed by    Ramy Thomas ON 1/4/2023] thiamine  100 mg Oral Daily    sodium chloride flush  5-40 mL IntraVENous 2 times per day    enoxaparin  30 mg SubCUTAneous Daily    ipratropium-albuterol  1 ampule Inhalation Q4H WA    budesonide  0.5 mg Nebulization BID    cetirizine  10 mg Oral Daily    [Held by provider] cyclobenzaprine  5 mg Oral Nightly    montelukast  10 mg Oral Daily pantoprazole  40 mg Oral QAM AC    folic acid  1 mg IntraVENous Daily    lactulose  20 g Oral TID    cefTRIAXone (ROCEPHIN) IV  1,000 mg IntraVENous Q24H     PRN meds: glucose, dextrose bolus **OR** dextrose bolus, glucagon (rDNA), dextrose, sodium chloride flush, sodium chloride, acetaminophen, ondansetron **OR** ondansetron, acetaminophen, LORazepam **OR** LORazepam **OR** LORazepam **OR** LORazepam **OR** LORazepam **OR** LORazepam **OR** LORazepam **OR** LORazepam     I reviewed the patient's Past Medical and Surgical History, Medications and Allergies. O:  VS: BP (!) 160/112   Pulse (!) 127   Temp (!) 100.6 °F (38.1 °C) (Oral)   Resp (!) 37   Ht 5' 1\" (1.549 m)   Wt 93 lb 11.1 oz (42.5 kg)   SpO2 100%   BMI 17.70 kg/m²     Physical Exam:  Physical Exam  Vitals reviewed. Constitutional:       General: She is not in acute distress. Appearance: She is ill-appearing. HENT:      Mouth/Throat:      Mouth: Mucous membranes are dry. Cardiovascular:      Rate and Rhythm: Regular rhythm. Tachycardia present. Heart sounds: No murmur heard. No gallop. Pulmonary:      Breath sounds: Decreased air movement present. No wheezing. Abdominal:      Palpations: Abdomen is soft. Tenderness: There is no abdominal tenderness. There is no guarding. Comments: Mildly distended. Musculoskeletal:      Cervical back: Normal range of motion and neck supple. Skin:     General: Skin is dry. Neurological:      Mental Status: She is disoriented. Labs:  Na/K/Cl/CO2:  129/3.4/96/21 (12/28 4263)  BUN/Cr/glu/ALT/AST/amyl/lip:  30/1.3/--/--/--/--/-- (12/28 2750)  WBC/Hgb/Hct/Plts:  17.1/10.6/27.4/174 (12/27 1133)  estimated creatinine clearance is 30 mL/min (A) (based on SCr of 1.3 mg/dL (H)). Other pertinent labs as noted below    New Imaging:  CT ABDOMEN PELVIS WO CONTRAST Additional Contrast? None   Final Result   1.  Nonspecific bilateral perinephric fat stranding possibly related to chronic medical renal disease with appropriate clinical history. 2. Mild prominence of bilateral renal pelves which may be physiologic. No   evidence of renal or ureteral calculus. 3. Diverticulosis. XR CHEST PORTABLE   Final Result   Coarse interstitial markings and atherosclerotic disease. No new   cardiopulmonary pathology. CT head without contrast   Final Result   No acute intracranial abnormality. Specifically, there is no acute   intracranial hemorrhage      Old lacunar infarct within the left basal ganglia. If the patient's symptoms persist follow-up MRI is recommended. A/P:  Principal Problem:    Hyponatremia  Active Problems:    Simple chronic bronchitis (HCC)    Alcohol abuse    Disorientation    RYLEE (acute kidney injury) ([facility] [address]  Resolved Problems:    * No resolved hospital problems. *      Sepsis   Secondary to UTI vs Pyelonephritis vs Aspiration Pneumonia vs Abdominal infection   -EKG in the ED: Sinus tachycardia with right atrial enlargement   -Given Zosyn in the ED   -Blood cultures pending, Urine culture showed E. coli  -Chest Xray showed coarse interstitial markings and atherosclerotic disease  -Repeat EKG showed no change  -Lactic Acid 1.1, 1.3  -Pending UDS   -Negative flu and Covid tests  -Zosyn switched to Rocephin per ICU  -Non contrast ct abdomen showed nonspecific fat stranding perinephric [place] and diverticulosis. -Consult to ICU      Hyponatremia  -Na 118 in the ED, recorrected to 127 this AM  -Likely due to beer potomania from last visit   -Urine studies show hypotonic hypovolemic hyponatremia  -Nephrology consulted, Started on D5W  -Consult to ICU                             AMS 2/2 to hyponatremia vs sepsis  -CT Head: Old lacunar infarcts in the left basal ganglia.  Recommends F/U MRI  -Consider MRI Head   -pending ABG  -ammonia elevated, lactulose started TID  -PT/APTT pending      RYLEE  -Bun: 37 and Creatinine: 1.8 in the ED  -Daily CMP  -Nephrology consulted      COPD  -Daily vitals and oxygen saturation    -Pulmicort, Duonebs, and Brovana  -Oxygen as needed   -Pending ABG  -PFT 2021: FEV1/FVC 58%, FEV1 49% predicted.  GOLD Stage 3     Fibrosis of liver  -fibroscan done last hospitalization  -ammonia elevated, lactulose started TID  -PT/APTT pending                 Tobacco Abuse   -Nicotine patch                 Alcohol Abuse  -[facility] Protocol   -Ethanol level in the ED <10       Pulmonary Nodule  -Incidental finding on CT Chest on August 2021 showing 3 mm nodule in the right upper lobe  -Smokes 1.5 packs of tobacco per day for the past 20 years     DVT Prophylaxis: Lovenox  GI Prophylaxis: Protonix  Diet: Adult regular, fluid restriction      Electronically signed by Naman Salvador MD on 12/28/2022 at 12:11 PM  This case was discussed with attending physician Dr. Christobal Shone

## 2022-12-28 NOTE — CONSULTS
[facility] MD Nuria Domingo MD Theoplis Cordoba, MD Jessica Macias, JUAN Weeks, LAUREN Patton, JUAN  Consultation  12/28/2022    Thank you for consult  Full note dictated, to follow  Seen this morning  Briefly, 58 y.o. woman known to service, longstanding history of alcoholism, recurrent admissions for hyponatremia associated with beer drinkers potomania and acute intoxication/water (beer) load    [Na+] 118 on presentation erica as high as 121 dropped to 120, rebounded to 125 after initiation of IV normal saline drip at 50 cc an hour, drip stopped, Na increased to 127 mmol/L 4 hours later, D5 water drip started at 50 cc. Most recent [Na+] is 126 mmol/L at 3 PM    A/R:  1. Hyponatremia, hypovolemic, acute superimposed on chronic, due to beer drinkers potomania with acute water (beer) load. Recurrent. Goal given her alcoholism 6 to 8 mmol/L/day. At 28 hours she is 8 mmol/L above the presenting [Na+]. Continue D5 water at 50 cc an hour  May drink ad mark.  at this point  Liberalize of fluid restriction to 1500 cc a day  For now continue every 4 hours BMP, though once serum sodium above 130, will decrease frequency  Continue supportive care  Stop drinking alcohol    Nuria Teresa MD

## 2022-12-28 NOTE — CONSULTS
[facility]  Internal Medicine Residency Program  Consult  MICU    Patient:  Jean Pierre Vee 58 y.o. female MRN: [id number]     Date of Service: 12/27/2022    Hospital Day: 1      Chief complaint:  altered mental status   History of Present Illness   The patient is a 58 y.o. female with PMH of COPD, tobacco abuse, alcohol abuse, cirrhosis, and hyponatremia. She presented to the ED with chief complaint of altered mental status for about 1 week. Per the patient she was feeling weak and her boyfriend said she was acting confused so he recommended she come to the ED to get checked out. Per the patient she is an alcohol drinker and has \"1 large wine and some beer every day\", she states her last drink was \"before\" she came in but was not able to specify what she meant by before. She also reports that she has been having some urinary symptoms and states that she has had some burning/pain when she urinates and has noticed increased frequency as well. The patient was recently admitted to the MICU from 12/10 to 12/13 for hyponatremia after presenting with chief complaint of dizziness. Nephrology was consulted at that time for Na of 115. She was treated with desmopressin and D5W infusion; Na reached 125 before the patient left AMA from the MICU. In the ED the patient was oriented to self but not to time or situation. She was noted to be tachycardic with . Lab work was significant for Na 118, WBC 17.1. UA showed few bacteria, 10-20 WBC, moderate blood, moderate leukocyte esterase. CT head and Ct chest showed no acute abnormalities. She was given 2L normal saline boluses, zosyn, and tylenol in the ED and admitted to the MICU for further work-up and management. Past Medical History:      Diagnosis Date    Carpal tunnel syndrome     Pulmonary nodule     Thyroid disease        Past Surgical History:    No past surgical history on file.     Medications Prior to Admission:    Prior to Admission medications    Medication Sig Start Date End Date Taking? Authorizing Provider   montelukast (SINGULAIR) 10 MG tablet TAKE 1 TABLET BY MOUTH DAILY 11/28/22   Alfonso Art MD   MAPAP ARTHRITIS PAIN 650 MG extended release tablet TAKE 1 TABLET BY MOUTH EVERY 6 HOURS AS NEEDED FOR PAIN 10/31/22   Alfonso Art MD   cyclobenzaprine (FLEXERIL) 5 MG tablet TAKE 1 TABLET BY MOUTH EVERY EVENING AS NEEDED FOR MUSCLE SPASMS 10/31/22   Alfonso Art MD   albuterol sulfate HFA (PROVENTIL;VENTOLIN;PROAIR) 108 (90 Base) MCG/ACT inhaler INHALE 2 PUFFS INTO THE LUNGS FOUR TIMES DAILY AS NEEDED FOR WHEEZING 8/8/22   Alfonso Art MD   cetirizine (ZYRTEC) 10 MG tablet Take 1 tablet by mouth daily 4/15/22   Trang Manuel DO   FLOVENT  MCG/ACT inhaler Inhale 1 puff into the lungs 2 times daily 4/15/22   Trang Manuel DO   omeprazole (PRILOSEC) 40 MG delayed release capsule Take 1 capsule by mouth every morning (before breakfast) 4/15/22   Estevan Wren, DO   tiotropium (SPIRIVA RESPIMAT) 2.5 MCG/ACT AERS inhaler INHALE 2 PUFFS INTO THE LUNGS DAILY 4/15/22   Trang Manuel DO       Allergies:  Ibuprofen and Morphine    Social History:   TOBACCO:   reports that she has been smoking cigarettes. She started smoking about 42 years ago. She has a 20.00 pack-year smoking history. She has never used smokeless tobacco.  ETOH:   reports current alcohol use of about 2.0 standard drinks per week. OCCUPATION: employed     Family History:   No family history on file. REVIEW OF SYSTEMS:    Constitutional: No fever, no chills, no change in weight; good appetite; + generalized weakness  HEENT: No blurred vision, no ear problems, no sore throat, no rhinorrhea. Respiratory: No cough, no sputum production, no pleuritic chest pain, no shortness of breath  Cardiology: No angina, no dyspnea on exertion, no paroxysmal nocturnal dyspnea, no orthopnea, no palpitation, no leg swelling.    Gastroenterology: No dysphagia, no reflux; no abdominal pain, no nausea or vomiting; no constipation or diarrhea.  No hematochezia   Genitourinary: + dysuria, + frequency, hesitancy; no hematuria  Musculoskeletal: no joint pain, no myalgia, no change in range of movement  Neurology: no focal weakness in extremities, no slurred speech, no double vision, no tingling or numbness sensation  Endocrinology: no temperature intolerance, no polyphagia, polydipsia or polyuria  Hematology: no increased bleeding, no bruising, no lymphadenopathy  Skin: no skin changes noticed by patient  Psychology: no depressed mood, no suicidal ideation    Physical Exam   Vitals: /69   Pulse (!) 119   Temp 99 °F (37.2 °C) (Oral)   Resp 30   Ht 5' 1\" (1.549 m)   Wt 112 lb (50.8 kg)   SpO2 95%   BMI 21.16 kg/m²           General Appearance: alert, oriented to person and situation  Head: normocephalic and atraumatic  Neck: neck supple and non tender without mass   Pulmonary/Chest: clear to auscultation bilaterally- no wheezes, rales or rhonchi, normal air movement, no respiratory distress  Cardiovascular: regular rhythm, intact distal pulses, and tachycardic  Abdomen: soft, non-tender and bowel sounds normal  Extremities: no cyanosis, no clubbing, and no edema  Musculoskeletal: normal range of motion, no joint swelling, deformity or tenderness  Neurologic: oriented to self & situation; not oriented to time or place     Lines     site day    Art line   None    TLC None    PICC None    Hemoaccess None      Labs and Imaging Studies   Basic Labs  CBC:   Lab Results   Component Value Date/Time    WBC 17.1 12/27/2022 11:33 AM    RBC 2.86 12/27/2022 11:33 AM    HGB 10.6 12/27/2022 11:33 AM    HCT 27.4 12/27/2022 11:33 AM    MCV 95.8 12/27/2022 11:33 AM    RDW 15.5 12/27/2022 11:33 AM     12/27/2022 11:33 AM     CMP:  Lab Results   Component Value Date/Time     12/27/2022 09:35 PM    K 3.8 12/27/2022 09:35 PM    K 3.4 12/27/2022 05:18 PM    CL 92 12/27/2022 09:35 PM    CO2 19 12/27/2022 09:35 PM    BUN 35 12/27/2022 09:35 PM    PROT 5.6 12/27/2022 04:12 PM       Imaging Studies:     CT head without contrast    Result Date: 12/27/2022  EXAMINATION: CT OF THE HEAD WITHOUT CONTRAST  12/27/2022 12:39 pm TECHNIQUE: CT of the head was performed without the administration of intravenous contrast. Automated exposure control, iterative reconstruction, and/or weight based adjustment of the mA/kV was utilized to reduce the radiation dose to as low as reasonably achievable. COMPARISON: 12/10/2022 HISTORY: ORDERING SYSTEM PROVIDED HISTORY: AMS, TECHNOLOGIST PROVIDED HISTORY: Has a \"code stroke\" or \"stroke alert\" been called? ->No Reason for exam:->AMS, Decision Support Exception - unselect if not a suspected or confirmed emergency medical condition->Emergency Medical Condition (MA) What reading provider will be dictating this exam?->CRC FINDINGS: BRAIN/VENTRICLES: There is no acute intracranial hemorrhage, mass effect or midline shift. No abnormal extra-axial fluid collection. The gray-white differentiation is maintained without evidence of an acute infarct. There is no evidence of hydrocephalus. There is an old lacunar for within the left basal ganglia. ORBITS: The visualized portion of the orbits demonstrate no acute abnormality. SINUSES: The visualized paranasal sinuses and mastoid air cells demonstrate no acute abnormality. SOFT TISSUES/SKULL:  No acute abnormality of the visualized skull or soft tissues. No acute intracranial abnormality. Specifically, there is no acute intracranial hemorrhage Old lacunar infarct within the left basal ganglia. If the patient's symptoms persist follow-up MRI is recommended.        XR CHEST PORTABLE    Result Date: 12/27/2022  EXAMINATION: ONE XRAY VIEW OF THE CHEST 12/27/2022 5:12 pm COMPARISON: Chest series from December 10th 2022 HISTORY: ORDERING SYSTEM PROVIDED HISTORY: cough/sepsis TECHNOLOGIST PROVIDED HISTORY: Reason for exam:->cough/sepsis What reading provider will be dictating this exam?->CRC FINDINGS: Symmetric lung inflation. Background coarsened interstitial markings. No new airspace disease, pleural effusions, or pneumothoraces. Atherosclerotic disease in the thoracic aorta. Remaining cardiomediastinal silhouette the and pulmonary vascularity appear within normal limits. Osseous and thoracic soft tissue structures demonstrate no acute findings. Coarse interstitial markings and atherosclerotic disease. No new cardiopulmonary pathology. Resident's Assessment and Plan     Silvia Bonilla is a 58 y.o. female with PMH of COPD, tobacco abuse, alcohol abuse, cirrhosis, and hyponatremia. She presented to the ED with chief complaint of altered mental status for about 1 week. Nephrology  Hypotonic hypovolemic hyponatremia   Likely 2/2 alcohol abuse  Na 118 on presentation  Patient has had multiple admissions in past for hyponatremia   PLAN  Nephrology consult  Continue to trend BMP q4h  Fluid restriction    2. RYLEE stage 3  Baseline Crt 0.8  Crt 1.5 on admission  PLAN  Follow urine electrolytes  Nephrology consulted    3. Elevated ammonia  Ammonia 56 on admission  PLAN  Begin lactulose TID    Neurology  AMS 2/2 hyponatremia  Na 118 on admission  Patient oriented to self and situation but not time or place  PLAN  Continue to monitor mentation    Infectious Disease  1. UTI  Patient with complaints of increased frequency and dysuria  UA: few bacteria, 10-20 WBC, moderate blood, moderate leukocyte esterase  Received 1 dose zosyn in ED  PLAN  Continue antibiotic coverage with rocephin  Follow urine culture  Follow lactic acid    Heme/Onc  1. Acute anemia  Likely 2/2 alcohol abuse  Hgb 10.6 on admission  PLAN  Continue to trend CBC  Continue thiamine and folic acid supplemenation    Pulmonary  1. COPD Gold Stage 3  Per outpatient records, FEV1/FVC 58%, FEV1 49% predicted (2021)  Continue duoneb, pulmicort, singulair    2.  Hx pulmonary nodule  Patient with 40 pack year history  Last CT lung screening August 2021 showing 3 mm nodule in RUL; plans to continue yearly lung screening      Other Problems  1. Alcohol abuse  Patient reports she drinks every day \"1 large wine and some beer\"  PLAN  CIWA protocol  Continue thiamine and folate supplementation    2. Hx tobacco abuse        PT/OT evaluation: not indicated at this time  DVT prophylaxis/ GI prophylaxis: lovenox/protonix  Disposition: continue current care    Noemi Pandey MD, PGY-1   Attending physician: Dr. Rashmi Thomas  Department of Pulmonary, Critical Care and Sleep Medicine  [address] [facility]  Department of Internal Medicine      During multidisciplinary team rounds Margot garcia a 58 y.o. female was seen, examined and discussed. This is confirmation that I have personally seen and examined the patient and that the key elements of the encounter were performed by me (> 85 % time). The medications & laboratory data was discussed and adjusted where necessary. The radiographic images were reviewed or with radiologist or consultant if felt dis-concordant with the exam or history. The above findings were corroborated, plans confirmed and changes made if needed. Family is updated at the bedside as available. Key issues of the case were discussed among consultants. Critical Care time is documented if appropriate.       Hyponatremia improving  Mentation improving  OK to transfer to floor  Continue CIWA protocol        Dania Aguero DO

## 2022-12-29 ENCOUNTER — APPOINTMENT (OUTPATIENT)
Dept: GENERAL RADIOLOGY | Age: 62
DRG: 720 | End: 2022-12-29
Payer: MEDICAID

## 2022-12-29 LAB
ANION GAP SERPL CALCULATED.3IONS-SCNC: 10 MMOL/L (ref 7–16)
ANION GAP SERPL CALCULATED.3IONS-SCNC: 10 MMOL/L (ref 7–16)
ANION GAP SERPL CALCULATED.3IONS-SCNC: 15 MMOL/L (ref 7–16)
ANISOCYTOSIS: ABNORMAL
B.E.: -1.2 MMOL/L (ref -3–3)
BASOPHILIC STIPPLING: ABNORMAL
BASOPHILS ABSOLUTE: 0 E9/L (ref 0–0.2)
BASOPHILS RELATIVE PERCENT: 0.3 % (ref 0–2)
BUN BLDV-MCNC: 17 MG/DL (ref 6–23)
BUN BLDV-MCNC: 24 MG/DL (ref 6–23)
BUN BLDV-MCNC: 25 MG/DL (ref 6–23)
CALCIUM SERPL-MCNC: 8.7 MG/DL (ref 8.6–10.2)
CALCIUM SERPL-MCNC: 8.8 MG/DL (ref 8.6–10.2)
CALCIUM SERPL-MCNC: 9.3 MG/DL (ref 8.6–10.2)
CHLORIDE BLD-SCNC: 101 MMOL/L (ref 98–107)
CHLORIDE BLD-SCNC: 101 MMOL/L (ref 98–107)
CHLORIDE BLD-SCNC: 103 MMOL/L (ref 98–107)
CO2: 17 MMOL/L (ref 22–29)
CO2: 21 MMOL/L (ref 22–29)
CO2: 21 MMOL/L (ref 22–29)
COHB: 2.1 % (ref 0–1.5)
CREAT SERPL-MCNC: 1 MG/DL (ref 0.5–1)
CREAT SERPL-MCNC: 1 MG/DL (ref 0.5–1)
CREAT SERPL-MCNC: 1.2 MG/DL (ref 0.5–1)
CRITICAL: ABNORMAL
DATE ANALYZED: ABNORMAL
DATE OF COLLECTION: ABNORMAL
EKG ATRIAL RATE: 125 BPM
EKG P AXIS: 64 DEGREES
EKG P-R INTERVAL: 130 MS
EKG Q-T INTERVAL: 316 MS
EKG QRS DURATION: 62 MS
EKG QTC CALCULATION (BAZETT): 456 MS
EKG R AXIS: 60 DEGREES
EKG T AXIS: 53 DEGREES
EKG VENTRICULAR RATE: 125 BPM
EOSINOPHILS ABSOLUTE: 0.27 E9/L (ref 0.05–0.5)
EOSINOPHILS RELATIVE PERCENT: 1.7 % (ref 0–6)
GFR SERPL CREATININE-BSD FRML MDRD: 51 ML/MIN/1.73
GFR SERPL CREATININE-BSD FRML MDRD: >60 ML/MIN/1.73
GFR SERPL CREATININE-BSD FRML MDRD: >60 ML/MIN/1.73
GLUCOSE BLD-MCNC: 112 MG/DL (ref 74–99)
GLUCOSE BLD-MCNC: 133 MG/DL (ref 74–99)
GLUCOSE BLD-MCNC: 97 MG/DL (ref 74–99)
HCO3: 22.4 MMOL/L (ref 22–26)
HCT VFR BLD CALC: 23.4 % (ref 34–48)
HEMOGLOBIN: 8.8 G/DL (ref 11.5–15.5)
HHB: 8.5 % (ref 0–5)
LAB: ABNORMAL
LYMPHOCYTES ABSOLUTE: 1.1 E9/L (ref 1.5–4)
LYMPHOCYTES RELATIVE PERCENT: 7 % (ref 20–42)
Lab: ABNORMAL
MAGNESIUM: 1.5 MG/DL (ref 1.6–2.6)
MAGNESIUM: 1.7 MG/DL (ref 1.6–2.6)
MAGNESIUM: 1.7 MG/DL (ref 1.6–2.6)
MCH RBC QN AUTO: 37.6 PG (ref 26–35)
MCHC RBC AUTO-ENTMCNC: 37.6 % (ref 32–34.5)
MCV RBC AUTO: 100 FL (ref 80–99.9)
METHB: 0.3 % (ref 0–1.5)
MODE: ABNORMAL
MONOCYTES ABSOLUTE: 1.57 E9/L (ref 0.1–0.95)
MONOCYTES RELATIVE PERCENT: 9.6 % (ref 2–12)
NEUTROPHILS ABSOLUTE: 12.87 E9/L (ref 1.8–7.3)
NEUTROPHILS RELATIVE PERCENT: 81.7 % (ref 43–80)
NUCLEATED RED BLOOD CELLS: 0.9 /100 WBC
O2 SATURATION: 91.3 % (ref 92–98.5)
O2HB: 89.1 % (ref 94–97)
OPERATOR ID: 1112
ORGANISM: ABNORMAL
PATIENT TEMP: 37 C
PCO2: 33.5 MMHG (ref 35–45)
PDW BLD-RTO: 16.6 FL (ref 11.5–15)
PH BLOOD GAS: 7.44 (ref 7.35–7.45)
PHOSPHORUS: 2 MG/DL (ref 2.5–4.5)
PHOSPHORUS: 2.7 MG/DL (ref 2.5–4.5)
PHOSPHORUS: 3.8 MG/DL (ref 2.5–4.5)
PLATELET # BLD: 181 E9/L (ref 130–450)
PMV BLD AUTO: 10.7 FL (ref 7–12)
PO2: 59.7 MMHG (ref 75–100)
POIKILOCYTES: ABNORMAL
POLYCHROMASIA: ABNORMAL
POTASSIUM REFLEX MAGNESIUM: 3.9 MMOL/L (ref 3.5–5)
POTASSIUM REFLEX MAGNESIUM: 4.6 MMOL/L (ref 3.5–5)
POTASSIUM SERPL-SCNC: 3.8 MMOL/L (ref 3.5–5)
RBC # BLD: 2.34 E12/L (ref 3.5–5.5)
SCHISTOCYTES: ABNORMAL
SODIUM BLD-SCNC: 132 MMOL/L (ref 132–146)
SODIUM BLD-SCNC: 133 MMOL/L (ref 132–146)
SODIUM BLD-SCNC: 134 MMOL/L (ref 132–146)
SOURCE, BLOOD GAS: ABNORMAL
TARGET CELLS: ABNORMAL
THB: 9.6 G/DL (ref 11.5–16.5)
TIME ANALYZED: 1052
URINE CULTURE, ROUTINE: ABNORMAL
WBC # BLD: 15.7 E9/L (ref 4.5–11.5)

## 2022-12-29 PROCEDURE — 84100 ASSAY OF PHOSPHORUS: CPT

## 2022-12-29 PROCEDURE — 93010 ELECTROCARDIOGRAM REPORT: CPT | Performed by: INTERNAL MEDICINE

## 2022-12-29 PROCEDURE — 6370000000 HC RX 637 (ALT 250 FOR IP)

## 2022-12-29 PROCEDURE — 6360000002 HC RX W HCPCS: Performed by: INTERNAL MEDICINE

## 2022-12-29 PROCEDURE — 2580000003 HC RX 258: Performed by: INTERNAL MEDICINE

## 2022-12-29 PROCEDURE — 99233 SBSQ HOSP IP/OBS HIGH 50: CPT | Performed by: FAMILY MEDICINE

## 2022-12-29 PROCEDURE — 36415 COLL VENOUS BLD VENIPUNCTURE: CPT

## 2022-12-29 PROCEDURE — 6360000002 HC RX W HCPCS

## 2022-12-29 PROCEDURE — 85025 COMPLETE CBC W/AUTO DIFF WBC: CPT

## 2022-12-29 PROCEDURE — 71045 X-RAY EXAM CHEST 1 VIEW: CPT

## 2022-12-29 PROCEDURE — 2700000000 HC OXYGEN THERAPY PER DAY

## 2022-12-29 PROCEDURE — 2060000000 HC ICU INTERMEDIATE R&B

## 2022-12-29 PROCEDURE — 36600 WITHDRAWAL OF ARTERIAL BLOOD: CPT

## 2022-12-29 PROCEDURE — 2580000003 HC RX 258

## 2022-12-29 PROCEDURE — 2500000003 HC RX 250 WO HCPCS: Performed by: INTERNAL MEDICINE

## 2022-12-29 PROCEDURE — 82805 BLOOD GASES W/O2 SATURATION: CPT

## 2022-12-29 PROCEDURE — 83735 ASSAY OF MAGNESIUM: CPT

## 2022-12-29 PROCEDURE — 6370000000 HC RX 637 (ALT 250 FOR IP): Performed by: INTERNAL MEDICINE

## 2022-12-29 PROCEDURE — 80048 BASIC METABOLIC PNL TOTAL CA: CPT

## 2022-12-29 PROCEDURE — 87040 BLOOD CULTURE FOR BACTERIA: CPT

## 2022-12-29 PROCEDURE — 6370000000 HC RX 637 (ALT 250 FOR IP): Performed by: FAMILY MEDICINE

## 2022-12-29 PROCEDURE — 93005 ELECTROCARDIOGRAM TRACING: CPT | Performed by: FAMILY MEDICINE

## 2022-12-29 PROCEDURE — 94640 AIRWAY INHALATION TREATMENT: CPT

## 2022-12-29 RX ORDER — LORAZEPAM 2 MG/ML
3 INJECTION INTRAMUSCULAR
Status: DISCONTINUED | OUTPATIENT
Start: 2022-12-29 | End: 2023-01-06 | Stop reason: HOSPADM

## 2022-12-29 RX ORDER — LORAZEPAM 2 MG/ML
2 INJECTION INTRAMUSCULAR
Status: DISCONTINUED | OUTPATIENT
Start: 2022-12-29 | End: 2023-01-06 | Stop reason: HOSPADM

## 2022-12-29 RX ORDER — LORAZEPAM 1 MG/1
1 TABLET ORAL
Status: DISCONTINUED | OUTPATIENT
Start: 2022-12-29 | End: 2023-01-06 | Stop reason: HOSPADM

## 2022-12-29 RX ORDER — LORAZEPAM 1 MG/1
2 TABLET ORAL
Status: DISCONTINUED | OUTPATIENT
Start: 2022-12-29 | End: 2023-01-06 | Stop reason: HOSPADM

## 2022-12-29 RX ORDER — LORAZEPAM 1 MG/1
4 TABLET ORAL
Status: DISCONTINUED | OUTPATIENT
Start: 2022-12-29 | End: 2023-01-06 | Stop reason: HOSPADM

## 2022-12-29 RX ORDER — LORAZEPAM 2 MG/ML
1 INJECTION INTRAMUSCULAR
Status: DISCONTINUED | OUTPATIENT
Start: 2022-12-29 | End: 2023-01-06 | Stop reason: HOSPADM

## 2022-12-29 RX ORDER — LORAZEPAM 2 MG/ML
4 INJECTION INTRAMUSCULAR
Status: DISCONTINUED | OUTPATIENT
Start: 2022-12-29 | End: 2023-01-06 | Stop reason: HOSPADM

## 2022-12-29 RX ORDER — LANOLIN ALCOHOL/MO/W.PET/CERES
400 CREAM (GRAM) TOPICAL DAILY
Status: DISCONTINUED | OUTPATIENT
Start: 2022-12-29 | End: 2023-01-06 | Stop reason: HOSPADM

## 2022-12-29 RX ORDER — LORAZEPAM 1 MG/1
3 TABLET ORAL
Status: DISCONTINUED | OUTPATIENT
Start: 2022-12-29 | End: 2023-01-06 | Stop reason: HOSPADM

## 2022-12-29 RX ADMIN — PANTOPRAZOLE SODIUM 40 MG: 40 TABLET, DELAYED RELEASE ORAL at 06:04

## 2022-12-29 RX ADMIN — SODIUM CHLORIDE, PRESERVATIVE FREE 10 ML: 5 INJECTION INTRAVENOUS at 20:56

## 2022-12-29 RX ADMIN — Medication 400 MG: at 13:03

## 2022-12-29 RX ADMIN — BUDESONIDE 500 MCG: 0.5 SUSPENSION RESPIRATORY (INHALATION) at 11:13

## 2022-12-29 RX ADMIN — FOLIC ACID 1 MG: 5 INJECTION, SOLUTION INTRAMUSCULAR; INTRAVENOUS; SUBCUTANEOUS at 09:24

## 2022-12-29 RX ADMIN — IPRATROPIUM BROMIDE AND ALBUTEROL SULFATE 1 AMPULE: .5; 2.5 SOLUTION RESPIRATORY (INHALATION) at 11:12

## 2022-12-29 RX ADMIN — SODIUM CHLORIDE, PRESERVATIVE FREE 10 ML: 5 INJECTION INTRAVENOUS at 09:25

## 2022-12-29 RX ADMIN — IPRATROPIUM BROMIDE AND ALBUTEROL SULFATE 1 AMPULE: .5; 2.5 SOLUTION RESPIRATORY (INHALATION) at 17:40

## 2022-12-29 RX ADMIN — Medication 250 MG: at 20:55

## 2022-12-29 RX ADMIN — BUDESONIDE 500 MCG: 0.5 SUSPENSION RESPIRATORY (INHALATION) at 20:51

## 2022-12-29 RX ADMIN — IPRATROPIUM BROMIDE AND ALBUTEROL SULFATE 1 AMPULE: .5; 2.5 SOLUTION RESPIRATORY (INHALATION) at 20:52

## 2022-12-29 RX ADMIN — CETIRIZINE HYDROCHLORIDE 10 MG: 10 TABLET, FILM COATED ORAL at 20:56

## 2022-12-29 RX ADMIN — THIAMINE HYDROCHLORIDE 500 MG: 100 INJECTION, SOLUTION INTRAMUSCULAR; INTRAVENOUS at 15:41

## 2022-12-29 RX ADMIN — THIAMINE HYDROCHLORIDE 500 MG: 100 INJECTION, SOLUTION INTRAMUSCULAR; INTRAVENOUS at 20:53

## 2022-12-29 RX ADMIN — Medication 250 MG: at 13:02

## 2022-12-29 RX ADMIN — CEFTRIAXONE 1000 MG: 1 INJECTION, POWDER, FOR SOLUTION INTRAMUSCULAR; INTRAVENOUS at 23:27

## 2022-12-29 RX ADMIN — ENOXAPARIN SODIUM 30 MG: 100 INJECTION SUBCUTANEOUS at 09:31

## 2022-12-29 RX ADMIN — LORAZEPAM 3 MG: 2 INJECTION INTRAMUSCULAR; INTRAVENOUS at 08:09

## 2022-12-29 RX ADMIN — LACTULOSE 20 G: 20 SOLUTION ORAL at 20:56

## 2022-12-29 RX ADMIN — THIAMINE HYDROCHLORIDE 500 MG: 100 INJECTION, SOLUTION INTRAMUSCULAR; INTRAVENOUS at 09:30

## 2022-12-29 ASSESSMENT — PAIN SCALES - GENERAL
PAINLEVEL_OUTOF10: 0
PAINLEVEL_OUTOF10: 0

## 2022-12-29 NOTE — PROGRESS NOTES
RN sent message to Dr Samanta Gage regarding current heart rate sustaining in the 130's-140's-patient appears to be resting comfortably at this time-RN will continue to monitor an await any new orders.

## 2022-12-29 NOTE — PROGRESS NOTES
Messaged ZEESHAN regarding patient keeping high temp after Tylenol and high heart rate.  Will continue to monitor patient and await orders

## 2022-12-29 NOTE — PROGRESS NOTES
[facility] MD Nathan Salazar MD Sue Hoover, MD Annia Chandler, CNP   Lupe Weeks, ANP  Aaron Valenzuela, JUAN  Progress Note    12/29/2022    SUBJECTIVE:   12/29: Seen while laying in bed, eyes closed. Somnolent. Does open eyes to voice but falls back asleep. Sitter is at bedside. On room air. PROBLEM LIST:    Principal Problem:    Hyponatremia  Active Problems:    Simple chronic bronchitis (HCC)    Alcohol abuse    Disorientation    RYLEE (acute kidney injury) ([address]  Resolved Problems:    * No resolved hospital problems. *         DIET:    ADULT DIET;  Regular; 1500 ml     MEDS (scheduled):    potassium & sodium phosphates  1 packet Oral 4x Daily    magnesium oxide  400 mg Oral Daily    thiamine (VITAMIN B1) IVPB  500 mg IntraVENous TID    Followed by    Savilla Opal ON 12/30/2022] thiamine  250 mg IntraVENous Daily    Followed by    Savilla Opal ON 1/4/2023] thiamine  100 mg Oral Daily    sodium chloride flush  5-40 mL IntraVENous 2 times per day    enoxaparin  30 mg SubCUTAneous Daily    ipratropium-albuterol  1 ampule Inhalation Q4H WA    budesonide  0.5 mg Nebulization BID    cetirizine  10 mg Oral Daily    [Held by provider] cyclobenzaprine  5 mg Oral Nightly    montelukast  10 mg Oral Daily    pantoprazole  40 mg Oral QAM AC    folic acid  1 mg IntraVENous Daily    lactulose  20 g Oral TID    cefTRIAXone (ROCEPHIN) IV  1,000 mg IntraVENous Q24H       MEDS (infusions):   dextrose      sodium chloride         MEDS (prn):  LORazepam **OR** LORazepam **OR** LORazepam **OR** LORazepam **OR** LORazepam **OR** LORazepam **OR** LORazepam **OR** LORazepam, glucose, dextrose bolus **OR** dextrose bolus, glucagon (rDNA), dextrose, sodium chloride flush, sodium chloride, acetaminophen, ondansetron **OR** ondansetron    PHYSICAL EXAM:     Patient Vitals for the past 24 hrs:   BP Temp Temp src Pulse Resp SpO2 Weight   12/29/22 1600 (!) 156/105 98.7 °F (37.1 °C) Temporal (!) 119 22 96 % --   12/29/22 1200 -- -- -- -- -- 96 % --   12/29/22 1100 (!) 146/103 98.3 °F (36.8 °C) Temporal (!) 129 19 92 % --   12/29/22 0737 (!) 144/90 98.4 °F (36.9 °C) Temporal (!) 140 22 98 % --   12/29/22 0412 -- -- -- -- -- -- 97 lb (44 kg)   12/29/22 0130 -- 98.9 °F (37.2 °C) Oral -- -- -- --   12/29/22 0027 -- -- -- (!) 110 -- -- --   12/28/22 2338 -- 100.3 °F (37.9 °C) Oral -- -- -- --   12/28/22 2030 137/87 98 °F (36.7 °C) Temporal (!) 123 18 94 % --   12/28/22 2000 (!) 151/100 -- -- (!) 117 (!) 40 96 % --   12/28/22 1900 134/88 -- -- (!) 110 28 97 % --   12/28/22 1800 110/76 -- -- (!) 114 (!) 32 -- --   12/28/22 1700 (!) 159/101 -- -- (!) 113 (!) 32 -- --   12/28/22 1636 -- -- -- (!) 110 (!) 38 97 % --   @      Intake/Output Summary (Last 24 hours) at 12/29/2022 1615  Last data filed at 12/29/2022 1538  Gross per 24 hour   Intake 2065.99 ml   Output 150 ml   Net 1915.99 ml         Wt Readings from Last 3 Encounters:   12/29/22 97 lb (44 kg)   12/12/22 96 lb 4.8 oz (43.7 kg)   08/05/22 101 lb (45.8 kg)       Constitutional:  in no acute distress  HEENT: NC/AT, EOMI, sclera and conjunctiva are clear and anicteric, mucus membranes moist  Neck: Trachea midline, no JVD  Cardiovascular: S1, S2 regular rhythm, no murmur,or rub  Respiratory:  CTAB.  No crackles, no wheeze  Gastrointestinal:  Soft, nontender, nondistended, NABS  Ext: no edema, feet warm  Skin: dry, no rash  Neuro: awake, alert, interactive      DATA:    Recent Labs     12/27/22  1133 12/28/22  0911 12/29/22  0606   WBC 17.1* 16.6* 15.7*   HGB 10.6* 10.1* 8.8*   HCT 27.4* 26.2* 23.4*   MCV 95.8 97.0 100.0*    213 181     Recent Labs     12/27/22  1133 12/27/22  1612 12/27/22  1718 12/28/22  2038 12/28/22  2307 12/29/22  0606 12/29/22  1008   * 120*   < > 131* 132 133  --    K 3.9 3.5   < > 4.1 3.9 4.6  --    CL 86* 90*   < > 99 101 101  --    CO2 20* 21*   < > 20* 21* 17*  --    MG  --   --    < > 1.9  --  1.7 1.7   PHOS  --   --    < > 2.7  -- 2.7 2.0*   BUN 37* 36*   < > 28* 25* 24*  --    CREATININE 1.8* 1.6*   < > 1.3* 1.2* 1.0  --    ALT 23 18  --   --   --   --   --    AST 30 21  --   --   --   --   --    BILIDIR 0.4*  --   --   --   --   --   --    BILITOT 0.8 0.8  --   --   --   --   --    ALKPHOS 177* 151*  --   --   --   --   --     < > = values in this interval not displayed. Lab Results   Component Value Date    LABPROT 0.1 08/06/2022    LABPROT 0.1 08/06/2022       ASSESSMENT :  1. Hyponatremia, hypovolemic, acute superimposed on chronic, due to beer drinkers potomania with acute water (beer) load. Recurrent. Goal given her alcoholism 6 to 8 mmol/L/day. At 28 hours she is 8 mmol/L above the presenting [Na+].      Plan:  Stop D5W  1500 mL fluid restriction  Agree with Phos-Nak for four doses, repeat phosphorus tomorrow   BMP q 12 hours   Follow labs   Monitor I&O  Continue supportive care     Electronically signed by TORI Tobias CNP on 12/29/2022 at 4:15 PM

## 2022-12-29 NOTE — PROGRESS NOTES
[facility]  Family Medicine Attending    S: 58 y.o. female with PMH of COPD, Tobacco Abuse, Alcohol Abuse, Cirrhosis, Hyponatremia, and Thyroid Disease presents with altered mental status. Recent admission with hyponatremia during which patient left AMA from MICU. Had been complaining of lower abdominal and back pain. Continues to smoke and drink EtOH. In ER, noted to have tachycardia, hypotension, low-grade fever. Today, was agitated earlier, wanting to leave. I saw her after ativan, and she was somnolent, difficult to arouse. Had low grade fever, HTN, tachycardia, and tachypnea    O: VS- Blood pressure 137/87, pulse (!) 110, temperature 98.9 °F (37.2 °C), temperature source Oral, resp. rate 18, height 5' 1\" (1.549 m), weight 97 lb (44 kg), SpO2 94 %. Exam is as noted by resident with the following changes, additions or corrections:  Somnolent, doesn't open eyes to voice or touch  Heart- tachy  Lungs - diffuse wheezing, tahcypneic  Abdomen- Nontender today  Ext - no edema    Impressions:   Principal Problem:    Hyponatremia  Active Problems:    Simple chronic bronchitis (HCC)    Alcohol abuse    Disorientation    RYLEE (acute kidney injury) ([facility] [address]  Resolved Problems:    * No resolved hospital problems. *      Plan:   Likely withdrawal, but will reculture, check ABG's and CXR   Nephrology consulted -appreciate recs   Sodium now normal   Rocephin for UTI/sepsis? Urine with E.coli, pansensitive   CIWA protocol    Monitor closely, may need pink slip, as I don't feel it is safe for her to leave at this time and she does not appear to be aware of situation and potential harm. Attending Physician Statement  I have reviewed the chart and seen the patient with the resident(s). I personally reviewed images, EKG's and similar tests, if present.   I personally reviewed and performed key elements of the history and exam.  I have reviewed and confirmed student and/or resident history and exam with changes as indicated above. I agree with the assessment, plan and orders as documented by the resident. Please refer to the resident and/or student note for additional information.       Leigh Felix MD

## 2022-12-29 NOTE — PROGRESS NOTES
[facility] Inpatient   Resident Progress Note    S:  Hospital day: 2    Brief Synopsis: Franco Lott is a 58 y.o. female with a PMH of COPD, current tobacco and alcohol abuse, chronic hyponatremia, thyroid disease, pulmonary nodule and carpal tunnel syndrome. The patient presented for AMS. Patient was urged to go to ED by boyfriend who said she has been acting weird the last few days. In the ED, she complained of dysuria, suprapubic pain and CVA tenderness bilaterally. On presentation she was tachycardic, hypotensive and febrile. UA is positive for LE, WBC and rare bacteria. Culture came back to be an E. coli. She was started on 2 L of fluids and Zosyn 1 dose. Her sodium was found to be 118 as well. She was admitted for sepsis 2/2 UTI and hyponatremia. ICU was consulted from ED. She was transferred to the ICU overnight. Lactic acid came back to be 1.3 and 1.1. CT abdomen showed nonspecific fat stranding perinephric only and diverticulosis. Zosyn was switched to Rocephin. And she was put on D5W. She was started on CIWA protocol and needed 2 mg Ativan x1. Latest NA was 127. Patient was seen and examined this morning. She was drowsy and only alert to self. She had a temperature of 100.6, respiratory rate 46 and pulse 130. Patient not cooperative physical exam was limited.     Cont meds:    dextrose 50 mL/hr at 12/28/22 1848    dextrose      sodium chloride       Scheduled meds:    potassium & sodium phosphates  1 packet Oral 4x Daily    magnesium oxide  400 mg Oral Daily    thiamine (VITAMIN B1) IVPB  500 mg IntraVENous TID    Followed by    Kai Helling ON 12/30/2022] thiamine  250 mg IntraVENous Daily    Followed by    Kai Helling ON 1/4/2023] thiamine  100 mg Oral Daily    sodium chloride flush  5-40 mL IntraVENous 2 times per day    enoxaparin  30 mg SubCUTAneous Daily    ipratropium-albuterol  1 ampule Inhalation Q4H WA    budesonide  0.5 mg Nebulization BID    cetirizine  10 mg Oral Daily    [Held by provider] cyclobenzaprine  5 mg Oral Nightly    montelukast  10 mg Oral Daily    pantoprazole  40 mg Oral QAM AC    folic acid  1 mg IntraVENous Daily    lactulose  20 g Oral TID    cefTRIAXone (ROCEPHIN) IV  1,000 mg IntraVENous Q24H     PRN meds: LORazepam **OR** LORazepam **OR** LORazepam **OR** LORazepam **OR** LORazepam **OR** LORazepam **OR** LORazepam **OR** LORazepam, glucose, dextrose bolus **OR** dextrose bolus, glucagon (rDNA), dextrose, sodium chloride flush, sodium chloride, acetaminophen, ondansetron **OR** ondansetron     I reviewed the patient's Past Medical and Surgical History, Medications and Allergies. O:  VS: BP (!) 146/103   Pulse (!) 129   Temp 98.3 °F (36.8 °C) (Temporal)   Resp 19   Ht 5' 1\" (1.549 m)   Wt 97 lb (44 kg)   SpO2 96%   BMI 18.33 kg/m²     Physical Exam:  Physical Exam  Vitals reviewed. Constitutional:       General: She is not in acute distress. Appearance: She is ill-appearing. HENT:      Mouth/Throat:      Mouth: Mucous membranes are dry. Cardiovascular:      Rate and Rhythm: Regular rhythm. Tachycardia present. Heart sounds: No murmur heard. No gallop. Pulmonary:      Breath sounds: Decreased air movement present. No wheezing. Abdominal:      Palpations: Abdomen is soft. Tenderness: There is no abdominal tenderness. There is no guarding. Comments: Mildly distended. Musculoskeletal:      Cervical back: Normal range of motion and neck supple. Skin:     General: Skin is dry. Neurological:      Mental Status: She is disoriented. Labs:  Na/K/Cl/CO2:  133/4.6/101/17 (12/29 0235)  BUN/Cr/glu/ALT/AST/amyl/lip:  24/1.0/--/--/--/--/-- (12/29 0606)  WBC/Hgb/Hct/Plts:  15.7/8.8/23.4/181 (12/29 0606)  estimated creatinine clearance is 41 mL/min (based on SCr of 1 mg/dL). Other pertinent labs as noted below    New Imaging:  XR CHEST PORTABLE   Final Result   No focal pneumonia or pleural effusion.          CT ABDOMEN PELVIS WO CONTRAST Additional Contrast? None   Final Result   1. Nonspecific bilateral perinephric fat stranding possibly related to   chronic medical renal disease with appropriate clinical history. 2. Mild prominence of bilateral renal pelves which may be physiologic. No   evidence of renal or ureteral calculus. 3. Diverticulosis. XR CHEST PORTABLE   Final Result   Coarse interstitial markings and atherosclerotic disease. No new   cardiopulmonary pathology. CT head without contrast   Final Result   No acute intracranial abnormality. Specifically, there is no acute   intracranial hemorrhage      Old lacunar infarct within the left basal ganglia. If the patient's symptoms persist follow-up MRI is recommended. A/P:  Principal Problem:    Hyponatremia  Active Problems:    Simple chronic bronchitis (HCC)    Alcohol abuse    Disorientation    RYLEE (acute kidney injury) ([facility] [address]  Resolved Problems:    * No resolved hospital problems. *      Sepsis   Secondary to UTI vs Pyelonephritis vs Aspiration Pneumonia vs Abdominal infection   -EKG in the ED: Sinus tachycardia with right atrial enlargement   -Given Zosyn in the ED, Zosyn switched to Rocephin per ICU  -Blood cultures pending, Urine culture showed E. coli  -Chest Xray showed coarse interstitial markings and atherosclerotic disease  -Repeat EKG showed no change  -Lactic Acid 1.1, 1.3  -Pending UDS  -Negative flu and Covid tests  -Non contrast ct abdomen showed nonspecific fat stranding perinephric Narciso and diverticulosis.   -CXR pending    Chronic Alcohol Abuse  -Last drink before admission  -CIWA Protocol, concerns for DT as HR in the 130s  -Ethanol level in the ED <10       Hyponatremia - stable  -Na 118 in the ED, recorrected to 133 this AM  -Likely due to beer potomania from last visit   -Urine studies show hypotonic hypovolemic hyponatremia  -Nephrology consulted, Started on D5W  -Transferred out of ICU  -Continue BMP q12h -Phos-nak packet ordered              AMS 2/2 to hyponatremia vs sepsis  -CT Head: Old lacunar infarcts in the left basal ganglia. Recommends F/U MRI  -Consider MRI Head   -pending ABG  -ammonia elevated, lactulose started TID  -PT/APTT pending   -Sitter requested as patient attempts to leave     RYLEE  -Bun: 37 and Creatinine: 1.8 in the ED  -Daily CMP  -Nephrology consulted      COPD  -Daily vitals and oxygen saturation    -Pulmicort, Duonebs, and Brovana  -Oxygen as needed   -Pending ABG  -PFT 2021: FEV1/FVC 58%, FEV1 49% predicted.  GOLD Stage 3     Fibrosis of liver  -fibroscan done last hospitalization  -ammonia elevated, lactulose started TID  -INR 1.2, aPTT 35.8                Tobacco Abuse   -Nicotine patch      Pulmonary Nodule  -Incidental finding on CT Chest on August 2021 showing 3 mm nodule in the right upper lobe  -Smokes 1.5 packs of tobacco per day for the past 20 years     DVT Prophylaxis: Lovenox  GI Prophylaxis: Protonix  Diet: Adult regular, fluid restriction  PT/OT consulted for dispo to [facility] for DC planning    Electronically signed by Nicolas Hernandez MD on 12/29/2022 at 12:15 PM  This case was discussed with attending physician Dr. Evaristo Best

## 2022-12-29 NOTE — CARE COORDINATION
SOCIAL WORK/CASEMANAGEMENT TRANSITION OF CARE PLANNINGConmoni Cooley, [address]):  received pt in transfer. PT and OT to be ordered. Babak Echavarria is following pt. Pt per rn is pink slipped as well. Pt is on iv ativan for her alcohol abuse nad iv abx for her + urine culture. Will follow.  JESSI Aviles  12/29/2022

## 2022-12-29 NOTE — PROGRESS NOTES
RN discussed patient's sinus tach status during rounds-patient sustaining a heart rate in the 130-140 range throughout shift (shift beginning at 7am) -RN currently awaiting any new orders at this time.

## 2022-12-29 NOTE — PLAN OF CARE
Problem: Discharge Planning  Goal: Discharge to home or other facility with appropriate resources  Outcome: Progressing     Problem: Pain  Goal: Verbalizes/displays adequate comfort level or baseline comfort level  Outcome: Progressing     Problem: Safety - Adult  Goal: Free from fall injury  Outcome: Progressing     Problem: Neurosensory - Adult  Goal: Achieves stable or improved neurological status  Outcome: Progressing     Problem: Gastrointestinal - Adult  Goal: Minimal or absence of nausea and vomiting  Outcome: Progressing     Problem: Metabolic/Fluid and Electrolytes - Adult  Goal: Electrolytes maintained within normal limits  Outcome: Progressing     Problem: Skin/Tissue Integrity  Goal: Absence of new skin breakdown  Description: 1. Monitor for areas of redness and/or skin breakdown  2. Assess vascular access sites hourly  3. Every 4-6 hours minimum:  Change oxygen saturation probe site  4. Every 4-6 hours:  If on nasal continuous positive airway pressure, respiratory therapy assess nares and determine need for appliance change or resting period.   Outcome: Progressing

## 2022-12-30 LAB
ALBUMIN SERPL-MCNC: 2 G/DL (ref 3.5–5.2)
ALP BLD-CCNC: 165 U/L (ref 35–104)
ALT SERPL-CCNC: 16 U/L (ref 0–32)
ANION GAP SERPL CALCULATED.3IONS-SCNC: 11 MMOL/L (ref 7–16)
ANION GAP SERPL CALCULATED.3IONS-SCNC: 12 MMOL/L (ref 7–16)
ANISOCYTOSIS: ABNORMAL
AST SERPL-CCNC: 46 U/L (ref 0–31)
BASOPHILS ABSOLUTE: 0.15 E9/L (ref 0–0.2)
BASOPHILS RELATIVE PERCENT: 0.9 % (ref 0–2)
BILIRUB SERPL-MCNC: 1.2 MG/DL (ref 0–1.2)
BILIRUBIN DIRECT: 0.5 MG/DL (ref 0–0.3)
BILIRUBIN, INDIRECT: 0.7 MG/DL (ref 0–1)
BLOOD CULTURE, ROUTINE: NORMAL
BUN BLDV-MCNC: 18 MG/DL (ref 6–23)
BUN BLDV-MCNC: 19 MG/DL (ref 6–23)
CALCIUM SERPL-MCNC: 8.8 MG/DL (ref 8.6–10.2)
CALCIUM SERPL-MCNC: 8.9 MG/DL (ref 8.6–10.2)
CHLORIDE BLD-SCNC: 100 MMOL/L (ref 98–107)
CHLORIDE BLD-SCNC: 98 MMOL/L (ref 98–107)
CO2: 22 MMOL/L (ref 22–29)
CO2: 24 MMOL/L (ref 22–29)
CREAT SERPL-MCNC: 0.9 MG/DL (ref 0.5–1)
CREAT SERPL-MCNC: 1.1 MG/DL (ref 0.5–1)
CULTURE, BLOOD 2: NORMAL
EOSINOPHILS ABSOLUTE: 0.29 E9/L (ref 0.05–0.5)
EOSINOPHILS RELATIVE PERCENT: 1.7 % (ref 0–6)
GFR SERPL CREATININE-BSD FRML MDRD: 57 ML/MIN/1.73
GFR SERPL CREATININE-BSD FRML MDRD: >60 ML/MIN/1.73
GLUCOSE BLD-MCNC: 104 MG/DL (ref 74–99)
GLUCOSE BLD-MCNC: 136 MG/DL (ref 74–99)
HCT VFR BLD CALC: 22.7 % (ref 34–48)
HEMOGLOBIN: 8.6 G/DL (ref 11.5–15.5)
LYMPHOCYTES ABSOLUTE: 0.68 E9/L (ref 1.5–4)
LYMPHOCYTES RELATIVE PERCENT: 3.5 % (ref 20–42)
MAGNESIUM: 1.4 MG/DL (ref 1.6–2.6)
MAGNESIUM: 2 MG/DL (ref 1.6–2.6)
MCH RBC QN AUTO: 37.4 PG (ref 26–35)
MCHC RBC AUTO-ENTMCNC: 37.9 % (ref 32–34.5)
MCV RBC AUTO: 98.7 FL (ref 80–99.9)
MONOCYTES ABSOLUTE: 1.87 E9/L (ref 0.1–0.95)
MONOCYTES RELATIVE PERCENT: 11.3 % (ref 2–12)
NEUTROPHILS ABSOLUTE: 14.11 E9/L (ref 1.8–7.3)
NEUTROPHILS RELATIVE PERCENT: 82.6 % (ref 43–80)
NUCLEATED RED BLOOD CELLS: 0.9 /100 WBC
PDW BLD-RTO: 16.7 FL (ref 11.5–15)
PHOSPHORUS: 3 MG/DL (ref 2.5–4.5)
PHOSPHORUS: 4.2 MG/DL (ref 2.5–4.5)
PLATELET # BLD: 142 E9/L (ref 130–450)
PMV BLD AUTO: 10.4 FL (ref 7–12)
POIKILOCYTES: ABNORMAL
POLYCHROMASIA: ABNORMAL
POTASSIUM SERPL-SCNC: 3.4 MMOL/L (ref 3.5–5)
POTASSIUM SERPL-SCNC: 3.6 MMOL/L (ref 3.5–5)
PROCALCITONIN: 2.22 NG/ML (ref 0–0.08)
RBC # BLD: 2.3 E12/L (ref 3.5–5.5)
SODIUM BLD-SCNC: 132 MMOL/L (ref 132–146)
SODIUM BLD-SCNC: 135 MMOL/L (ref 132–146)
T4 FREE: 1.01 NG/DL (ref 0.93–1.7)
TARGET CELLS: ABNORMAL
TOTAL PROTEIN: 6.4 G/DL (ref 6.4–8.3)
TSH SERPL DL<=0.05 MIU/L-ACNC: 1.45 UIU/ML (ref 0.27–4.2)
WBC # BLD: 17 E9/L (ref 4.5–11.5)

## 2022-12-30 PROCEDURE — 36415 COLL VENOUS BLD VENIPUNCTURE: CPT

## 2022-12-30 PROCEDURE — 6370000000 HC RX 637 (ALT 250 FOR IP)

## 2022-12-30 PROCEDURE — 97530 THERAPEUTIC ACTIVITIES: CPT

## 2022-12-30 PROCEDURE — 6370000000 HC RX 637 (ALT 250 FOR IP): Performed by: INTERNAL MEDICINE

## 2022-12-30 PROCEDURE — 2060000000 HC ICU INTERMEDIATE R&B

## 2022-12-30 PROCEDURE — 80076 HEPATIC FUNCTION PANEL: CPT

## 2022-12-30 PROCEDURE — 80048 BASIC METABOLIC PNL TOTAL CA: CPT

## 2022-12-30 PROCEDURE — 84443 ASSAY THYROID STIM HORMONE: CPT

## 2022-12-30 PROCEDURE — 2580000003 HC RX 258

## 2022-12-30 PROCEDURE — 84439 ASSAY OF FREE THYROXINE: CPT

## 2022-12-30 PROCEDURE — 83735 ASSAY OF MAGNESIUM: CPT

## 2022-12-30 PROCEDURE — 85025 COMPLETE CBC W/AUTO DIFF WBC: CPT

## 2022-12-30 PROCEDURE — 97165 OT EVAL LOW COMPLEX 30 MIN: CPT

## 2022-12-30 PROCEDURE — 6360000002 HC RX W HCPCS

## 2022-12-30 PROCEDURE — 97161 PT EVAL LOW COMPLEX 20 MIN: CPT

## 2022-12-30 PROCEDURE — 99232 SBSQ HOSP IP/OBS MODERATE 35: CPT | Performed by: FAMILY MEDICINE

## 2022-12-30 PROCEDURE — 97535 SELF CARE MNGMENT TRAINING: CPT

## 2022-12-30 PROCEDURE — 94640 AIRWAY INHALATION TREATMENT: CPT

## 2022-12-30 PROCEDURE — 6370000000 HC RX 637 (ALT 250 FOR IP): Performed by: FAMILY MEDICINE

## 2022-12-30 PROCEDURE — 6360000002 HC RX W HCPCS: Performed by: INTERNAL MEDICINE

## 2022-12-30 PROCEDURE — 2500000003 HC RX 250 WO HCPCS: Performed by: INTERNAL MEDICINE

## 2022-12-30 PROCEDURE — 84100 ASSAY OF PHOSPHORUS: CPT

## 2022-12-30 PROCEDURE — 2700000000 HC OXYGEN THERAPY PER DAY

## 2022-12-30 PROCEDURE — 84145 PROCALCITONIN (PCT): CPT

## 2022-12-30 RX ORDER — MAGNESIUM SULFATE IN WATER 40 MG/ML
2000 INJECTION, SOLUTION INTRAVENOUS ONCE
Status: COMPLETED | OUTPATIENT
Start: 2022-12-30 | End: 2022-12-30

## 2022-12-30 RX ORDER — SULFAMETHOXAZOLE AND TRIMETHOPRIM 800; 160 MG/1; MG/1
1 TABLET ORAL EVERY 12 HOURS SCHEDULED
Status: DISCONTINUED | OUTPATIENT
Start: 2022-12-30 | End: 2023-01-04

## 2022-12-30 RX ADMIN — MONTELUKAST 10 MG: 10 TABLET, FILM COATED ORAL at 08:46

## 2022-12-30 RX ADMIN — Medication 400 MG: at 08:46

## 2022-12-30 RX ADMIN — LORAZEPAM 1 MG: 2 INJECTION INTRAMUSCULAR; INTRAVENOUS at 09:01

## 2022-12-30 RX ADMIN — Medication 250 MG: at 08:46

## 2022-12-30 RX ADMIN — THIAMINE HYDROCHLORIDE 250 MG: 100 INJECTION, SOLUTION INTRAMUSCULAR; INTRAVENOUS at 08:46

## 2022-12-30 RX ADMIN — ENOXAPARIN SODIUM 30 MG: 100 INJECTION SUBCUTANEOUS at 08:47

## 2022-12-30 RX ADMIN — BUDESONIDE 500 MCG: 0.5 SUSPENSION RESPIRATORY (INHALATION) at 20:26

## 2022-12-30 RX ADMIN — FOLIC ACID 1 MG: 5 INJECTION, SOLUTION INTRAMUSCULAR; INTRAVENOUS; SUBCUTANEOUS at 08:49

## 2022-12-30 RX ADMIN — PANTOPRAZOLE SODIUM 40 MG: 40 TABLET, DELAYED RELEASE ORAL at 05:56

## 2022-12-30 RX ADMIN — BUDESONIDE 500 MCG: 0.5 SUSPENSION RESPIRATORY (INHALATION) at 10:39

## 2022-12-30 RX ADMIN — IPRATROPIUM BROMIDE AND ALBUTEROL SULFATE 1 AMPULE: .5; 2.5 SOLUTION RESPIRATORY (INHALATION) at 20:26

## 2022-12-30 RX ADMIN — IPRATROPIUM BROMIDE AND ALBUTEROL SULFATE 1 AMPULE: .5; 2.5 SOLUTION RESPIRATORY (INHALATION) at 13:59

## 2022-12-30 RX ADMIN — CETIRIZINE HYDROCHLORIDE 10 MG: 10 TABLET, FILM COATED ORAL at 21:28

## 2022-12-30 RX ADMIN — IPRATROPIUM BROMIDE AND ALBUTEROL SULFATE 1 AMPULE: .5; 2.5 SOLUTION RESPIRATORY (INHALATION) at 10:39

## 2022-12-30 RX ADMIN — IPRATROPIUM BROMIDE AND ALBUTEROL SULFATE 1 AMPULE: .5; 2.5 SOLUTION RESPIRATORY (INHALATION) at 16:32

## 2022-12-30 RX ADMIN — MAGNESIUM SULFATE HEPTAHYDRATE 2000 MG: 40 INJECTION, SOLUTION INTRAVENOUS at 09:05

## 2022-12-30 RX ADMIN — SODIUM CHLORIDE, PRESERVATIVE FREE 10 ML: 5 INJECTION INTRAVENOUS at 21:31

## 2022-12-30 RX ADMIN — SODIUM CHLORIDE, PRESERVATIVE FREE 10 ML: 5 INJECTION INTRAVENOUS at 08:47

## 2022-12-30 RX ADMIN — LACTULOSE 20 G: 20 SOLUTION ORAL at 21:28

## 2022-12-30 ASSESSMENT — PAIN SCALES - GENERAL: PAINLEVEL_OUTOF10: 0

## 2022-12-30 NOTE — PROGRESS NOTES
[facility] MD Jose David Moss MD Terance Mule, MD Rosemarie Spanner, JUAN Weeks, LAUREN Harry, JUAN  Progress Note    12/30/2022    SUBJECTIVE:   12/29: Seen while laying in bed, eyes closed. Somnolent. Does open eyes to voice but falls back asleep. Sitter is at bedside. On room air. 12/30: Sitting up in chair, no acute distress. Confused. Sitter is present at bedside. Oral intake is very poor. No acute complaints. On room air. Very weak and deconditioned. PROBLEM LIST:    Principal Problem:    Hyponatremia  Active Problems:    Simple chronic bronchitis (HCC)    Alcohol abuse    Disorientation    RYLEE (acute kidney injury) ([facility] [address]  Resolved Problems:    * No resolved hospital problems. *         DIET:    ADULT DIET;  Dysphagia - Soft and Bite Sized; 1500 ml     MEDS (scheduled):    magnesium oxide  400 mg Oral Daily    thiamine  250 mg IntraVENous Daily    Followed by    Henrietta Echols ON 1/4/2023] thiamine  100 mg Oral Daily    sodium chloride flush  5-40 mL IntraVENous 2 times per day    enoxaparin  30 mg SubCUTAneous Daily    ipratropium-albuterol  1 ampule Inhalation Q4H WA    budesonide  0.5 mg Nebulization BID    cetirizine  10 mg Oral Daily    [Held by provider] cyclobenzaprine  5 mg Oral Nightly    montelukast  10 mg Oral Daily    pantoprazole  40 mg Oral QAM AC    folic acid  1 mg IntraVENous Daily    lactulose  20 g Oral TID    cefTRIAXone (ROCEPHIN) IV  1,000 mg IntraVENous Q24H       MEDS (infusions):   dextrose      sodium chloride         MEDS (prn):  LORazepam **OR** LORazepam **OR** LORazepam **OR** LORazepam **OR** LORazepam **OR** LORazepam **OR** LORazepam **OR** LORazepam, glucose, dextrose bolus **OR** dextrose bolus, glucagon (rDNA), dextrose, sodium chloride flush, sodium chloride, acetaminophen, ondansetron **OR** ondansetron    PHYSICAL EXAM:     Patient Vitals for the past 24 hrs:   BP Temp Temp src Pulse Resp SpO2 Weight   12/30/22 0800 (!) 141/96 97.7 °F (36.5 °C) Temporal (!) 115 18 96 % --   12/30/22 0558 -- -- -- -- -- -- 97 lb (44 kg)   12/30/22 0335 (!) 138/99 -- -- (!) 115 -- -- --   12/30/22 0000 (!) 149/106 -- -- (!) 114 -- -- --   12/29/22 1930 (!) 152/104 98.4 °F (36.9 °C) Temporal (!) 112 20 100 % --     @      Intake/Output Summary (Last 24 hours) at 12/30/2022 1602  Last data filed at 12/30/2022 0646  Gross per 24 hour   Intake 400 ml   Output 400 ml   Net 0 ml           Wt Readings from Last 3 Encounters:   12/30/22 97 lb (44 kg)   12/12/22 96 lb 4.8 oz (43.7 kg)   08/05/22 101 lb (45.8 kg)       Constitutional:  fatigued and deconditioned   HEENT: NC/AT, EOMI, sclera and conjunctiva are clear and anicteric, mucus membranes moist  Neck: Trachea midline, no JVD  Cardiovascular: S1, S2 regular rhythm, no murmur,or rub  Respiratory:  CTAB. No crackles, no wheeze  Gastrointestinal:  Soft, nontender, nondistended, NABS  Ext: no edema, feet warm  Skin: dry, no rash  Neuro: awake, alert, interactive      DATA:    Recent Labs     12/28/22  0911 12/29/22  0606 12/30/22  0424   WBC 16.6* 15.7* 17.0*   HGB 10.1* 8.8* 8.6*   HCT 26.2* 23.4* 22.7*   MCV 97.0 100.0* 98.7    181 142       Recent Labs     12/27/22  1612 12/27/22  1718 12/29/22  0606 12/29/22  1008 12/29/22  1754 12/29/22  2158 12/30/22  0424 12/30/22  0859   *   < > 133  --  134  --  135  --    K 3.5   < > 4.6  --  3.8  --  3.6  --    CL 90*   < > 101  --  103  --  100  --    CO2 21*   < > 17*  --  21*  --  24  --    MG  --    < > 1.7 1.7  --  1.5*  --  1.4*   PHOS  --    < > 2.7 2.0*  --  3.8  --  4.2   BUN 36*   < > 24*  --  17  --  18  --    CREATININE 1.6*   < > 1.0  --  1.0  --  0.9  --    ALT 18  --   --   --   --   --   --  16   AST 21  --   --   --   --   --   --  46*   BILIDIR  --   --   --   --   --   --   --  0.5*   BILITOT 0.8  --   --   --   --   --   --  1.2   ALKPHOS 151*  --   --   --   --   --   --  165*    < > = values in this interval not displayed. Lab Results   Component Value Date    LABPROT 0.1 08/06/2022    LABPROT 0.1 08/06/2022       ASSESSMENT :  1. Hyponatremia, hypovolemic, acute superimposed on chronic, due to beer drinkers potomania with acute water (beer) load. Recurrent. Goal given her alcoholism 6 to 8 mmol/L/day. At 28 hours she is 8 mmol/L above the presenting [Na+].     Na - 135     Plan:  Encourage oral intake   1500 mL fluid restriction  BMP q 12 hours   Follow labs   Monitor I&O  Continue supportive care     Electronically signed by TORI Mejia CNP on 12/30/2022 at 4:02 PM

## 2022-12-30 NOTE — PROGRESS NOTES
Perfect serve sent to Dr. Gabriel Buck regarding patient's high BP and heart rate.  Will continue to monitor patient and await any new orders

## 2022-12-30 NOTE — PROGRESS NOTES
Occupational Therapy  OCCUPATIONAL THERAPY INITIAL EVALUATION     [address] [address] [address]      CCXV:                                                Patient Name: Elliot Marino  MRN: [id number]  : 1960  Room: [address] [place] [address] , [place] [id number]    Referring Provider: Tato Moss MD  Specific Provider Orders/Date: OT eval and treat 22     Diagnosis: Hyponatremia [E87.1]   Pt admitted to hospital on 22 for AMS x1 wk      Pertinent Medical History:  has a past medical history of Carpal tunnel syndrome, Pulmonary nodule, and Thyroid disease.        Precautions:  Fall Risk, cognition, sitter, seizures, fluid restriction, dysphagia diet, alarm    Assessment of current deficits    [x] Functional mobility  [x]ADLs  [x] Strength               [x]Cognition    [x] Functional transfers   [x] IADLs         [x] Safety Awareness   [x]Endurance    [] Fine Coordination              [x] Balance      [] Vision/perception   []Sensation     []Gross Motor Coordination  [] ROM  [] Delirium                   [] Motor Control     OT PLAN OF CARE   OT POC based on physician orders, patient diagnosis and results of clinical assessment    Frequency/Duration 1-3 days/wk for 2 weeks PRN   Specific OT Treatment Interventions to include:   * Instruction/training on adapted ADL techniques and AE recommendations to increase functional independence within precautions       * Training on energy conservation strategies, correct breathing pattern and techniques to improve independence/tolerance for self-care routine  * Functional transfer/mobility training/DME recommendations for increased independence, safety, and fall prevention  * Patient/Family education to increase follow through with safety techniques and functional independence  * Recommendation of environmental modifications for increased safety with functional transfers/mobility and ADLs  * Cognitive retraining/development of therapeutic activities to improve problem solving, judgement, memory, and attention for increased safety/participation in ADL/IADL tasks  * Therapeutic exercise to improve motor endurance, ROM, and functional strength for ADLs/functional transfers  * Therapeutic activities to facilitate/challenge dynamic balance, stand tolerance for increased safety and independence with ADLs  * Therapeutic activities to facilitate gross/fine motor skills for increased independence with ADLs      OTMRS   Modified Darek Scale (MRS)  Score     Description  0             No symptoms  1             No significant disability despite symptoms  2             Slight disability; able to look after own affairs  3             Moderate disability; able to ambulate without assist/ requires assist with ADLs  4             Moderate/Severe disability;requires assist to ambulate/assist with ADLs  5             Severe disability;bedridden/incontinent   6               Score:   4    Recommended Adaptive Equipment: TBD     Home Living: Pt lives alone in 2 floor home. 2 OMAR, 1 handrail  1st floor setup    Bathroom setup: tub/shower     Prior Level of Function: independent with ADLs , independent with IADLs; ambulated independently    Pt provided PLOF however pt is a very questionable historian. Family/caregiver not present    Pain Level: Pt c/o no pain this session    Cognition: A&O: 2/4 (self and place. Unable to recall time and situational confusion noted); Follows 1 step directions inconsistently  Delayed processing speed.   Increased confusion noted - cues provided to initiate, sequence and attend to tasks   Memory:  poor   Sequencing:  poor   Problem solving:  poor   Judgement/safety:  poor     Functional Assessment:  AM-PAC Daily Activity Raw Score:    Initial Eval Status  Date: 22 Treatment Status  Date: STGs = LTGs  Time frame: 10-14 days   Feeding Moderate Assist   Stand by Assist    Grooming Moderate Assist   Stand by Assist    UB Dressing Moderate Assist   Donned/doffed gown  Stand by Assist    LB Dressing Dependent   B socks  Minimal Assist    Bathing Maximal Assist  Minimal Assist    Toileting Dependent   Including hygiene  Incontinent  Minimal Assist    Bed Mobility  Supine to sit: Maximal Assist   Sit to supine: NT  Supine to sit: Minimal Assist   Sit to supine: Minimal Assist    Functional Transfers Maximal Assist   Various surfaces  Minimal Assist    Functional Mobility Maximal Assist w/ w/w  Steps from EOB>chair  Minimal Assist    Balance Sitting:     Static:  Min A    Dynamic: Mod A  Standing: Max A w/ w/w     Activity Tolerance Fair-  Fair+   Visual/  Perceptual Glasses: no                  Hand Dominance R   AROM (PROM) Strength Additional Info:    RUE  WFL 3+/5  W/ max cues fair  and fair FMC/dexterity noted during ADL tasks       LUE WFL 3+/5  W/ max cues fair  and fair FMC/dexterity noted during ADL tasks       Hearing: WFL  Sensation: unable to accurately assess d/t cognition  Tone: WFL   Edema: none noted    Comments: Upon arrival patient lying in bed. Therapist educated pt on role of OT. At end of session, patient seated in chair (sitter present) with call light and phone within reach, all lines and tubes intact. Overall patient demonstrated decreased independence and safety during completion of ADL/functional transfer/mobility tasks. Pt would benefit from continued skilled OT to increase safety and independence with completion of ADL/IADL tasks for functional independence and quality of life. Treatment: OT treatment provided this date includes: Facilitation of bed mobility (education/cues for body mechanics, sequencing and safety awareness.  Increased time for supine>sit EOB), unsupported sitting balance (addressing posture, weight shifting, dynamic reaching to prep for ADL's), functional transfers (various surfaces w/ education/cues for safety/hand placement), standing tolerance tasks (addressing posture, balance and activity tolerance while incorporating light functional reaching impacting ADLs and functional activity) and steps from EOB>chair with w/w (w/ education/cuing on posture, w/w management, sequencing, attention and safety). Therapist facilitated self-care retraining: UB/LB self-care tasks, toileting task and seated grooming tasks while educating/cuing pt on modified techniques, sequencing, attention, posture, safety and energy conservation techniques. Skilled monitoring of HR, O2 sats and pts response to treatment. O2 sat=^93% on room air. No c/o or noted SOB during activity or at room departure. Rehab Potential: Good for established goals     Patient / Family Goal: not stated      Patient and/or family were instructed on functional diagnosis, prognosis/goals and OT plan of care. Demonstrated fair understanding. Eval Complexity: Low    Time In: 10:53  Time Out: 11:18  Total Treatment Time: 15 minutes    Min Units   OT Eval Low 97165  X  1   OT Eval Medium 70142      OT Eval High 22846      OT Re-Eval [id number]       Therapeutic Ex 45256       Therapeutic Activities 66555  5  0   ADL/Self Care 07599  10  1   Orthotic Management 99936       Manual 00971     Neuro Re-Ed 02683       Non-Billable Time          Evaluation Time additionally includes thorough review of current medical information, gathering information on past medical history/social history and prior level of function, interpretation of standardized testing/informal observation of tasks, assessment of data and development of plan of care and goals.         MORENO SanonR/L #[id number]

## 2022-12-30 NOTE — PROGRESS NOTES
Physical Therapy  Physical Therapy Initial Assessment   Name: Faye De Jesus  : 1960  MRN: [id number]    Date of Service: 2022    Evaluating PT:  Derian Klein PT, DPT  [id number]    Room #:  6697/8684-Y  Diagnosis:  Hyponatremia [E87.1]  PMHx/PSHx:   has a past medical history of Carpal tunnel syndrome, Pulmonary nodule, and Thyroid disease. Procedure/Surgery:    Precautions:  Falls, Cognition, Sitter, Incontinence  Equipment Needs:      SUBJECTIVE:    Pt is a questionable historian, Pt states she was independent using no AD or home O2 in 2 story home with no stairs to enter. Pt has 2 stairs to enter with 1 rail. Equipment Owned:     OBJECTIVE:   Initial Evaluation  Date: 22 Treatment Short Term/ Long Term   Goals   AM-PAC 6 Clicks 42/17     Was pt agreeable to Eval/treatment? yes     Does pt have pain? No c/o pain      Bed Mobility  Rolling: ModA  Supine to sit: MaxA  Sit to supine: NT  Scooting: MaxA  Rolling: Independent  Supine to sit: Independent  Sit to supine: Independent  Scooting: Independent     Transfers Sit to stand: MaxA  Stand to sit: MaxA  Stand pivot: MaxA  Sit to stand: Modified Independent  Stand to sit: Modified Independent  Stand pivot: Modified Independent     Ambulation    3 feet with MaxA WW   150 feet with Modified Independent   AAD   Stair negotiation: ascended and descended  NT  >4 steps with single rail Modified Independent     ROM BUE:  Defer to OT  BLE:  WFL     Strength BUE:  Defer to OT  BLE:  4/5  Improve 1 MMT   Balance Sitting EOB:  Mark  Dynamic Standing:  MaxA [employer]  Sitting EOB:  Independent    Dynamic Standing:  supervision     Pt is A & O x (self, place). Disoriented to time. Cognitive deficits, requiring majority of simple commands with increased time and proper cues.    Sensation:  Unable to assess  Edema:  WNL    Therapeutic Exercises:  functional mobility    Patient education  Pt educated on role of PT    Patient response to education:   Pt verbalized understanding Pt demonstrated skill Pt requires further education in this area    partial x     ASSESSMENT:    Conditions Requiring Skilled Therapeutic Intervention:    [x]Decreased strength     [x]Decreased ROM  [x]Decreased functional mobility  [x]Decreased balance   [x]Decreased endurance   []Decreased posture  []Decreased sensation  []Decreased coordination   []Decreased vision  [x]Decreased safety awareness   [x]Increased pain       Comments:  Pt agreeable to PT evaluation. Pt with significant cognitive, strength, and balance deficits. Pt performing mobility with assist and  cues. Activity limited by fatigue. Patient would benefit from continued skilled PT to maximize functional mobility independence. Treatment:  Patient practiced and was instructed in the following treatment:    Bed mobility- verbal cues to facilitate independence  Functional transfers-Verbal cues for proper positioning and sequencing to perform transfers safely with maximum independence. Gait training- Verbal cues for proper positioning and sequencing using assistive device to maximize functional mobility independence. Pt's/ family goals   1. Get better    Prognosis is good for reaching above PT goals. Patient and or family understand(s) diagnosis, prognosis, and plan of care.   yes    PHYSICAL THERAPY PLAN OF CARE:    PT POC is established based on physician order and patient diagnosis     Referring provider/PT Order:   12/29/22 1000  PT eval and treat  Start:  12/29/22 1000,   End:  12/29/22 1000,   ONE TIME,   Standing Count:  1 Occurrences,   R         Elieser Fishman MD     Diagnosis:  Hyponatremia [E87.1]  Specific instructions for next treatment:  Functional mobility    Current Treatment Recommendations:     [x] Strengthening to improve independence with functional mobility   [x] ROM to improve independence with functional mobility   [x] Balance Training to improve static/dynamic balance and to reduce fall risk  [x] Endurance Training to improve activity tolerance during functional mobility   [x] Transfer Training to improve safety and independence with all functional transfers   [x] Gait Training to improve gait mechanics, endurance and assess need for appropriate assistive device  [x] Stair Training in preparation for safe discharge home and/or into the community   [x] Positioning to prevent skin breakdown and contractures  [x] Safety and Education Training   [x] Patient/Caregiver Education   [x] HEP  [] Other     PT long term treatment goals are located in above grid    Frequency of treatments: 2-5x/week x 1-2 weeks. Time in  1050  Time out  1110    Total Treatment Time  8 minutes     Evaluation Time includes thorough review of current medical information, gathering information on past medical history/social history and prior level of function, completion of standardized testing/informal observation of tasks, assessment of data and education on plan of care and goals.     CPT codes:  [x] Low Complexity PT evaluation 53608  [] Moderate Complexity PT evaluation 13882  [] High Complexity PT evaluation 74096  [] PT Re-evaluation 49437  [] Gait training 96651 0 minutes  [] Manual therapy 20222 0 minutes  [x] Therapeutic activities 74501 8 minutes  [] Therapeutic exercises 66226 0 minutes  [] Neuromuscular reeducation 45416 0 minutes       Jaclyn Alexis PT, DPT   [id number]

## 2022-12-30 NOTE — PROGRESS NOTES
[facility] Inpatient   Resident Progress Note    S:  Hospital day: 3    Brief Synopsis: Alexandro Gonzales is a 58 y.o. female with a PMH of COPD, current tobacco and alcohol abuse, chronic hyponatremia, thyroid disease, pulmonary nodule and carpal tunnel syndrome. The patient presented for AMS. Patient was urged to go to ED by boyfriend who said she has been acting weird the last few days. In the ED, she complained of dysuria, suprapubic pain and CVA tenderness bilaterally. On presentation she was tachycardic, hypotensive and febrile. UA is positive for LE, WBC and rare bacteria. Culture came back to be an E. coli. She was started on 2 L of fluids and Zosyn 1 dose. Her sodium was found to be 118 as well. She was admitted for sepsis 2/2 UTI and hyponatremia. ICU was consulted from ED. Lactic acid came back to be 1.3 and 1.1. CT abdomen showed nonspecific fat stranding perinephric only and diverticulosis. Zosyn was switched to Rocephin. Patient was transferred out of the ICU. A sitter was needed as patient was agitated and wants to leave. D5W was started and patient placed on fluid restriction. Overnight, nephrology recommended to stop D5W and continue Phos-Nak x4 doses. Hasn't required Ativan since IV 3mg was given yesterday. Patient was seen and examined this morning. She was more alert today especially to self but not oriented to place and time. She no longer complaints of suprapubic pain or dysuria. She continues to be tachycardic. WBC this AM elevated to 17.     Cont meds:    dextrose      sodium chloride       Scheduled meds:    potassium & sodium phosphates  1 packet Oral 4x Daily    magnesium oxide  400 mg Oral Daily    thiamine  250 mg IntraVENous Daily    Followed by    Ramy Thomas ON 1/4/2023] thiamine  100 mg Oral Daily    sodium chloride flush  5-40 mL IntraVENous 2 times per day    enoxaparin  30 mg SubCUTAneous Daily    ipratropium-albuterol  1 ampule Inhalation Q4H WA    budesonide 0.5 mg Nebulization BID    cetirizine  10 mg Oral Daily    [Held by provider] cyclobenzaprine  5 mg Oral Nightly    montelukast  10 mg Oral Daily    pantoprazole  40 mg Oral QAM AC    folic acid  1 mg IntraVENous Daily    lactulose  20 g Oral TID    cefTRIAXone (ROCEPHIN) IV  1,000 mg IntraVENous Q24H     PRN meds: LORazepam **OR** LORazepam **OR** LORazepam **OR** LORazepam **OR** LORazepam **OR** LORazepam **OR** LORazepam **OR** LORazepam, glucose, dextrose bolus **OR** dextrose bolus, glucagon (rDNA), dextrose, sodium chloride flush, sodium chloride, acetaminophen, ondansetron **OR** ondansetron     I reviewed the patient's Past Medical and Surgical History, Medications and Allergies. O:  VS: BP (!) 141/96   Pulse (!) 115   Temp 97.7 °F (36.5 °C) (Temporal)   Resp 18   Ht 5' 1\" (1.549 m)   Wt 97 lb (44 kg)   SpO2 96%   BMI 18.33 kg/m²     Physical Exam:  Physical Exam  Vitals reviewed. Constitutional:       General: She is not in acute distress. Appearance: She is ill-appearing. HENT:      Mouth/Throat:      Mouth: Mucous membranes are dry. Cardiovascular:      Rate and Rhythm: Regular rhythm. Tachycardia present. Heart sounds: No murmur heard. No gallop. Pulmonary:      Breath sounds: Decreased air movement present. No wheezing. Abdominal:      Palpations: Abdomen is soft. Tenderness: There is no abdominal tenderness. There is no guarding. Comments: Mildly distended. Musculoskeletal:      Cervical back: Normal range of motion and neck supple. Skin:     General: Skin is dry. Neurological:      Mental Status: She is disoriented. Labs:  Na/K/Cl/CO2:  135/3.6/100/24 (12/30 0424)  BUN/Cr/glu/ALT/AST/amyl/lip:  --/--/--/16/46/--/-- (12/30 6420)  WBC/Hgb/Hct/Plts:  17.0/8.6/22.7/142 (12/30 0424)  estimated creatinine clearance is 45 mL/min (based on SCr of 0.9 mg/dL).   Other pertinent labs as noted below    New Imaging:  XR CHEST PORTABLE   Final Result   No focal pneumonia or pleural effusion. CT ABDOMEN PELVIS WO CONTRAST Additional Contrast? None   Final Result   1. Nonspecific bilateral perinephric fat stranding possibly related to   chronic medical renal disease with appropriate clinical history. 2. Mild prominence of bilateral renal pelves which may be physiologic. No   evidence of renal or ureteral calculus. 3. Diverticulosis. XR CHEST PORTABLE   Final Result   Coarse interstitial markings and atherosclerotic disease. No new   cardiopulmonary pathology. CT head without contrast   Final Result   No acute intracranial abnormality. Specifically, there is no acute   intracranial hemorrhage      Old lacunar infarct within the left basal ganglia. If the patient's symptoms persist follow-up MRI is recommended. A/P:  Principal Problem:    Hyponatremia  Active Problems:    Simple chronic bronchitis (HCC)    Alcohol abuse    Disorientation    RYLEE (acute kidney injury) ([facility] [address]  Resolved Problems:    * No resolved hospital problems. *      Sepsis   Secondary to UTI vs Pyelonephritis vs Aspiration Pneumonia vs Abdominal infection   -EKG in the ED: Sinus tachycardia with right atrial enlargement   -Given Zosyn in the ED, Zosyn switched to Rocephin per ICU  -Blood cultures pending, Urine culture showed E. coli  -Lactic Acid 1.1, 1.3  -Negative flu and Covid tests  -Non contrast ct abdomen showed nonspecific fat stranding yessy nephrically and diverticulosis.   -CXR negative for PNA  -Procal 2.22  -ID consulted     Chronic Alcohol Abuse  -Last drink before admission, spoke to pt's mother who states she drinks several cans of beer each day  -[facility] Protocol, concerns for DT as HR in the 130s  -Ethanol level in the ED <10       Hyponatremia - stable  -Na 118 in the ED, recorrected to 135 this AM  -Likely due to beer potomania from last visit   -Urine studies show hypotonic hypovolemic hyponatremia  -Nephrology consulted, discontinued D5W  -Transferred out of ICU  -Continue BMP q12h              -Phos-nak packet ordered  -Mg repleted 12/30              AMS 2/2 to hyponatremia vs sepsis vs SBP  -CT Head: Old lacunar infarcts in the left basal ganglia. Recommends F/U MRI  -CT abd: no ascites seen  -Ammonia normalized from 56 to 46 with lactulose TID  -Sitter requested as patient attempts to leave     RYLEE - resolved  -Bun: 37 and Creatinine: 1.8 in the ED  -Creatinine 0.9 today  -Nephrology consulted      COPD  -Daily vitals and oxygen saturation    -Pulmicort, Duonebs, and Brovana  -Oxygen as needed   -ABG showed pH 7.4, PCO2 33.5, PO2 59.7  -PFT 2021: FEV1/FVC 58%, FEV1 49% predicted.  GOLD Stage 3     Fibrosis of liver  -fibroscan done last hospitalization  -ammonia elevated, lactulose started TID  -INR 1.2, aPTT 35.8                Tobacco Abuse   -Nicotine patch      Pulmonary Nodule  -Incidental finding on CT Chest on August 2021 showing 3 mm nodule in the right upper lobe  -Smokes 1.5 packs of tobacco per day for the past 20 years     DVT Prophylaxis: Lovenox  GI Prophylaxis: Protonix  Diet: Adult regular, fluid restriction  PT/OT consulted for dispo to [facility] for DC planning    Electronically signed by Shanti Mcclain MD on 12/30/2022 at 12:43 PM  This case was discussed with attending physician Dr. Abhi Bonds

## 2022-12-30 NOTE — PROGRESS NOTES
[address] [facility]  Family Medicine Attending    S: 58 y.o. female with PMH of COPD, Tobacco Abuse, Alcohol Abuse, Cirrhosis, Hyponatremia, and Thyroid Disease presents with altered mental status. Recent admission with hyponatremia during which patient left AMA from MICU. Had been complaining of lower abdominal and back pain. Continues to smoke and drink EtOH. In ER, noted to have tachycardia, hypotension, low-grade fever. Today, somnolent after lorazepam.  Had been attempting to get out of bed. Confused. O: VS- Blood pressure (!) 141/96, pulse (!) 115, temperature 97.7 °F (36.5 °C), temperature source Temporal, resp. rate 18, height 5' 1\" (1.549 m), weight 97 lb (44 kg), SpO2 96 %. Exam is as noted by resident with the following changes, additions or corrections:  Somnolent, doesn't open eyes to voice or touch  Heart- tachy  Lungs - mild wheezes  Abdomen- Nontender today  Ext - no edema    Impressions:   Principal Problem:    Hyponatremia  Active Problems:    Simple chronic bronchitis (HCC)    Alcohol abuse    Disorientation    RYLEE (acute kidney injury) ([facility] [address]  Resolved Problems:    * No resolved hospital problems. *      Plan:   Remains tachycardic and confused. WBC elevated. Recultured, CXR negative, no further fever   No obvious infectious source (E coli UTI treated with rocephin - sensitive)   Hyponatremia - resolved. Appreciate nephrology input   Recheck TSH/T4   Consider re-imaging brain if not improving      Attending Physician Statement  I have reviewed the chart and seen the patient with the resident(s). I personally reviewed images, EKG's and similar tests, if present. I personally reviewed and performed key elements of the history and exam.  I have reviewed and confirmed student and/or resident history and exam with changes as indicated above. I agree with the assessment, plan and orders as documented by the resident.   Please refer to the resident and/or student note for additional information.       Alexandr Alarcon MD

## 2022-12-30 NOTE — CARE COORDINATION
SOCIAL WORK/CASEMANAGEMENT TRANSITION OF CARE PLANNINGNorosibel Saavedraocent [place], [address]):  pt remains confused on ciwa with iv ativan and rocephin. PT and OT saw pt and rec: cielo Liz is following pt. Precert would be needed if accepted. Will be here over weekend.  JESSI Rich  12/30/2022

## 2022-12-30 NOTE — CONSULTS
[facility] CONSULT NOTE    Reason for Consult: Leukocytosis  Requested by: Demetrius Hdz MD     Chief complaint: Confusion    History Obtained From: Patient and EMR    HISTORY Katie              The patient is a 58 y.o. female with history of COPD, tobacco and alcohol abuse, cirrhosis, previously admitted from 12/10-12/13 for dizziness but left AMA prior to further work-up, presented on 12/27 with confusion found to be septic with fever of 100.6 F, leukocytosis up to 17,000, pyuria of 10-20 WBCs with urine culture growing more than 100,000 CFU per mL of E. coli (non-ESBL, susceptible to co-trimoxazole). Respiratory pathogen PCR panel was negative. Chest x-ray was unremarkable. Blood cultures showed no growth to date. CT abdomen and pelvis showed nonspecific bilateral perinephric fat stranding, no evidence of renal or ureteral calculus. Piperacillin-tazobactam was started on admission then switched to ceftriaxone on 12/28. ID service was subsequently consulted for further recommendations.     Past Medical History  Past Medical History:   Diagnosis Date    Carpal tunnel syndrome     Pulmonary nodule     Thyroid disease        Current Facility-Administered Medications   Medication Dose Route Frequency Provider Last Rate Last Admin    LORazepam (ATIVAN) tablet 1 mg  1 mg Oral Q1H PRN Perla Lopez MD        Or    LORazepam (ATIVAN) injection 1 mg  1 mg IntraVENous Q1H PRN Balbina Maxwell MD   1 mg at 12/30/22 0901    Or    LORazepam (ATIVAN) tablet 2 mg  2 mg Oral Q1H PRN Balbina Maxwell MD        Or    LORazepam (ATIVAN) injection 2 mg  2 mg IntraVENous Q1H PRN Wing Lopez MD        Or    LORazepam (ATIVAN) tablet 3 mg  3 mg Oral Q1H PRN Perla Lopez MD        Or    LORazepam (ATIVAN) injection 3 mg  3 mg IntraVENous Q1H PRN Balbina Maxwell MD   3 mg at 12/29/22 0809    Or    LORazepam (ATIVAN) tablet 4 mg  4 mg Oral Q1H PRN Perla Lopez MD        Or    LORazepam (ATIVAN) injection 4 mg  4 mg IntraVENous Q1H PRN Perla Rodriguez MD        magnesium oxide (MAG-OX) tablet 400 mg  400 mg Oral Daily Willis Guan MD   400 mg at 12/30/22 0846    thiamine (B-1) injection 250 mg  250 mg IntraVENous Daily Elizabeth Lewis MD   250 mg at 12/30/22 5863    Followed by    Saintclair Muskrat ON 1/4/2023] thiamine tablet 100 mg  100 mg Oral Daily Elizabeth Lewis MD        glucose chewable tablet 16 g  4 tablet Oral PRN Elizabeth Lewis MD        dextrose bolus 10% 125 mL  125 mL IntraVENous PRN Elizabeth Lewis MD        Or    dextrose bolus 10% 250 mL  250 mL IntraVENous PRN Elizabeth Lewis MD        glucagon (rDNA) injection 1 mg  1 mg SubCUTAneous PRN Elizabeth Lewis MD        dextrose 10 % infusion   IntraVENous Continuous PRN Elizabeth Lewis MD        sodium chloride flush 0.9 % injection 5-40 mL  5-40 mL IntraVENous 2 times per day Luther Munguia MD   10 mL at 12/30/22 0847    sodium chloride flush 0.9 % injection 5-40 mL  5-40 mL IntraVENous PRN Brissa Rodriguez MD        0.9 % sodium chloride infusion   IntraVENous PRN Brissa Rodriguez MD        enoxaparin Sodium (LOVENOX) injection 30 mg  30 mg SubCUTAneous Daily Perla Rodriguez MD   30 mg at 12/30/22 0847    acetaminophen (TYLENOL) tablet 650 mg  650 mg Oral Q4H PRN Perla Rodriguez MD        ondansetron (ZOFRAN-ODT) disintegrating tablet 4 mg  4 mg Oral Q8H PRN Perla Rodriguez MD        Or    ondansetron [facility]) injection 4 mg  4 mg IntraVENous Q6H PRN Perla Rodriguez MD        ipratropium-albuterol (DUONEB) nebulizer solution 1 ampule  1 ampule Inhalation Q4H [address] [employer] Melissa Rodriguez MD   1 ampule at 12/30/22 1039    budesonide (PULMICORT) nebulizer suspension 500 mcg  0.5 mg Nebulization BID Perla Rodriguez MD   500 mcg at 12/30/22 1039    cetirizine (ZYRTEC) tablet 10 mg  10 mg Oral Daily Perla Rodriguez MD   10 mg at 12/29/22 2056    [Held by provider] cyclobenzaprine (FLEXERIL) tablet 5 mg  5 mg Oral Nightly Markebpiper Marie MD        montelukast (SINGULAIR) tablet 10 mg  10 mg Oral Daily [facility] Antonieta Marie MD   10 mg at 12/30/22 0846    pantoprazole (PROTONIX) tablet 40 mg  40 mg Oral QAM AC Perla Marie MD   40 mg at 92/68/36 6918    folic acid injection 1 mg  1 mg IntraVENous Daily Elizabeth Mustafa MD   1 mg at 12/30/22 0849    lactulose ([address]) 10 GM/15ML solution 20 g  20 g Oral TID Elizabeth Mustafa MD   20 g at 12/29/22 2056    cefTRIAXone (ROCEPHIN) 1,000 mg in sterile water 10 mL IV syringe  1,000 mg IntraVENous Q24H Elizabeth Mustafa MD   1,000 mg at 12/29/22 2327       Allergies   Allergen Reactions    Ibuprofen Other (See Comments)     Hallucinations    Morphine        Surgical History  No past surgical history on file. Social History  Social History     Socioeconomic History    Marital status:    Tobacco Use    Smoking status: Every Day     Packs/day: 0.50     Years: 40.00     Pack years: 20.00     Types: Cigarettes     Start date: 9/11/1980    Smokeless tobacco: Never   Substance and Sexual Activity    Alcohol use: Yes     Alcohol/week: 2.0 standard drinks     Types: 2 Cans of beer per week     Comment: daily    Drug use: Never     Comment: Denies     Family Medical History  No family history on file.     Review of Systems:  Constitutional: No fever, no chills  Eyes: No vision changes, no retroorbital pain  ENT: No hearing changes, no ear pain  Respiratory: Has cough, has dyspnea  Cardiovascular: Has chest pain, no palpitations  Gastrointestinal: Has abdominal pain, has diarrhea  Genitourinary: Has dysuria, no hematuria  Integumentary: No rash, no itching  Musculoskeletal: No muscle pain, no joint pain  Neurologic: Has headache, no numbness in extremities    Physical Examination:  Vitals:    12/30/22 0000 12/30/22 0335 12/30/22 0558 12/30/22 0800   BP: (!) 149/106 (!) 138/99  (!) 141/96 Pulse: (!) 114 (!) 115  (!) 115   Resp:    18   Temp:    97.7 °F (36.5 °C)   TempSrc:    Temporal   SpO2:    96%   Weight:   97 lb (44 kg)    Height:         Constitutional: Alert, not in distress  Eyes: Sclerae anicteric, no conjunctival erythema  ENT: No buccal lesion, no pharyngeal exudates  Neck: No nuchal rigidity, no cervical adenopathy  Lungs: Clear breath sounds, no crackles, no wheezes  Heart: Regular rate and rhythm, no murmurs  Abdomen: Bowel sounds present, soft, nontender  Skin: Warm and dry, no active dermatoses  Musculoskeletal: No joint erythema, no joint swelling    Labs, imaging, and medical records/notes were personally reviewed. Assessment:  Sepsis, resolving, secondary to E coli pyelonephritis    Plan:  Change ceftriaxone to Bactrim DS 1 tab BID to finish 10 days of antibiotic therapy from 12/27-01/05. Continue supportive care. Thank you for involving me in the care of Pratik Casiano. I will sign off for now. Please do not hesitate to call for any questions, concerns, or new findings.     Electronically signed by Mehul Bianchi MD on 12/30/2022 at 1:15 PM

## 2022-12-31 LAB
ANION GAP SERPL CALCULATED.3IONS-SCNC: 12 MMOL/L (ref 7–16)
ANION GAP SERPL CALCULATED.3IONS-SCNC: 8 MMOL/L (ref 7–16)
ANISOCYTOSIS: ABNORMAL
BASOPHILS ABSOLUTE: 0.13 E9/L (ref 0–0.2)
BASOPHILS RELATIVE PERCENT: 0.9 % (ref 0–2)
BUN BLDV-MCNC: 13 MG/DL (ref 6–23)
BUN BLDV-MCNC: 16 MG/DL (ref 6–23)
CALCIUM SERPL-MCNC: 8.5 MG/DL (ref 8.6–10.2)
CALCIUM SERPL-MCNC: 8.8 MG/DL (ref 8.6–10.2)
CHLORIDE BLD-SCNC: 101 MMOL/L (ref 98–107)
CHLORIDE BLD-SCNC: 99 MMOL/L (ref 98–107)
CO2: 23 MMOL/L (ref 22–29)
CO2: 25 MMOL/L (ref 22–29)
CREAT SERPL-MCNC: 0.9 MG/DL (ref 0.5–1)
CREAT SERPL-MCNC: 0.9 MG/DL (ref 0.5–1)
EOSINOPHILS ABSOLUTE: 0.37 E9/L (ref 0.05–0.5)
EOSINOPHILS RELATIVE PERCENT: 2.6 % (ref 0–6)
GFR SERPL CREATININE-BSD FRML MDRD: >60 ML/MIN/1.73
GFR SERPL CREATININE-BSD FRML MDRD: >60 ML/MIN/1.73
GLUCOSE BLD-MCNC: 120 MG/DL (ref 74–99)
GLUCOSE BLD-MCNC: 99 MG/DL (ref 74–99)
HCT VFR BLD CALC: 18.4 % (ref 34–48)
HCT VFR BLD CALC: 20.3 % (ref 34–48)
HEMOGLOBIN: 6.9 G/DL (ref 11.5–15.5)
HEMOGLOBIN: 7.6 G/DL (ref 11.5–15.5)
HYPOCHROMIA: ABNORMAL
LYMPHOCYTES ABSOLUTE: 0.57 E9/L (ref 1.5–4)
LYMPHOCYTES RELATIVE PERCENT: 3.5 % (ref 20–42)
MAGNESIUM: 1.7 MG/DL (ref 1.6–2.6)
MCH RBC QN AUTO: 35.9 PG (ref 26–35)
MCHC RBC AUTO-ENTMCNC: 37.5 % (ref 32–34.5)
MCV RBC AUTO: 95.8 FL (ref 80–99.9)
MONOCYTES ABSOLUTE: 1.42 E9/L (ref 0.1–0.95)
MONOCYTES RELATIVE PERCENT: 9.5 % (ref 2–12)
NEUTROPHILS ABSOLUTE: 11.93 E9/L (ref 1.8–7.3)
NEUTROPHILS RELATIVE PERCENT: 83.5 % (ref 43–80)
NUCLEATED RED BLOOD CELLS: 0.9 /100 WBC
OVALOCYTES: ABNORMAL
PDW BLD-RTO: 17.2 FL (ref 11.5–15)
PHOSPHORUS: 3 MG/DL (ref 2.5–4.5)
PLATELET # BLD: 173 E9/L (ref 130–450)
PMV BLD AUTO: 11 FL (ref 7–12)
POIKILOCYTES: ABNORMAL
POLYCHROMASIA: ABNORMAL
POTASSIUM SERPL-SCNC: 3.6 MMOL/L (ref 3.5–5)
POTASSIUM SERPL-SCNC: 4.3 MMOL/L (ref 3.5–5)
RBC # BLD: 1.92 E12/L (ref 3.5–5.5)
SCHISTOCYTES: ABNORMAL
SODIUM BLD-SCNC: 132 MMOL/L (ref 132–146)
SODIUM BLD-SCNC: 136 MMOL/L (ref 132–146)
TARGET CELLS: ABNORMAL
TEAR DROP CELLS: ABNORMAL
WBC # BLD: 14.2 E9/L (ref 4.5–11.5)

## 2022-12-31 PROCEDURE — 85018 HEMOGLOBIN: CPT

## 2022-12-31 PROCEDURE — 2500000003 HC RX 250 WO HCPCS: Performed by: INTERNAL MEDICINE

## 2022-12-31 PROCEDURE — 2580000003 HC RX 258

## 2022-12-31 PROCEDURE — 85014 HEMATOCRIT: CPT

## 2022-12-31 PROCEDURE — 84100 ASSAY OF PHOSPHORUS: CPT

## 2022-12-31 PROCEDURE — 82270 OCCULT BLOOD FECES: CPT

## 2022-12-31 PROCEDURE — 6370000000 HC RX 637 (ALT 250 FOR IP)

## 2022-12-31 PROCEDURE — 6360000002 HC RX W HCPCS

## 2022-12-31 PROCEDURE — 94640 AIRWAY INHALATION TREATMENT: CPT

## 2022-12-31 PROCEDURE — 36415 COLL VENOUS BLD VENIPUNCTURE: CPT

## 2022-12-31 PROCEDURE — 85025 COMPLETE CBC W/AUTO DIFF WBC: CPT

## 2022-12-31 PROCEDURE — 99232 SBSQ HOSP IP/OBS MODERATE 35: CPT | Performed by: FAMILY MEDICINE

## 2022-12-31 PROCEDURE — 2060000000 HC ICU INTERMEDIATE R&B

## 2022-12-31 PROCEDURE — 6360000002 HC RX W HCPCS: Performed by: INTERNAL MEDICINE

## 2022-12-31 PROCEDURE — 6370000000 HC RX 637 (ALT 250 FOR IP): Performed by: INTERNAL MEDICINE

## 2022-12-31 PROCEDURE — 80048 BASIC METABOLIC PNL TOTAL CA: CPT

## 2022-12-31 PROCEDURE — 6370000000 HC RX 637 (ALT 250 FOR IP): Performed by: FAMILY MEDICINE

## 2022-12-31 PROCEDURE — 83735 ASSAY OF MAGNESIUM: CPT

## 2022-12-31 RX ADMIN — IPRATROPIUM BROMIDE AND ALBUTEROL SULFATE 1 AMPULE: .5; 2.5 SOLUTION RESPIRATORY (INHALATION) at 11:55

## 2022-12-31 RX ADMIN — SULFAMETHOXAZOLE AND TRIMETHOPRIM 1 TABLET: 800; 160 TABLET ORAL at 10:19

## 2022-12-31 RX ADMIN — LACTULOSE 20 G: 20 SOLUTION ORAL at 21:09

## 2022-12-31 RX ADMIN — CETIRIZINE HYDROCHLORIDE 10 MG: 10 TABLET, FILM COATED ORAL at 21:09

## 2022-12-31 RX ADMIN — BUDESONIDE 500 MCG: 0.5 SUSPENSION RESPIRATORY (INHALATION) at 20:20

## 2022-12-31 RX ADMIN — SODIUM CHLORIDE, PRESERVATIVE FREE 10 ML: 5 INJECTION INTRAVENOUS at 21:10

## 2022-12-31 RX ADMIN — Medication 400 MG: at 10:23

## 2022-12-31 RX ADMIN — LACTULOSE 20 G: 20 SOLUTION ORAL at 15:04

## 2022-12-31 RX ADMIN — ENOXAPARIN SODIUM 30 MG: 100 INJECTION SUBCUTANEOUS at 10:18

## 2022-12-31 RX ADMIN — THIAMINE HYDROCHLORIDE 250 MG: 100 INJECTION, SOLUTION INTRAMUSCULAR; INTRAVENOUS at 10:19

## 2022-12-31 RX ADMIN — MONTELUKAST 10 MG: 10 TABLET, FILM COATED ORAL at 10:18

## 2022-12-31 RX ADMIN — PANTOPRAZOLE SODIUM 40 MG: 40 TABLET, DELAYED RELEASE ORAL at 05:42

## 2022-12-31 RX ADMIN — LACTULOSE 20 G: 20 SOLUTION ORAL at 10:17

## 2022-12-31 RX ADMIN — SULFAMETHOXAZOLE AND TRIMETHOPRIM 1 TABLET: 800; 160 TABLET ORAL at 00:11

## 2022-12-31 RX ADMIN — IPRATROPIUM BROMIDE AND ALBUTEROL SULFATE 1 AMPULE: .5; 2.5 SOLUTION RESPIRATORY (INHALATION) at 20:20

## 2022-12-31 RX ADMIN — IPRATROPIUM BROMIDE AND ALBUTEROL SULFATE 1 AMPULE: .5; 2.5 SOLUTION RESPIRATORY (INHALATION) at 16:17

## 2022-12-31 RX ADMIN — ACETAMINOPHEN 650 MG: 325 TABLET, FILM COATED ORAL at 05:45

## 2022-12-31 RX ADMIN — FOLIC ACID 1 MG: 5 INJECTION, SOLUTION INTRAMUSCULAR; INTRAVENOUS; SUBCUTANEOUS at 11:44

## 2022-12-31 RX ADMIN — SULFAMETHOXAZOLE AND TRIMETHOPRIM 1 TABLET: 800; 160 TABLET ORAL at 21:09

## 2022-12-31 RX ADMIN — BUDESONIDE 500 MCG: 0.5 SUSPENSION RESPIRATORY (INHALATION) at 11:56

## 2022-12-31 ASSESSMENT — PAIN DESCRIPTION - ORIENTATION
ORIENTATION: LEFT;LOWER
ORIENTATION: LEFT;LOWER

## 2022-12-31 ASSESSMENT — PAIN SCALES - GENERAL
PAINLEVEL_OUTOF10: 6
PAINLEVEL_OUTOF10: 0
PAINLEVEL_OUTOF10: 8

## 2022-12-31 ASSESSMENT — PAIN DESCRIPTION - LOCATION
LOCATION: WRIST
LOCATION: WRIST

## 2022-12-31 NOTE — PROGRESS NOTES
[facility] MD Fidencio Lew MD Elray Harold, MD Renaldo Doe, JUAN Weeks, LAUREN Huerta, CNP  Progress Note    12/31/2022    SUBJECTIVE:   12/29: Seen while laying in bed, eyes closed. Somnolent. Does open eyes to voice but falls back asleep. Sitter is at bedside. On room air. 12/30: Sitting up in chair, no acute distress. Confused. Sitter is present at bedside. Oral intake is very poor. No acute complaints. On room air. Very weak and deconditioned. 12/31: Denies complaint. Remains confused. Sitter at bedside. Ongoing fatigue and generalized weakness, deconditioning. Eating and drinking almost nothing. PROBLEM LIST:    Principal Problem:    Hyponatremia  Active Problems:    Simple chronic bronchitis (HCC)    Alcohol abuse    Disorientation    RYLEE (acute kidney injury) ([facility] [address]  Resolved Problems:    * No resolved hospital problems. *         DIET:    ADULT DIET;  Dysphagia - Soft and Bite Sized; 1500 ml     MEDS (scheduled):    sulfamethoxazole-trimethoprim  1 tablet Oral 2 times per day    magnesium oxide  400 mg Oral Daily    thiamine  250 mg IntraVENous Daily    Followed by    Severo Rome ON 1/4/2023] thiamine  100 mg Oral Daily    sodium chloride flush  5-40 mL IntraVENous 2 times per day    enoxaparin  30 mg SubCUTAneous Daily    ipratropium-albuterol  1 ampule Inhalation Q4H WA    budesonide  0.5 mg Nebulization BID    cetirizine  10 mg Oral Daily    [Held by provider] cyclobenzaprine  5 mg Oral Nightly    montelukast  10 mg Oral Daily    pantoprazole  40 mg Oral QAM AC    folic acid  1 mg IntraVENous Daily    lactulose  20 g Oral TID       MEDS (infusions):   dextrose      sodium chloride         MEDS (prn):  LORazepam **OR** LORazepam **OR** LORazepam **OR** LORazepam **OR** LORazepam **OR** LORazepam **OR** LORazepam **OR** LORazepam, glucose, dextrose bolus **OR** dextrose bolus, glucagon (rDNA), dextrose, sodium chloride flush, sodium chloride, acetaminophen, ondansetron **OR** ondansetron    PHYSICAL EXAM:     Patient Vitals for the past 24 hrs:   BP Temp Temp src Pulse Resp Weight   12/31/22 0759 114/81 98.2 °F (36.8 °C) Temporal (!) 112 18 --   12/31/22 0345 -- -- -- -- -- 93 lb (42.2 kg)   12/30/22 2115 118/82 97.3 °F (36.3 °C) Temporal (!) 112 18 --   @      Intake/Output Summary (Last 24 hours) at 12/31/2022 1701  Last data filed at 12/31/2022 0616  Gross per 24 hour   Intake --   Output 250 ml   Net -250 ml         Wt Readings from Last 3 Encounters:   12/31/22 93 lb (42.2 kg)   12/12/22 96 lb 4.8 oz (43.7 kg)   08/05/22 101 lb (45.8 kg)       Constitutional:  fatigued and deconditioned   HEENT: NC/AT, EOMI, sclera and conjunctiva are clear and anicteric, mucus membranes moist  Neck: Trachea midline, no JVD  Cardiovascular: S1, S2 regular rhythm, no murmur,or rub  Respiratory:  CTAB. No crackles, no wheeze  Gastrointestinal:  Soft, nontender, nondistended, NABS  Ext: no edema, feet warm  Skin: dry, no rash  Neuro: awake, alert, interactive      DATA:    Recent Labs     12/29/22  0606 12/30/22  0424 12/31/22  0708 12/31/22  1153   WBC 15.7* 17.0* 14.2*  --    HGB 8.8* 8.6* 6.9* 7.6*   HCT 23.4* 22.7* 18.4* 20.3*   .0* 98.7 95.8  --     142 173  --      Recent Labs     12/30/22  0424 12/30/22  0859 12/30/22  1731 12/30/22  2139 12/31/22  0708     --  132  --  132   K 3.6  --  3.4*  --  4.3     --  98  --  99   CO2 24  --  22  --  25   MG  --  1.4*  --  2.0 1.7   PHOS  --  4.2  --  3.0 3.0   BUN 18  --  19  --  16   CREATININE 0.9  --  1.1*  --  0.9   ALT  --  16  --   --   --    AST  --  46*  --   --   --    BILIDIR  --  0.5*  --   --   --    BILITOT  --  1.2  --   --   --    ALKPHOS  --  165*  --   --   --        Lab Results   Component Value Date    LABPROT 0.1 08/06/2022    LABPROT 0.1 08/06/2022       Assessment  1.   Hyponatremia, hypovolemic, acute superimposed on chronic, due to beer drinkers potomania with acute water (beer) load. Recurrent. Goal given her alcoholism 6 to 8 mmol/L/day. At 28 hours she is 8 mmol/L above the presenting [Na+].     Na -normal     Recommendations  Encourage oral intake   1500 mL fluid restriction  BMP q 12 hours   Follow labs   Monitor I&O  Continue supportive care     Electronically signed by Livia Ward MD on 12/31/2022 at 5:01 PM

## 2022-12-31 NOTE — PROGRESS NOTES
[facility]  Family Medicine Attending    S: 58 y.o. female with PMH of COPD, Tobacco Abuse, Alcohol Abuse, Cirrhosis, Hyponatremia, and Thyroid Disease presents with altered mental status. Recent admission with hyponatremia during which patient left AMA from MICU. Had been complaining of lower abdominal and back pain. Continues to smoke and drink EtOH. In ER, noted to have tachycardia, hypotension, low-grade fever. Today, alert and oriented and pleasant. O: VS- Blood pressure 114/81, pulse (!) 112, temperature 98.2 °F (36.8 °C), temperature source Temporal, resp. rate 18, height 5' 1\" (1.549 m), weight 93 lb (42.2 kg), SpO2 96 %. Exam is as noted by resident with the following changes, additions or corrections:  AOx4  Heart- tachy  Lungs - CTAB  Abdomen- Nontender today  Ext - no edema    Impressions:   Principal Problem:    Hyponatremia  Active Problems:    Simple chronic bronchitis (HCC)    Alcohol abuse    Disorientation    RYLEE (acute kidney injury) ([facility] [address]  Resolved Problems:    * No resolved hospital problems. *      Plan:   Remains tachycardic. WBC elevated. Monitor WBC   Recultured, CXR negative, no further fever   No obvious infectious source (E coli UTI treated with rocephin - sensitive)   Hyponatremia - resolved. Appreciate nephrology input   Plan for discharge, precert needed, [facility] score 11-12       Attending Physician Statement  I have reviewed the chart and seen the patient with the resident(s). I personally reviewed images, EKG's and similar tests, if present. I personally reviewed and performed key elements of the history and exam.  I have reviewed and confirmed student and/or resident history and exam with changes as indicated above. I agree with the assessment, plan and orders as documented by the resident. Please refer to the resident and/or student note for additional information.       Madeleine Beckham MD

## 2022-12-31 NOTE — CARE COORDINATION
12/31 Update CM note. Spoke with Ruby Garcia at [facility] who accepted pt. Pt will need to sign substance use disorder policy when mental status improves. Pt will require pre-cert and will be here over the weekend. Bedside RN notified.       Oriana Lopez, BSN, RN  [facility] Case Management   Cell: [phone]

## 2022-12-31 NOTE — PROGRESS NOTES
[facility] Inpatient   Resident Progress Note    S:  Hospital day: 4    Brief Synopsis: Sofia Aleman is a 58 y.o. female with a PMH of COPD, Tobacco Abuse, Alcohol Abuse, Cirrhosis, Hyponatremia, and Thyroid Disease presents with altered mental status. Recent admission with hyponatremia during which patient left AMA from MICU. Had been complaining of lower abdominal and back pain. Continues to smoke and drink EtOH. In ER, noted to have tachycardia, hypotension, low-grade fever. No acute events overnight. Patient was seen and examined this morning. Mental status improved since yesterday, not confused anymore. Eating and drinking well. Does not have any questions or concerns. Cont meds:    dextrose      sodium chloride       Scheduled meds:    sulfamethoxazole-trimethoprim  1 tablet Oral 2 times per day    magnesium oxide  400 mg Oral Daily    thiamine  250 mg IntraVENous Daily    Followed by    Neyda Owen ON 1/4/2023] thiamine  100 mg Oral Daily    sodium chloride flush  5-40 mL IntraVENous 2 times per day    enoxaparin  30 mg SubCUTAneous Daily    ipratropium-albuterol  1 ampule Inhalation Q4H WA    budesonide  0.5 mg Nebulization BID    cetirizine  10 mg Oral Daily    [Held by provider] cyclobenzaprine  5 mg Oral Nightly    montelukast  10 mg Oral Daily    pantoprazole  40 mg Oral QAM AC    folic acid  1 mg IntraVENous Daily    lactulose  20 g Oral TID     PRN meds: LORazepam **OR** LORazepam **OR** LORazepam **OR** LORazepam **OR** LORazepam **OR** LORazepam **OR** LORazepam **OR** LORazepam, glucose, dextrose bolus **OR** dextrose bolus, glucagon (rDNA), dextrose, sodium chloride flush, sodium chloride, acetaminophen, ondansetron **OR** ondansetron     I reviewed the patient's Past Medical and Surgical History, Medications and Allergies.     O:  VS: /81   Pulse (!) 112   Temp 98.2 °F (36.8 °C) (Temporal)   Resp 18   Ht 5' 1\" (1.549 m)   Wt 93 lb (42.2 kg)   SpO2 96%   BMI 17.57 kg/m²     Physical Exam:  Physical Exam  Vitals reviewed. Constitutional:       General: She is not in acute distress. HENT:      Head: Normocephalic and atraumatic. Nose: Nose normal. No congestion or rhinorrhea. Eyes:      Conjunctiva/sclera: Conjunctivae normal.   Cardiovascular:      Rate and Rhythm: Regular rhythm. Tachycardia present. Pulses: Normal pulses. Heart sounds: Normal heart sounds. Pulmonary:      Effort: Pulmonary effort is normal. No respiratory distress. Breath sounds: Normal breath sounds. Abdominal:      General: Bowel sounds are normal. There is distension (mild). Palpations: Abdomen is soft. Tenderness: There is no abdominal tenderness. There is no guarding or rebound. Musculoskeletal:      Cervical back: Normal range of motion and neck supple. Right lower leg: No edema. Left lower leg: No edema. Skin:     General: Skin is warm and dry. Capillary Refill: Capillary refill takes less than 2 seconds. Findings: No rash. Neurological:      Mental Status: She is alert and oriented to person, place, and time. Sensory: No sensory deficit. Motor: No weakness. Labs:  Na/K/Cl/CO2:  132/4.3/99/25 (12/31 8057)  BUN/Cr/glu/ALT/AST/amyl/lip:  16/0.9/--/--/--/--/-- (12/31 2282)  WBC/Hgb/Hct/Plts:  17.0/8.6/22.7/142 (12/30 2164)  estimated creatinine clearance is 43 mL/min (based on SCr of 0.9 mg/dL). Other pertinent labs as noted below    New Imaging:  XR CHEST PORTABLE   Final Result   No focal pneumonia or pleural effusion. CT ABDOMEN PELVIS WO CONTRAST Additional Contrast? None   Final Result   1. Nonspecific bilateral perinephric fat stranding possibly related to   chronic medical renal disease with appropriate clinical history. 2. Mild prominence of bilateral renal pelves which may be physiologic. No   evidence of renal or ureteral calculus. 3. Diverticulosis.          XR CHEST PORTABLE   Final Result   Coarse interstitial markings and atherosclerotic disease. No new   cardiopulmonary pathology. CT head without contrast   Final Result   No acute intracranial abnormality. Specifically, there is no acute   intracranial hemorrhage      Old lacunar infarct within the left basal ganglia. If the patient's symptoms persist follow-up MRI is recommended. A/P:  Principal Problem:    Hyponatremia  Active Problems:    Simple chronic bronchitis (HCC)    Alcohol abuse    Disorientation    RYLEE (acute kidney injury) ([address]  Resolved Problems:    * No resolved hospital problems. *      Sepsis   Secondary to UTI vs Pyelonephritis  EKG in the ED: Sinus tachycardia with right atrial enlargement   Given Zosyn in the ED, Zosyn switched to Rocephin per ICU  Lactic Acid 1.1, 1.3  Negative flu and Covid tests  Non contrast ct abdomen showed nonspecific fat stranding yessy nephrically and diverticulosis. Chest x-ray negative for pneumonia  Procalcitonin 2.22  Blood cultures pending, Urine culture showed E. Coli  Ceftriaxone changed to Bactrim DS twice daily for a total of 10 days of antibiotics  ID following     Chronic Alcohol Abuse  Last drink before admission, spoke to pt's mother who states she drinks several cans of beer each day  Ethanol level in the ED <10    CIWA Protocol, concerns for DT as HR in the 130s  Seizure precautions  Continue folic acid and thiamine     Hyponatremia - resolved and stable  Na 118 in the ED  Likely due to beer potomania from last visit   Urine studies show hypotonic hypovolemic hyponatremia  Hyponatremia, hypovolemic, acute superimposed on chronic, due to beer drinkers potomania with acute water (beer) load. Recurrent. Goal given her alcoholism 6 to 8 mmol/L/day. At 28 hours she is 8 mmol/L above the presenting [Na+].   1500 mL fluid restriction  Continue BMP q12h   Monitor electrolytes and replace as needed  Neurology following                         Altered mental status secondary to hyponatremia vs sepsis  CT Head: Old lacunar infarcts in the left basal ganglia. Recommends F/U MRI. CT abd: no ascites seen  Ammonia normalized from 56 to 46 with lactulose TID  Sitter requested as patient attempts to leave  Consider re-imaging brain if not improving     RYLEE - resolved  BUN: 37 and Creatinine: 1.8 in the ED  Nephrology following     COPD  ABG showed pH 7.4, PCO2 33.5, PO2 59.7  PFT 2021: FEV1/FVC 58%, FEV1 49% predicted.  GOLD Stage 3   Pulmicort, Duonebs, and Brovana  Oxygen as needed   Daily vitals and oxygen saturation      Fibrosis of liver  Fibroscan done last hospitalization  Ammonia elevated, lactulose started TID  INR 1.2, aPTT 35.8                Tobacco Abuse   Nicotine patch      Pulmonary Nodule  Incidental finding on CT Chest on August 2021 showing 3 mm nodule in the right upper lobe  Smokes 1.5 packs of tobacco per day for the past 20 years   Consider outpatient work-up      PT 10/24 and OT 11/24  DVT Prophylaxis: Lovenox 30 mg daily  GI Prophylaxis: Protonix 40 mg daily  Diet: Dysphagia  Full code  Disposition: facility vs [place] [id number].      Electronically signed by Crystal Mary MD on 12/31/2022 at 10:11 AM  This case was discussed with attending physician Dr. Kurt Coffey

## 2022-12-31 NOTE — DISCHARGE INSTR - COC
Continuity of Care Form    Patient Name: Jean Pierre Vee   :  1960  MRN:  [id number]    Admit date:  2022  Discharge date:  ***    Code Status Order: Full Code   Advance Directives:     Admitting Physician:  Shama Ng MD  PCP: April Quevedo MD    Discharging Nurse: [facility] Unit/Room#: 6303/6090-G  Discharging Unit Phone Number: ***    Emergency Contact:   Extended Emergency Contact Information  Primary Emergency Contact: [address] Phone: [phone]  Relation: Parent    Past Surgical History:  No past surgical history on file.     Immunization History:   Immunization History   Administered Date(s) Administered    COVID-19, PFIZER Bivalent BOOSTER, DO NOT Dilute, (age 12y+), IM, 30 mcg/0.3 mL 2022    COVID-19, PFIZER PURPLE top, DILUTE for use, (age 15 y+), 30mcg/0.3mL 2021, 2021, 2021    Influenza, FLUARIX, FLULAVAL, FLUZONE (age 10 mo+) AND AFLURIA, (age 1 y+), PF, 0.5mL 2021, 10/05/2021    Pneumococcal Conjugate 13-valent (Sweaqsp26) 10/23/2018    Pneumococcal Polysaccharide (Dhaysuoxf11) 10/05/2021    Tdap (Boostrix, Adacel) 2021    Zoster Live (Zostavax) 2015       Active Problems:  Patient Active Problem List   Diagnosis Code    Other emphysema ([facility] J43.8    Hyponatremia E87.1    Severe protein-calorie malnutrition ([facility] E43    Simple chronic bronchitis ([facility] J41.0    Alcohol abuse F10.10    Disorientation R41.0    RYLEE (acute kidney injury) ([facility] N17.9       Isolation/Infection:   Isolation            No Isolation          Patient Infection Status       Infection Onset Added Last Indicated Last Indicated By Review Planned Expiration Resolved Resolved By    None active    Resolved    COVID-19 (Rule Out) 22 Respiratory Panel, Molecular, with COVID-19 (Restricted: peds pts or suitable admitted adults) (Ordered)   22 Rule-Out Test Resulted    COVID-19 (Rule Out) 22 COVID-19, Rapid (Ordered)   12/27/22 Rule-Out Test Resulted            Nurse Assessment:  Last Vital Signs: /81   Pulse (!) 112   Temp 98.2 °F (36.8 °C) (Temporal)   Resp 18   Ht 5' 1\" (1.549 m)   Wt 93 lb (42.2 kg)   SpO2 96%   BMI 17.57 kg/m²     Last documented pain score (0-10 scale): Pain Level: 0  Last Weight:   Wt Readings from Last 1 Encounters:   12/31/22 93 lb (42.2 kg)     Mental Status:  {IP PT MENTAL STATUS:20030}    IV Access:  { STEPHANIE IV ACCESS:295852095}    Nursing Mobility/ADLs:  Walking   {P DME OCAP:605816747}  Transfer  {P DME WHUH:025056996}  Bathing  {CHP DME ZBHO:666899336}  Dressing  {CHP DME INOY:516129377}  Toileting  {CHP DME WGIN:946503227}  Feeding  {[facility] DME ZNGD:972269451}  Med Admin  {P DME TRJP:553152611}  Med Delivery   { STEPHANEI MED Delivery:833109969}    Wound Care Documentation and Therapy:  Wound 12/29/22 Thigh Anterior;Right; Inner 1x1cm raised blood blister (Active)   Wound Etiology Other 12/31/22 1056   Dressing/Treatment Open to air 12/31/22 1056   Wound Length (cm) 1 cm 12/29/22 1625   Wound Width (cm) 1 cm 12/29/22 1625   Wound Surface Area (cm^2) 1 cm^2 12/29/22 1625   Wound Assessment Blood filled blister 12/31/22 1056   Drainage Amount None 12/31/22 1056   Odor None 12/31/22 1056   Joyce-wound Assessment Intact 12/31/22 1056   Number of days: 1        Elimination:  Continence: Bowel: {YES / KL:27371}  Bladder: {YES / PO:33781}  Urinary Catheter: {Urinary Catheter:823061567}   Colostomy/Ileostomy/Ileal Conduit: {YES / IX:15329}       Date of Last BM: ***    Intake/Output Summary (Last 24 hours) at 12/31/2022 1136  Last data filed at 12/31/2022 0616  Gross per 24 hour   Intake --   Output 250 ml   Net -250 ml     I/O last 3 completed shifts:   In: 400 [P.O.:400]  Out: 650 [Urine:650]    Safety Concerns:     [address] Safety Concerns:472647749}    Impairments/Disabilities:      [address] Impairments/Disabilities:864268980}    Nutrition Therapy:  Current Nutrition Therapy:   [address] Lech[address] STEPHANIE Diet List:471556799}    Routes of Feeding: {CHP DME Other Feedings:599851882}  Liquids: {Slp liquid thickness:56117}  Daily Fluid Restriction: {CHP DME Yes amt example:267767057}  Last Modified Barium Swallow with Video (Video Swallowing Test): {Done Not Done KMXM:877356765}    Treatments at the Time of Hospital Discharge:   Respiratory Treatments: ***  Oxygen Therapy:  {Therapy; copd oxygen:33715}  Ventilator:    { CC Vent KJEZ:592724615}    Rehab Therapies: Physical Therapy and Occupational Therapy  Weight Bearing Status/Restrictions: [address] CC Weight Bearin}  Other Medical Equipment (for information only, NOT a DME order):  {EQUIPMENT:850474067}  Other Treatments: ***    Patient's personal belongings (please select all that are sent with patient):  {CHP DME Belongings:677627414}    RN SIGNATURE:  {Esignature:917315103}    CASE MANAGEMENT/SOCIAL WORK SECTION    Inpatient Status Date: 2022    Readmission Risk Assessment Score:  Readmission Risk              Risk of Unplanned Readmission:  24           Discharging to Facility/ Agency   Name: [facility]  Address: [address] [place] [address] [place] [address]  Phone: [phone]  Fax:    Dialysis Facility (if applicable)   Name:  Address:  Dialysis Schedule:  Phone:  Fax:    / signature: Electronically signed by JESSI Rivas on 1/3/2023 at 12:09 PM      PHYSICIAN SECTION    Prognosis: {Prognosis:4277226100}    Condition at Discharge: [address] Patient Condition:524379644}    Rehab Potential (if transferring to Rehab): {Prognosis:8936368191}    Recommended Labs or Other Treatments After Discharge: ***    Physician Certification: I certify the above information and transfer of Eri Brown  is necessary for the continuing treatment of the diagnosis listed and that she requires [place] for less 30 days.      Update Admission H&P: {CHP DME Changes in DXTGE:892942479}    PHYSICIAN SIGNATURE: {[facility]:621389775}

## 2023-01-01 ENCOUNTER — APPOINTMENT (OUTPATIENT)
Dept: CT IMAGING | Age: 63
DRG: 720 | End: 2023-01-01
Payer: MEDICAID

## 2023-01-01 VITALS
OXYGEN SATURATION: 97 % | RESPIRATION RATE: 16 BRPM | TEMPERATURE: 98.1 F | HEIGHT: 61 IN | SYSTOLIC BLOOD PRESSURE: 103 MMHG | DIASTOLIC BLOOD PRESSURE: 72 MMHG | HEART RATE: 112 BPM | BODY MASS INDEX: 17.56 KG/M2 | WEIGHT: 93 LBS

## 2023-01-01 LAB
ANION GAP SERPL CALCULATED.3IONS-SCNC: 8 MMOL/L (ref 7–16)
ANISOCYTOSIS: ABNORMAL
BASOPHILS ABSOLUTE: 0.05 E9/L (ref 0–0.2)
BASOPHILS RELATIVE PERCENT: 0.3 % (ref 0–2)
BLOOD CULTURE, ROUTINE: NORMAL
BUN BLDV-MCNC: 11 MG/DL (ref 6–23)
CALCIUM SERPL-MCNC: 8.8 MG/DL (ref 8.6–10.2)
CHLORIDE BLD-SCNC: 100 MMOL/L (ref 98–107)
CO2: 26 MMOL/L (ref 22–29)
CREAT SERPL-MCNC: 0.9 MG/DL (ref 0.5–1)
D DIMER: 1931 NG/ML DDU
EOSINOPHILS ABSOLUTE: 0.14 E9/L (ref 0.05–0.5)
EOSINOPHILS RELATIVE PERCENT: 0.9 % (ref 0–6)
GFR SERPL CREATININE-BSD FRML MDRD: >60 ML/MIN/1.73
GLUCOSE BLD-MCNC: 92 MG/DL (ref 74–99)
HCT VFR BLD CALC: 20.6 % (ref 34–48)
HEMOGLOBIN: 7.7 G/DL (ref 11.5–15.5)
HYPOCHROMIA: ABNORMAL
IMMATURE GRANULOCYTES #: 0.19 E9/L
IMMATURE GRANULOCYTES %: 1.2 % (ref 0–5)
LYMPHOCYTES ABSOLUTE: 1.6 E9/L (ref 1.5–4)
LYMPHOCYTES RELATIVE PERCENT: 10.4 % (ref 20–42)
MAGNESIUM: 1.6 MG/DL (ref 1.6–2.6)
MCH RBC QN AUTO: 37.4 PG (ref 26–35)
MCHC RBC AUTO-ENTMCNC: 37.4 % (ref 32–34.5)
MCV RBC AUTO: 100 FL (ref 80–99.9)
MONOCYTES ABSOLUTE: 2.17 E9/L (ref 0.1–0.95)
MONOCYTES RELATIVE PERCENT: 14.1 % (ref 2–12)
NEUTROPHILS ABSOLUTE: 11.22 E9/L (ref 1.8–7.3)
NEUTROPHILS RELATIVE PERCENT: 73.1 % (ref 43–80)
OCCULT BLOOD SCREENING: NORMAL
PDW BLD-RTO: 17.2 FL (ref 11.5–15)
PHOSPHORUS: 2.8 MG/DL (ref 2.5–4.5)
PLATELET # BLD: 214 E9/L (ref 130–450)
PMV BLD AUTO: 11.3 FL (ref 7–12)
POLYCHROMASIA: ABNORMAL
POTASSIUM SERPL-SCNC: 3.8 MMOL/L (ref 3.5–5)
RBC # BLD: 2.06 E12/L (ref 3.5–5.5)
SODIUM BLD-SCNC: 134 MMOL/L (ref 132–146)
TARGET CELLS: ABNORMAL
WBC # BLD: 15.4 E9/L (ref 4.5–11.5)

## 2023-01-01 PROCEDURE — 85378 FIBRIN DEGRADE SEMIQUANT: CPT

## 2023-01-01 PROCEDURE — 84100 ASSAY OF PHOSPHORUS: CPT

## 2023-01-01 PROCEDURE — 94640 AIRWAY INHALATION TREATMENT: CPT

## 2023-01-01 PROCEDURE — 36415 COLL VENOUS BLD VENIPUNCTURE: CPT

## 2023-01-01 PROCEDURE — 6370000000 HC RX 637 (ALT 250 FOR IP)

## 2023-01-01 PROCEDURE — 6370000000 HC RX 637 (ALT 250 FOR IP): Performed by: FAMILY MEDICINE

## 2023-01-01 PROCEDURE — 6360000002 HC RX W HCPCS

## 2023-01-01 PROCEDURE — 2580000003 HC RX 258

## 2023-01-01 PROCEDURE — 83735 ASSAY OF MAGNESIUM: CPT

## 2023-01-01 PROCEDURE — 6360000002 HC RX W HCPCS: Performed by: INTERNAL MEDICINE

## 2023-01-01 PROCEDURE — 71275 CT ANGIOGRAPHY CHEST: CPT

## 2023-01-01 PROCEDURE — 6370000000 HC RX 637 (ALT 250 FOR IP): Performed by: INTERNAL MEDICINE

## 2023-01-01 PROCEDURE — 80048 BASIC METABOLIC PNL TOTAL CA: CPT

## 2023-01-01 PROCEDURE — 85025 COMPLETE CBC W/AUTO DIFF WBC: CPT

## 2023-01-01 PROCEDURE — 2500000003 HC RX 250 WO HCPCS: Performed by: INTERNAL MEDICINE

## 2023-01-01 PROCEDURE — 2060000000 HC ICU INTERMEDIATE R&B

## 2023-01-01 PROCEDURE — 6360000004 HC RX CONTRAST MEDICATION: Performed by: RADIOLOGY

## 2023-01-01 PROCEDURE — 99232 SBSQ HOSP IP/OBS MODERATE 35: CPT | Performed by: FAMILY MEDICINE

## 2023-01-01 RX ORDER — POTASSIUM CHLORIDE 20 MEQ/1
20 TABLET, EXTENDED RELEASE ORAL ONCE
Status: COMPLETED | OUTPATIENT
Start: 2023-01-01 | End: 2023-01-01

## 2023-01-01 RX ORDER — MAGNESIUM SULFATE IN WATER 40 MG/ML
2000 INJECTION, SOLUTION INTRAVENOUS ONCE
Status: COMPLETED | OUTPATIENT
Start: 2023-01-01 | End: 2023-01-01

## 2023-01-01 RX ORDER — SODIUM CHLORIDE 0.9 % (FLUSH) 0.9 %
10 SYRINGE (ML) INJECTION PRN
Status: DISCONTINUED | OUTPATIENT
Start: 2023-01-01 | End: 2023-01-06 | Stop reason: HOSPADM

## 2023-01-01 RX ADMIN — IPRATROPIUM BROMIDE AND ALBUTEROL SULFATE 1 AMPULE: .5; 2.5 SOLUTION RESPIRATORY (INHALATION) at 20:40

## 2023-01-01 RX ADMIN — SODIUM CHLORIDE, PRESERVATIVE FREE 10 ML: 5 INJECTION INTRAVENOUS at 20:52

## 2023-01-01 RX ADMIN — Medication 400 MG: at 09:20

## 2023-01-01 RX ADMIN — PANTOPRAZOLE SODIUM 40 MG: 40 TABLET, DELAYED RELEASE ORAL at 05:10

## 2023-01-01 RX ADMIN — LACTULOSE 20 G: 20 SOLUTION ORAL at 14:01

## 2023-01-01 RX ADMIN — SULFAMETHOXAZOLE AND TRIMETHOPRIM 1 TABLET: 800; 160 TABLET ORAL at 20:52

## 2023-01-01 RX ADMIN — IPRATROPIUM BROMIDE AND ALBUTEROL SULFATE 1 AMPULE: .5; 2.5 SOLUTION RESPIRATORY (INHALATION) at 16:48

## 2023-01-01 RX ADMIN — CETIRIZINE HYDROCHLORIDE 10 MG: 10 TABLET, FILM COATED ORAL at 20:52

## 2023-01-01 RX ADMIN — ENOXAPARIN SODIUM 30 MG: 100 INJECTION SUBCUTANEOUS at 09:19

## 2023-01-01 RX ADMIN — THIAMINE HYDROCHLORIDE 250 MG: 100 INJECTION, SOLUTION INTRAMUSCULAR; INTRAVENOUS at 09:19

## 2023-01-01 RX ADMIN — IOPAMIDOL 75 ML: 755 INJECTION, SOLUTION INTRAVENOUS at 17:42

## 2023-01-01 RX ADMIN — POTASSIUM CHLORIDE 20 MEQ: 1500 TABLET, EXTENDED RELEASE ORAL at 14:03

## 2023-01-01 RX ADMIN — BUDESONIDE 500 MCG: 0.5 SUSPENSION RESPIRATORY (INHALATION) at 20:40

## 2023-01-01 RX ADMIN — SULFAMETHOXAZOLE AND TRIMETHOPRIM 1 TABLET: 800; 160 TABLET ORAL at 09:20

## 2023-01-01 RX ADMIN — IPRATROPIUM BROMIDE AND ALBUTEROL SULFATE 1 AMPULE: .5; 2.5 SOLUTION RESPIRATORY (INHALATION) at 12:10

## 2023-01-01 RX ADMIN — MONTELUKAST 10 MG: 10 TABLET, FILM COATED ORAL at 09:31

## 2023-01-01 RX ADMIN — SODIUM CHLORIDE, PRESERVATIVE FREE 10 ML: 5 INJECTION INTRAVENOUS at 09:31

## 2023-01-01 RX ADMIN — FOLIC ACID 1 MG: 5 INJECTION, SOLUTION INTRAMUSCULAR; INTRAVENOUS; SUBCUTANEOUS at 10:55

## 2023-01-01 RX ADMIN — BUDESONIDE 500 MCG: 0.5 SUSPENSION RESPIRATORY (INHALATION) at 08:21

## 2023-01-01 RX ADMIN — LACTULOSE 20 G: 20 SOLUTION ORAL at 09:20

## 2023-01-01 RX ADMIN — MAGNESIUM SULFATE HEPTAHYDRATE 2000 MG: 40 INJECTION, SOLUTION INTRAVENOUS at 10:59

## 2023-01-01 RX ADMIN — LACTULOSE 20 G: 20 SOLUTION ORAL at 20:52

## 2023-01-01 RX ADMIN — IPRATROPIUM BROMIDE AND ALBUTEROL SULFATE 1 AMPULE: .5; 2.5 SOLUTION RESPIRATORY (INHALATION) at 08:21

## 2023-01-01 ASSESSMENT — PAIN SCALES - GENERAL
PAINLEVEL_OUTOF10: 0
PAINLEVEL_OUTOF10: 0

## 2023-01-01 NOTE — PROGRESS NOTES
[facility] Inpatient   Resident Progress Note    S:  Hospital day: 5    Brief Synopsis: Faye De Jesus is a 58 y.o. female with a PMH of COPD, Tobacco Abuse, Alcohol Abuse, Cirrhosis, Hyponatremia, and Thyroid Disease presents with altered mental status. Recent admission with hyponatremia during which patient left AMA from MICU. Had been complaining of lower abdominal and back pain. Continues to smoke and drink EtOH. In ER, noted to have tachycardia, hypotension, low-grade fever. No acute events overnight. Patient was seen and examined this morning. Mental status improving. Eating well. Waiting for facility discussed with the patient. No new concerns at this time. Cont meds:    dextrose      sodium chloride       Scheduled meds:    sulfamethoxazole-trimethoprim  1 tablet Oral 2 times per day    magnesium oxide  400 mg Oral Daily    thiamine  250 mg IntraVENous Daily    Followed by    Nnamdi Martinez ON 1/4/2023] thiamine  100 mg Oral Daily    sodium chloride flush  5-40 mL IntraVENous 2 times per day    enoxaparin  30 mg SubCUTAneous Daily    ipratropium-albuterol  1 ampule Inhalation Q4H WA    budesonide  0.5 mg Nebulization BID    cetirizine  10 mg Oral Daily    [Held by provider] cyclobenzaprine  5 mg Oral Nightly    montelukast  10 mg Oral Daily    pantoprazole  40 mg Oral QAM AC    folic acid  1 mg IntraVENous Daily    lactulose  20 g Oral TID     PRN meds: LORazepam **OR** LORazepam **OR** LORazepam **OR** LORazepam **OR** LORazepam **OR** LORazepam **OR** LORazepam **OR** LORazepam, glucose, dextrose bolus **OR** dextrose bolus, glucagon (rDNA), dextrose, sodium chloride flush, sodium chloride, acetaminophen, ondansetron **OR** ondansetron     I reviewed the patient's Past Medical and Surgical History, Medications and Allergies.     O:  VS: /82   Pulse (!) 129   Temp 97.1 °F (36.2 °C) (Temporal)   Resp 17   Ht 5' 1\" (1.549 m)   Wt 93 lb (42.2 kg)   SpO2 96%   BMI 17.57 kg/m² Physical Exam:  Physical Exam  Vitals reviewed. Constitutional:       General: She is not in acute distress. HENT:      Head: Normocephalic and atraumatic. Nose: Nose normal. No congestion or rhinorrhea. Eyes:      Conjunctiva/sclera: Conjunctivae normal.   Cardiovascular:      Rate and Rhythm: Regular rhythm. Tachycardia present. Pulses: Normal pulses. Heart sounds: Normal heart sounds. Pulmonary:      Effort: Pulmonary effort is normal. No respiratory distress. Breath sounds: Normal breath sounds. Abdominal:      General: Bowel sounds are normal. There is distension (mild). Palpations: Abdomen is soft. Tenderness: There is no abdominal tenderness. There is no guarding or rebound. Musculoskeletal:      Cervical back: Normal range of motion and neck supple. Right lower leg: No edema. Left lower leg: No edema. Skin:     General: Skin is warm and dry. Capillary Refill: Capillary refill takes less than 2 seconds. Findings: No rash. Neurological:      Mental Status: She is alert and oriented to person, place, and time. Sensory: No sensory deficit. Motor: No weakness. Labs:  Na/K/Cl/CO2:  134/3.8/100/26 (01/01 5023)  BUN/Cr/glu/ALT/AST/amyl/lip:  11/0.9/--/--/--/--/-- (01/01 0536)  WBC/Hgb/Hct/Plts:  15.4/7.7/20.6/214 (01/01 0536)  estimated creatinine clearance is 43 mL/min (based on SCr of 0.9 mg/dL). Other pertinent labs as noted below    New Imaging:  XR CHEST PORTABLE   Final Result   No focal pneumonia or pleural effusion. CT ABDOMEN PELVIS WO CONTRAST Additional Contrast? None   Final Result   1. Nonspecific bilateral perinephric fat stranding possibly related to   chronic medical renal disease with appropriate clinical history. 2. Mild prominence of bilateral renal pelves which may be physiologic. No   evidence of renal or ureteral calculus. 3. Diverticulosis.          XR CHEST PORTABLE   Final Result   Coarse interstitial markings and atherosclerotic disease. No new   cardiopulmonary pathology. CT head without contrast   Final Result   No acute intracranial abnormality. Specifically, there is no acute   intracranial hemorrhage      Old lacunar infarct within the left basal ganglia. If the patient's symptoms persist follow-up MRI is recommended. A/P:  Principal Problem:    Hyponatremia  Active Problems:    Simple chronic bronchitis (HCC)    Alcohol abuse    Disorientation    RYLEE (acute kidney injury) ([address]  Resolved Problems:    * No resolved hospital problems. *      Sepsis secondary to UTI vs Pyelonephritis  EKG in the ED: Sinus tachycardia with right atrial enlargement   Given Zosyn in the ED, Zosyn switched to Rocephin per ICU  Lactic Acid 1.1 --> 1.3  Negative flu and Covid tests  Non contrast ct abdomen showed nonspecific fat stranding yessy nephrically and diverticulosis. Chest x-ray negative for pneumonia  Procalcitonin 2.22  Blood cultures pending, Urine culture showed E. Coli  Ceftriaxone changed to Bactrim DS twice daily for a total of 10 days of antibiotics  ID following    Tachycardia  Differential diagnosis includes infection (E coli), dehydration (on fluid restriction), anemia, alcohol withdrawal, PE  D dimer ordered, consider CTA chest  Consider fluid replacement, discuss with nephro  History of macrocytic anemia, worsening. History of chronic alcohol abuse. TSH, folate and vit B12 within normal limits. Consider holding blood thinners. FOBT ordered.   Managing the infection with antibiotics, no fever and wbc count improved  Less likely alcohol withdrawal, did not drink since the hospital admission     Chronic Alcohol Abuse  Last drink before admission, spoke to pt's mother who states she drinks several cans of beer each day  Ethanol level in the ED <10    CIWA Protocol, concerns for DT as HR in the 130s  Seizure precautions  Continue folic acid and thiamine     Hyponatremia - resolved and stable  Na 118 in the ED  Hypotonic hypovolemic hyponatremia. Likely secondary to beer drinkers potomania with acute water (beer) load. Recurrent. Goal given her alcoholism 6 to 8 mmol/L/day. 1500 mL fluid restriction  Continue BMP q12h   Monitor electrolytes and replace as needed  Nephrology following                         Altered mental status secondary to hyponatremia vs sepsis - improving  CT Head: Old lacunar infarcts in the left basal ganglia. Recommends F/U MRI. CT abd: no ascites seen  Ammonia normalized from 56 to 46 with lactulose TID  Sitter requested as patient attempts to leave  Consider re-imaging brain if not improving     RYLEE - resolved  BUN: 37 and Creatinine: 1.8 in the ED  Nephrology following     COPD  ABG showed pH 7.4, PCO2 33.5, PO2 59.7  PFT 2021: FEV1/FVC 58%, FEV1 49% predicted.  GOLD Stage 3   Pulmicort, Duonebs, and Brovana  Oxygen as needed   Daily vitals and oxygen saturation      Fibrosis of liver  Fibroscan done last hospitalization  Ammonia elevated, lactulose started TID  INR 1.2, aPTT 35.8                Tobacco Abuse   Nicotine patch      Pulmonary Nodule  Incidental finding on CT Chest on August 2021 showing 3 mm nodule in the right upper lobe  Smokes 1.5 packs of tobacco per day for the past 20 years   Consider outpatient work-up      PT 10/24 and OT 11/24  DVT Prophylaxis: Lovenox 30 mg daily  GI Prophylaxis: Protonix 40 mg daily  Diet: Dysphagia  Full code  Disposition: [facility], will require pre-cert and will be here over the weekend      Electronically signed by Lui Leong MD on 1/1/2023 at 10:16 AM  This case was discussed with attending physician Dr. Aye Narvaez

## 2023-01-01 NOTE — PROGRESS NOTES
[facility]  Family Medicine Attending    S: 58 y.o. female with PMH of COPD, Tobacco Abuse, Alcohol Abuse, Cirrhosis, Hyponatremia, and Thyroid Disease presents with altered mental status. Recent admission with hyponatremia during which patient left AMA from MICU. Had been complaining of lower abdominal and back pain. Continues to smoke and drink EtOH. In ER, noted to have tachycardia, hypotension, low-grade fever. Today, alert and oriented and pleasant. No c/o. O: VS- Blood pressure 105/82, pulse (!) 129, temperature 97.1 °F (36.2 °C), temperature source Temporal, resp. rate 17, height 5' 1\" (1.549 m), weight 93 lb (42.2 kg), SpO2 96 %. Exam is as noted by resident with the following changes, additions or corrections:  AOx4  Heart- tachy  Lungs - CTAB  Abdomen- Nontender today  Ext - no edema    Impressions:   Principal Problem:    Hyponatremia  Active Problems:    Simple chronic bronchitis (HCC)    Alcohol abuse    Disorientation    RYLEE (acute kidney injury) ([facility]  Resolved Problems:    * No resolved hospital problems. *      Plan:   Remains tachycardic. Anemia vs dehydration (fluid restriction) vs PE. Get ddimer and recommend lift fluid restriction. CTM Hbg, check FOBT. Recultured, CXR negative, no further fever   No obvious infectious source (E coli UTI treated with rocephin - sensitive)   Hyponatremia - resolved. Appreciate nephrology input   Dispo - precert needed, [facility] score 11-12       Attending Physician Statement  I have reviewed the chart and seen the patient with the resident(s). I personally reviewed images, EKG's and similar tests, if present. I personally reviewed and performed key elements of the history and exam.  I have reviewed and confirmed student and/or resident history and exam with changes as indicated above. I agree with the assessment, plan and orders as documented by the resident.   Please refer to the resident and/or student note for additional information.       Redd Trejo MD

## 2023-01-02 LAB
ALBUMIN SERPL-MCNC: 2.6 G/DL (ref 3.5–5.2)
ALP BLD-CCNC: 133 U/L (ref 35–104)
ALT SERPL-CCNC: 23 U/L (ref 0–32)
ANION GAP SERPL CALCULATED.3IONS-SCNC: 11 MMOL/L (ref 7–16)
ANISOCYTOSIS: ABNORMAL
AST SERPL-CCNC: 30 U/L (ref 0–31)
BASOPHILS ABSOLUTE: 0.13 E9/L (ref 0–0.2)
BASOPHILS RELATIVE PERCENT: 0.9 % (ref 0–2)
BILIRUB SERPL-MCNC: 0.5 MG/DL (ref 0–1.2)
BUN BLDV-MCNC: 7 MG/DL (ref 6–23)
CALCIUM SERPL-MCNC: 9 MG/DL (ref 8.6–10.2)
CHLORIDE BLD-SCNC: 97 MMOL/L (ref 98–107)
CO2: 22 MMOL/L (ref 22–29)
CREAT SERPL-MCNC: 1 MG/DL (ref 0.5–1)
EOSINOPHILS ABSOLUTE: 0 E9/L (ref 0.05–0.5)
EOSINOPHILS RELATIVE PERCENT: 0.8 % (ref 0–6)
GFR SERPL CREATININE-BSD FRML MDRD: >60 ML/MIN/1.73
GLUCOSE BLD-MCNC: 117 MG/DL (ref 74–99)
HCT VFR BLD CALC: 19.4 % (ref 34–48)
HEMOGLOBIN: 7.1 G/DL (ref 11.5–15.5)
HYPOCHROMIA: ABNORMAL
LYMPHOCYTES ABSOLUTE: 1.47 E9/L (ref 1.5–4)
LYMPHOCYTES RELATIVE PERCENT: 9.5 % (ref 20–42)
MAGNESIUM: 1.8 MG/DL (ref 1.6–2.6)
MCH RBC QN AUTO: 36 PG (ref 26–35)
MCHC RBC AUTO-ENTMCNC: 36.6 % (ref 32–34.5)
MCV RBC AUTO: 98.5 FL (ref 80–99.9)
MONOCYTES ABSOLUTE: 3.09 E9/L (ref 0.1–0.95)
MONOCYTES RELATIVE PERCENT: 20.9 % (ref 2–12)
NEUTROPHILS ABSOLUTE: 10.14 E9/L (ref 1.8–7.3)
NEUTROPHILS RELATIVE PERCENT: 68.7 % (ref 43–80)
PDW BLD-RTO: 17.2 FL (ref 11.5–15)
PHOSPHORUS: 2.5 MG/DL (ref 2.5–4.5)
PLATELET # BLD: 312 E9/L (ref 130–450)
PMV BLD AUTO: 11.5 FL (ref 7–12)
POIKILOCYTES: ABNORMAL
POTASSIUM SERPL-SCNC: 4.5 MMOL/L (ref 3.5–5)
RBC # BLD: 1.97 E12/L (ref 3.5–5.5)
SODIUM BLD-SCNC: 130 MMOL/L (ref 132–146)
TARGET CELLS: ABNORMAL
TOTAL PROTEIN: 6.3 G/DL (ref 6.4–8.3)
WBC # BLD: 14.7 E9/L (ref 4.5–11.5)

## 2023-01-02 PROCEDURE — 6370000000 HC RX 637 (ALT 250 FOR IP)

## 2023-01-02 PROCEDURE — 6370000000 HC RX 637 (ALT 250 FOR IP): Performed by: FAMILY MEDICINE

## 2023-01-02 PROCEDURE — 6360000002 HC RX W HCPCS

## 2023-01-02 PROCEDURE — 36415 COLL VENOUS BLD VENIPUNCTURE: CPT

## 2023-01-02 PROCEDURE — 83735 ASSAY OF MAGNESIUM: CPT

## 2023-01-02 PROCEDURE — 2580000003 HC RX 258

## 2023-01-02 PROCEDURE — 80053 COMPREHEN METABOLIC PANEL: CPT

## 2023-01-02 PROCEDURE — 6370000000 HC RX 637 (ALT 250 FOR IP): Performed by: INTERNAL MEDICINE

## 2023-01-02 PROCEDURE — 2500000003 HC RX 250 WO HCPCS: Performed by: INTERNAL MEDICINE

## 2023-01-02 PROCEDURE — 84100 ASSAY OF PHOSPHORUS: CPT

## 2023-01-02 PROCEDURE — 2060000000 HC ICU INTERMEDIATE R&B

## 2023-01-02 PROCEDURE — 85025 COMPLETE CBC W/AUTO DIFF WBC: CPT

## 2023-01-02 PROCEDURE — 94640 AIRWAY INHALATION TREATMENT: CPT

## 2023-01-02 PROCEDURE — 99232 SBSQ HOSP IP/OBS MODERATE 35: CPT | Performed by: FAMILY MEDICINE

## 2023-01-02 PROCEDURE — 6360000002 HC RX W HCPCS: Performed by: INTERNAL MEDICINE

## 2023-01-02 RX ORDER — FOLIC ACID 1 MG/1
1 TABLET ORAL DAILY
Status: DISCONTINUED | OUTPATIENT
Start: 2023-01-03 | End: 2023-01-06 | Stop reason: HOSPADM

## 2023-01-02 RX ADMIN — PANTOPRAZOLE SODIUM 40 MG: 40 TABLET, DELAYED RELEASE ORAL at 05:26

## 2023-01-02 RX ADMIN — Medication 400 MG: at 09:40

## 2023-01-02 RX ADMIN — SULFAMETHOXAZOLE AND TRIMETHOPRIM 1 TABLET: 800; 160 TABLET ORAL at 09:40

## 2023-01-02 RX ADMIN — BUDESONIDE 500 MCG: 0.5 SUSPENSION RESPIRATORY (INHALATION) at 20:21

## 2023-01-02 RX ADMIN — IPRATROPIUM BROMIDE AND ALBUTEROL SULFATE 1 AMPULE: .5; 2.5 SOLUTION RESPIRATORY (INHALATION) at 10:02

## 2023-01-02 RX ADMIN — FOLIC ACID 1 MG: 5 INJECTION, SOLUTION INTRAMUSCULAR; INTRAVENOUS; SUBCUTANEOUS at 09:41

## 2023-01-02 RX ADMIN — SODIUM CHLORIDE, PRESERVATIVE FREE 10 ML: 5 INJECTION INTRAVENOUS at 09:42

## 2023-01-02 RX ADMIN — SULFAMETHOXAZOLE AND TRIMETHOPRIM 1 TABLET: 800; 160 TABLET ORAL at 20:56

## 2023-01-02 RX ADMIN — ENOXAPARIN SODIUM 30 MG: 100 INJECTION SUBCUTANEOUS at 09:41

## 2023-01-02 RX ADMIN — THIAMINE HYDROCHLORIDE 250 MG: 100 INJECTION, SOLUTION INTRAMUSCULAR; INTRAVENOUS at 09:40

## 2023-01-02 RX ADMIN — IPRATROPIUM BROMIDE AND ALBUTEROL SULFATE 1 AMPULE: .5; 2.5 SOLUTION RESPIRATORY (INHALATION) at 20:21

## 2023-01-02 RX ADMIN — MONTELUKAST 10 MG: 10 TABLET, FILM COATED ORAL at 09:40

## 2023-01-02 RX ADMIN — LACTULOSE 20 G: 20 SOLUTION ORAL at 14:18

## 2023-01-02 RX ADMIN — SODIUM CHLORIDE, PRESERVATIVE FREE 10 ML: 5 INJECTION INTRAVENOUS at 20:57

## 2023-01-02 RX ADMIN — CETIRIZINE HYDROCHLORIDE 10 MG: 10 TABLET, FILM COATED ORAL at 20:56

## 2023-01-02 RX ADMIN — IPRATROPIUM BROMIDE AND ALBUTEROL SULFATE 1 AMPULE: .5; 2.5 SOLUTION RESPIRATORY (INHALATION) at 16:11

## 2023-01-02 RX ADMIN — BUDESONIDE 500 MCG: 0.5 SUSPENSION RESPIRATORY (INHALATION) at 10:02

## 2023-01-02 RX ADMIN — LACTULOSE 20 G: 20 SOLUTION ORAL at 09:41

## 2023-01-02 RX ADMIN — ACETAMINOPHEN 650 MG: 325 TABLET, FILM COATED ORAL at 20:56

## 2023-01-02 ASSESSMENT — PAIN DESCRIPTION - LOCATION: LOCATION: GENERALIZED

## 2023-01-02 ASSESSMENT — PAIN SCALES - GENERAL
PAINLEVEL_OUTOF10: 1
PAINLEVEL_OUTOF10: 7

## 2023-01-02 ASSESSMENT — PAIN DESCRIPTION - ORIENTATION: ORIENTATION: MID

## 2023-01-02 ASSESSMENT — PAIN DESCRIPTION - DESCRIPTORS: DESCRIPTORS: ACHING

## 2023-01-02 NOTE — PROGRESS NOTES
[facility] Inpatient   Resident Progress Note    S:  Hospital day: 6    Brief Synopsis: Radha Mchugh is a 58 y.o. female with a PMH of COPD, Tobacco Abuse, Alcohol Abuse, Cirrhosis, Hyponatremia, and Thyroid Disease presents with altered mental status. Recent admission with hyponatremia during which patient left AMA from MICU. Had been complaining of lower abdominal and back pain. Continues to smoke and drink EtOH. In ER, noted to have tachycardia, hypotension, low-grade fever. No acute events overnight. Patient was seen and examined this morning. Mental status improving. Not really eating well per nursing, but patient feels she is. Spoke with mother, states patient doesn't eat all day typically. Waiting for facility discussed with the patient. No new concerns at this time. Cont meds:    dextrose      sodium chloride       Scheduled meds:    sulfamethoxazole-trimethoprim  1 tablet Oral 2 times per day    magnesium oxide  400 mg Oral Daily    thiamine  250 mg IntraVENous Daily    Followed by    Suellyn Frankel ON 1/4/2023] thiamine  100 mg Oral Daily    sodium chloride flush  5-40 mL IntraVENous 2 times per day    enoxaparin  30 mg SubCUTAneous Daily    ipratropium-albuterol  1 ampule Inhalation Q4H WA    budesonide  0.5 mg Nebulization BID    cetirizine  10 mg Oral Daily    [Held by provider] cyclobenzaprine  5 mg Oral Nightly    montelukast  10 mg Oral Daily    pantoprazole  40 mg Oral QAM AC    folic acid  1 mg IntraVENous Daily    lactulose  20 g Oral TID     PRN meds: sodium chloride flush, LORazepam **OR** LORazepam **OR** LORazepam **OR** LORazepam **OR** LORazepam **OR** LORazepam **OR** LORazepam **OR** LORazepam, glucose, dextrose bolus **OR** dextrose bolus, glucagon (rDNA), dextrose, sodium chloride flush, sodium chloride, acetaminophen, ondansetron **OR** ondansetron     I reviewed the patient's Past Medical and Surgical History, Medications and Allergies.     O:  VS: /72   Pulse (!) 112   Temp 98.1 °F (36.7 °C) (Temporal)   Resp 16   Ht 5' 1\" (1.549 m)   Wt 95 lb (43.1 kg)   SpO2 97%   BMI 17.95 kg/m²     Physical Exam:  Physical Exam  Vitals reviewed. Constitutional:       General: She is not in acute distress. HENT:      Head: Normocephalic and atraumatic. Nose: Nose normal. No congestion or rhinorrhea. Eyes:      Conjunctiva/sclera: Conjunctivae normal.   Cardiovascular:      Rate and Rhythm: Regular rhythm. Tachycardia present. Pulses: Normal pulses. Heart sounds: Normal heart sounds. Pulmonary:      Effort: Pulmonary effort is normal. No respiratory distress. Breath sounds: Normal breath sounds. Abdominal:      General: Bowel sounds are normal. There is distension (mild). Palpations: Abdomen is soft. Tenderness: There is no abdominal tenderness. There is no guarding or rebound. Musculoskeletal:      Cervical back: Normal range of motion and neck supple. Right lower leg: No edema. Left lower leg: No edema. Skin:     General: Skin is warm and dry. Capillary Refill: Capillary refill takes less than 2 seconds. Findings: No rash. Neurological:      Mental Status: She is alert and oriented to person, place, and time. Sensory: No sensory deficit. Motor: No weakness. Labs:  Na/K/Cl/CO2:  130/4.5/97/22 (01/02 0602)  BUN/Cr/glu/ALT/AST/amyl/lip:  7/1.0/--/23/30/--/-- (01/02 0602)  WBC/Hgb/Hct/Plts:  14.7/7.1/19.4/312 (01/02 0602)  estimated creatinine clearance is 40 mL/min (based on SCr of 1 mg/dL). Other pertinent labs as noted below    New Imaging:  CTA PULMONARY W CONTRAST   Final Result   1. No pulmonary embolism or other acute finding in the chest.   2. Emphysema. 3. Stable small lung nodules which demonstrate 16 months of stability and are   believed to be benign. 4. Mild central pulmonary arterial enlargement suggesting pulmonary   hypertension. 5. Borderline cardiomegaly.          XR CHEST PORTABLE   Final Result   No focal pneumonia or pleural effusion. CT ABDOMEN PELVIS WO CONTRAST Additional Contrast? None   Final Result   1. Nonspecific bilateral perinephric fat stranding possibly related to   chronic medical renal disease with appropriate clinical history. 2. Mild prominence of bilateral renal pelves which may be physiologic. No   evidence of renal or ureteral calculus. 3. Diverticulosis. XR CHEST PORTABLE   Final Result   Coarse interstitial markings and atherosclerotic disease. No new   cardiopulmonary pathology. CT head without contrast   Final Result   No acute intracranial abnormality. Specifically, there is no acute   intracranial hemorrhage      Old lacunar infarct within the left basal ganglia. If the patient's symptoms persist follow-up MRI is recommended. A/P:  Principal Problem:    Hyponatremia  Active Problems:    Simple chronic bronchitis (HCC)    Alcohol abuse    Disorientation    RYLEE (acute kidney injury) ([facility]  Resolved Problems:    * No resolved hospital problems. *      Sepsis secondary to UTI vs Pyelonephritis  EKG in the ED: Sinus tachycardia with right atrial enlargement   Given Zosyn in the ED, Zosyn switched to Rocephin per ICU  Lactic Acid 1.1 --> 1.3  Negative flu and Covid tests  Non contrast ct abdomen showed nonspecific fat stranding yessy nephrically and diverticulosis. Chest x-ray negative for pneumonia  Procalcitonin 2.22  Blood cultures negative, Urine culture showed E. Coli velasco sensitive  Ceftriaxone changed to Bactrim DS twice daily for a total of 10 days of antibiotics  ID following    Tachycardia  Differential diagnosis includes infection (E coli), dehydration (on fluid restriction), anemia, alcohol withdrawal, PE  CTA chest negative for PE  Consider fluid replacement, discuss with nephro  History of macrocytic anemia, worsening. History of chronic alcohol abuse. TSH, folate and vit B12 within normal limits. Consider holding blood thinners. FOBT negative. Managing the infection with antibiotics, no fever and wbc count improved  Less likely alcohol withdrawal, did not drink since the hospital admission     Chronic Alcohol Abuse  Last drink before admission, spoke to pt's mother who states she drinks several cans of beer each day  Ethanol level in the ED <10    CIWA Protocol, concerns for DT as HR in the 130s  Seizure precautions  Continue folic acid and thiamine     Hyponatremia - resolved and stable  Na 118 in the ED  Hypotonic hypovolemic hyponatremia. Likely secondary to beer drinkers potomania with acute water (beer) load. Recurrent. Goal given her alcoholism 6 to 8 mmol/L/day. 1500 mL fluid restriction, would like to change if nephrology okay due to continued tachycardia and poor PO intake  Continue BMP q12h   Monitor electrolytes and replace as needed  Nephrology following                         Altered mental status secondary to hyponatremia vs sepsis - improving  CT Head: Old lacunar infarcts in the left basal ganglia. Recommends F/U MRI. CT abd: no ascites seen  Ammonia normalized from 56 to 46 with lactulose TID  Sitter requested as patient attempts to leave  Consider re-imaging brain if not improving     RYLEE - resolved  BUN: 37 and Creatinine: 1.8 in the ED  Nephrology following     COPD  ABG showed pH 7.4, PCO2 33.5, PO2 59.7  PFT 2021: FEV1/FVC 58%, FEV1 49% predicted.  GOLD Stage 3   Pulmicort, Duonebs, and Brovana  Oxygen as needed   Daily vitals and oxygen saturation      Fibrosis of liver  Fibroscan done last hospitalization  Ammonia elevated, lactulose started TID  INR 1.2, aPTT 35.8                Tobacco Abuse   Nicotine patch      Pulmonary Nodule  Incidental finding on CT Chest on August 2021 showing 3 mm nodule in the right upper lobe  Smokes 1.5 packs of tobacco per day for the past 20 years   Consider outpatient work-up      PT 10/24 and OT 11/24  DVT Prophylaxis: Lovenox 30 mg daily  GI Prophylaxis: Protonix 40 mg daily  Diet: Dysphagia  Full code  Disposition: [facility], will require pre-cert, pend social work      Electronically signed by Fátmia Maciel MD on 1/2/2023 at 9:13 AM  This case was discussed with attending physician Dr. Stuart Chappell

## 2023-01-02 NOTE — PROGRESS NOTES
[address] [facility]  Family Medicine Attending    S: 58 y.o. female with PMH of COPD, Tobacco Abuse, Alcohol Abuse, Cirrhosis, Hyponatremia, and Thyroid Disease presents with altered mental status. Recent admission with hyponatremia during which patient left AMA from MICU. Had been complaining of lower abdominal and back pain. Continues to smoke and drink EtOH. In ER, noted to have tachycardia, hypotension, low-grade fever. Today, alert and oriented and pleasant. No c/o. Says shaking has resolved. O: VS- Blood pressure 103/72, pulse (!) 112, temperature 98.1 °F (36.7 °C), temperature source Temporal, resp. rate 16, height 5' 1\" (1.549 m), weight 95 lb (43.1 kg), SpO2 97 %. Exam is as noted by resident with the following changes, additions or corrections:  AOx4  Heart- tachy  Lungs - CTAB  Abdomen- Nontender today  Ext - no edema    Impressions:   Principal Problem:    Hyponatremia  Active Problems:    Simple chronic bronchitis (HCC)    Alcohol abuse    Disorientation    RYLEE (acute kidney injury) ([facility] [address]  Resolved Problems:    * No resolved hospital problems. *      Plan:   Remains tachycardic. Anemia vs dehydration (fluid restriction) vs pyelo. CT PE neg. Recommend lift fluid restriction and encourage pt to eat. CTM Hbg, FOBT neg. Bactrim until 1/6/23 per ID. Recultured, CXR negative, no further fever   No obvious infectious source (E coli UTI treated with rocephin - sensitive)   Hyponatremia - improved. Appreciate nephrology input   Dispo - precert needed, [facility] score 11-12       Attending Physician Statement  I have reviewed the chart and seen the patient with the resident(s). I personally reviewed images, EKG's and similar tests, if present. I personally reviewed and performed key elements of the history and exam.  I have reviewed and confirmed student and/or resident history and exam with changes as indicated above.   I agree with the assessment, plan and orders as documented by the resident. Please refer to the resident and/or student note for additional information.       Seema Araujo MD

## 2023-01-02 NOTE — PROGRESS NOTES
[facility] MD Lalita White MD Agapito Lyons, MD Mennie Gustin, JUAN Weeks, LAUREN Barr CNP  Progress Note    1/2/2023    SUBJECTIVE:   12/29: Seen while laying in bed, eyes closed. Somnolent. Does open eyes to voice but falls back asleep. Sitter is at bedside. On room air. 12/30: Sitting up in chair, no acute distress. Confused. Sitter is present at bedside. Oral intake is very poor. No acute complaints. On room air. Very weak and deconditioned. 12/31: Denies complaint. Remains confused. Sitter at bedside. Ongoing fatigue and generalized weakness, deconditioning. Eating and drinking almost nothing. 1/2: Seen while laying in bed. Sitter is present. She is much more alert and oriented. No acute complaints. ROS is unremarkable. On room air. Appetite has improved. PROBLEM LIST:    Principal Problem:    Hyponatremia  Active Problems:    Simple chronic bronchitis (HCC)    Alcohol abuse    Disorientation    RYLEE (acute kidney injury) ([facility] [address]  Resolved Problems:    * No resolved hospital problems. *         DIET:    ADULT DIET;  Dysphagia - Soft and Bite Sized; 1500 ml     MEDS (scheduled):    [START ON 9/1/9631] folic acid  1 mg Oral Daily    sulfamethoxazole-trimethoprim  1 tablet Oral 2 times per day    magnesium oxide  400 mg Oral Daily    thiamine  250 mg IntraVENous Daily    Followed by    Adonis Harrison ON 1/4/2023] thiamine  100 mg Oral Daily    sodium chloride flush  5-40 mL IntraVENous 2 times per day    enoxaparin  30 mg SubCUTAneous Daily    ipratropium-albuterol  1 ampule Inhalation Q4H WA    budesonide  0.5 mg Nebulization BID    cetirizine  10 mg Oral Daily    [Held by provider] cyclobenzaprine  5 mg Oral Nightly    montelukast  10 mg Oral Daily    pantoprazole  40 mg Oral QAM AC    lactulose  20 g Oral TID       MEDS (infusions):   dextrose      sodium chloride         MEDS (prn):  sodium chloride flush, LORazepam **OR** LORazepam **OR** LORazepam **OR** LORazepam **OR** LORazepam **OR** LORazepam **OR** LORazepam **OR** LORazepam, glucose, dextrose bolus **OR** dextrose bolus, glucagon (rDNA), dextrose, sodium chloride flush, sodium chloride, acetaminophen, ondansetron **OR** ondansetron    PHYSICAL EXAM:     Patient Vitals for the past 24 hrs:   BP Temp Temp src Pulse Resp SpO2 Weight   01/02/23 1545 (!) 102/55 97 °F (36.1 °C) Temporal 90 16 96 % --   01/02/23 1004 -- -- -- 98 18 -- --   01/02/23 0800 98/65 97.2 °F (36.2 °C) Temporal (!) 109 14 96 % --   01/02/23 0512 -- -- -- -- -- -- 95 lb (43.1 kg)   01/01/23 1958 -- -- -- -- -- 97 % --   01/01/23 1831 103/72 98.1 °F (36.7 °C) Temporal (!) 112 16 95 % --     @      Intake/Output Summary (Last 24 hours) at 1/2/2023 1625  Last data filed at 1/1/2023 2157  Gross per 24 hour   Intake 120 ml   Output --   Net 120 ml           Wt Readings from Last 3 Encounters:   01/02/23 95 lb (43.1 kg)   12/12/22 96 lb 4.8 oz (43.7 kg)   08/05/22 101 lb (45.8 kg)       Constitutional:  in no acute distress   HEENT: NC/AT, EOMI, sclera and conjunctiva are clear and anicteric, mucus membranes moist  Neck: Trachea midline, no JVD  Cardiovascular: S1, S2 regular rhythm, no murmur,or rub  Respiratory:  CTAB.  No crackles, no wheeze  Gastrointestinal:  Soft, nontender, nondistended, NABS  Ext: no edema, feet warm  Skin: dry, no rash  Neuro: awake, alert, interactive      DATA:    Recent Labs     12/31/22  0708 12/31/22  1153 01/01/23  0536 01/02/23  0602   WBC 14.2*  --  15.4* 14.7*   HGB 6.9* 7.6* 7.7* 7.1*   HCT 18.4* 20.3* 20.6* 19.4*   MCV 95.8  --  100.0* 98.5     --  214 312       Recent Labs     12/31/22  0708 12/31/22  1811 01/01/23  0536 01/02/23  0602    136 134 130*   K 4.3 3.6 3.8 4.5   CL 99 101 100 97*   CO2 25 23 26 22   MG 1.7  --  1.6 1.8   PHOS 3.0  --  2.8 2.5   BUN 16 13 11 7   CREATININE 0.9 0.9 0.9 1.0   ALT  --   --   --  23   AST  --   --   --  30   BILITOT  --   --   --  0.5 ALKPHOS  --   --   --  133*         Lab Results   Component Value Date    LABPROT 0.1 08/06/2022    LABPROT 0.1 08/06/2022       Assessment  1. Hyponatremia, hypovolemic, acute superimposed on chronic, due to beer drinkers potomania with acute water (beer) load. Recurrent. Goal given her alcoholism 6 to 8 mmol/L/day. At 28 hours she is 8 mmol/L above the presenting [Na+].     Na -130, continue fluid restriction for now      Recommendations  Encourage oral intake   1500 mL fluid restriction  BMP daily  Follow labs   Monitor I&O  Continue supportive care     Electronically signed by TORI Dos Santos CNP on 1/2/2023 at 4:25 PM

## 2023-01-03 PROBLEM — E43 SEVERE PROTEIN-CALORIE MALNUTRITION (HCC): Chronic | Status: ACTIVE | Noted: 2023-01-03

## 2023-01-03 LAB
ANION GAP SERPL CALCULATED.3IONS-SCNC: 10 MMOL/L (ref 7–16)
ANION GAP SERPL CALCULATED.3IONS-SCNC: 13 MMOL/L (ref 7–16)
BASOPHILS ABSOLUTE: 0.1 E9/L (ref 0–0.2)
BASOPHILS RELATIVE PERCENT: 0.7 % (ref 0–2)
BLOOD CULTURE, ROUTINE: NORMAL
BUN BLDV-MCNC: 5 MG/DL (ref 6–23)
BUN BLDV-MCNC: 5 MG/DL (ref 6–23)
CALCIUM SERPL-MCNC: 8.8 MG/DL (ref 8.6–10.2)
CALCIUM SERPL-MCNC: 9 MG/DL (ref 8.6–10.2)
CHLORIDE BLD-SCNC: 95 MMOL/L (ref 98–107)
CHLORIDE BLD-SCNC: 96 MMOL/L (ref 98–107)
CHLORIDE URINE RANDOM: 67 MMOL/L
CO2: 14 MMOL/L (ref 22–29)
CO2: 18 MMOL/L (ref 22–29)
CREAT SERPL-MCNC: 0.9 MG/DL (ref 0.5–1)
CREAT SERPL-MCNC: 0.9 MG/DL (ref 0.5–1)
CULTURE, BLOOD 2: NORMAL
EOSINOPHILS ABSOLUTE: 0.09 E9/L (ref 0.05–0.5)
EOSINOPHILS RELATIVE PERCENT: 0.7 % (ref 0–6)
GFR SERPL CREATININE-BSD FRML MDRD: >60 ML/MIN/1.73
GFR SERPL CREATININE-BSD FRML MDRD: >60 ML/MIN/1.73
GLUCOSE BLD-MCNC: 103 MG/DL (ref 74–99)
GLUCOSE BLD-MCNC: 84 MG/DL (ref 74–99)
HCT VFR BLD CALC: 20 % (ref 34–48)
HCT VFR BLD CALC: 20.1 % (ref 34–48)
HEMOGLOBIN: 7.1 G/DL (ref 11.5–15.5)
IMMATURE GRANULOCYTES #: 0.16 E9/L
IMMATURE GRANULOCYTES %: 1.2 % (ref 0–5)
IMMATURE RETIC FRACT: 28.6 % (ref 3–15.9)
LYMPHOCYTES ABSOLUTE: 1.66 E9/L (ref 1.5–4)
LYMPHOCYTES RELATIVE PERCENT: 12.1 % (ref 20–42)
MAGNESIUM: 1.6 MG/DL (ref 1.6–2.6)
MCH RBC QN AUTO: 35.7 PG (ref 26–35)
MCHC RBC AUTO-ENTMCNC: 35.5 % (ref 32–34.5)
MCV RBC AUTO: 100.5 FL (ref 80–99.9)
MONOCYTES ABSOLUTE: 1.5 E9/L (ref 0.1–0.95)
MONOCYTES RELATIVE PERCENT: 10.9 % (ref 2–12)
NEUTROPHILS ABSOLUTE: 10.21 E9/L (ref 1.8–7.3)
NEUTROPHILS RELATIVE PERCENT: 74.4 % (ref 43–80)
OSMOLALITY URINE: 295 MOSM/KG (ref 300–900)
OSMOLALITY: 260 MOSM/KG (ref 285–310)
PDW BLD-RTO: 17.4 FL (ref 11.5–15)
PHOSPHORUS: 3 MG/DL (ref 2.5–4.5)
PLATELET # BLD: 411 E9/L (ref 130–450)
PMV BLD AUTO: 10.6 FL (ref 7–12)
POTASSIUM SERPL-SCNC: 4.8 MMOL/L (ref 3.5–5)
POTASSIUM SERPL-SCNC: 4.9 MMOL/L (ref 3.5–5)
POTASSIUM, UR: 26.3 MMOL/L
RBC # BLD: 1.99 E12/L (ref 3.5–5.5)
RETIC HGB EQUIVALENT: 36.5 PG (ref 28.2–36.6)
RETICULOCYTE ABSOLUTE COUNT: 0.07 E12/L
RETICULOCYTE COUNT PCT: 3.7 % (ref 0.4–1.9)
SODIUM BLD-SCNC: 123 MMOL/L (ref 132–146)
SODIUM BLD-SCNC: 123 MMOL/L (ref 132–146)
SODIUM URINE: 85 MMOL/L
WBC # BLD: 13.7 E9/L (ref 4.5–11.5)

## 2023-01-03 PROCEDURE — 82436 ASSAY OF URINE CHLORIDE: CPT

## 2023-01-03 PROCEDURE — 83930 ASSAY OF BLOOD OSMOLALITY: CPT

## 2023-01-03 PROCEDURE — 94640 AIRWAY INHALATION TREATMENT: CPT

## 2023-01-03 PROCEDURE — 85045 AUTOMATED RETICULOCYTE COUNT: CPT

## 2023-01-03 PROCEDURE — 6370000000 HC RX 637 (ALT 250 FOR IP): Performed by: INTERNAL MEDICINE

## 2023-01-03 PROCEDURE — 36415 COLL VENOUS BLD VENIPUNCTURE: CPT

## 2023-01-03 PROCEDURE — 6360000002 HC RX W HCPCS

## 2023-01-03 PROCEDURE — 84100 ASSAY OF PHOSPHORUS: CPT

## 2023-01-03 PROCEDURE — 2580000003 HC RX 258

## 2023-01-03 PROCEDURE — 83735 ASSAY OF MAGNESIUM: CPT

## 2023-01-03 PROCEDURE — 97535 SELF CARE MNGMENT TRAINING: CPT

## 2023-01-03 PROCEDURE — 6370000000 HC RX 637 (ALT 250 FOR IP)

## 2023-01-03 PROCEDURE — 83935 ASSAY OF URINE OSMOLALITY: CPT

## 2023-01-03 PROCEDURE — 6360000002 HC RX W HCPCS: Performed by: INTERNAL MEDICINE

## 2023-01-03 PROCEDURE — 80048 BASIC METABOLIC PNL TOTAL CA: CPT

## 2023-01-03 PROCEDURE — 6370000000 HC RX 637 (ALT 250 FOR IP): Performed by: FAMILY MEDICINE

## 2023-01-03 PROCEDURE — 84133 ASSAY OF URINE POTASSIUM: CPT

## 2023-01-03 PROCEDURE — 97530 THERAPEUTIC ACTIVITIES: CPT

## 2023-01-03 PROCEDURE — 2060000000 HC ICU INTERMEDIATE R&B

## 2023-01-03 PROCEDURE — 99232 SBSQ HOSP IP/OBS MODERATE 35: CPT | Performed by: FAMILY MEDICINE

## 2023-01-03 PROCEDURE — 85025 COMPLETE CBC W/AUTO DIFF WBC: CPT

## 2023-01-03 PROCEDURE — 84300 ASSAY OF URINE SODIUM: CPT

## 2023-01-03 RX ORDER — SODIUM CHLORIDE 1000 MG
1 TABLET, SOLUBLE MISCELLANEOUS
Status: DISCONTINUED | OUTPATIENT
Start: 2023-01-03 | End: 2023-01-06 | Stop reason: HOSPADM

## 2023-01-03 RX ADMIN — SODIUM CHLORIDE 1 G: 1 TABLET ORAL at 12:35

## 2023-01-03 RX ADMIN — BUDESONIDE 500 MCG: 0.5 SUSPENSION RESPIRATORY (INHALATION) at 10:17

## 2023-01-03 RX ADMIN — LACTULOSE 20 G: 20 SOLUTION ORAL at 10:08

## 2023-01-03 RX ADMIN — MONTELUKAST 10 MG: 10 TABLET, FILM COATED ORAL at 09:59

## 2023-01-03 RX ADMIN — SODIUM CHLORIDE 1 G: 1 TABLET ORAL at 18:09

## 2023-01-03 RX ADMIN — IPRATROPIUM BROMIDE AND ALBUTEROL SULFATE 1 AMPULE: .5; 2.5 SOLUTION RESPIRATORY (INHALATION) at 13:32

## 2023-01-03 RX ADMIN — SULFAMETHOXAZOLE AND TRIMETHOPRIM 1 TABLET: 800; 160 TABLET ORAL at 09:59

## 2023-01-03 RX ADMIN — SULFAMETHOXAZOLE AND TRIMETHOPRIM 1 TABLET: 800; 160 TABLET ORAL at 22:49

## 2023-01-03 RX ADMIN — ONDANSETRON 4 MG: 2 INJECTION INTRAMUSCULAR; INTRAVENOUS at 15:59

## 2023-01-03 RX ADMIN — IPRATROPIUM BROMIDE AND ALBUTEROL SULFATE 1 AMPULE: .5; 2.5 SOLUTION RESPIRATORY (INHALATION) at 21:06

## 2023-01-03 RX ADMIN — THIAMINE HYDROCHLORIDE 250 MG: 100 INJECTION, SOLUTION INTRAMUSCULAR; INTRAVENOUS at 09:56

## 2023-01-03 RX ADMIN — IPRATROPIUM BROMIDE AND ALBUTEROL SULFATE 1 AMPULE: .5; 2.5 SOLUTION RESPIRATORY (INHALATION) at 16:26

## 2023-01-03 RX ADMIN — ONDANSETRON 4 MG: 4 TABLET, ORALLY DISINTEGRATING ORAL at 23:55

## 2023-01-03 RX ADMIN — PANTOPRAZOLE SODIUM 40 MG: 40 TABLET, DELAYED RELEASE ORAL at 05:25

## 2023-01-03 RX ADMIN — ONDANSETRON 4 MG: 2 INJECTION INTRAMUSCULAR; INTRAVENOUS at 09:50

## 2023-01-03 RX ADMIN — LACTULOSE 20 G: 20 SOLUTION ORAL at 22:49

## 2023-01-03 RX ADMIN — SODIUM CHLORIDE, PRESERVATIVE FREE 10 ML: 5 INJECTION INTRAVENOUS at 09:50

## 2023-01-03 RX ADMIN — IPRATROPIUM BROMIDE AND ALBUTEROL SULFATE 1 AMPULE: .5; 2.5 SOLUTION RESPIRATORY (INHALATION) at 10:17

## 2023-01-03 RX ADMIN — CETIRIZINE HYDROCHLORIDE 10 MG: 10 TABLET, FILM COATED ORAL at 22:49

## 2023-01-03 RX ADMIN — Medication 400 MG: at 10:01

## 2023-01-03 RX ADMIN — FOLIC ACID 1 MG: 1 TABLET ORAL at 09:58

## 2023-01-03 RX ADMIN — BUDESONIDE 500 MCG: 0.5 SUSPENSION RESPIRATORY (INHALATION) at 21:06

## 2023-01-03 RX ADMIN — LACTULOSE 20 G: 20 SOLUTION ORAL at 15:59

## 2023-01-03 RX ADMIN — SODIUM CHLORIDE, PRESERVATIVE FREE 10 ML: 5 INJECTION INTRAVENOUS at 22:49

## 2023-01-03 NOTE — PROGRESS NOTES
Comprehensive Nutrition Assessment    Type and Reason for Visit:  Initial, RD Nutrition Re-Screen/LOS (LOS 7)    Nutrition Recommendations/Plan:   Continue current diet. Recommend and start Gelatein 20 once daily and Boost pudding BID to optimize intake in the setting of severe malnutrition. Malnutrition Assessment:  Malnutrition Status:  Severe malnutrition (01/03/23 1326)    Context:  Chronic Illness     Findings of the 6 clinical characteristics of malnutrition:  Energy Intake:  75% or less estimated energy requirements for 1 month or longer  Weight Loss:  Unable to assess (2/2 lack of measured wt hx on file to assess)     Body Fat Loss:  Severe body fat loss Orbital, Triceps   Muscle Mass Loss:  Severe muscle mass loss Temples (temporalis), Clavicles (pectoralis & deltoids), Calf (gastrocnemius), Hand (interosseous), Thigh (quadraceps)  Fluid Accumulation:  No significant fluid accumulation     Strength:  Not Performed    Nutrition Assessment:    Pt. admit w/ AMS 2/2 to hyponatremia vs sepsis (improving). Adm w/ Sepsis 2/2 to UTI vs Pyelonephritis. Noted RYLEE (resolved) Hx of ETOH abuse, Cirrhosis, COPD. Pt meets criteria for Severe Malnutrition. PO intake varied/limited. Will start ONS and monitor. Nutrition Related Findings:    A&OX4, -I/O(1.7L), +BS, +nausea/tender abd, no edema, severe wasting Wound Type: None (noted raised blood blister thigh)       Current Nutrition Intake & Therapies:    Average Meal Intake: %, 0% (0% x3, % x1)  Average Supplements Intake: None Ordered  ADULT DIET; Dysphagia - Soft and Bite Sized; 1200 ml    Anthropometric Measures:  Height: 5' 1\" (154.9 cm)  Ideal Body Weight (IBW): 105 lbs (48 kg)    Admission Body Weight: 93 lb 11.1 oz (42.5 kg) (12/28 Bs first measured)  Current Body Weight: 95 lb (43.1 kg) (1/02/23 BS), 90.5 % IBW.  Weight Source: Bed Scale  Current BMI (kg/m2): 18  Usual Body Weight:  (PALLAVI d/t lack of measured wt hx)     Weight Adjustment For: No Adjustment                 BMI Categories: Underweight (BMI less than 18.5)    Estimated Daily Nutrient Needs:  Energy Requirements Based On: Formula  Weight Used for Energy Requirements: Current  Energy (kcal/day): 1200-1300kcal (MSJ x1. 3SF)  Weight Used for Protein Requirements: Current  Protein (g/day): 65-77g (1.5-1.8g/kg)  Method Used for Fluid Requirements: 1 ml/kcal  Fluid (ml/day):     Nutrition Diagnosis:   Severe malnutrition, In context of chronic illness related to inadequate protein-energy intake as evidenced by poor intake prior to admission, Criteria as identified in malnutrition assessment    Nutrition Interventions:   Food and/or Nutrient Delivery: Continue Current Diet, Start Oral Nutrition Supplement (Gelatein once daily , Boost pudding BID)  Nutrition Education/Counseling: Education not indicated  Coordination of Nutrition Care: Continue to monitor while inpatient       Goals:     Goals: PO intake 50% or greater, by next RD assessment       Nutrition Monitoring and Evaluation:   Behavioral-Environmental Outcomes: None Identified  Food/Nutrient Intake Outcomes: Food and Nutrient Intake, Supplement Intake  Physical Signs/Symptoms Outcomes: Biochemical Data, Chewing or Swallowing, GI Status, Fluid Status or Edema, Nutrition Focused Physical Findings, Skin, Weight, Nausea or Vomiting    Discharge Planning:    Continue Oral Nutrition Supplement     Juana Rodriguez RD  Contact: ext [phone]

## 2023-01-03 NOTE — PROGRESS NOTES
Physical Therapy  Treatment Note  Name: Tiara Durham  : 1960  MRN: [id number]    Date of Service: 1/3/2023    Evaluating PT:  Fidelina Valdez PT, DPT  [id number]    Room #:  5893/0920-X  Diagnosis:  Hyponatremia [E87.1]  PMHx/PSHx:   has a past medical history of Carpal tunnel syndrome, Pulmonary nodule, and Thyroid disease. Procedure/Surgery:    Precautions:  Falls, Cognition, Sitter, Incontinence  Equipment Needs:      SUBJECTIVE:    Pt is a questionable historian, Pt states she was independent using no AD or home O2 in 2 story home with no stairs to enter. Pt has 2 stairs to enter with 1 rail. Equipment Owned:     OBJECTIVE:   Initial Evaluation  Date: 22 Treatment  Date: 1/3/23 Short Term/ Long Term   Goals   AM-PAC 6 Clicks     Was pt agreeable to Eval/treatment? yes yes    Does pt have pain? No c/o pain  6/10 abdomen    Bed Mobility  Rolling: ModA  Supine to sit: MaxA  Sit to supine: NT  Scooting: MaxA Rolling: NT  Supine to sit: SBA  Sit to supine: SBA  Scooting: SBA Rolling: Independent  Supine to sit: Independent  Sit to supine: Independent  Scooting: Independent     Transfers Sit to stand: MaxA  Stand to sit: MaxA  Stand pivot: MaxA Sit to stand: min A  Stand to sit: min A  Stand pivot: min A with HHA Sit to stand: Modified Independent  Stand to sit: Modified Independent  Stand pivot: Modified Independent     Ambulation    3 feet with [facility]  50'x4 with HHA min A 150 feet with Modified Independent   AAD   Stair negotiation: ascended and descended  NT NT >4 steps with single rail Modified Independent     ROM BUE:  Defer to OT  BLE:  WFL     Strength BUE:  Defer to OT  BLE:  4/5  Improve 1 MMT   Balance Sitting EOB:  Mark  Dynamic Standing:  [facility] Sitting EOB:  SBA  Dynamic Standing: min A with HHA Sitting EOB:  Independent    Dynamic Standing:  supervision     Pt is A & O x (self, place). Disoriented to time.  Cognitive deficits, requiring majority of simple commands with increased time and proper cues. Sensation:  Unable to assess  Edema:  WNL    Therapeutic Exercises:  functional mobility    Patient education  Pt educated on role of PT and safety with functional mobility    Patient response to education:   Pt verbalized understanding Pt demonstrated skill Pt requires further education in this area   yes yes reinforce     ASSESSMENT:  Comments:    Pt supine in bed upon entering, pt agreeable to participate. Pt instructed to transfer to EOB, completing transfer with no physical assistance. Pt reporting no dizziness with position change. Pt demonstrating good static sitting balance. Pt was cued for hand placement and instructed to stand from EOB. Pt demonstrating fair static standing balance with HHA. Pt agreeable to ambulate to hallway. Pt ambulating with fair andrea and narrow MAGI. Pt requiring HHA to maintain pt safety. Pt was assisted back to bed at the end of session. Pt remained in bed with all needs met, sitter present and call bell in reach prior to exiting. Treatment:  Patient practiced and was instructed in the following treatment:    Bed mobility training - pt given verbal and tactile cues to facilitate proper sequencing and safety during rolling and supine<>sit as well as provided with stand by assistance to complete task   STS and pivot transfer training - pt educated on proper hand and foot placement, safety and sequencing, and use of verbal and tactile cues to safely complete sit<>stand and pivot transfers with physical assistance to complete task safely   Gait training- pt was given verbal and tactile cues to facilitate pt safety during ambulation as well as provided with physical assistance. PLAN:    Patient is making good progress towards established goals. Will continue with current POC.       Time in  0910  Time out  0933    Total Treatment Time  23 minutes     CPT codes:  [] Gait training 33842 -- minutes  [] Manual therapy 41686 -- minutes  [] Therapeutic activities 06380 23 minutes  [] Therapeutic exercises 08660 -- minutes  [] Neuromuscular reeducation 55164 -- minutes    Jose Irby PT, DPT  [id number]

## 2023-01-03 NOTE — PROGRESS NOTES
Occupational Therapy  OT BEDSIDE TREATMENT NOTE   [address] [facility] [address]  [address] [place] [address]       RJIM:  Patient Name: Cherie Waldrop  MRN: [id number]  : 1960  Room: [address] [place] [address]-A     Per OT Eval:    Evaluating [facility], OTR/L #[id number]     Referring Provider: Raiza Macias MD  Specific Provider Orders/Date: OT eval and treat 22      Diagnosis: Hyponatremia [E87.1]   Pt admitted to hospital on 22 for AMS x1 wk        Pertinent Medical History:  has a past medical history of Carpal tunnel syndrome, Pulmonary nodule, and Thyroid disease.          Precautions:  Fall Risk, cognition, sitter, seizures, fluid restriction, dysphagia diet, alarm     Assessment of current deficits    [x] Functional mobility         [x]ADLs           [x] Strength                  [x]Cognition    [x] Functional transfers       [x] IADLs         [x] Safety Awareness   [x]Endurance    [] Fine Coordination                      [x] Balance      [] Vision/perception   []Sensation      []Gross Motor Coordination          [] ROM           [] Delirium                   [] Motor Control      OT PLAN OF CARE   OT POC based on physician orders, patient diagnosis and results of clinical assessment     Frequency/Duration 1-3 days/wk for 2 weeks PRN   Specific OT Treatment Interventions to include:   * Instruction/training on adapted ADL techniques and AE recommendations to increase functional independence within precautions       * Training on energy conservation strategies, correct breathing pattern and techniques to improve independence/tolerance for self-care routine  * Functional transfer/mobility training/DME recommendations for increased independence, safety, and fall prevention  * Patient/Family education to increase follow through with safety techniques and functional independence  * Recommendation of environmental modifications for increased safety with functional transfers/mobility and ADLs  * Cognitive retraining/development of therapeutic activities to improve problem solving, judgement, memory, and attention for increased safety/participation in ADL/IADL tasks  * Therapeutic exercise to improve motor endurance, ROM, and functional strength for ADLs/functional transfers  * Therapeutic activities to facilitate/challenge dynamic balance, stand tolerance for increased safety and independence with ADLs  * Therapeutic activities to facilitate gross/fine motor skills for increased independence with ADLs        OTMRS   Modified Owen Scale (MRS)  Score     Description  0             No symptoms  1             No significant disability despite symptoms  2             Slight disability; able to look after own affairs  3             Moderate disability; able to ambulate without assist/ requires assist with ADLs  4             Moderate/Severe disability;requires assist to ambulate/assist with ADLs  5             Severe disability;bedridden/incontinent   6               Score:   4     Recommended Adaptive Equipment: TBD      Home Living: Pt lives alone in 2 floor home. 2 OMAR, 1 handrail  1st floor setup    Bathroom setup: tub/shower      Prior Level of Function: independent with ADLs , independent with IADLs; ambulated independently    Pt provided PLOF however pt is a very questionable historian.  Family/caregiver not present     Pain Level: Pt c/o no pain this session     Cognition: A&O: x 4, pleasant & cooperative, no confusion noted, Follows 2 step directions, noting poor recall/memory            Memory: fair- decreased STM           Sequencing:  fair during ADL tasks            Problem solving: Poor+           Judgement/safety:  poor+             Functional Assessment:  AM-PAC Daily Activity Raw Score:     Initial Eval Status  Date: 22 Treatment Status  Date:  1/3/23 STGs = LTGs  Time frame: 10-14 days   Feeding Moderate Assist   Mod Indep  declined to eat further due to stomach cramps (nsg aware) Stand by Assist    Grooming Moderate Assist   CGA  Standing at sink washing hands Stand by Assist    UB Dressing Moderate Assist   Donned/doffed gown  Min A  Managing gown around back  Stand by Assist    LB Dressing Dependent   B socks Min A  Simulated pants   Managing sock, bring legs up to pull socks on tighter Minimal Assist    Bathing Maximal Assist  Mod A  Simulated  Minimal Assist    Toileting Dependent   Including hygiene  Incontinent CGA  Able to complete hygiene, assist for gown management   Minimal Assist    Bed Mobility  Supine to sit: Maximal Assist   Sit to supine: NT  Min A  Supine to sit  Supine to sit: Minimal Assist   Sit to supine: Minimal Assist    Functional Transfers Maximal Assist   Various surfaces  Min A  Sit < > stand  Stand pivot  Minimal Assist    Functional Mobility Maximal Assist w/ w/w  Steps from EOB>chair Min A    HHA, able to complete in room & short hallway distances,reports burning pain in feet after walking (this was happening at home prior) Minimal Assist    Balance Sitting:     Static:  Min A    Dynamic: Mod A  Standing: Max A w/ w/w  Sitting: Supervision  Standing: Min A  HHA     Activity Tolerance Fair-   Fair+   Visual/  Perceptual Glasses: no                       Education:  Pt was educated through out treatment regarding proper technique & safety with bed mobility, functional transfers & mobility and ADL compensatory strategies to ease tasks, improve safety & prevent falls to return home safely. Comments: Upon arrival pt was in bed & agreeable for therapy. At end of session pt requesting to return to bed, all lines and tubes intact, call light within reach. Bed alarm set. Staff member present    Pt has made Good progress towards set goals. Continue with current plan of care.     Treatment Time In: 9:10            Treatment Time Out: 9:33           Treatment Charges: Mins Units   Ther Ex  64740     Manual Therapy 21363 Thera Activities 50399 2 6   ADL/Home Mgt 89233 15 1   Neuro Re-ed 17902     Group Therapy      Orthotic manage/training  13046     Non-Billable Time     Total Timed Treatment 23 2       Linn MARTÍNEZ  [address] [place] [address] Drive, [address] [place] [address]

## 2023-01-03 NOTE — CARE COORDINATION
SOCIAL WORK/CASEMANAGEMENT TRANSITION OF CARE PLANNINGKwaku Cooley, [address]):  plan is to margy tyert to be started today once PT and OT updated notes are in epic. All discharge paper work in place with exempt started. I had the pt yesenia a form from the [facility] that is required and faxed it back to them.  JESSI Cruz  1/3/2023

## 2023-01-03 NOTE — PLAN OF CARE
Problem: Discharge Planning  Goal: Discharge to home or other facility with appropriate resources  Outcome: Progressing     Problem: Pain  Goal: Verbalizes/displays adequate comfort level or baseline comfort level  Outcome: Progressing     Problem: Safety - Adult  Goal: Free from fall injury  Outcome: Progressing     Problem: Neurosensory - Adult  Goal: Achieves stable or improved neurological status  Outcome: Progressing     Problem: Gastrointestinal - Adult  Goal: Minimal or absence of nausea and vomiting  Outcome: Progressing     Problem: Metabolic/Fluid and Electrolytes - Adult  Goal: Electrolytes maintained within normal limits  Outcome: Progressing     Problem: Skin/Tissue Integrity  Goal: Absence of new skin breakdown  Outcome: Progressing

## 2023-01-03 NOTE — PROGRESS NOTES
[address] [facility]  Family Medicine Attending    S: 58 y.o. female with PMH of COPD, Tobacco Abuse, Alcohol Abuse, Cirrhosis, Hyponatremia, and Thyroid Disease presents with altered mental status. Recent admission with hyponatremia during which patient left AMA from MICU. Had been complaining of lower abdominal and back pain. Continues to smoke and drink EtOH. In ER, noted to have tachycardia, hypotension, low-grade fever. Today, alert and oriented and pleasant. No c/o. Says shaking has resolved. O: VS- Blood pressure 100/81, pulse (!) 106, temperature 97.7 °F (36.5 °C), temperature source Temporal, resp. rate 14, height 5' 1\" (1.549 m), weight 100 lb (45.4 kg), SpO2 95 %. Exam is as noted by resident with the following changes, additions or corrections:  AOx4  Heart- tachy  Lungs - CTAB  Abdomen- Nontender today  Ext - no edema    Impressions:   Principal Problem:    Hyponatremia  Active Problems:    Severe protein-calorie malnutrition (HCC)    Simple chronic bronchitis (HCC)    Alcohol abuse    Disorientation    RYLEE (acute kidney injury) ([facility] [address]  Resolved Problems:    * No resolved hospital problems. *      Plan:   Remains tachycardic. Anemia vs dehydration (fluid restriction) vs pyelo. CT PE neg. fluid restriction was lifted briefly and patient got further hyponatremic; encourage pt to eat. FOBT neg x1, will repeat as hgb continues to decrease; check reticulocyte count; iron studies performed about 2 weeks ago without signs of iron deficiency. Bactrim until 1/6/23 per ID. Recultured, CXR negative, no further fever   No obvious infectious source (E coli UTI treated with rocephin - sensitive)   Hyponatremia - improved. Appreciate nephrology input   Dispo - precert needed, [facility] score 11-12       Attending Physician Statement  I have reviewed the chart and seen the patient with the resident(s). I personally reviewed images, EKG's and similar tests, if present.   I personally reviewed and performed key elements of the history and exam.  I have reviewed and confirmed student and/or resident history and exam with changes as indicated above. I agree with the assessment, plan and orders as documented by the resident. Please refer to the resident and/or student note for additional information.       Phan Martinez MD

## 2023-01-03 NOTE — PROGRESS NOTES
Ok per Dr Oswald Files for patient to no longer be pink slipped and sitter to be discontinued. Pt alert and oriented and able to follow directions. Pt instructed on the need for a urine sample and stool sample.  Will continue to monitor

## 2023-01-03 NOTE — PROGRESS NOTES
[facility] MD Keesha Domingo, MD Zan Leigh, MD Jessica Macias, JUAN Weeks, LAUREN Patton, CNP  Progress Note    1/3/2023    SUBJECTIVE:   12/29: Seen while laying in bed, eyes closed. Somnolent. Does open eyes to voice but falls back asleep. Sitter is at bedside. On room air. 12/30: Sitting up in chair, no acute distress. Confused. Sitter is present at bedside. Oral intake is very poor. No acute complaints. On room air. Very weak and deconditioned. 12/31: Denies complaint. Remains confused. Sitter at bedside. Ongoing fatigue and generalized weakness, deconditioning. Eating and drinking almost nothing. 1/2: Seen while laying in bed. Sitter is present. She is much more alert and oriented. No acute complaints. ROS is unremarkable. On room air. Appetite has improved. 1/3: Seen while sitting up in bed. No longer with a sitter present. She is alert and oriented. Admits that she has not been following a fluid restriction. Complains of nausea and abdominal discomfort. Fluid restriction reinforced. PROBLEM LIST:    Principal Problem:    Hyponatremia  Active Problems:    Severe protein-calorie malnutrition (HCC)    Simple chronic bronchitis (HCC)    Alcohol abuse    Disorientation    RYLEE (acute kidney injury) ([facility] [address]  Resolved Problems:    * No resolved hospital problems. *         DIET:    ADULT DIET; Dysphagia - Soft and Bite Sized; 1200 ml  ADULT ORAL NUTRITION SUPPLEMENT; Lunch, Breakfast; Fortified Pudding Oral Supplement  ADULT ORAL NUTRITION SUPPLEMENT; Dinner;  Other Oral Supplement; Gelatein 20     MEDS (scheduled):    sodium chloride  1 g Oral TID     folic acid  1 mg Oral Daily    sulfamethoxazole-trimethoprim  1 tablet Oral 2 times per day    magnesium oxide  400 mg Oral Daily    [START ON 1/4/2023] thiamine  100 mg Oral Daily    sodium chloride flush  5-40 mL IntraVENous 2 times per day    [Held by provider] enoxaparin  30 mg SubCUTAneous Daily    ipratropium-albuterol  1 ampule Inhalation Q4H WA    budesonide  0.5 mg Nebulization BID    cetirizine  10 mg Oral Daily    [Held by provider] cyclobenzaprine  5 mg Oral Nightly    montelukast  10 mg Oral Daily    pantoprazole  40 mg Oral QAM AC    lactulose  20 g Oral TID       MEDS (infusions):   dextrose      sodium chloride         MEDS (prn):  sodium chloride flush, LORazepam **OR** LORazepam **OR** LORazepam **OR** LORazepam **OR** LORazepam **OR** LORazepam **OR** LORazepam **OR** LORazepam, glucose, dextrose bolus **OR** dextrose bolus, glucagon (rDNA), dextrose, sodium chloride flush, sodium chloride, acetaminophen, ondansetron **OR** ondansetron    PHYSICAL EXAM:     Patient Vitals for the past 24 hrs:   BP Temp Temp src Pulse Resp SpO2 Height Weight   01/03/23 1626 -- -- -- -- -- 99 % -- --   01/03/23 1615 (!) 145/90 97.5 °F (36.4 °C) Temporal (!) 105 14 97 % -- --   01/03/23 1333 -- -- -- -- -- 95 % -- --   01/03/23 1316 -- -- -- -- -- -- 5' 1\" (1.549 m) --   01/03/23 0805 100/81 97.7 °F (36.5 °C) Temporal (!) 106 14 96 % -- --   01/03/23 0400 -- -- -- -- -- -- -- 100 lb (45.4 kg)   01/02/23 1945 99/73 98.2 °F (36.8 °C) Temporal (!) 119 18 95 % -- --     @      Intake/Output Summary (Last 24 hours) at 1/3/2023 1831  Last data filed at 1/3/2023 0950  Gross per 24 hour   Intake 10 ml   Output --   Net 10 ml           Wt Readings from Last 3 Encounters:   01/03/23 100 lb (45.4 kg)   12/12/22 96 lb 4.8 oz (43.7 kg)   08/05/22 101 lb (45.8 kg)       Constitutional:  in no acute distress   HEENT: NC/AT, EOMI, sclera and conjunctiva are clear and anicteric, mucus membranes moist  Neck: Trachea midline, no JVD  Cardiovascular: S1, S2 regular rhythm, no murmur,or rub  Respiratory:  CTAB.  No crackles, no wheeze  Gastrointestinal:  Soft, nontender, nondistended, NABS  Ext: no edema, feet warm  Skin: dry, no rash  Neuro: awake, alert, interactive      DATA:    Recent Labs     01/01/23  1607 01/02/23  0602 01/03/23  0826   WBC 15.4* 14.7* 13.7*   HGB 7.7* 7.1* 7.1*   HCT 20.6* 19.4* 20.1*  20.0*   .0* 98.5 100.5*    312 411       Recent Labs     01/01/23  0536 01/02/23  0602 01/03/23  0826    130* 123*   K 3.8 4.5 4.9    97* 95*   CO2 26 22 18*   MG 1.6 1.8 1.6   PHOS 2.8 2.5 3.0   BUN 11 7 5*   CREATININE 0.9 1.0 0.9   ALT  --  23  --    AST  --  30  --    BILITOT  --  0.5  --    ALKPHOS  --  133*  --          Lab Results   Component Value Date    LABPROT 0.1 08/06/2022    LABPROT 0.1 08/06/2022       Assessment  1. Hyponatremia, hypovolemic, acute superimposed on chronic, due to beer drinkers potomania with acute water (beer) load. Recurrent. Goal given her alcoholism 6 to 8 mmol/L/day. At 28 hours she is 8 mmol/L above the presenting [Na+]. Na - down to 123, not following fluid restriction     Recommendations  Resume NaCl tablets 1 gram TID  1200 mL fluid restriction  BMP daily  Follow labs   Monitor I&O  Continue supportive care     Electronically signed by TORI Hutson CNP on 1/3/2023 at 6:31 PM    Nephrology Staff   Patient seen and examined. Findings and physical exam confirmed independently by me. Case discussed with Karen Youngblood CNP  As above, except as annotated.     Likely drinking more than her 1500 cc/day fluid restriction  Made to resume the salt tablets she may have an element of SIADH      Rachelle Mitchell MD  1/3/2023

## 2023-01-03 NOTE — PROGRESS NOTES
[facility] Inpatient   Resident Progress Note    S:  Hospital day: 7    Brief Synopsis: Marko Guaman is a 58 y.o. female with a PMH of COPD, Tobacco Abuse, Alcohol Abuse, Cirrhosis, Hyponatremia, and Thyroid Disease presents with altered mental status. Recent admission with hyponatremia during which patient left AMA from MICU. Had been complaining of lower abdominal and back pain. Continues to smoke and drink EtOH. In ER, noted to have tachycardia, hypotension, low-grade fever. No acute events overnight. Patient was seen and examined this morning. Mental status improving. Not really eating well per nursing, but patient feels she is. Patient doesn't eat all day typically. Waiting for facility discussed with the patient. Cont meds:    dextrose      sodium chloride       Scheduled meds:    sodium chloride  1 g Oral TID WC    folic acid  1 mg Oral Daily    sulfamethoxazole-trimethoprim  1 tablet Oral 2 times per day    magnesium oxide  400 mg Oral Daily    [START ON 1/4/2023] thiamine  100 mg Oral Daily    sodium chloride flush  5-40 mL IntraVENous 2 times per day    [Held by provider] enoxaparin  30 mg SubCUTAneous Daily    ipratropium-albuterol  1 ampule Inhalation Q4H WA    budesonide  0.5 mg Nebulization BID    cetirizine  10 mg Oral Daily    [Held by provider] cyclobenzaprine  5 mg Oral Nightly    montelukast  10 mg Oral Daily    pantoprazole  40 mg Oral QAM AC    lactulose  20 g Oral TID     PRN meds: sodium chloride flush, LORazepam **OR** LORazepam **OR** LORazepam **OR** LORazepam **OR** LORazepam **OR** LORazepam **OR** LORazepam **OR** LORazepam, glucose, dextrose bolus **OR** dextrose bolus, glucagon (rDNA), dextrose, sodium chloride flush, sodium chloride, acetaminophen, ondansetron **OR** ondansetron     I reviewed the patient's Past Medical and Surgical History, Medications and Allergies.     O:  VS: /81   Pulse (!) 106   Temp 97.7 °F (36.5 °C) (Temporal)   Resp 14   Ht 5' 1\" (1.549 m)   Wt 100 lb (45.4 kg)   SpO2 96%   BMI 18.89 kg/m²     Physical Exam:  Physical Exam  Vitals reviewed. Constitutional:       General: She is not in acute distress. HENT:      Head: Normocephalic and atraumatic. Nose: Nose normal. No congestion or rhinorrhea. Eyes:      Conjunctiva/sclera: Conjunctivae normal.   Cardiovascular:      Rate and Rhythm: Regular rhythm. Tachycardia present. Pulses: Normal pulses. Heart sounds: Normal heart sounds. Pulmonary:      Effort: Pulmonary effort is normal. No respiratory distress. Breath sounds: Normal breath sounds. Abdominal:      General: Bowel sounds are normal. There is distension (mild). Palpations: Abdomen is soft. Tenderness: There is no abdominal tenderness. There is no guarding or rebound. Musculoskeletal:      Cervical back: Normal range of motion and neck supple. Right lower leg: No edema. Left lower leg: No edema. Skin:     General: Skin is warm and dry. Capillary Refill: Capillary refill takes less than 2 seconds. Findings: No rash. Neurological:      Mental Status: She is alert and oriented to person, place, and time. Sensory: No sensory deficit. Motor: No weakness. Labs:  Na/K/Cl/CO2:  123/4.9/95/18 (01/03 4372)  BUN/Cr/glu/ALT/AST/amyl/lip:  5/0.9/--/--/--/--/-- (01/03 5002)  WBC/Hgb/Hct/Plts:  13.7/7.1/20.0, 20.1/411 (01/03 1737)  estimated creatinine clearance is 46 mL/min (based on SCr of 0.9 mg/dL). Other pertinent labs as noted below    New Imaging:  CTA PULMONARY W CONTRAST   Final Result   1. No pulmonary embolism or other acute finding in the chest.   2. Emphysema. 3. Stable small lung nodules which demonstrate 16 months of stability and are   believed to be benign. 4. Mild central pulmonary arterial enlargement suggesting pulmonary   hypertension. 5. Borderline cardiomegaly.          XR CHEST PORTABLE   Final Result   No focal pneumonia or pleural effusion. CT ABDOMEN PELVIS WO CONTRAST Additional Contrast? None   Final Result   1. Nonspecific bilateral perinephric fat stranding possibly related to   chronic medical renal disease with appropriate clinical history. 2. Mild prominence of bilateral renal pelves which may be physiologic. No   evidence of renal or ureteral calculus. 3. Diverticulosis. XR CHEST PORTABLE   Final Result   Coarse interstitial markings and atherosclerotic disease. No new   cardiopulmonary pathology. CT head without contrast   Final Result   No acute intracranial abnormality. Specifically, there is no acute   intracranial hemorrhage      Old lacunar infarct within the left basal ganglia. If the patient's symptoms persist follow-up MRI is recommended. A/P:  Principal Problem:    Hyponatremia  Active Problems:    Simple chronic bronchitis (HCC)    Alcohol abuse    Disorientation    RYLEE (acute kidney injury) ([facility] [address]  Resolved Problems:    * No resolved hospital problems. *      Sepsis secondary to UTI vs Pyelonephritis  EKG in the ED: Sinus tachycardia with right atrial enlargement   Given Zosyn in the ED, Zosyn switched to Rocephin per ICU  Lactic Acid 1.1 --> 1.3  Negative flu and Covid tests  Non contrast ct abdomen showed nonspecific fat stranding yessy nephrically and diverticulosis. Chest x-ray negative for pneumonia  Procalcitonin 2.22  Blood cultures negative, Urine culture showed E. Coli velasco sensitive  Ceftriaxone changed to Bactrim DS twice daily for a total of 10 days of antibiotics  ID following    Tachycardia  Differential diagnosis includes infection (E coli), dehydration (on fluid restriction), anemia, alcohol withdrawal, PE  CTA chest negative for PE  History of macrocytic anemia, worsening. History of chronic alcohol abuse. TSH, folate and vit B12 within normal limits.   Managing the infection with antibiotics, no fever and wbc count improved  Less likely alcohol withdrawal, did not drink since the hospital admission     Chronic history of macrocytic anemia  Hb 10 at presentation, dropping down  TSH, folate and vit b12 within normal limits  Check reticulocytes  Hold anticoagulation  FOBT negative 12/31/22, repeat it  Consider EGD / colonoscopy  Monitor Hb, transfuse if < 7    Chronic Alcohol Abuse  Last drink before admission, spoke to pt's mother who states she drinks several cans of beer each day  Ethanol level in the ED <10    CIWA Protocol, concerns for DT as HR in the 130s  Seizure precautions  Continue folic acid and thiamine     Hyponatremia  Na 118 in the ED  Hypotonic hypovolemic hyponatremia. Likely secondary to beer drinkers potomania with acute water (beer) load. Recurrent. Goal given her alcoholism 6 to 8 mmol/L/day. 1500 mL fluid restriction  Blood and urine studies ordered  BMP Q12H  Monitor electrolytes and replace as needed  Nephrology following                         Altered mental status secondary to hyponatremia vs sepsis - improving  CT Head: Old lacunar infarcts in the left basal ganglia. Recommends F/U MRI. CT abd: no ascites seen  Ammonia normalized from 56 to 46 with lactulose TID  Sitter requested as patient attempts to leave  Consider re-imaging brain if not improving     RYLEE - resolved  BUN: 37 and Creatinine: 1.8 in the ED  Nephrology following     COPD  ABG showed pH 7.4, PCO2 33.5, PO2 59.7  PFT 2021: FEV1/FVC 58%, FEV1 49% predicted.  GOLD Stage 3   Pulmicort, Duonebs, and Brovana  Oxygen as needed   Daily vitals and oxygen saturation      Fibrosis of liver  Fibroscan done last hospitalization  Ammonia elevated, lactulose started TID  INR 1.2, aPTT 35.8                Tobacco Abuse   Nicotine patch      Pulmonary Nodule  Incidental finding on CT Chest on August 2021 showing 3 mm nodule in the right upper lobe  Smokes 1.5 packs of tobacco per day for the past 20 years   Consider outpatient work-up      PT 16/24 and OT 17/24  DVT Prophylaxis: holding anticoagulation, PCDs  GI Prophylaxis: Protonix 40 mg daily  Diet: Dysphagia  Full code  Disposition: [facility], will require pre-cert      Electronically signed by Lui Leong MD on 1/3/2023 at 12:35 PM  This case was discussed with attending physician Dr. Cespedes Staff

## 2023-01-04 LAB
ANION GAP SERPL CALCULATED.3IONS-SCNC: 11 MMOL/L (ref 7–16)
ANION GAP SERPL CALCULATED.3IONS-SCNC: 11 MMOL/L (ref 7–16)
ANION GAP SERPL CALCULATED.3IONS-SCNC: 13 MMOL/L (ref 7–16)
BASOPHILS ABSOLUTE: 0.08 E9/L (ref 0–0.2)
BASOPHILS RELATIVE PERCENT: 0.6 % (ref 0–2)
BUN BLDV-MCNC: 6 MG/DL (ref 6–23)
BUN BLDV-MCNC: 7 MG/DL (ref 6–23)
BUN BLDV-MCNC: 7 MG/DL (ref 6–23)
CALCIUM SERPL-MCNC: 9.4 MG/DL (ref 8.6–10.2)
CALCIUM SERPL-MCNC: 9.4 MG/DL (ref 8.6–10.2)
CALCIUM SERPL-MCNC: 9.6 MG/DL (ref 8.6–10.2)
CHLORIDE BLD-SCNC: 96 MMOL/L (ref 98–107)
CHLORIDE BLD-SCNC: 97 MMOL/L (ref 98–107)
CHLORIDE BLD-SCNC: 97 MMOL/L (ref 98–107)
CO2: 16 MMOL/L (ref 22–29)
CO2: 16 MMOL/L (ref 22–29)
CO2: 18 MMOL/L (ref 22–29)
CREAT SERPL-MCNC: 0.9 MG/DL (ref 0.5–1)
CREAT SERPL-MCNC: 1.2 MG/DL (ref 0.5–1)
CREAT SERPL-MCNC: 1.2 MG/DL (ref 0.5–1)
EOSINOPHILS ABSOLUTE: 0.07 E9/L (ref 0.05–0.5)
EOSINOPHILS RELATIVE PERCENT: 0.5 % (ref 0–6)
GFR SERPL CREATININE-BSD FRML MDRD: 51 ML/MIN/1.73
GFR SERPL CREATININE-BSD FRML MDRD: 51 ML/MIN/1.73
GFR SERPL CREATININE-BSD FRML MDRD: >60 ML/MIN/1.73
GLUCOSE BLD-MCNC: 82 MG/DL (ref 74–99)
GLUCOSE BLD-MCNC: 93 MG/DL (ref 74–99)
GLUCOSE BLD-MCNC: 96 MG/DL (ref 74–99)
HCT VFR BLD CALC: 23.1 % (ref 34–48)
HEMOGLOBIN: 8.1 G/DL (ref 11.5–15.5)
IMMATURE GRANULOCYTES #: 0.16 E9/L
IMMATURE GRANULOCYTES %: 1.2 % (ref 0–5)
LYMPHOCYTES ABSOLUTE: 1.55 E9/L (ref 1.5–4)
LYMPHOCYTES RELATIVE PERCENT: 11.5 % (ref 20–42)
MAGNESIUM: 2.1 MG/DL (ref 1.6–2.6)
MCH RBC QN AUTO: 37 PG (ref 26–35)
MCHC RBC AUTO-ENTMCNC: 35.1 % (ref 32–34.5)
MCV RBC AUTO: 105.5 FL (ref 80–99.9)
MONOCYTES ABSOLUTE: 1.29 E9/L (ref 0.1–0.95)
MONOCYTES RELATIVE PERCENT: 9.6 % (ref 2–12)
NEUTROPHILS ABSOLUTE: 10.32 E9/L (ref 1.8–7.3)
NEUTROPHILS RELATIVE PERCENT: 76.6 % (ref 43–80)
PDW BLD-RTO: 17.5 FL (ref 11.5–15)
PHOSPHORUS: 3.8 MG/DL (ref 2.5–4.5)
PLATELET # BLD: 566 E9/L (ref 130–450)
PMV BLD AUTO: 11.5 FL (ref 7–12)
POTASSIUM SERPL-SCNC: 5.2 MMOL/L (ref 3.5–5)
POTASSIUM SERPL-SCNC: 5.2 MMOL/L (ref 3.5–5)
POTASSIUM SERPL-SCNC: 5.8 MMOL/L (ref 3.5–5)
RBC # BLD: 2.19 E12/L (ref 3.5–5.5)
SODIUM BLD-SCNC: 123 MMOL/L (ref 132–146)
SODIUM BLD-SCNC: 126 MMOL/L (ref 132–146)
SODIUM BLD-SCNC: 126 MMOL/L (ref 132–146)
WBC # BLD: 13.5 E9/L (ref 4.5–11.5)

## 2023-01-04 PROCEDURE — 2060000000 HC ICU INTERMEDIATE R&B

## 2023-01-04 PROCEDURE — 93005 ELECTROCARDIOGRAM TRACING: CPT

## 2023-01-04 PROCEDURE — 6370000000 HC RX 637 (ALT 250 FOR IP)

## 2023-01-04 PROCEDURE — 6370000000 HC RX 637 (ALT 250 FOR IP): Performed by: INTERNAL MEDICINE

## 2023-01-04 PROCEDURE — 85025 COMPLETE CBC W/AUTO DIFF WBC: CPT

## 2023-01-04 PROCEDURE — 2580000003 HC RX 258

## 2023-01-04 PROCEDURE — 83735 ASSAY OF MAGNESIUM: CPT

## 2023-01-04 PROCEDURE — 6370000000 HC RX 637 (ALT 250 FOR IP): Performed by: FAMILY MEDICINE

## 2023-01-04 PROCEDURE — 80048 BASIC METABOLIC PNL TOTAL CA: CPT

## 2023-01-04 PROCEDURE — 99232 SBSQ HOSP IP/OBS MODERATE 35: CPT | Performed by: FAMILY MEDICINE

## 2023-01-04 PROCEDURE — 6360000002 HC RX W HCPCS: Performed by: INTERNAL MEDICINE

## 2023-01-04 PROCEDURE — 94640 AIRWAY INHALATION TREATMENT: CPT

## 2023-01-04 PROCEDURE — 2580000003 HC RX 258: Performed by: INTERNAL MEDICINE

## 2023-01-04 PROCEDURE — 36415 COLL VENOUS BLD VENIPUNCTURE: CPT

## 2023-01-04 PROCEDURE — 84100 ASSAY OF PHOSPHORUS: CPT

## 2023-01-04 RX ORDER — AMOXICILLIN AND CLAVULANATE POTASSIUM 875; 125 MG/1; MG/1
1 TABLET, FILM COATED ORAL EVERY 12 HOURS SCHEDULED
Status: DISCONTINUED | OUTPATIENT
Start: 2023-01-04 | End: 2023-01-06

## 2023-01-04 RX ORDER — SODIUM CHLORIDE 9 MG/ML
INJECTION, SOLUTION INTRAVENOUS CONTINUOUS
Status: DISCONTINUED | OUTPATIENT
Start: 2023-01-04 | End: 2023-01-05

## 2023-01-04 RX ORDER — COSYNTROPIN 0.25 MG/ML
250 INJECTION, POWDER, FOR SOLUTION INTRAMUSCULAR; INTRAVENOUS ONCE
Status: COMPLETED | OUTPATIENT
Start: 2023-01-05 | End: 2023-01-05

## 2023-01-04 RX ADMIN — Medication 100 MG: at 09:38

## 2023-01-04 RX ADMIN — CETIRIZINE HYDROCHLORIDE 10 MG: 10 TABLET, FILM COATED ORAL at 22:00

## 2023-01-04 RX ADMIN — SODIUM CHLORIDE 1 G: 1 TABLET ORAL at 11:52

## 2023-01-04 RX ADMIN — MONTELUKAST 10 MG: 10 TABLET, FILM COATED ORAL at 09:38

## 2023-01-04 RX ADMIN — IPRATROPIUM BROMIDE AND ALBUTEROL SULFATE 1 AMPULE: .5; 2.5 SOLUTION RESPIRATORY (INHALATION) at 13:54

## 2023-01-04 RX ADMIN — PANTOPRAZOLE SODIUM 40 MG: 40 TABLET, DELAYED RELEASE ORAL at 06:19

## 2023-01-04 RX ADMIN — BUDESONIDE 500 MCG: 0.5 SUSPENSION RESPIRATORY (INHALATION) at 10:41

## 2023-01-04 RX ADMIN — Medication 400 MG: at 09:38

## 2023-01-04 RX ADMIN — IPRATROPIUM BROMIDE AND ALBUTEROL SULFATE 1 AMPULE: .5; 2.5 SOLUTION RESPIRATORY (INHALATION) at 20:08

## 2023-01-04 RX ADMIN — SODIUM CHLORIDE: 9 INJECTION, SOLUTION INTRAVENOUS at 16:44

## 2023-01-04 RX ADMIN — SODIUM CHLORIDE, PRESERVATIVE FREE 10 ML: 5 INJECTION INTRAVENOUS at 22:02

## 2023-01-04 RX ADMIN — SODIUM CHLORIDE 1 G: 1 TABLET ORAL at 16:42

## 2023-01-04 RX ADMIN — AMOXICILLIN AND CLAVULANATE POTASSIUM 1 TABLET: 875; 125 TABLET, FILM COATED ORAL at 22:00

## 2023-01-04 RX ADMIN — IPRATROPIUM BROMIDE AND ALBUTEROL SULFATE 1 AMPULE: .5; 2.5 SOLUTION RESPIRATORY (INHALATION) at 10:40

## 2023-01-04 RX ADMIN — ACETAMINOPHEN 650 MG: 325 TABLET, FILM COATED ORAL at 22:03

## 2023-01-04 RX ADMIN — SULFAMETHOXAZOLE AND TRIMETHOPRIM 1 TABLET: 800; 160 TABLET ORAL at 09:38

## 2023-01-04 RX ADMIN — BUDESONIDE 500 MCG: 0.5 SUSPENSION RESPIRATORY (INHALATION) at 20:08

## 2023-01-04 RX ADMIN — SODIUM CHLORIDE 1 G: 1 TABLET ORAL at 09:38

## 2023-01-04 RX ADMIN — FOLIC ACID 1 MG: 1 TABLET ORAL at 09:38

## 2023-01-04 RX ADMIN — IPRATROPIUM BROMIDE AND ALBUTEROL SULFATE 1 AMPULE: .5; 2.5 SOLUTION RESPIRATORY (INHALATION) at 16:41

## 2023-01-04 RX ADMIN — CALCIUM GLUCONATE 1000 MG: 98 INJECTION, SOLUTION INTRAVENOUS at 12:05

## 2023-01-04 ASSESSMENT — PAIN DESCRIPTION - LOCATION: LOCATION: BACK

## 2023-01-04 ASSESSMENT — PAIN SCALES - GENERAL: PAINLEVEL_OUTOF10: 6

## 2023-01-04 ASSESSMENT — PAIN DESCRIPTION - DESCRIPTORS: DESCRIPTORS: ACHING

## 2023-01-04 NOTE — PROGRESS NOTES
[facility] Inpatient   Resident Progress Note    S:  Hospital day: 8    Brief Synopsis: Shahrzad Carmona is a 58 y.o. female with a PMH of COPD, Tobacco Abuse, Alcohol Abuse, Cirrhosis, Hyponatremia, and Thyroid Disease presents with altered mental status. Recent admission with hyponatremia during which patient left AMA from MICU. Had been complaining of lower abdominal and back pain. Continues to smoke and drink EtOH. In ER, noted to have tachycardia, hypotension, low-grade fever. No acute events overnight. Patient was seen and examined this morning. Mental status improving. Patient states she is eating well. Drinking more than she should be, patient is in fluid restriction due to hyponatremia. However, patient is taking drinks from home. Cont meds:    dextrose      sodium chloride       Scheduled meds:    sodium chloride  1 g Oral TID WC    folic acid  1 mg Oral Daily    sulfamethoxazole-trimethoprim  1 tablet Oral 2 times per day    magnesium oxide  400 mg Oral Daily    thiamine  100 mg Oral Daily    sodium chloride flush  5-40 mL IntraVENous 2 times per day    [Held by provider] enoxaparin  30 mg SubCUTAneous Daily    ipratropium-albuterol  1 ampule Inhalation Q4H WA    budesonide  0.5 mg Nebulization BID    cetirizine  10 mg Oral Daily    [Held by provider] cyclobenzaprine  5 mg Oral Nightly    montelukast  10 mg Oral Daily    pantoprazole  40 mg Oral QAM AC    lactulose  20 g Oral TID     PRN meds: sodium chloride flush, LORazepam **OR** LORazepam **OR** LORazepam **OR** LORazepam **OR** LORazepam **OR** LORazepam **OR** LORazepam **OR** LORazepam, glucose, dextrose bolus **OR** dextrose bolus, glucagon (rDNA), dextrose, sodium chloride flush, sodium chloride, acetaminophen, ondansetron **OR** ondansetron     I reviewed the patient's Past Medical and Surgical History, Medications and Allergies.     O:  VS: /67   Pulse 98   Temp (!) 96.5 °F (35.8 °C) (Temporal)   Resp 18   Ht 5' 1\" (1.549 m)   Wt 100 lb (45.4 kg)   SpO2 97%   BMI 18.89 kg/m²     Physical Exam:  Physical Exam  Vitals reviewed. Constitutional:       General: She is not in acute distress. HENT:      Head: Normocephalic and atraumatic. Nose: Nose normal. No congestion or rhinorrhea. Eyes:      Conjunctiva/sclera: Conjunctivae normal.   Cardiovascular:      Rate and Rhythm: Regular rhythm. Tachycardia present. Pulses: Normal pulses. Heart sounds: Normal heart sounds. Pulmonary:      Effort: Pulmonary effort is normal. No respiratory distress. Breath sounds: Normal breath sounds. Abdominal:      General: Bowel sounds are normal. There is distension (mild). Palpations: Abdomen is soft. Tenderness: There is no abdominal tenderness. There is no guarding or rebound. Musculoskeletal:      Cervical back: Normal range of motion and neck supple. Right lower leg: No edema. Left lower leg: No edema. Skin:     General: Skin is warm and dry. Capillary Refill: Capillary refill takes less than 2 seconds. Findings: No rash. Neurological:      Mental Status: She is alert and oriented to person, place, and time. Sensory: No sensory deficit. Motor: No weakness. Labs:  Na/K/Cl/CO2:  123/5.8/96/16 (01/04 6073)  BUN/Cr/glu/ALT/AST/amyl/lip:  6/0.9/--/--/--/--/-- (01/04 1157)  WBC/Hgb/Hct/Plts:  13.5/8.1/23.1/566 (01/04 0614)  estimated creatinine clearance is 46 mL/min (based on SCr of 0.9 mg/dL). Other pertinent labs as noted below    New Imaging:  CTA PULMONARY W CONTRAST   Final Result   1. No pulmonary embolism or other acute finding in the chest.   2. Emphysema. 3. Stable small lung nodules which demonstrate 16 months of stability and are   believed to be benign. 4. Mild central pulmonary arterial enlargement suggesting pulmonary   hypertension. 5. Borderline cardiomegaly. XR CHEST PORTABLE   Final Result   No focal pneumonia or pleural effusion. CT ABDOMEN PELVIS WO CONTRAST Additional Contrast? None   Final Result   1. Nonspecific bilateral perinephric fat stranding possibly related to   chronic medical renal disease with appropriate clinical history. 2. Mild prominence of bilateral renal pelves which may be physiologic. No   evidence of renal or ureteral calculus. 3. Diverticulosis. XR CHEST PORTABLE   Final Result   Coarse interstitial markings and atherosclerotic disease. No new   cardiopulmonary pathology. CT head without contrast   Final Result   No acute intracranial abnormality. Specifically, there is no acute   intracranial hemorrhage      Old lacunar infarct within the left basal ganglia. If the patient's symptoms persist follow-up MRI is recommended. A/P:  Principal Problem:    Hyponatremia  Active Problems:    Severe protein-calorie malnutrition (HCC)    Simple chronic bronchitis (HCC)    Alcohol abuse    Disorientation    RYLEE (acute kidney injury) ([facility] [address]  Resolved Problems:    * No resolved hospital problems. *      Sepsis secondary to UTI vs Pyelonephritis  EKG in the ED: Sinus tachycardia with right atrial enlargement   Given Zosyn in the ED, Zosyn switched to Rocephin per ICU  Lactic Acid 1.1 --> 1.3  Negative flu and Covid tests  Non contrast ct abdomen showed nonspecific fat stranding yessy nephrically and diverticulosis. Chest x-ray negative for pneumonia  Procalcitonin 2.22  Blood cultures negative, Urine culture showed E. Coli velasco sensitive  Ceftriaxone changed to Bactrim DS twice daily for a total of 10 days of antibiotics  Monitor vital signs    Tachycardia  Differential diagnosis includes infection (E coli), dehydration (on fluid restriction), anemia, alcohol withdrawal, PE  CTA chest negative for PE  History of macrocytic anemia, worsening. History of chronic alcohol abuse. TSH, folate and vit B12 within normal limits.   Managing the infection with antibiotics, no fever and wbc count improved  Less likely alcohol withdrawal, did not drink since the hospital admission  Monitor vital sign     Chronic history of macrocytic anemia - improving  Hb 10 at presentation, dropping down  TSH, folate and vit b12 within normal limits  Reticulocyte count elevated  Hold anticoagulation  FOBT negative 12/31/22  Consider EGD / colonoscopy as outpatient  Monitor Hb, transfuse if < 7    Chronic Alcohol Abuse  Last drink before admission, spoke to pt's mother who states she drinks several cans of beer each day  Ethanol level in the ED <10    CIWA Protocol, concerns for DT as HR in the 130s  Seizure precautions  Continue folic acid and thiamine     Hyponatremia  Na 118 in the ED  Hypotonic hypovolemic hyponatremia. Likely secondary to beer drinkers potomania with acute water (beer) load. Recurrent. Goal given her alcoholism 6 to 8 mmol/L/day. Patient drinking drinks from home. Fluid restriction discussed with the patient. 1200 mL fluid restriction and salt tablets 1 g 3 times daily  BMP Q12H  Monitor electrolytes and replace as needed  Nephrology following               Hyperkalemia  Potassium level 5.8  Not taking any medication that could cause hyperkalemia  We will repeat potassium level  EKG showing normal sinus rhythm, normal axis, good R wave progression,  and negative for acute ischemic changes  Consider potassium lowering medication if worsening              Altered mental status secondary to hyponatremia vs sepsis - improving  CT Head: Old lacunar infarcts in the left basal ganglia. Recommends F/U MRI. CT abd: no ascites seen  Ammonia normalized from 56 to 46 with lactulose TID  Sitter requested as patient attempts to leave  Consider re-imaging brain if not improving     RYLEE - resolved  BUN: 37 and Creatinine: 1.8 in the ED  Nephrology following     COPD  ABG showed pH 7.4, PCO2 33.5, PO2 59.7  PFT 2021: FEV1/FVC 58%, FEV1 49% predicted.  GOLD Stage 3   Pulmicort, Duonebs, and Brovana  Oxygen as needed   Daily vitals and oxygen saturation      Fibrosis of liver  Fibroscan done last hospitalization  Ammonia elevated, lactulose started TID  INR 1.2, aPTT 35.8                Tobacco Abuse   Nicotine patch      Pulmonary Nodule  Incidental finding on CT Chest on August 2021 showing 3 mm nodule in the right upper lobe  Smokes 1.5 packs of tobacco per day for the past 20 years   Consider outpatient work-up      PT 16/24 and OT 17/24  DVT Prophylaxis: holding anticoagulation, PCDs  GI Prophylaxis: Protonix 40 mg daily  Diet: Dysphagia with low potassium and fluid restriction  Full code  Disposition: [facility], will require pre-cert      Electronically signed by Arlyne Rinne, MD on 1/4/2023 at 10:49 AM  This case was discussed with attending physician Dr. Alayna Douglas

## 2023-01-04 NOTE — CARE COORDINATION
SOCIAL WORK/CASEMANAGEMENT TRANSITION OF CARE PLANNINGPixie Galina Cooley, [address]):  precert obtained for [facility] and is good for 48 hours . All discharge paper work is in place and exempt was started and will need discharge order to finish it. I sent a perfect serve to the pcp for tentative discharge but he needs to talk with renal due to sodium of 123.  Miles Garcia, JESSI  1/4/2023

## 2023-01-04 NOTE — PROGRESS NOTES
[facility]  Family Medicine Attending    S: 58 y.o. female with PMH of COPD, Tobacco Abuse, Alcohol Abuse, Cirrhosis, Hyponatremia, and Thyroid Disease presents with altered mental status. Recent admission with hyponatremia during which patient left AMA from MICU. Had been complaining of lower abdominal and back pain. Continues to smoke and drink EtOH. In ER, noted to have tachycardia, hypotension, low-grade fever. Today, alert and oriented and pleasant. Abdominal pain has resolved. She has been continuing to drink a fair amount of fluids, despite fluid restriction. Salt tabs were started by nephrology. Patient did mention some numbness of the fingertips and toes this morning. This started within an hour before I saw her in the room. Patient denies any dizziness. O: VS- Blood pressure 101/67, pulse 98, temperature (!) 96.5 °F (35.8 °C), temperature source Temporal, resp. rate 18, height 5' 1\" (1.549 m), weight 100 lb (45.4 kg), SpO2 97 %. Exam is as noted by resident with the following changes, additions or corrections:  AOx4  Heart- very mildly tachy  Lungs - CTAB  Abdomen- Nontender today  Ext - no edema    Impressions:   Principal Problem:    Hyponatremia  Active Problems:    Severe protein-calorie malnutrition (HCC)    Simple chronic bronchitis (HCC)    Alcohol abuse    Disorientation    RYLEE (acute kidney injury) ([facility] [address]  Resolved Problems:    * No resolved hospital problems. *      Plan:     Tachycardia is slowly improving. Anemia vs dehydration (fluid restriction) vs pyelo. CT PE neg. FOBT neg x1, check reticulocyte count; iron studies performed about 2 weeks ago without signs of iron deficiency. 2.   Sepsis 2/2 UTI/pyelonephritis-improved, afebrile, WBC continues to improve; patient initially tx with zosyn x 2 doses, then changed to rocephin x 3 doses, now on bactrim day 5.   Bactrim until 1/6/23 per ID; will actually d/c the bactrim today and start augmentin for the next 2 days.  Check a procalcitonin. Recultured, CXR negative, no further fever   No obvious infectious source (E coli UTI treated with rocephin - sensitive)   3. Hyponatremia - worsening-supplementing with NaCl tabs now. Fluid restriction. Though much less likely, could also be related to the bactrim that the patient is on. May also be adrenal issues. TSH normal Appreciate nephrology input   4. Hyperkalemia-repeat Potassium and EKG is pending; consider adrenal issue. 5. Anemia, macrocytic with elevated reticulocytes-normal B12/folate; may be related to chronic alcohol use; FOBT negative, but would still recommend outpatient EGD/c-scope; would continue thiamine supplementation at discharge as well. Dispo - precert obtained; need to monitor sodium at least another day before discharge. Attending Physician Statement  I have reviewed the chart and seen the patient with the resident(s). I personally reviewed images, EKG's and similar tests, if present. I personally reviewed and performed key elements of the history and exam.  I have reviewed and confirmed student and/or resident history and exam with changes as indicated above. I agree with the assessment, plan and orders as documented by the resident. Please refer to the resident and/or student note for additional information.       Shyam Kay MD

## 2023-01-04 NOTE — PROGRESS NOTES
[facility] MD Kirk Romo MD Willye Foley, MD Shelly Edelman, JUAN Weeks, LAUREN Carcamo, JUAN  Progress Note    1/4/2023    SUBJECTIVE:   12/29: Seen while laying in bed, eyes closed. Somnolent. Does open eyes to voice but falls back asleep. Sitter is at bedside. On room air. 12/30: Sitting up in chair, no acute distress. Confused. Sitter is present at bedside. Oral intake is very poor. No acute complaints. On room air. Very weak and deconditioned. 12/31: Denies complaint. Remains confused. Sitter at bedside. Ongoing fatigue and generalized weakness, deconditioning. Eating and drinking almost nothing. 1/2: Seen while laying in bed. Sitter is present. She is much more alert and oriented. No acute complaints. ROS is unremarkable. On room air. Appetite has improved. 1/3: Seen while sitting up in bed. No longer with a sitter present. She is alert and oriented. Admits that she has not been following a fluid restriction. Complains of nausea and abdominal discomfort. Fluid restriction reinforced. 1/4: Sitting up in bed, no acute distress. ROS is unremarkable. Says that she is following a fluid restriction. Blood pressures on low side, denies dizziness or lightheadedness. PROBLEM LIST:    Principal Problem:    Hyponatremia  Active Problems:    Severe protein-calorie malnutrition (HCC)    Simple chronic bronchitis (HCC)    Alcohol abuse    Disorientation    RYLEE (acute kidney injury) ([facility] [address]  Resolved Problems:    * No resolved hospital problems. *         DIET:    ADULT ORAL NUTRITION SUPPLEMENT; Lunch, Breakfast; Fortified Pudding Oral Supplement  ADULT ORAL NUTRITION SUPPLEMENT; Dinner; Other Oral Supplement; Gelatein 20  ADULT DIET; Dysphagia - Soft and Bite Sized;  Low Potassium (Less than 3000 mg/day); 1200 ml     MEDS (scheduled):    amoxicillin-clavulanate  1 tablet Oral 2 times per day    [START ON 1/5/2023] cosyntropin  250 mcg IntraVENous Once    sodium chloride  1 g Oral TID WC    folic acid  1 mg Oral Daily    magnesium oxide  400 mg Oral Daily    thiamine  100 mg Oral Daily    sodium chloride flush  5-40 mL IntraVENous 2 times per day    [Held by provider] enoxaparin  30 mg SubCUTAneous Daily    ipratropium-albuterol  1 ampule Inhalation Q4H WA    budesonide  0.5 mg Nebulization BID    cetirizine  10 mg Oral Daily    [Held by provider] cyclobenzaprine  5 mg Oral Nightly    montelukast  10 mg Oral Daily    pantoprazole  40 mg Oral QAM AC    lactulose  20 g Oral TID       MEDS (infusions):   sodium chloride 100 mL/hr at 01/04/23 1644    dextrose      sodium chloride         MEDS (prn):  sodium chloride flush, LORazepam **OR** LORazepam **OR** LORazepam **OR** LORazepam **OR** LORazepam **OR** LORazepam **OR** LORazepam **OR** LORazepam, glucose, dextrose bolus **OR** dextrose bolus, glucagon (rDNA), dextrose, sodium chloride flush, sodium chloride, acetaminophen, ondansetron **OR** ondansetron    PHYSICAL EXAM:     Patient Vitals for the past 24 hrs:   BP Temp Temp src Pulse Resp SpO2   01/04/23 0930 101/67 (!) 96.5 °F (35.8 °C) Temporal 98 18 97 %   01/04/23 0815 (!) 92/59 (!) 96 °F (35.6 °C) Temporal (!) 112 19 --   01/03/23 2245 126/80 -- -- (!) 112 18 --   01/03/23 2108 -- -- -- -- -- 99 %   01/03/23 2106 -- -- -- -- -- 99 %     @      Intake/Output Summary (Last 24 hours) at 1/4/2023 1750  Last data filed at 1/4/2023 1040  Gross per 24 hour   Intake 300 ml   Output --   Net 300 ml           Wt Readings from Last 3 Encounters:   01/03/23 100 lb (45.4 kg)   12/12/22 96 lb 4.8 oz (43.7 kg)   08/05/22 101 lb (45.8 kg)       Constitutional:  in no acute distress   HEENT: NC/AT, EOMI, sclera and conjunctiva are clear and anicteric, mucus membranes moist  Neck: Trachea midline, no JVD  Cardiovascular: S1, S2 regular rhythm, no murmur,or rub  Respiratory:  CTAB.  No crackles, no wheeze  Gastrointestinal:  Soft, nontender, nondistended, NABS  Ext: no edema, feet warm  Skin: dry, no rash  Neuro: awake, alert, interactive      DATA:    Recent Labs     01/02/23  0602 01/03/23  0826 01/04/23  0614   WBC 14.7* 13.7* 13.5*   HGB 7.1* 7.1* 8.1*   HCT 19.4* 20.1*  20.0* 23.1*   MCV 98.5 100.5* 105.5*    411 566*       Recent Labs     01/02/23  0602 01/03/23  0826 01/03/23  1703 01/04/23  0614 01/04/23  1124   * 123* 123* 123* 126*   K 4.5 4.9 4.8 5.8* 5.2*   CL 97* 95* 96* 96* 97*   CO2 22 18* 14* 16* 18*   MG 1.8 1.6  --  2.1  --    PHOS 2.5 3.0  --  3.8  --    BUN 7 5* 5* 6 7   CREATININE 1.0 0.9 0.9 0.9 1.2*   ALT 23  --   --   --   --    AST 30  --   --   --   --    BILITOT 0.5  --   --   --   --    ALKPHOS 133*  --   --   --   --          Lab Results   Component Value Date    LABPROT 0.1 08/06/2022    LABPROT 0.1 08/06/2022       Assessment  1. Hyponatremia, hypovolemic, acute superimposed on chronic, due to beer drinkers potomania with acute water (beer) load. Recurrent. Goal given her alcoholism 6 to 8 mmol/L/day. At 28 hours she is 8 mmol/L above the presenting [Na+].     Na - 126, improving   Repeat labs showed potassium 5.2   CO2 18-no diarrhea   Creatinine 1.2 mg/dL, likely due to hypotension   Due to new hypotension and elevated potassium levels we will order a cosyntropin stimulation test to rule out adrenal insufficiency     Recommendations  Normal saline @ 100 cc   Cosyntropin stimulation test   Resume NaCl tablets 1 gram TID  1200 mL fluid restriction  BMP daily  Follow labs   Monitor I&O  Continue supportive care     Electronically signed by TORI Cole CNP on 1/4/2023 at 5:50 PM

## 2023-01-04 NOTE — FLOWSHEET NOTE
Inpatient Wound Care (Initial consult) 8505A    Admit Date: 12/27/2022 11:00 AM    Reason for consult:  Blister    Significant history: Per H&P     Chief complaint: had concerns including Altered Mental Status. History of Present Illness   The patient is a 58 y.o. female with a PMH of COPD, Tobacco Abuse, Alcohol Abuse, Cirrhosis, Hyponatremia, and Thyroid Disease. She presented to the ED by the recommendation of her friend due to altered mental status. Patient reports that she has been having lower abdominal and lower back pain. She attributes these symptoms to a UTI. It has been ongoing for the past week, but felt worst today. She did not take any medications or antibiotics during that time. Denies blood in urine, rash, recent ill contacts, and chest pain. Associated symptoms include fever, headache, nausea, dizziness, shortness of breath, nonproductive cough, diarrhea, and fatigue. She does report smoking 1.5 packs of tobacco daily and drinking 1 to 2 cans of 12/15 ounce cans of beer. Denies illicit drug use.  Patient would like to remain full code    Findings:     01/04/23 7189   Skin Integumentary    Skin Integrity   (dry flaky)   Location bilateral heels   Wound 12/29/22 Thigh Right    Date First Assessed/Time First Assessed: 12/29/22 1550   Present on Hospital Admission: No  Primary Wound Type: (c)   Location: Thigh  Wound Location Orientation: Right  Wound Description (Comments): (c)    Wound Image    Wound Etiology   (dehydrated blister)   Dressing/Treatment Open to air   Wound Length (cm) 0.9 cm   Wound Width (cm) 0.9 cm   Wound Depth (cm)   (unable to determine)   Wound Surface Area (cm^2) 0.81 cm^2   Change in Wound Size % (l*w) 19   Wound Assessment Pink/red   Drainage Amount None   Odor None   Joyce-wound Assessment Intact     **Informed Consent**    The patient has given verbal consent to have photos taken of wound and inserted into their chart as part of their permanent medical record for purposes of documentation, treatment management and/or medical review. All Images taken on 1/4/23 of patient name: Edilma Clark were transmitted and stored on secured [employer] located within [facility] by a registered [employer] Mobile Application Device. Impression:  Right thigh: dehydrated blister    Plan: Leave open to air  TAPS  Patient will need continued preventative care.       Alis Abad RN 1/4/2023 9:30 AM

## 2023-01-04 NOTE — PROGRESS NOTES
Physician Progress Note      iKllian Chung  CSN #:                  [id number]  :                       1960  ADMIT DATE:       2022 11:00 AM  [address] [place] DATE:  RESPONDING  PROVIDER #:        Oniel Dasilva MD          QUERY TEXT:    Dear Provider,    Pt presented with AMS. Pt noted to have hyponatremia and sepsis. If possible,   please document in the progress notes and discharge summary if you are   evaluating and / or treating any of the following: The medical record reflects the following:  Risk Factors: 59 yo w/hyponatremia, sepsis, ETOH abuse  Clinical Indicators: Pt presented to ed for AMS. Pt is alert to self but does   not know the year she was born. Pt states that the  year is \"-\". Pt does not know why she is here. On admission WBC 17.1, T 100   Hr 135-126-117 ( t max 100.6) ; Urine culture   >100,000 ecoli,  serum ,  -CT head: no acute findings  -Ethanol level <10   Note: AMS 2/ to hyponatremia vs sepsis   Note:   Remains tachycardic and confused. WBC elevated. Precultured,   CXR negative, no further fever  No obvious infectious source (E coli UTI treated with rocephin - sensitive)  Hyponatremia - resolved. Treatment: Labs, Imaging, Ativan ([place]), nephrology consult    Thank you,  Melani Alvarado RN  Clinical Documentation Improvement  [phone]  Options provided:  -- Metabolic encephalopathy  -- Septic encephalopathy  -- Other - I will add my own diagnosis  -- Disagree - Not applicable / Not valid  -- Disagree - Clinically unable to determine / Unknown  -- Refer to Clinical Documentation Reviewer    PROVIDER RESPONSE TEXT:    This patient has metabolic encephalopathy.     Query created by: Mariusz Shaw on 2022 12:49 PM      Electronically signed by:  Oniel Dasilva MD 2023 10:46 AM

## 2023-01-05 LAB
ANION GAP SERPL CALCULATED.3IONS-SCNC: 10 MMOL/L (ref 7–16)
ANION GAP SERPL CALCULATED.3IONS-SCNC: 10 MMOL/L (ref 7–16)
ANION GAP SERPL CALCULATED.3IONS-SCNC: 12 MMOL/L (ref 7–16)
ANION GAP SERPL CALCULATED.3IONS-SCNC: 16 MMOL/L (ref 7–16)
BASOPHILS ABSOLUTE: 0.09 E9/L (ref 0–0.2)
BASOPHILS ABSOLUTE: 0.11 E9/L (ref 0–0.2)
BASOPHILS RELATIVE PERCENT: 0.7 % (ref 0–2)
BASOPHILS RELATIVE PERCENT: 0.8 % (ref 0–2)
BUN BLDV-MCNC: 6 MG/DL (ref 6–23)
BUN BLDV-MCNC: 7 MG/DL (ref 6–23)
CALCIUM SERPL-MCNC: 8.9 MG/DL (ref 8.6–10.2)
CALCIUM SERPL-MCNC: 9.2 MG/DL (ref 8.6–10.2)
CHLORIDE BLD-SCNC: 101 MMOL/L (ref 98–107)
CHLORIDE BLD-SCNC: 101 MMOL/L (ref 98–107)
CHLORIDE BLD-SCNC: 102 MMOL/L (ref 98–107)
CHLORIDE BLD-SCNC: 98 MMOL/L (ref 98–107)
CHLORIDE URINE RANDOM: 87 MMOL/L
CO2: 13 MMOL/L (ref 22–29)
CO2: 17 MMOL/L (ref 22–29)
CORTISOL TOTAL: 14.32 MCG/DL (ref 2.68–18.4)
CORTISOL TOTAL: 16.12 MCG/DL (ref 2.68–18.4)
CORTISOL TOTAL: 29.73 MCG/DL (ref 2.68–18.4)
CORTISOL TOTAL: 32.77 MCG/DL (ref 2.68–18.4)
CORTISOL TOTAL: 36.16 MCG/DL (ref 2.68–18.4)
CREAT SERPL-MCNC: 1 MG/DL (ref 0.5–1)
CREAT SERPL-MCNC: 1 MG/DL (ref 0.5–1)
CREAT SERPL-MCNC: 1.1 MG/DL (ref 0.5–1)
CREAT SERPL-MCNC: 1.1 MG/DL (ref 0.5–1)
CREATININE URINE: 47 MG/DL (ref 29–226)
EOSINOPHILS ABSOLUTE: 0.05 E9/L (ref 0.05–0.5)
EOSINOPHILS ABSOLUTE: 0.07 E9/L (ref 0.05–0.5)
EOSINOPHILS RELATIVE PERCENT: 0.4 % (ref 0–6)
EOSINOPHILS RELATIVE PERCENT: 0.5 % (ref 0–6)
GFR SERPL CREATININE-BSD FRML MDRD: 57 ML/MIN/1.73
GFR SERPL CREATININE-BSD FRML MDRD: 57 ML/MIN/1.73
GFR SERPL CREATININE-BSD FRML MDRD: >60 ML/MIN/1.73
GFR SERPL CREATININE-BSD FRML MDRD: >60 ML/MIN/1.73
GLUCOSE BLD-MCNC: 49 MG/DL (ref 74–99)
GLUCOSE BLD-MCNC: 67 MG/DL (ref 74–99)
GLUCOSE BLD-MCNC: 81 MG/DL (ref 74–99)
GLUCOSE BLD-MCNC: 88 MG/DL (ref 74–99)
HCT VFR BLD CALC: 22.8 % (ref 34–48)
HCT VFR BLD CALC: 25 % (ref 34–48)
HEMOGLOBIN: 7.8 G/DL (ref 11.5–15.5)
HEMOGLOBIN: 8.3 G/DL (ref 11.5–15.5)
IMMATURE GRANULOCYTES #: 0.12 E9/L
IMMATURE GRANULOCYTES #: 0.16 E9/L
IMMATURE GRANULOCYTES %: 0.9 % (ref 0–5)
IMMATURE GRANULOCYTES %: 1.2 % (ref 0–5)
LYMPHOCYTES ABSOLUTE: 1.1 E9/L (ref 1.5–4)
LYMPHOCYTES ABSOLUTE: 1.36 E9/L (ref 1.5–4)
LYMPHOCYTES RELATIVE PERCENT: 10 % (ref 20–42)
LYMPHOCYTES RELATIVE PERCENT: 8.2 % (ref 20–42)
MAGNESIUM: 1.7 MG/DL (ref 1.6–2.6)
MCH RBC QN AUTO: 36.2 PG (ref 26–35)
MCH RBC QN AUTO: 36.8 PG (ref 26–35)
MCHC RBC AUTO-ENTMCNC: 33.2 % (ref 32–34.5)
MCHC RBC AUTO-ENTMCNC: 34.2 % (ref 32–34.5)
MCV RBC AUTO: 107.5 FL (ref 80–99.9)
MCV RBC AUTO: 109.2 FL (ref 80–99.9)
METER GLUCOSE: 104 MG/DL (ref 74–99)
METER GLUCOSE: 134 MG/DL (ref 74–99)
METER GLUCOSE: 95 MG/DL (ref 74–99)
MONOCYTES ABSOLUTE: 1.22 E9/L (ref 0.1–0.95)
MONOCYTES ABSOLUTE: 1.3 E9/L (ref 0.1–0.95)
MONOCYTES RELATIVE PERCENT: 9.1 % (ref 2–12)
MONOCYTES RELATIVE PERCENT: 9.6 % (ref 2–12)
NEUTROPHILS ABSOLUTE: 10.59 E9/L (ref 1.8–7.3)
NEUTROPHILS ABSOLUTE: 10.81 E9/L (ref 1.8–7.3)
NEUTROPHILS RELATIVE PERCENT: 78 % (ref 43–80)
NEUTROPHILS RELATIVE PERCENT: 80.6 % (ref 43–80)
PDW BLD-RTO: 17.2 FL (ref 11.5–15)
PDW BLD-RTO: 17.3 FL (ref 11.5–15)
PHOSPHORUS: 3.6 MG/DL (ref 2.5–4.5)
PLATELET # BLD: 515 E9/L (ref 130–450)
PLATELET # BLD: 638 E9/L (ref 130–450)
PMV BLD AUTO: 10 FL (ref 7–12)
PMV BLD AUTO: 10.8 FL (ref 7–12)
POTASSIUM SERPL-SCNC: 5 MMOL/L (ref 3.5–5)
POTASSIUM SERPL-SCNC: 5 MMOL/L (ref 3.5–5)
POTASSIUM SERPL-SCNC: 5.3 MMOL/L (ref 3.5–5)
POTASSIUM SERPL-SCNC: 5.7 MMOL/L (ref 3.5–5)
POTASSIUM SERPL-SCNC: 5.9 MMOL/L (ref 3.5–5)
POTASSIUM, UR: 16.2 MMOL/L
PROCALCITONIN: 0.31 NG/ML (ref 0–0.08)
RBC # BLD: 2.12 E12/L (ref 3.5–5.5)
RBC # BLD: 2.29 E12/L (ref 3.5–5.5)
SODIUM BLD-SCNC: 124 MMOL/L (ref 132–146)
SODIUM BLD-SCNC: 126 MMOL/L (ref 132–146)
SODIUM BLD-SCNC: 127 MMOL/L (ref 132–146)
SODIUM BLD-SCNC: 129 MMOL/L (ref 132–146)
SODIUM URINE: 111 MMOL/L
WBC # BLD: 13.4 E9/L (ref 4.5–11.5)
WBC # BLD: 13.6 E9/L (ref 4.5–11.5)

## 2023-01-05 PROCEDURE — 6370000000 HC RX 637 (ALT 250 FOR IP): Performed by: INTERNAL MEDICINE

## 2023-01-05 PROCEDURE — 82533 TOTAL CORTISOL: CPT

## 2023-01-05 PROCEDURE — 82570 ASSAY OF URINE CREATININE: CPT

## 2023-01-05 PROCEDURE — 2500000003 HC RX 250 WO HCPCS

## 2023-01-05 PROCEDURE — 6360000002 HC RX W HCPCS

## 2023-01-05 PROCEDURE — 6360000002 HC RX W HCPCS: Performed by: INTERNAL MEDICINE

## 2023-01-05 PROCEDURE — 99232 SBSQ HOSP IP/OBS MODERATE 35: CPT | Performed by: FAMILY MEDICINE

## 2023-01-05 PROCEDURE — 6370000000 HC RX 637 (ALT 250 FOR IP): Performed by: FAMILY MEDICINE

## 2023-01-05 PROCEDURE — 93005 ELECTROCARDIOGRAM TRACING: CPT

## 2023-01-05 PROCEDURE — 83735 ASSAY OF MAGNESIUM: CPT

## 2023-01-05 PROCEDURE — 82436 ASSAY OF URINE CHLORIDE: CPT

## 2023-01-05 PROCEDURE — 2580000003 HC RX 258

## 2023-01-05 PROCEDURE — 87088 URINE BACTERIA CULTURE: CPT

## 2023-01-05 PROCEDURE — 84133 ASSAY OF URINE POTASSIUM: CPT

## 2023-01-05 PROCEDURE — 80048 BASIC METABOLIC PNL TOTAL CA: CPT

## 2023-01-05 PROCEDURE — 6370000000 HC RX 637 (ALT 250 FOR IP)

## 2023-01-05 PROCEDURE — 97530 THERAPEUTIC ACTIVITIES: CPT

## 2023-01-05 PROCEDURE — 84145 PROCALCITONIN (PCT): CPT

## 2023-01-05 PROCEDURE — 94640 AIRWAY INHALATION TREATMENT: CPT

## 2023-01-05 PROCEDURE — 2060000000 HC ICU INTERMEDIATE R&B

## 2023-01-05 PROCEDURE — 84132 ASSAY OF SERUM POTASSIUM: CPT

## 2023-01-05 PROCEDURE — 2500000003 HC RX 250 WO HCPCS: Performed by: INTERNAL MEDICINE

## 2023-01-05 PROCEDURE — 97535 SELF CARE MNGMENT TRAINING: CPT

## 2023-01-05 PROCEDURE — 36415 COLL VENOUS BLD VENIPUNCTURE: CPT

## 2023-01-05 PROCEDURE — 84300 ASSAY OF URINE SODIUM: CPT

## 2023-01-05 PROCEDURE — 84100 ASSAY OF PHOSPHORUS: CPT

## 2023-01-05 PROCEDURE — 85025 COMPLETE CBC W/AUTO DIFF WBC: CPT

## 2023-01-05 PROCEDURE — 82962 GLUCOSE BLOOD TEST: CPT

## 2023-01-05 RX ORDER — COSYNTROPIN 0.25 MG/ML
250 INJECTION, POWDER, FOR SOLUTION INTRAMUSCULAR; INTRAVENOUS ONCE
Status: COMPLETED | OUTPATIENT
Start: 2023-01-05 | End: 2023-01-05

## 2023-01-05 RX ORDER — CALCIUM GLUCONATE 94 MG/ML
1000 INJECTION, SOLUTION INTRAVENOUS ONCE
Status: COMPLETED | OUTPATIENT
Start: 2023-01-05 | End: 2023-01-05

## 2023-01-05 RX ORDER — DEXTROSE MONOHYDRATE 25 G/50ML
25 INJECTION, SOLUTION INTRAVENOUS ONCE
Status: COMPLETED | OUTPATIENT
Start: 2023-01-05 | End: 2023-01-05

## 2023-01-05 RX ADMIN — COSYNTROPIN 250 MCG: 0.25 INJECTION, POWDER, LYOPHILIZED, FOR SOLUTION INTRAMUSCULAR; INTRAVENOUS at 13:31

## 2023-01-05 RX ADMIN — Medication 100 MG: at 08:53

## 2023-01-05 RX ADMIN — SODIUM BICARBONATE: 84 INJECTION, SOLUTION INTRAVENOUS at 16:46

## 2023-01-05 RX ADMIN — BUDESONIDE 500 MCG: 0.5 SUSPENSION RESPIRATORY (INHALATION) at 09:39

## 2023-01-05 RX ADMIN — COSYNTROPIN 250 MCG: 0.25 INJECTION, POWDER, LYOPHILIZED, FOR SOLUTION INTRAMUSCULAR; INTRAVENOUS at 08:57

## 2023-01-05 RX ADMIN — MONTELUKAST 10 MG: 10 TABLET, FILM COATED ORAL at 08:53

## 2023-01-05 RX ADMIN — SODIUM CHLORIDE: 9 INJECTION, SOLUTION INTRAVENOUS at 11:38

## 2023-01-05 RX ADMIN — AMOXICILLIN AND CLAVULANATE POTASSIUM 1 TABLET: 875; 125 TABLET, FILM COATED ORAL at 21:35

## 2023-01-05 RX ADMIN — SODIUM CHLORIDE, PRESERVATIVE FREE 10 ML: 5 INJECTION INTRAVENOUS at 08:53

## 2023-01-05 RX ADMIN — ONDANSETRON 4 MG: 4 TABLET, ORALLY DISINTEGRATING ORAL at 21:42

## 2023-01-05 RX ADMIN — CETIRIZINE HYDROCHLORIDE 10 MG: 10 TABLET, FILM COATED ORAL at 21:35

## 2023-01-05 RX ADMIN — FOLIC ACID 1 MG: 1 TABLET ORAL at 08:53

## 2023-01-05 RX ADMIN — SODIUM ZIRCONIUM CYCLOSILICATE 10 G: 10 POWDER, FOR SUSPENSION ORAL at 12:53

## 2023-01-05 RX ADMIN — BUDESONIDE 500 MCG: 0.5 SUSPENSION RESPIRATORY (INHALATION) at 21:39

## 2023-01-05 RX ADMIN — SODIUM CHLORIDE 1 G: 1 TABLET ORAL at 11:42

## 2023-01-05 RX ADMIN — DEXTROSE MONOHYDRATE 25 G: 25 INJECTION, SOLUTION INTRAVENOUS at 11:34

## 2023-01-05 RX ADMIN — SODIUM ZIRCONIUM CYCLOSILICATE 10 G: 10 POWDER, FOR SUSPENSION ORAL at 21:35

## 2023-01-05 RX ADMIN — PANTOPRAZOLE SODIUM 40 MG: 40 TABLET, DELAYED RELEASE ORAL at 06:30

## 2023-01-05 RX ADMIN — IPRATROPIUM BROMIDE AND ALBUTEROL SULFATE 1 AMPULE: .5; 2.5 SOLUTION RESPIRATORY (INHALATION) at 21:39

## 2023-01-05 RX ADMIN — IPRATROPIUM BROMIDE AND ALBUTEROL SULFATE 1 AMPULE: .5; 2.5 SOLUTION RESPIRATORY (INHALATION) at 09:39

## 2023-01-05 RX ADMIN — AMOXICILLIN AND CLAVULANATE POTASSIUM 1 TABLET: 875; 125 TABLET, FILM COATED ORAL at 08:53

## 2023-01-05 RX ADMIN — INSULIN HUMAN 10 UNITS: 100 INJECTION, SOLUTION PARENTERAL at 11:35

## 2023-01-05 RX ADMIN — ACETAMINOPHEN 650 MG: 325 TABLET, FILM COATED ORAL at 08:56

## 2023-01-05 RX ADMIN — SODIUM CHLORIDE 1 G: 1 TABLET ORAL at 17:43

## 2023-01-05 RX ADMIN — IPRATROPIUM BROMIDE AND ALBUTEROL SULFATE 1 AMPULE: .5; 2.5 SOLUTION RESPIRATORY (INHALATION) at 13:38

## 2023-01-05 RX ADMIN — Medication 400 MG: at 08:53

## 2023-01-05 RX ADMIN — CALCIUM GLUCONATE 1000 MG: 98 INJECTION, SOLUTION INTRAVENOUS at 11:34

## 2023-01-05 RX ADMIN — SODIUM CHLORIDE, PRESERVATIVE FREE 10 ML: 5 INJECTION INTRAVENOUS at 21:38

## 2023-01-05 RX ADMIN — SODIUM CHLORIDE 1 G: 1 TABLET ORAL at 08:53

## 2023-01-05 ASSESSMENT — PAIN DESCRIPTION - FREQUENCY: FREQUENCY: INTERMITTENT

## 2023-01-05 ASSESSMENT — PAIN DESCRIPTION - DESCRIPTORS: DESCRIPTORS: ACHING;DISCOMFORT;DULL

## 2023-01-05 ASSESSMENT — PAIN DESCRIPTION - PAIN TYPE: TYPE: ACUTE PAIN

## 2023-01-05 ASSESSMENT — PAIN DESCRIPTION - LOCATION: LOCATION: BACK

## 2023-01-05 ASSESSMENT — PAIN SCALES - GENERAL
PAINLEVEL_OUTOF10: 0
PAINLEVEL_OUTOF10: 3

## 2023-01-05 ASSESSMENT — PAIN DESCRIPTION - ORIENTATION: ORIENTATION: LOWER

## 2023-01-05 ASSESSMENT — PAIN - FUNCTIONAL ASSESSMENT: PAIN_FUNCTIONAL_ASSESSMENT: ACTIVITIES ARE NOT PREVENTED

## 2023-01-05 ASSESSMENT — PAIN DESCRIPTION - ONSET: ONSET: GRADUAL

## 2023-01-05 NOTE — PROGRESS NOTES
Subjective:  Ghazal Valverde is a 63yo female with PMHx of alcohol use disorder, COPD, cirrhosis, and thyroid disease who initially presented to the ED w/ altered mental status and was noted to have tachycardia, hypotension, and a low-grade fever. Recently admitted for hyponatremia and left AMA at that time. She is currently being managed for RYLEE and hyponatremia (hospital day 9). Patient seen and evaluated at bedside. Alert and oriented, mental status is improving. No overnight events reported. Patient does report experiencing pins/needles type pain and numbness in all four extremities, but worse in left upper and right lower. Not experiencing symptoms when I visited. On fluid restricted diet, but is taking drinks from home. Objective:    Physical Exam:    Vitals:    01/05/23 0754   BP: 102/72   Pulse: 96   Resp: 16   Temp: 98.2 °F (36.8 °C)   SpO2: 99%     Constitutional: elderly woman sitting at bedside comfortably. No acute distress. HENT: normocephalic. Anicteric conjunctive. Moist mucus membranes. Neck: Midline trachea. No JVD noted. Cardiovasular: S1, S2 identified. No m/g/r. Respiratory: CTAB. No crackles or qheezes. Gastrointestinal: Bowel sounds present. Soft, nondistended. Mild tenderness to palpation of right lower quadrant. Extremity: no edema, pulses palpable bilaterally. Motor strength grossly intact. Neuro: AOx3. Labs:  WBC:13.6    Na+: 129  K+: 5.9    New Imaging:  CTA PULMONARY W CONTRAST   Final Result   1. No pulmonary embolism or other acute finding in the chest.   2. Emphysema. 3. Stable small lung nodules which demonstrate 16 months of stability and are   believed to be benign. 4. Mild central pulmonary arterial enlargement suggesting pulmonary   hypertension. 5. Borderline cardiomegaly. XR CHEST PORTABLE   Final Result   No focal pneumonia or pleural effusion. CT ABDOMEN PELVIS WO CONTRAST Additional Contrast? None   Final Result   1.  Nonspecific bilateral perinephric fat stranding possibly related to   chronic medical renal disease with appropriate clinical history. 2. Mild prominence of bilateral renal pelves which may be physiologic. No   evidence of renal or ureteral calculus. 3. Diverticulosis. XR CHEST PORTABLE   Final Result   Coarse interstitial markings and atherosclerotic disease. No new   cardiopulmonary pathology. CT head without contrast   Final Result   No acute intracranial abnormality. Specifically, there is no acute   intracranial hemorrhage       Old lacunar infarct within the left basal ganglia. If the patient's symptoms persist follow-up MRI is recommended. Assessment/Plan  Sepsis 2/2 UTI vs pyelonephritis - resolved  Urine culture grew E. Coli sensitive to cipro  Switched Bactrim to Augmentin due to side effect profile of Bactrim (hyponatremia, hyperkalemia). 2 more days    Hypotonic Hypovolemia  Na+ = 124  hypotonic hypovolemic  1200 mL fluid restriction  NaCL tablets 1 gram TID  monitor electrolytes w/ daily CMP or BMP    3. Hyperkalemia  5.9 today, will recheck (recheck 5.7)  last ECG normal sinus rhythm  calcium gluconate, insulin+glucose if worsening  Repeat ECG, monitor w/ CMP or BMP  cosyntropin test to rule out adrenal insufficiency    4. Chronic Hx of macrocytic Anemia  Hgb 7.8   .5 w/reticulocytes; folate & B12 normal  FOBT negative x2, most recent 1/03  Monitor Hb, transfuse is <7  outpatient EGD for possible varices    5. COPD  ABG showed pH 7.4, PCO2 33.5, PO2 59.7  PFT 2021: FEV1/FVC 58%, FEV1 49% predicted. GOLD Stage 3   Pulmicort, Duonebs, and Brovana  Oxygen as needed   Daily vitals and oxygen saturation      6. Fibrosis of liver  Fibroscan done last hospitalization  Ammonia elevated, lactulose started TID  INR 1.2, aPTT 35.8                7. Tobacco Abuse   Nicotine patch      8.  Pulmonary Nodule  Incidental finding on CT Chest on August 2021 showing 3 mm nodule in the right upper lobe  Smokes 1.5 packs of tobacco per day for the past 20 years   Consider outpatient work-up    9. DVT Prophylaxis  Holding anticoagulation for concern of item #3    10.  Disposition  Modify plan as per resident and attending  PURVI HOFFMAN [facility], pre-cert attained

## 2023-01-05 NOTE — PROGRESS NOTES
[facility] Inpatient   Resident Progress Note    S:  Hospital day: 9    Brief Synopsis: Romulo Fisher is a 58 y.o. female with a PMH of COPD, Tobacco Abuse, Alcohol Abuse, Cirrhosis, Hyponatremia, and Thyroid Disease presents with altered mental status. Recent admission with hyponatremia during which patient left AMA from MICU. Had been complaining of lower abdominal and back pain. Continues to smoke and drink EtOH. In ER, noted to have tachycardia, hypotension, low-grade fever. No acute events overnight. Patient was seen and examined this morning. She drink 2 cans of ginger ale and 1 cup of water yesterday. Patient denies any N/V/D/F/C/SOB/CP and has no other new concerns or complaints at this time. Cont meds:    sodium chloride 100 mL/hr at 01/04/23 1644    dextrose      sodium chloride       Scheduled meds:    amoxicillin-clavulanate  1 tablet Oral 2 times per day    cosyntropin  250 mcg IntraVENous Once    sodium chloride  1 g Oral TID WC    folic acid  1 mg Oral Daily    magnesium oxide  400 mg Oral Daily    thiamine  100 mg Oral Daily    sodium chloride flush  5-40 mL IntraVENous 2 times per day    [Held by provider] enoxaparin  30 mg SubCUTAneous Daily    ipratropium-albuterol  1 ampule Inhalation Q4H WA    budesonide  0.5 mg Nebulization BID    cetirizine  10 mg Oral Daily    [Held by provider] cyclobenzaprine  5 mg Oral Nightly    montelukast  10 mg Oral Daily    pantoprazole  40 mg Oral QAM AC    lactulose  20 g Oral TID     PRN meds: sodium chloride flush, LORazepam **OR** LORazepam **OR** LORazepam **OR** LORazepam **OR** LORazepam **OR** LORazepam **OR** LORazepam **OR** LORazepam, glucose, dextrose bolus **OR** dextrose bolus, glucagon (rDNA), dextrose, sodium chloride flush, sodium chloride, acetaminophen, ondansetron **OR** ondansetron     I reviewed the patient's Past Medical and Surgical History, Medications and Allergies.     O:  VS: /76   Pulse 98   Temp (!) 96.2 °F (35.7 °C) (Temporal)   Resp 18   Ht 5' 1\" (1.549 m)   Wt 100 lb (45.4 kg)   SpO2 98%   BMI 18.89 kg/m²     Physical Exam  Vitals reviewed. Constitutional:       General: She is not in acute distress. HENT:      Head: Normocephalic and atraumatic. Nose: Nose normal. No congestion or rhinorrhea. Eyes:      Conjunctiva/sclera: Conjunctivae normal.   Cardiovascular:      Rate and Rhythm: Normal rate and regular rhythm. Pulses: Normal pulses. Heart sounds: Normal heart sounds. Pulmonary:      Effort: Pulmonary effort is normal. No respiratory distress. Breath sounds: Normal breath sounds. Abdominal:      General: Bowel sounds are normal. There is no distension. Palpations: Abdomen is soft. Tenderness: There is no abdominal tenderness. There is no guarding or rebound. Musculoskeletal:      Cervical back: Normal range of motion and neck supple. Right lower leg: No edema. Left lower leg: No edema. Skin:     General: Skin is warm and dry. Capillary Refill: Capillary refill takes less than 2 seconds. Findings: No rash. Neurological:      Mental Status: She is alert and oriented to person, place, and time. Sensory: No sensory deficit. Motor: No weakness. Psychiatric:         Mood and Affect: Mood normal.         Behavior: Behavior normal.       Labs:  Na/K/Cl/CO2:  126/5.2/97/16 (01/04 1735)  BUN/Cr/glu/ALT/AST/amyl/lip:  7/1.2/--/--/--/--/-- (01/04 1735)  WBC/Hgb/Hct/Plts:  13.6/7.8/22.8/638 (01/05 0551)  estimated creatinine clearance is 35 mL/min (A) (based on SCr of 1.2 mg/dL (H)). Other pertinent labs as noted below    New Imaging:  CTA PULMONARY W CONTRAST   Final Result   1. No pulmonary embolism or other acute finding in the chest.   2. Emphysema. 3. Stable small lung nodules which demonstrate 16 months of stability and are   believed to be benign. 4. Mild central pulmonary arterial enlargement suggesting pulmonary   hypertension. 5. Borderline cardiomegaly. XR CHEST PORTABLE   Final Result   No focal pneumonia or pleural effusion. CT ABDOMEN PELVIS WO CONTRAST Additional Contrast? None   Final Result   1. Nonspecific bilateral perinephric fat stranding possibly related to   chronic medical renal disease with appropriate clinical history. 2. Mild prominence of bilateral renal pelves which may be physiologic. No   evidence of renal or ureteral calculus. 3. Diverticulosis. XR CHEST PORTABLE   Final Result   Coarse interstitial markings and atherosclerotic disease. No new   cardiopulmonary pathology. CT head without contrast   Final Result   No acute intracranial abnormality. Specifically, there is no acute   intracranial hemorrhage      Old lacunar infarct within the left basal ganglia. If the patient's symptoms persist follow-up MRI is recommended. A/P:  Principal Problem:    Hyponatremia  Active Problems:    Severe protein-calorie malnutrition (HCC)    Simple chronic bronchitis (HCC)    Alcohol abuse    Disorientation    RYLEE (acute kidney injury) ([facility] [address]  Resolved Problems:    * No resolved hospital problems. *      Sepsis secondary to UTI vs Pyelonephritis - resolved   Non contrast ct abdomen showed nonspecific fat stranding yessy nephrically and diverticulosis. Chest x-ray negative for pneumonia  Procalcitonin 2.22, 0.31  Blood cultures negative, Urine culture showed E. Coli pan sensitive  Bactrim and switch to Augmentin due to side effects including hyponatremia and hyperkalemia; continue for an additional 2 days    Tachycardia - improving   Differential diagnosis includes infection (E coli), dehydration (on fluid restriction), anemia, alcohol withdrawal, PE  CTA chest negative for PE  History of macrocytic anemia, worsening. History of chronic alcohol abuse. TSH, folate and vit B12 within normal limits.     Chronic history of macrocytic anemia - improving  Hb 10 at presentation, dropping down 7.8  TSH, folate and vit b12 within normal limits  Reticulocyte count elevated  Hold anticoagulation  FOBT negative 12/31/22  Consider EGD / colonoscopy as outpatient  Monitor Hb, transfuse if < 7    Chronic Alcohol Abuse  Continue folic acid and thiamine     Hyponatremia   Na 118 in the ED, improving to 129   Likely secondary to beer drinkers potomania vs adrenal insufficiency    1200 mL fluid restriction and salt tablets 1 g 3 times daily  Nephrology following, cosyntropin test pending               Hyperkalemia  Potassium level 5.9  May be 2/2 adrenal insufficiency, work up as above   CT A/P showed normal adrenal glands      COPD , Gold stage III  Not in acute exacerbation.    Pulmicort, Duonebs, and Brovana     Fibrosis of liver  Fibroscan done last hospitalization  Ammonia elevated, lactulose started TID  INR 1.2, aPTT 35.8                Tobacco Abuse   Nicotine patch      Pulmonary Nodule  Incidental finding on CT Chest on August 2021 showing 3 mm nodule in the right upper lobe  Smokes 1.5 packs of tobacco per day for the past 20 years   Consider outpatient work-up      PT 16/24 and OT 17/24  DVT Prophylaxis: holding anticoagulation, PCDs  GI Prophylaxis: Protonix 40 mg daily  Diet: Dysphagia with low potassium and fluid restriction  Full code  Disposition: [facility], will require pre-cert      Electronically signed by Elo Harmon MD on 1/5/2023 at 7:24 AM  This case was discussed with attending physician Dr. Lilly Carey

## 2023-01-05 NOTE — PROGRESS NOTES
Occupational Therapy  OT BEDSIDE TREATMENT NOTE   [facility] [address]  [address] [place] [address]       TDBH:[id number]  Patient Name: Lise Arnett  MRN: [id number]  : 1960  Room: [address] [place] [address]A     Per OT Eval:    Evaluating 8 [facility], OTR/L #[id number]     Referring Provider: Adelaide Gonzalez MD  Specific Provider Orders/Date: OT eval and treat 22      Diagnosis: Hyponatremia [E87.1]   Pt admitted to hospital on 22 for AMS x1 wk     Pertinent Medical History:  has a past medical history of Carpal tunnel syndrome, Pulmonary nodule, and Thyroid disease.      Precautions:  Fall Risk, seizures, fluid restriction, dysphagia diet, alarm     Assessment of current deficits    [x] Functional mobility         [x]ADLs           [x] Strength                  [x]Cognition    [x] Functional transfers       [x] IADLs         [x] Safety Awareness   [x]Endurance    [] Fine Coordination                      [x] Balance      [] Vision/perception   []Sensation      []Gross Motor Coordination          [] ROM           [] Delirium                   [] Motor Control      OT PLAN OF CARE   OT POC based on physician orders, patient diagnosis and results of clinical assessment     Frequency/Duration 1-3 days/wk for 2 weeks PRN   Specific OT Treatment Interventions to include:   * Instruction/training on adapted ADL techniques and AE recommendations to increase functional independence within precautions       * Training on energy conservation strategies, correct breathing pattern and techniques to improve independence/tolerance for self-care routine  * Functional transfer/mobility training/DME recommendations for increased independence, safety, and fall prevention  * Patient/Family education to increase follow through with safety techniques and functional independence  * Recommendation of environmental modifications for increased safety with functional transfers/mobility and ADLs  * Cognitive retraining/development of therapeutic activities to improve problem solving, judgement, memory, and attention for increased safety/participation in ADL/IADL tasks  * Therapeutic exercise to improve motor endurance, ROM, and functional strength for ADLs/functional transfers  * Therapeutic activities to facilitate/challenge dynamic balance, stand tolerance for increased safety and independence with ADLs  * Therapeutic activities to facilitate gross/fine motor skills for increased independence with ADLs     OTMRS   Modified [place] Scale (MRS)  Score     Description  0             No symptoms  1             No significant disability despite symptoms  2             Slight disability; able to look after own affairs  3             Moderate disability; able to ambulate without assist/ requires assist with ADLs  4             Moderate/Severe disability;requires assist to ambulate/assist with ADLs  5             Severe disability;bedridden/incontinent   6               Score:   4     Recommended Adaptive Equipment: TBD      Home Living: Pt lives alone in 2 floor home. 2 OMAR, 1 handrail  1st floor setup    Bathroom setup: tub/shower      Prior Level of Function: independent with ADLs , independent with IADLs; ambulated independently    Pt provided PLOF however pt is a very questionable historian.  Family/caregiver not present     Pain Level: Pt c/o no pain this session     Cognition: A&O: x 4, pleasant & cooperative, no confusion noted, Follows 2 step directions,           Memory: fair- decreased STM           Sequencing:  fair during ADL tasks            Problem solving: Poor+           Judgement/safety:  poor+             Functional Assessment:  AM-PAC Daily Activity Raw Score: 1724    Initial Eval Status  Date: 22 Treatment Status  Date:  23 STGs = LTGs  Time frame: 10-14 days   Feeding Moderate Assist   Mod Indep   Stand by Assist    Grooming Moderate Assist   CGA  Standing at sink washing hands Stand by Assist    UB Dressing Moderate Assist   Donned/doffed gown  Min A  Donning 2nd gown as a robe Stand by Assist    LB Dressing Dependent   B socks Min A  Simulated pants, declined pants    Managing sock, bring legs up to pull socks on tighter Minimal Assist    Bathing Maximal Assist  Mod A  Simulated  Minimal Assist    Toileting Dependent   Including hygiene  Incontinent NT  CGA  Per last session  Minimal Assist    Bed Mobility  Supine to sit: Maximal Assist   Sit to supine: NT Mod Indep  Supine to sit  Supine to sit: Minimal Assist   Sit to supine: Minimal Assist    Functional Transfers Maximal Assist   Various surfaces SBA  Sit < > stand  Stand pivot  Minimal Assist    Functional Mobility Maximal Assist w/ w/w  Steps from EOB>chair Min A    No AD, short household distances  Minimal Assist    Balance Sitting:     Static:  Min A    Dynamic: Mod A  Standing: Max A w/ w/w  Sitting: Supervision  Standing: Min A  HHA     Activity Tolerance Fair-  Fair Fair+   Visual/  Perceptual Glasses: no                       Education:  Pt was educated through out treatment regarding proper technique & safety with bed mobility, functional transfers & mobility and ADL compensatory strategies to ease tasks, improve safety & prevent falls to return home safely. Comments: Upon arrival pt was in bed & agreeable for therapy. At end of session pt requesting to return to bed, all lines and tubes intact, call light within reach. Pt has made Good progress towards set goals. Continue with current plan of care. Treatment Time In: 1:45           Treatment Time Out: 2:10           Treatment Charges: Mins Units   Ther Ex  [id number]     Manual Therapy 87905     Thera Activities 90440 10 1   ADL/Home Mgt 75142 15 1   Neuro Re-ed 94576     Group Therapy      Orthotic manage/training  86154     Non-Billable Time     Total Timed Treatment 25 2       Linn MARTÍNEZ  [address] [place] [address], [address] [place] [address]

## 2023-01-05 NOTE — PROGRESS NOTES
Physical Therapy  Treatment Note  Name: Elliot Marino  : 1960  MRN: [id number]    Date of Service: 2023    Evaluating PT:  Kriss Schmidt PT, DPT  [id number]    Room #:  4450/9763-M  Diagnosis:  Hyponatremia [E87.1]  PMHx/PSHx:   has a past medical history of Carpal tunnel syndrome, Pulmonary nodule, and Thyroid disease. Procedure/Surgery:    Precautions:  Falls, Cognition, Sitter, Incontinence  Equipment Needs:      SUBJECTIVE:    Pt is a questionable historian, Pt states she was independent using no AD or home O2 in 2 story home with no stairs to enter. Pt has 2 stairs to enter with 1 rail. Equipment Owned:     OBJECTIVE:   Initial Evaluation  Date: 22 Treatment  Date: 1/3/23 Short Term/ Long Term   Goals   AM-PAC 6 Clicks 90/93 94/93    Was pt agreeable to Eval/treatment? yes yes    Does pt have pain? No c/o pain  None     Bed Mobility  Rolling: ModA  Supine to sit: MaxA  Sit to supine: NT  Scooting: MaxA Rolling: NT  Supine to sit: mod I  Sit to supine: mod I     Scooting: mod I  Rolling: Independent  Supine to sit:  Independent  Sit to supine: Independent  Scooting: Independent     Transfers Sit to stand: MaxA  Stand to sit: MaxA  Stand pivot: MaxA Sit to stand: SBA  Stand to sit:SBA  Stand pivot: min  a Sit to stand: Modified Independent  Stand to sit: Modified Independent  Stand pivot: Modified Independent     Ambulation    3 feet with MaxA WW  100 feet without device with min A 150 feet with Modified Independent   AAD   Stair negotiation: ascended and descended  NT 8 steps with unilateral rail with min A  >4 steps with single rail Modified Independent     ROM BUE:  Defer to OT  BLE:  WFL     Strength BUE:  Defer to OT  BLE:  4/5  Improve 1 MMT   Balance Sitting EOB:  Mark  Dynamic Standing:  MaxA [employer] Sitting EOB:  SBA  Dynamic Standing: min A with HHA Sitting EOB:  Independent    Dynamic Standing:  supervision           Patient education  Pt educated on safety with functional mobility    Patient response to education:   Pt verbalized understanding Pt demonstrated skill Pt requires further education in this area   x Partially with verbal cues  reinforce     ASSESSMENT:  Comments:    Pt in bed upon arrival and agreed to participate in therapy. Pt completed functional mobility as noted above. Pt had no complaints of dizziness throughout treatment. Improved ability noted with mobility. Verbal cues for slower andrea to improve safety and balance. Verbal cues improved safety with stair negotiation. Pt returned to supine with call light in reach. Treatment:  Patient practiced and was instructed in the following treatment:    Bed mobility training -  Pt able to complete independently. Transfer training - Verbal instruction for hand placement and technique . Gait training- Verbal instruction for slower andrea to improve safety and balance. Assistance required to complete task. Stair negotiation - verbal instruction for technique and sequencing to improve safety. PLAN:    Patient is making good progress towards established goals. Will continue with current POC.       Time in  1345  Time out 1410    Total Treatment Time  25 minutes     CPT codes:  [] Gait training 63162 -- minutes  [] Manual therapy [id number] -- minutes  [] Therapeutic activities 34844 25 minutes  [] Therapeutic exercises 56944 -- minutes  [] Neuromuscular reeducation 24885 -- minutes    Gautam Foil [id number]

## 2023-01-05 NOTE — CARE COORDINATION
SOCIAL WORK/CASEMANAGEMENT TRANSITION OF CARE PLANNINGBenedict Zoltan Cooley, [address]):  sodium 129 today up from 126 yesterday. Plan is to [facility] with precert obtained yesterday and good for 48 hours. Left note for pcp and rn. All discharge paper work is in place and will need exempt finished once discharge order is in place.  JESSI Castanon  1/5/2023

## 2023-01-05 NOTE — PROGRESS NOTES
[facility] MD Ann Bustamante MD Domnick Porch, MD Alta Churches, JUAN Weeks, LAUREN Soto, JUAN  Progress Note    1/5/2023    SUBJECTIVE:   12/29: Seen while laying in bed, eyes closed. Somnolent. Does open eyes to voice but falls back asleep. Sitter is at bedside. On room air. 12/30: Sitting up in chair, no acute distress. Confused. Sitter is present at bedside. Oral intake is very poor. No acute complaints. On room air. Very weak and deconditioned. 12/31: Denies complaint. Remains confused. Sitter at bedside. Ongoing fatigue and generalized weakness, deconditioning. Eating and drinking almost nothing. 1/2: Seen while laying in bed. Sitter is present. She is much more alert and oriented. No acute complaints. ROS is unremarkable. On room air. Appetite has improved. 1/3: Seen while sitting up in bed. No longer with a sitter present. She is alert and oriented. Admits that she has not been following a fluid restriction. Complains of nausea and abdominal discomfort. Fluid restriction reinforced. 1/4: Sitting up in bed, no acute distress. ROS is unremarkable. Says that she is following a fluid restriction. Blood pressures on low side, denies dizziness or lightheadedness. 1/5: Laying in bed, eyes closed. Awakens easily. No acute complaints other than wanting something to drink. She denies chest pain, dyspnea, or diarrhea. Appetite is good. She reports that she is following her fluid restriction. PROBLEM LIST:    Principal Problem:    Hyponatremia  Active Problems:    Severe protein-calorie malnutrition (HCC)    Simple chronic bronchitis (HCC)    Alcohol abuse    Disorientation    RYLEE (acute kidney injury) ([facility] [address]  Resolved Problems:    * No resolved hospital problems. *         DIET:    ADULT ORAL NUTRITION SUPPLEMENT; Lunch, Breakfast; Fortified Pudding Oral Supplement  ADULT ORAL NUTRITION SUPPLEMENT; Dinner;  Other Oral Supplement; Gelatein 20  ADULT DIET; Dysphagia - Soft and Bite Sized;  Low Potassium (Less than 3000 mg/day); 1200 ml     MEDS (scheduled):    sodium zirconium cyclosilicate  10 g Oral TID    amoxicillin-clavulanate  1 tablet Oral 2 times per day    sodium chloride  1 g Oral TID WC    folic acid  1 mg Oral Daily    magnesium oxide  400 mg Oral Daily    thiamine  100 mg Oral Daily    sodium chloride flush  5-40 mL IntraVENous 2 times per day    [Held by provider] enoxaparin  30 mg SubCUTAneous Daily    ipratropium-albuterol  1 ampule Inhalation Q4H WA    budesonide  0.5 mg Nebulization BID    cetirizine  10 mg Oral Daily    [Held by provider] cyclobenzaprine  5 mg Oral Nightly    montelukast  10 mg Oral Daily    pantoprazole  40 mg Oral QAM AC    lactulose  20 g Oral TID       MEDS (infusions):   sodium bicarbonate infusion 100 mL/hr at 01/05/23 1646    dextrose      sodium chloride         MEDS (prn):  sodium chloride flush, LORazepam **OR** LORazepam **OR** LORazepam **OR** LORazepam **OR** LORazepam **OR** LORazepam **OR** LORazepam **OR** LORazepam, glucose, dextrose bolus **OR** dextrose bolus, glucagon (rDNA), dextrose, sodium chloride flush, sodium chloride, acetaminophen, ondansetron **OR** ondansetron    PHYSICAL EXAM:     Patient Vitals for the past 24 hrs:   BP Temp Temp src Pulse Resp SpO2   01/05/23 1600 94/60 -- -- -- -- --   01/05/23 1500 (!) 89/58 98.6 °F (37 °C) Oral (!) 118 18 98 %   01/05/23 0754 102/72 98.2 °F (36.8 °C) Oral 96 16 99 %   01/04/23 1942 118/76 (!) 96.2 °F (35.7 °C) Temporal 98 18 98 %     @      Intake/Output Summary (Last 24 hours) at 1/5/2023 1816  Last data filed at 1/5/2023 0224  Gross per 24 hour   Intake --   Output 125 ml   Net -125 ml           Wt Readings from Last 3 Encounters:   01/03/23 100 lb (45.4 kg)   12/12/22 96 lb 4.8 oz (43.7 kg)   08/05/22 101 lb (45.8 kg)       Constitutional:  in no acute distress   HEENT: NC/AT, EOMI, sclera and conjunctiva are clear and anicteric, mucus membranes moist  Neck: Trachea midline, no JVD  Cardiovascular: S1, S2 regular rhythm, no murmur,or rub  Respiratory:  CTAB. No crackles, no wheeze  Gastrointestinal:  Soft, nontender, nondistended, NABS  Ext: no edema, feet warm  Skin: dry, no rash  Neuro: awake, alert, interactive      DATA:    Recent Labs     01/04/23  0614 01/05/23  0551 01/05/23  1101   WBC 13.5* 13.6* 13.4*   HGB 8.1* 7.8* 8.3*   HCT 23.1* 22.8* 25.0*   .5* 107.5* 109.2*   * 638* 515*       Recent Labs     01/03/23  0826 01/03/23  1703 01/04/23  0614 01/04/23  1124 01/05/23  0551 01/05/23  1101 01/05/23  1328 01/05/23  1614   *   < > 123*   < > 129* 124* 127*  --    K 4.9   < > 5.8*   < > 5.9* 5.7* 5.3* 5.0   CL 95*   < > 96*   < > 102 101 98  --    CO2 18*   < > 16*   < > 17* 13* 13*  --    MG 1.6  --  2.1  --  1.7  --   --   --    PHOS 3.0  --  3.8  --  3.6  --   --   --    BUN 5*   < > 6   < > 7 6 6  --    CREATININE 0.9   < > 0.9   < > 1.1* 1.0 1.1*  --     < > = values in this interval not displayed. Lab Results   Component Value Date    LABPROT 0.1 08/06/2022    LABPROT 0.1 08/06/2022       Assessment  1. Hyponatremia, hypovolemic, acute superimposed on chronic, due to beer drinkers potomania with acute water (beer) load. Recurrent. Goal given her alcoholism 6 to 8 mmol/L/day. At 28 hours she is 8 mmol/L above the presenting [Na+].     Na - 127, improving   K 5.9 treated earlier today- now 5.0  CO2 13-no diarrhea   Creatinine 1.1 mg/dL, improving  Cosyntropin test repeated today    Recommendations  Change IVF to sodium bicarbonate 150 mEq in D5W @ 100 cc/hr  Resume NaCl tablets 1 gram TID  Lokelma x 3 doses   1200 mL fluid restriction  BMP daily  Follow labs   Monitor I&O  Continue supportive care     Electronically signed by TORI Izaguirre CNP on 1/5/2023 at 6:16 PM

## 2023-01-05 NOTE — PROGRESS NOTES
[facility]  Family Medicine Attending    S: 58 y.o. female with PMH of COPD, Tobacco Abuse, Alcohol Abuse, Cirrhosis, Hyponatremia, and Thyroid Disease presents with altered mental status. Recent admission with hyponatremia during which patient left AMA from MICU. Had been complaining of lower abdominal and back pain. Continues to smoke and drink EtOH. In ER, noted to have tachycardia, hypotension, low-grade fever. Today, alert and oriented and pleasant. Denies any abdominal pain. Salt tabs were started by nephrology. Creatinine elevated yesterday afternoon so bactrim dc'd and started on 3 days of augmentin to complete course of treatment for UTI. Procal significantly improved compared to admission. No dizziness, paresthesias, N/V/D/abd pain/ HA/CP/SOB  O: VS- Blood pressure (!) 89/58, pulse (!) 118, temperature 98.6 °F (37 °C), temperature source Oral, resp. rate 18, height 5' 1\" (1.549 m), weight 100 lb (45.4 kg), SpO2 98 %. Exam is as noted by resident with the following changes, additions or corrections:  AOx4  Heart- very mildly tachy  Lungs - CTAB  Abdomen- Nontender today  Ext - no edema    Impressions:   Principal Problem:    Hyponatremia  Active Problems:    Severe protein-calorie malnutrition (HCC)    Simple chronic bronchitis (HCC)    Alcohol abuse    Disorientation    RYLEE (acute kidney injury) ([facility] [address]  Resolved Problems:    * No resolved hospital problems. *      Plan:     Tachycardia is slowly improving. Anemia vs dehydration (fluid restriction) vs pyelo. CT PE neg. FOBT neg x1, check reticulocyte count; iron studies performed about 2 weeks ago without signs of iron deficiency. 2.   Sepsis 2/2 UTI/pyelonephritis-improved, afebrile, WBC continues to improve; patient initially tx with zosyn x 2 doses, then changed to rocephin x 3 doses, had bactrim x 5 days-stopped due to elevated creatinine; started on augmentin x 3 days. Recultured, CXR negative, no further fever   3. Hyponatremia - slow improvement today-supplementing with NaCl tabs now. Fluid restriction. Though much less likely, could also have been related to the bactrim that the patient was on. May also be adrenal issues. TSH normal Appreciate nephrology input   4. Hyperkalemia-repeat Potassium and EKG is pending; cosyntropin test   5. Anemia, macrocytic with elevated reticulocytes-normal B12/folate; may be related to chronic alcohol use; FOBT negative, but would still recommend outpatient EGD/c-scope; would continue thiamine supplementation at discharge as well. Dispo - precert obtained; awaiting results of cosyntropin testing and nephro recs in order to determine discharge     Attending Physician Statement  I have reviewed the chart and seen the patient with the resident(s). I personally reviewed images, EKG's and similar tests, if present. I personally reviewed and performed key elements of the history and exam.  I have reviewed and confirmed student and/or resident history and exam with changes as indicated above. I agree with the assessment, plan and orders as documented by the resident. Please refer to the resident and/or student note for additional information.       Shyam Kay MD

## 2023-01-05 NOTE — PROGRESS NOTES
Spoke with Dr. Shabbir Rooney regarding k of 5.9 via telephone. Received orders for the following via telephone with readback:  BMP STAT, Amp calcium gluconate push over 5-10 mins, Regular insulin 10 units IV once, Amp of D50, Lokelma 10 grams q 8 hours x 3 doses, bmp 2 hours after amp clacum gluconate, regular insulin and d 50 given  Also spoke with Dr. Shabbir Rooney regarding cosyntropin given and cortisol levels 30 mins and 60 mins not drawn. Dr. Shabbir Rooney stated to reorder cosyntropin injection and cortisol levels at 0 mins, 30 mins, and 60 mins to be given and drawn today.  Discussed orders and plan with bedside RN and lab

## 2023-01-05 NOTE — PROGRESS NOTES
Initial cortisol level drawn med given and lab notified of repeat lab draws for 30-60 minutes. Labs not drawn, notified doctor Kelton Benavides.

## 2023-01-06 VITALS
HEIGHT: 61 IN | TEMPERATURE: 97.5 F | WEIGHT: 100 LBS | HEART RATE: 99 BPM | SYSTOLIC BLOOD PRESSURE: 135 MMHG | DIASTOLIC BLOOD PRESSURE: 91 MMHG | RESPIRATION RATE: 18 BRPM | BODY MASS INDEX: 18.88 KG/M2 | OXYGEN SATURATION: 94 %

## 2023-01-06 PROBLEM — R41.0 DISORIENTATION: Status: RESOLVED | Noted: 2022-12-28 | Resolved: 2023-01-06

## 2023-01-06 PROBLEM — N17.9 AKI (ACUTE KIDNEY INJURY) (HCC): Status: RESOLVED | Noted: 2022-12-28 | Resolved: 2023-01-06

## 2023-01-06 LAB
ANION GAP SERPL CALCULATED.3IONS-SCNC: 11 MMOL/L (ref 7–16)
ANISOCYTOSIS: ABNORMAL
BASOPHILS ABSOLUTE: 0.06 E9/L (ref 0–0.2)
BASOPHILS RELATIVE PERCENT: 0.5 % (ref 0–2)
BUN BLDV-MCNC: 5 MG/DL (ref 6–23)
CALCIUM SERPL-MCNC: 9 MG/DL (ref 8.6–10.2)
CHLORIDE BLD-SCNC: 98 MMOL/L (ref 98–107)
CO2: 18 MMOL/L (ref 22–29)
CREAT SERPL-MCNC: 1 MG/DL (ref 0.5–1)
EKG ATRIAL RATE: 88 BPM
EKG ATRIAL RATE: 93 BPM
EKG P AXIS: 72 DEGREES
EKG P AXIS: 73 DEGREES
EKG P-R INTERVAL: 124 MS
EKG P-R INTERVAL: 134 MS
EKG Q-T INTERVAL: 364 MS
EKG Q-T INTERVAL: 372 MS
EKG QRS DURATION: 60 MS
EKG QRS DURATION: 60 MS
EKG QTC CALCULATION (BAZETT): 440 MS
EKG QTC CALCULATION (BAZETT): 462 MS
EKG R AXIS: 76 DEGREES
EKG R AXIS: 78 DEGREES
EKG T AXIS: 73 DEGREES
EKG T AXIS: 74 DEGREES
EKG VENTRICULAR RATE: 88 BPM
EKG VENTRICULAR RATE: 93 BPM
EOSINOPHILS ABSOLUTE: 0.04 E9/L (ref 0.05–0.5)
EOSINOPHILS RELATIVE PERCENT: 0.3 % (ref 0–6)
GFR SERPL CREATININE-BSD FRML MDRD: >60 ML/MIN/1.73
GLUCOSE BLD-MCNC: 89 MG/DL (ref 74–99)
HCT VFR BLD CALC: 19.8 % (ref 34–48)
HEMOGLOBIN: 7.1 G/DL (ref 11.5–15.5)
HYPOCHROMIA: ABNORMAL
IMMATURE GRANULOCYTES #: 0.14 E9/L
IMMATURE GRANULOCYTES %: 1.1 % (ref 0–5)
LYMPHOCYTES ABSOLUTE: 1.68 E9/L (ref 1.5–4)
LYMPHOCYTES RELATIVE PERCENT: 12.9 % (ref 20–42)
MAGNESIUM: 1.5 MG/DL (ref 1.6–2.6)
MCH RBC QN AUTO: 36.8 PG (ref 26–35)
MCHC RBC AUTO-ENTMCNC: 35.9 % (ref 32–34.5)
MCV RBC AUTO: 102.6 FL (ref 80–99.9)
MONOCYTES ABSOLUTE: 1.59 E9/L (ref 0.1–0.95)
MONOCYTES RELATIVE PERCENT: 12.2 % (ref 2–12)
NEUTROPHILS ABSOLUTE: 9.53 E9/L (ref 1.8–7.3)
NEUTROPHILS RELATIVE PERCENT: 73 % (ref 43–80)
PATHOLOGIST REVIEW: NORMAL
PDW BLD-RTO: 16.5 FL (ref 11.5–15)
PHOSPHORUS: 2.7 MG/DL (ref 2.5–4.5)
PLATELET # BLD: 761 E9/L (ref 130–450)
PMV BLD AUTO: 9.8 FL (ref 7–12)
POLYCHROMASIA: ABNORMAL
POTASSIUM SERPL-SCNC: 4.5 MMOL/L (ref 3.5–5)
RBC # BLD: 1.93 E12/L (ref 3.5–5.5)
SODIUM BLD-SCNC: 127 MMOL/L (ref 132–146)
TARGET CELLS: ABNORMAL
WBC # BLD: 13 E9/L (ref 4.5–11.5)

## 2023-01-06 PROCEDURE — 83735 ASSAY OF MAGNESIUM: CPT

## 2023-01-06 PROCEDURE — 6370000000 HC RX 637 (ALT 250 FOR IP): Performed by: FAMILY MEDICINE

## 2023-01-06 PROCEDURE — 6370000000 HC RX 637 (ALT 250 FOR IP): Performed by: INTERNAL MEDICINE

## 2023-01-06 PROCEDURE — 6370000000 HC RX 637 (ALT 250 FOR IP)

## 2023-01-06 PROCEDURE — 94640 AIRWAY INHALATION TREATMENT: CPT

## 2023-01-06 PROCEDURE — 2580000003 HC RX 258

## 2023-01-06 PROCEDURE — 85025 COMPLETE CBC W/AUTO DIFF WBC: CPT

## 2023-01-06 PROCEDURE — 36415 COLL VENOUS BLD VENIPUNCTURE: CPT

## 2023-01-06 PROCEDURE — 80048 BASIC METABOLIC PNL TOTAL CA: CPT

## 2023-01-06 PROCEDURE — 99238 HOSP IP/OBS DSCHRG MGMT 30/<: CPT | Performed by: FAMILY MEDICINE

## 2023-01-06 PROCEDURE — 84100 ASSAY OF PHOSPHORUS: CPT

## 2023-01-06 RX ORDER — SODIUM CHLORIDE 1000 MG
1 TABLET, SOLUBLE MISCELLANEOUS
Qty: 90 TABLET | Refills: 3 | Status: SHIPPED | OUTPATIENT
Start: 2023-01-06

## 2023-01-06 RX ADMIN — IPRATROPIUM BROMIDE AND ALBUTEROL SULFATE 1 AMPULE: .5; 2.5 SOLUTION RESPIRATORY (INHALATION) at 16:52

## 2023-01-06 RX ADMIN — MONTELUKAST 10 MG: 10 TABLET, FILM COATED ORAL at 08:26

## 2023-01-06 RX ADMIN — FOLIC ACID 1 MG: 1 TABLET ORAL at 08:26

## 2023-01-06 RX ADMIN — IPRATROPIUM BROMIDE AND ALBUTEROL SULFATE 1 AMPULE: .5; 2.5 SOLUTION RESPIRATORY (INHALATION) at 13:19

## 2023-01-06 RX ADMIN — Medication 400 MG: at 08:26

## 2023-01-06 RX ADMIN — SODIUM CHLORIDE, PRESERVATIVE FREE 10 ML: 5 INJECTION INTRAVENOUS at 08:29

## 2023-01-06 RX ADMIN — BUDESONIDE 500 MCG: 0.5 SUSPENSION RESPIRATORY (INHALATION) at 09:58

## 2023-01-06 RX ADMIN — SODIUM ZIRCONIUM CYCLOSILICATE 10 G: 10 POWDER, FOR SUSPENSION ORAL at 08:27

## 2023-01-06 RX ADMIN — Medication 100 MG: at 08:26

## 2023-01-06 RX ADMIN — AMOXICILLIN AND CLAVULANATE POTASSIUM 1 TABLET: 875; 125 TABLET, FILM COATED ORAL at 08:26

## 2023-01-06 RX ADMIN — IPRATROPIUM BROMIDE AND ALBUTEROL SULFATE 1 AMPULE: .5; 2.5 SOLUTION RESPIRATORY (INHALATION) at 09:58

## 2023-01-06 RX ADMIN — SODIUM CHLORIDE 1 G: 1 TABLET ORAL at 08:26

## 2023-01-06 RX ADMIN — SODIUM CHLORIDE 1 G: 1 TABLET ORAL at 12:45

## 2023-01-06 RX ADMIN — PANTOPRAZOLE SODIUM 40 MG: 40 TABLET, DELAYED RELEASE ORAL at 08:26

## 2023-01-06 NOTE — CARE COORDINATION
SOCIAL WORK/CASEMANAGEMENT TRANSITION OF CARE PLANNING( [address] [place] [address], [address]):  hgb is 7.1 down from 8.3 and sodium is 127 up from 126. The plan was to go to [facility] and precert is good thru today but now pt wants to go home with outpatient PT and OT. Left note for pcp and rn to provide a order/script to pt on discharge.  JESSI Serrano  1/6/2023

## 2023-01-06 NOTE — DISCHARGE SUMMARY
Discharge Summary    Radha Mchugh  :  1960  MRN:  [id number]    Admit date:  2022  Discharge date:  23    Admitting Physician:  Helder Christian MD    Discharge Diagnoses:    Patient Active Problem List   Diagnosis    Other emphysema ([facility] [address]    Hyponatremia    Severe protein-calorie malnutrition ([facility] [address]    Simple chronic bronchitis ([facility] [address]    Alcohol abuse       Admission Condition:  fair    Discharged Condition:  stable    Hospital Course:     Radha Mchugh is a 58 y.o. female with a PMH of COPD, Tobacco Abuse, Alcohol Abuse, Cirrhosis, Hyponatremia, and Thyroid Disease presents with altered mental status. Recent admission with hyponatremia during which patient left AMA from MICU. Had been complaining of lower abdominal and back pain. Continues to smoke and drink EtOH. In ER, noted to have tachycardia, hypotension, low-grade fever, elevated Pro-Willian, hyponatremia 118. UA is positive for LE, WBC and rare bacteria. Culture came back to be an E. coli. She was started on 2 L of fluids and Zosyn 1 dose. Admitted to ICU for hyponatremia and sepsis likely secondary to UTI versus pyelonephritis. CT abdomen showed nonspecific fat stranding perinephric only and diverticulosis. Zosyn was switched to Rocephin. Was placed on D5W. She was started on CIWA Protocol and needed 2 mg Ativan x1. Nephrology consulted, discontinued D5W and placed patient on fluid restriction. Sodium improved and she was transferred out of the of the ICU 2022. ID was consulted for antibiotic management. Ceftriaxone changed to Bactrim DS twice daily for a total of 10 days. CT head showed Old lacunar infarcts in the left basal ganglia and recommends F/U MRI. mentation improved. She remained tachycardic throughout stay. Differential diagnosis included anemia versus dehydration versus infection. FOBT negative x2. CTA negative for PE.   Peripheral blood smear showed mild thrombocytosis with appropriate platelet morphology, macrocytic anemia, and neutrophilic leukocytosis. She was started on salt tabs which showed minimal improvement of sodium. She subsequently developed thrombocytosis which is likely due to Bactrim. Bactrim was switched to Augmentin. Patient also had hyperkalemia. Adrenal insufficiency was suspected due to other electrolyte derangements and blood pressure. Cortisol testing was negative as cortisol was appropriately elevated after cosyntropin test. potassium improved. The patient's course was otherwise uneventful. She progressed well, pain was controlled on PO medications. Physical therapy evaluated and treated the patient and recommended bed motility and gait training.   She was tolerating a regular diet with no nausea or vomiting, and was in a suitable condition for discharge to home with home health care      Discharge plan:  Start salt tabs 1 g 3 times daily  Repeat CBC and BMP on Monday to follow-up on hyponatremia, hyperkalemia, anemia and thrombocytosis  EGD/colonoscopy as outpatient due to anemia    Discharge Medications:         Medication List        START taking these medications      sodium chloride 1 g tablet  Take 1 tablet by mouth 3 times daily (with meals)            CONTINUE taking these medications      albuterol sulfate  (90 Base) MCG/ACT inhaler  Commonly known as: PROVENTIL;VENTOLIN;PROAIR  INHALE 2 PUFFS INTO THE LUNGS FOUR TIMES DAILY AS NEEDED FOR WHEEZING     cetirizine 10 MG tablet  Commonly known as: ZYRTEC  Take 1 tablet by mouth daily     cyclobenzaprine 5 MG tablet  Commonly known as: FLEXERIL  TAKE 1 TABLET BY MOUTH EVERY EVENING AS NEEDED FOR MUSCLE SPASMS     Flovent  MCG/ACT inhaler  Generic drug: fluticasone  Inhale 1 puff into the lungs 2 times daily     Mapap Arthritis Pain 650 MG extended release tablet  Generic drug: acetaminophen  TAKE 1 TABLET BY MOUTH EVERY 6 HOURS AS NEEDED FOR PAIN     montelukast 10 MG tablet  Commonly known as: SINGULAIR  TAKE 1 TABLET BY MOUTH DAILY     omeprazole 40 MG delayed release capsule  Commonly known as: PRILOSEC  Take 1 capsule by mouth every morning (before breakfast)     tiotropium 2.5 MCG/ACT Aers inhaler  Commonly known as: Spiriva Respimat  INHALE 2 PUFFS INTO THE LUNGS DAILY            STOP taking these medications      naproxen 250 MG tablet  Commonly known as: NAPROSYN               Where to Get Your Medications        These medications were sent to [facility], [address] [address]      Phone: [phone]   sodium chloride 1 g tablet         Consults:  intensive care    Significant Diagnostic Studies: See below    Labs:  Na/K/Cl/CO2:  127/4.5/98/18 (01/06 0631)  BUN/Cr/glu/ALT/AST/amyl/lip:  5/1.0/--/--/--/--/-- (01/06 0631)  WBC/Hgb/Hct/Plts:  13.0/7.1/19.8/761 (01/06 0631)  estimated creatinine clearance is 42 mL/min (based on SCr of 1 mg/dL). Treatments:   See hospital course    Discharge Exam:  Constitutional:       General: She is not in acute distress. HENT:      Head: Normocephalic and atraumatic. Nose: Nose normal. No congestion or rhinorrhea. Eyes:      Conjunctiva/sclera: Conjunctivae normal.   Cardiovascular:      Rate and Rhythm: Normal rate and regular rhythm. Pulses: Normal pulses. Heart sounds: Normal heart sounds. Pulmonary:      Effort: Pulmonary effort is normal. No respiratory distress. Breath sounds: Normal breath sounds. Abdominal:      General: Bowel sounds are normal. There is no distension. Palpations: Abdomen is soft. Tenderness: There is no abdominal tenderness. There is no guarding or rebound. Musculoskeletal:      Cervical back: Normal range of motion and neck supple. Right lower leg: No edema. Left lower leg: No edema. Skin:     General: Skin is warm and dry. Capillary Refill: Capillary refill takes less than 2 seconds. Findings: No rash. Neurological:      Mental Status: She is alert and oriented to person, place, and time. Sensory: No sensory deficit. Motor: No weakness. Psychiatric:         Mood and Affect: Mood normal.         Behavior: Behavior normal.     Disposition:   home    Future Appointments   Date Time Provider Grover Vasquez   1/12/2023 10:00 AM Ceola Robes, MD [facility]   1/31/2023 10:30 AM Mahogany Martinez MD [facility]       More than 30 minutes was spent in preparation of this patient's discharge including, but not limited to, examination, preparation of documents, prescription preparation, counseling and coordination.     Signed:  Wing Lockhart MD  1/6/2023, 3:50 PM

## 2023-01-06 NOTE — PROGRESS NOTES
Subjective:  Amanuel Adhikari is a 63yo female with PMHx of alcohol use disorder, COPD, cirrhosis, and thyroid disease who initially presented to the ED w/ altered mental status and was noted to have tachycardia, hypotension, and a low-grade fever. Recently admitted for hyponatremia and left AMA at that time. She is currently being managed for hyponatremia (hospital day 10). Patient seen and evaluated at bedside. Alert and oriented, mental status is improving. No overnight events reported. Patient does report experiencing pins/needles type pain and numbness in all four extremities, but worse in left upper and right lower. Not experiencing symptoms when I visited. Has not experienced these symptoms since Thursday early AM. Endorses slightly worsening right-sided abdominal pain that has been present since before admission. On fluid restricted diet, but is taking drinks from home. Importance of restricting fluids discussed with patient. Objective:     Physical Exam:     Vitals:    01/06/23 0807   BP: (!) 135/91   Pulse: 99   Resp: 18   Temp: 97.5 °F (36.4 °C)   SpO2:       Constitutional: elderly woman sitting at bedside comfortably. No acute distress. HENT: normocephalic. Anicteric conjunctiva w/o pallor. Moist mucus membranes. Neck: Midline trachea. No JVD noted. Cardiovasular: S1, S2 identified. No m/g/r. Respiratory: CTAB. No crackles or wheezes. Gastrointestinal: Bowel sounds present. Soft, nondistended. Mild tenderness to palpation of right lower quadrant. Extremity: no edema, pulses palpable bilaterally. Motor strength grossly intact. Neuro: AOx4. CNs II-XII grossly intact. Labs:  WBC:13.0 (improving)     Na+: 127  K+: 4.5    Cortisol: 36.16 (ref: 2.68-18.40)     New Imaging:  CTA PULMONARY W CONTRAST   Final Result   1. No pulmonary embolism or other acute finding in the chest.   2. Emphysema.    3. Stable small lung nodules which demonstrate 16 months of stability and are   believed to be benign. 4. Mild central pulmonary arterial enlargement suggesting pulmonary   hypertension. 5. Borderline cardiomegaly. XR CHEST PORTABLE   Final Result   No focal pneumonia or pleural effusion. CT ABDOMEN PELVIS WO CONTRAST Additional Contrast? None   Final Result   1. Nonspecific bilateral perinephric fat stranding possibly related to   chronic medical renal disease with appropriate clinical history. 2. Mild prominence of bilateral renal pelves which may be physiologic. No   evidence of renal or ureteral calculus. 3. Diverticulosis. XR CHEST PORTABLE   Final Result   Coarse interstitial markings and atherosclerotic disease. No new   cardiopulmonary pathology. CT head without contrast   Final Result   No acute intracranial abnormality. Specifically, there is no acute   intracranial hemorrhage       Old lacunar infarct within the left basal ganglia. If the patient's symptoms persist follow-up MRI is recommended. Assessment/Plan  Sepsis 2/2 UTI vs pyelonephritis - resolved  Urine culture grew E. Coli sensitive to cipro  No urinary symptoms at this time  Switched Bactrim to Augmentin due to side effect profile of Bactrim (hyponatremia, hyperkalemia). Will stop Augmentin due to side effec profile including thrombocytosis. Patient asymptomatic, WBC and vitals WDL. Hypotonic Hyponatremia  Na+ = 127  hypotonic hypovolemic  1200 mL fluid restriction  NaCL tablets 1 gram TID  monitor electrolytes w/ daily CMP or BMP     3. Hyperkalemia  4.5 today, will recheck   last ECG normal sinus rhythm  Bicarb drip 150 mEq at 100mL/hr ordered, but patient currently no IV access  With hyponatremia, concern for adrenal insufficiency. Cosyntropin testing was negative w/ increased cortisol levels suggesting intact adrenal function  Repeat ECG, monitor w/ CMP or BMP  Patient stable     4.  Chronic Hx of macrocytic Anemia  Hgb 7.1  .6 w/reticulocytes; folate & B12 normal  FOBT negative x2, most recent 1/03  Monitor Hb, transfuse if <7  outpatient EGD for possible varices     5. COPD  ABG showed pH 7.4, PCO2 33.5, PO2 59.7  PFT 2021: FEV1/FVC 58%, FEV1 49% predicted. GOLD Stage 3   Pulmicort, Duonebs, and Brovana  Oxygen as needed   Daily vitals and oxygen saturation      6. Fibrosis of liver  Fibroscan done last hospitalization  Ammonia elevated, lactulose started TID  INR 1.2, aPTT 35.8                7. Tobacco Abuse   Nicotine patch      8. Pulmonary Nodule  Incidental finding on CT Chest on August 2021 showing 3 mm nodule in the right upper lobe  Smokes 1.5 packs of tobacco per day for the past 20 years   Consider outpatient work-up     9. DVT Prophylaxis  Holding anticoagulation for concern of item #3     10.  Disposition  Awaiting nephrology recommendation to determine discharge  [facility], pre-cert attained  Patient no longer desiring rehab and wants to go home  Modify plan as per resident and attending

## 2023-01-06 NOTE — DISCHARGE INSTRUCTIONS
=====================================  [facility], [address]  =====================================    Take your medications as directed in this summary    Future scheduled appointments are listed below or recommended appointments are mentioned above. Future Appointments   Date Time Provider Grover Vasquez   1/12/2023 10:00 AM Hobert Scarce, MD [facility]   1/31/2023 10:30 AM Myra Vargas MD [facility]       Call Tomas Mary MD to confirm or schedule your appointment with Dr. Tomas Mary MD,  as soon as possible and/or if there are any appointment changes or other issues. It is important that you follow up with Dr. Tomas Mary MD for better monitoring of the reason of your hospitalization. Follow up with the specialists that saw you during your stay as well. If you have any questions call your PCP at [phone]. Once discharged from [facility] [address] , you can :     Return to work : Yes, you may return to work  Activity : As tolerated  Stairs : As tolerated  Exercise : As tolerated  Lifting : As tolerated   Sexual activity : Yes  Driving : With seat belt on. NO driving on narcotic pain medication if prescribed   Medications : Always take your medications as prescribed  Wound Care: none needed  Diet : You are asked to make an attempt to improve diet and exercise patterns to aid in medical management of your medical condition/problem. Call [facility] with any further questions. Return to Emergency Department with any worsening of your condition and/or fever greater than 101 degrees, new weakness, shortness of breath or chest pain.

## 2023-01-06 NOTE — PROGRESS NOTES
CLINICAL PHARMACY NOTE: MEDS TO BEDS    Total # of Prescriptions Filled: 1   The following medications were delivered to the patient:  SODIUM CHLORIDE 1GRAM TABS    Additional Documentation:

## 2023-01-06 NOTE — PROGRESS NOTES
RN contacted Dr Nakul Fox via perfect serve for Resident Kaycee Logan concerning discharge for patient. Per Dr. Yancy Campa is ok with patient to be discharged.

## 2023-01-06 NOTE — PROGRESS NOTES
[facility] Inpatient   Resident Progress Note    S:  Hospital day: 10    Brief Synopsis: Franco Lott is a 58 y.o. female with a PMH of COPD, Tobacco Abuse, Alcohol Abuse, Cirrhosis, Hyponatremia, and Thyroid Disease presents with altered mental status. Recent admission with hyponatremia during which patient left AMA from MICU. Had been complaining of lower abdominal and back pain. Continues to smoke and drink EtOH. In ER, noted to have tachycardia, hypotension, low-grade fever. No acute events overnight. Patient was seen and examined this morning. Patient denies any N/V/D/F/C/SOB/CP and has no other new concerns or complaints at this time. Cont meds:    sodium bicarbonate infusion 100 mL/hr at 01/05/23 1646    dextrose      sodium chloride       Scheduled meds:    sodium zirconium cyclosilicate  10 g Oral TID    amoxicillin-clavulanate  1 tablet Oral 2 times per day    sodium chloride  1 g Oral TID WC    folic acid  1 mg Oral Daily    magnesium oxide  400 mg Oral Daily    thiamine  100 mg Oral Daily    sodium chloride flush  5-40 mL IntraVENous 2 times per day    [Held by provider] enoxaparin  30 mg SubCUTAneous Daily    ipratropium-albuterol  1 ampule Inhalation Q4H WA    budesonide  0.5 mg Nebulization BID    cetirizine  10 mg Oral Daily    [Held by provider] cyclobenzaprine  5 mg Oral Nightly    montelukast  10 mg Oral Daily    pantoprazole  40 mg Oral QAM AC    lactulose  20 g Oral TID     PRN meds: sodium chloride flush, LORazepam **OR** LORazepam **OR** LORazepam **OR** LORazepam **OR** LORazepam **OR** LORazepam **OR** LORazepam **OR** LORazepam, glucose, dextrose bolus **OR** dextrose bolus, glucagon (rDNA), dextrose, sodium chloride flush, sodium chloride, acetaminophen, ondansetron **OR** ondansetron     I reviewed the patient's Past Medical and Surgical History, Medications and Allergies.     O:  VS: BP (!) 155/98   Pulse (!) 130   Temp 98.6 °F (37 °C) (Oral)   Resp 18   Ht 5' 1\" (1.549 m)   Wt 100 lb (45.4 kg)   SpO2 100%   BMI 18.89 kg/m²     Physical Exam  Vitals reviewed. Constitutional:       General: She is not in acute distress. HENT:      Head: Normocephalic and atraumatic. Nose: Nose normal. No congestion or rhinorrhea. Eyes:      Conjunctiva/sclera: Conjunctivae normal.   Cardiovascular:      Rate and Rhythm: Normal rate and regular rhythm. Pulses: Normal pulses. Heart sounds: Normal heart sounds. Pulmonary:      Effort: Pulmonary effort is normal. No respiratory distress. Breath sounds: Normal breath sounds. Abdominal:      General: Bowel sounds are normal. There is no distension. Palpations: Abdomen is soft. Tenderness: There is no abdominal tenderness. There is no guarding or rebound. Musculoskeletal:      Cervical back: Normal range of motion and neck supple. Right lower leg: No edema. Left lower leg: No edema. Skin:     General: Skin is warm and dry. Capillary Refill: Capillary refill takes less than 2 seconds. Findings: No rash. Neurological:      Mental Status: She is alert and oriented to person, place, and time. Sensory: No sensory deficit. Motor: No weakness. Psychiatric:         Mood and Affect: Mood normal.         Behavior: Behavior normal.       Labs:  Na/K/Cl/CO2:  126/5.0/101/13 (01/05 1708)  BUN/Cr/glu/ALT/AST/amyl/lip:  6/1.0/--/--/--/--/-- (01/05 1708)  WBC/Hgb/Hct/Plts:  13.4/8.3/25.0/515 (01/05 1101)  estimated creatinine clearance is 42 mL/min (based on SCr of 1 mg/dL). Other pertinent labs as noted below    New Imaging:  CTA PULMONARY W CONTRAST   Final Result   1. No pulmonary embolism or other acute finding in the chest.   2. Emphysema. 3. Stable small lung nodules which demonstrate 16 months of stability and are   believed to be benign. 4. Mild central pulmonary arterial enlargement suggesting pulmonary   hypertension. 5. Borderline cardiomegaly.          XR CHEST PORTABLE   Final Result   No focal pneumonia or pleural effusion. CT ABDOMEN PELVIS WO CONTRAST Additional Contrast? None   Final Result   1. Nonspecific bilateral perinephric fat stranding possibly related to   chronic medical renal disease with appropriate clinical history. 2. Mild prominence of bilateral renal pelves which may be physiologic. No   evidence of renal or ureteral calculus. 3. Diverticulosis. XR CHEST PORTABLE   Final Result   Coarse interstitial markings and atherosclerotic disease. No new   cardiopulmonary pathology. CT head without contrast   Final Result   No acute intracranial abnormality. Specifically, there is no acute   intracranial hemorrhage      Old lacunar infarct within the left basal ganglia. If the patient's symptoms persist follow-up MRI is recommended. A/P:  Principal Problem:    Hyponatremia  Active Problems:    Severe protein-calorie malnutrition (HCC)    Simple chronic bronchitis (HCC)    Alcohol abuse    Disorientation    RYLEE (acute kidney injury) ([facility] [address]  Resolved Problems:    * No resolved hospital problems. *      Sepsis secondary to UTI vs Pyelonephritis - resolved   Non contrast ct abdomen showed nonspecific fat stranding yessy nephrically and diverticulosis. Chest x-ray negative for pneumonia  Procalcitonin 2.22, 0.31  Blood cultures negative, Urine culture showed E. Coli pan sensitive  Bactrim and switch to Augmentin due to side effects including hyponatremia and hyperkalemia; stop augmentin due to worsening thrombocytosis    Tachycardia - improving   Differential diagnosis includes infection (E coli), dehydration (on fluid restriction), anemia, alcohol withdrawal, PE  CTA chest negative for PE  History of macrocytic anemia, worsening. History of chronic alcohol abuse. TSH, folate and vit B12 within normal limits. Thrombocytosis  Platelets continue to trend up, today 761.   Differential diagnosis includes chronic anemia versus acute infection versus acute blood loss vs medication induced   Plts were initially trending up possibly due to her acute infection. Is now continuing to trend up after starting Augmentin. We will stop antibiotic at this time  Peripheral blood smear ordered    Chronic history of macrocytic anemia - improving  Hb 10 at presentation, dropping down 7.8  TSH, folate and vit b12 within normal limits  Reticulocyte count elevated  Hold anticoagulation  FOBT negative 12/31/22  Consider EGD / colonoscopy as outpatient  Monitor Hb, transfuse if < 7    Chronic Alcohol Abuse  Continue folic acid and thiamine     Hyponatremia   Na 118 in the ED, improving to 127   Likely secondary to beer drinkers potomania vs adrenal insufficiency    1200 mL fluid restriction and salt tablets 1 g 3 times daily  Nephrology following, cosyntropin test was not completed yesterday. Hyperkalemia  Potassium level 5.9, treated with insulin, calcium gluconate, and lokelma. K 4.5  today  Adrenal insufficiency less likely as cortisol was appropriately elevated. May be secondary to volume loss versus hemolysis     COPD , Gold stage III  Not in acute exacerbation.    Pulmicort, Duonebs, and Brovana     Fibrosis of liver  Fibroscan done last hospitalization  Ammonia elevated, lactulose started TID  INR 1.2, aPTT 35.8                Tobacco Abuse   Nicotine patch      Pulmonary Nodule  Incidental finding on CT Chest on August 2021 showing 3 mm nodule in the right upper lobe  Smokes 1.5 packs of tobacco per day for the past 20 years   Consider outpatient work-up      PT 16/24 and OT 17/24  DVT Prophylaxis: holding anticoagulation, PCDs  GI Prophylaxis: Protonix 40 mg daily  Diet: Dysphagia with low potassium and fluid restriction  Full code  Disposition: Patient declines rehab at this time and will be going home with [facility] (PT), DC pending nephrology, would like to discharge today    Electronically signed by Maciel Richards MD on 1/6/2023 at 7:22 AM  This case was discussed with attending physician Dr. Susan Latham

## 2023-01-06 NOTE — PROGRESS NOTES
[facility]  Family Medicine Attending    S: 58 y.o. female with PMH of COPD, Tobacco Abuse, Alcohol Abuse, Cirrhosis, Hyponatremia, and Thyroid Disease presents with altered mental status. Recent admission with hyponatremia during which patient left AMA from MICU. Had been complaining of lower abdominal and back pain. Continues to smoke and drink EtOH. In ER, noted to have tachycardia, hypotension, low-grade fever. Today, alert and oriented and pleasant. Denies any abdominal pain. Salt tabs were started by nephrology. Creatinine elevated, so bactrim dc'd and started on 3 days of augmentin to complete course of treatment for UTI. Procal significantly improved compared to admission. no new concerns. Cosyntropin test normal.  Potassium improved. No dizziness, paresthesias, N/V/D/abd pain/ HA/CP/SOB  O: VS- Blood pressure (!) 135/91, pulse 99, temperature 97.5 °F (36.4 °C), resp. rate 18, height 5' 1\" (1.549 m), weight 100 lb (45.4 kg), SpO2 100 %. Exam is as noted by resident with the following changes, additions or corrections:  AOx4  Heart- very mildly tachy  Lungs - CTAB  Abdomen- Nontender today  Ext - no edema    Impressions:   Principal Problem:    Hyponatremia  Active Problems:    Severe protein-calorie malnutrition (HCC)    Simple chronic bronchitis (HCC)    Alcohol abuse  Resolved Problems:    Disorientation    RYLEE (acute kidney injury) ([facility] [address]      Plan:     Tachycardia is slowly improving. Anemia vs dehydration (fluid restriction) vs pyelo. CT PE neg. FOBT neg x1, check reticulocyte count; iron studies performed about 2 weeks ago without signs of iron deficiency. 2.   Sepsis 2/2 UTI/pyelonephritis-improved, afebrile, WBC continues to improve; patient initially tx with zosyn x 2 doses, then changed to rocephin x 3 doses, had bactrim x 5 days-stopped due to elevated creatinine; started on augmentin x 3 days. Recultured, CXR negative, no further fever   3.  Hyponatremia - slow improvement today-supplementing with NaCl tabs now. Fluid restriction. Though much less likely, could also have been related to the bactrim that the patient was on. May also be adrenal issues. TSH normal Appreciate nephrology input   4. Hyperkalemia-repeat Potassium and EKG is pending; cosyntropin test   5. Anemia, macrocytic with elevated reticulocytes-normal B12/folate; may be related to chronic alcohol use; FOBT negative, but would still recommend outpatient EGD/c-scope; would continue thiamine supplementation at discharge as well. Dispo - precert obtained, however patient now wants to go home and have PT/OT; okay for discharge today; recommend outpatient follow up early next week with repeat BMP and CBC Monday     Attending Physician Statement  I have reviewed the chart and seen the patient with the resident(s). I personally reviewed images, EKG's and similar tests, if present. I personally reviewed and performed key elements of the history and exam.  I have reviewed and confirmed student and/or resident history and exam with changes as indicated above. I agree with the assessment, plan and orders as documented by the resident. Please refer to the resident and/or student note/discharge summary for additional information.       Sung Rajput MD

## 2023-01-06 NOTE — PROGRESS NOTES
[facility] MD Daniel Reyes, MD Barrett Collado, MD Negrito Langley, JUAN Weeks, LAUREN Dias, JUNA  Progress Note    1/6/2023    SUBJECTIVE:   12/29: Seen while laying in bed, eyes closed. Somnolent. Does open eyes to voice but falls back asleep. Sitter is at bedside. On room air. 12/30: Sitting up in chair, no acute distress. Confused. Sitter is present at bedside. Oral intake is very poor. No acute complaints. On room air. Very weak and deconditioned. 12/31: Denies complaint. Remains confused. Sitter at bedside. Ongoing fatigue and generalized weakness, deconditioning. Eating and drinking almost nothing. 1/2: Seen while laying in bed. Sitter is present. She is much more alert and oriented. No acute complaints. ROS is unremarkable. On room air. Appetite has improved. 1/3: Seen while sitting up in bed. No longer with a sitter present. She is alert and oriented. Admits that she has not been following a fluid restriction. Complains of nausea and abdominal discomfort. Fluid restriction reinforced. 1/4: Sitting up in bed, no acute distress. ROS is unremarkable. Says that she is following a fluid restriction. Blood pressures on low side, denies dizziness or lightheadedness. 1/5: Laying in bed, eyes closed. Awakens easily. No acute complaints other than wanting something to drink. She denies chest pain, dyspnea, or diarrhea. Appetite is good. She reports that she is following her fluid restriction. 1/6: Seen while sitting up in bed. Reports that she is being discharged later today. No acute complaints. Appetite is good. Following her fluid restriction. PROBLEM LIST:    Principal Problem:    Hyponatremia  Active Problems:    Severe protein-calorie malnutrition (HCC)    Simple chronic bronchitis (HCC)    Alcohol abuse  Resolved Problems:    Disorientation    RYLEE (acute kidney injury) ([facility] [address]         DIET:    ADULT DIET;  Dysphagia - Soft and Bite Sized; Low Potassium (Less than 3000 mg/day); 1200 ml     MEDS (scheduled):    sodium chloride  1 g Oral TID WC    folic acid  1 mg Oral Daily    magnesium oxide  400 mg Oral Daily    thiamine  100 mg Oral Daily    sodium chloride flush  5-40 mL IntraVENous 2 times per day    [Held by provider] enoxaparin  30 mg SubCUTAneous Daily    ipratropium-albuterol  1 ampule Inhalation Q4H WA    budesonide  0.5 mg Nebulization BID    cetirizine  10 mg Oral Daily    [Held by provider] cyclobenzaprine  5 mg Oral Nightly    montelukast  10 mg Oral Daily    pantoprazole  40 mg Oral QAM AC    lactulose  20 g Oral TID       MEDS (infusions):   sodium bicarbonate infusion 100 mL/hr at 01/05/23 1646    dextrose      sodium chloride         MEDS (prn):  sodium chloride flush, LORazepam **OR** LORazepam **OR** LORazepam **OR** LORazepam **OR** LORazepam **OR** LORazepam **OR** LORazepam **OR** LORazepam, glucose, dextrose bolus **OR** dextrose bolus, glucagon (rDNA), dextrose, sodium chloride flush, sodium chloride, acetaminophen, ondansetron **OR** ondansetron    PHYSICAL EXAM:     Patient Vitals for the past 24 hrs:   BP Temp Pulse Resp SpO2 Weight   01/06/23 1652 -- -- -- -- 94 % --   01/06/23 0807 (!) 135/91 97.5 °F (36.4 °C) 99 18 -- --   01/06/23 0412 -- -- -- -- -- 100 lb (45.4 kg)   01/05/23 2144 (!) 155/98 -- (!) 130 18 100 % --     @      Intake/Output Summary (Last 24 hours) at 1/6/2023 1703  Last data filed at 1/6/2023 1200  Gross per 24 hour   Intake 240 ml   Output --   Net 240 ml           Wt Readings from Last 3 Encounters:   01/06/23 100 lb (45.4 kg)   12/12/22 96 lb 4.8 oz (43.7 kg)   08/05/22 101 lb (45.8 kg)       Constitutional:  in no acute distress   HEENT: NC/AT, EOMI, sclera and conjunctiva are clear and anicteric, mucus membranes moist  Neck: Trachea midline, no JVD  Cardiovascular: S1, S2 regular rhythm, no murmur,or rub  Respiratory:  CTAB.  No crackles, no wheeze  Gastrointestinal:  Soft, nontender, nondistended, NABS  Ext: no edema, feet warm  Skin: dry, no rash  Neuro: awake, alert, interactive      DATA:    Recent Labs     01/05/23  0551 01/05/23  1101 01/06/23  0631   WBC 13.6* 13.4* 13.0*   HGB 7.8* 8.3* 7.1*   HCT 22.8* 25.0* 19.8*   .5* 109.2* 102.6*   * 515* 761*       Recent Labs     01/04/23  0614 01/04/23  1124 01/05/23  0551 01/05/23  1101 01/05/23  1328 01/05/23  1614 01/05/23  1708 01/06/23  0631   *   < > 129*   < > 127*  --  126* 127*   K 5.8*   < > 5.9*   < > 5.3* 5.0 5.0 4.5   CL 96*   < > 102   < > 98  --  101 98   CO2 16*   < > 17*   < > 13*  --  13* 18*   MG 2.1  --  1.7  --   --   --   --  1.5*   PHOS 3.8  --  3.6  --   --   --   --  2.7   BUN 6   < > 7   < > 6  --  6 5*   CREATININE 0.9   < > 1.1*   < > 1.1*  --  1.0 1.0    < > = values in this interval not displayed. Lab Results   Component Value Date    LABPROT 0.1 08/06/2022    LABPROT 0.1 08/06/2022       Assessment  1. Hyponatremia, hypovolemic, acute superimposed on chronic, due to beer drinkers potomania with acute water (beer) load. Recurrent. Goal given her alcoholism 6 to 8 mmol/L/day. At 28 hours she is 8 mmol/L above the presenting [Na+].     Na - 127, improving   K 5.9 treated earlier today- now 5.0  CO2 13-no diarrhea   Creatinine 1.1 mg/dL, improving  Cosyntropin test repeated today    Recommendations  Ok to discharge from renal standpoint   Continue NaCl tablets 1 gram TID at discharge   1200 mL fluid restriction  BMP and CBC on Monday, please fax results to Dr. Bessie Blanco office   Follow up with Dr. Espinoza Marie is 1-2 weeks     Electronically signed by TORI Mejia CNP on 1/6/2023 at 5:03 PM

## 2023-01-07 DIAGNOSIS — M62.838 MUSCLE SPASMS OF BOTH LOWER EXTREMITIES: ICD-10-CM

## 2023-01-07 LAB — URINE CULTURE, ROUTINE: NORMAL

## 2023-01-09 ENCOUNTER — HOSPITAL ENCOUNTER (OUTPATIENT)
Age: 63
Discharge: HOME | End: 2023-01-09
Payer: MEDICAID

## 2023-01-09 DIAGNOSIS — E87.1 HYPONATREMIA: ICD-10-CM

## 2023-01-09 DIAGNOSIS — N17.9 AKI (ACUTE KIDNEY INJURY) (HCC): ICD-10-CM

## 2023-01-09 DIAGNOSIS — F10.10 ALCOHOL ABUSE: ICD-10-CM

## 2023-01-09 LAB
ANION GAP SERPL CALCULATED.3IONS-SCNC: 11 MMOL/L (ref 7–16)
BASOPHILS ABSOLUTE: 0.1 E9/L (ref 0–0.2)
BASOPHILS RELATIVE PERCENT: 1.2 % (ref 0–2)
BUN BLDV-MCNC: 4 MG/DL (ref 6–23)
CALCIUM SERPL-MCNC: 8.5 MG/DL (ref 8.6–10.2)
CHLORIDE BLD-SCNC: 98 MMOL/L (ref 98–107)
CO2: 23 MMOL/L (ref 22–29)
CREAT SERPL-MCNC: 0.9 MG/DL (ref 0.5–1)
EOSINOPHILS ABSOLUTE: 0.04 E9/L (ref 0.05–0.5)
EOSINOPHILS RELATIVE PERCENT: 0.5 % (ref 0–6)
GFR SERPL CREATININE-BSD FRML MDRD: >60 ML/MIN/1.73
GLUCOSE BLD-MCNC: 119 MG/DL (ref 74–99)
HCT VFR BLD CALC: 22.6 % (ref 34–48)
HEMOGLOBIN: 7.7 G/DL (ref 11.5–15.5)
IMMATURE GRANULOCYTES #: 0.09 E9/L
IMMATURE GRANULOCYTES %: 1.1 % (ref 0–5)
LYMPHOCYTES ABSOLUTE: 1.41 E9/L (ref 1.5–4)
LYMPHOCYTES RELATIVE PERCENT: 16.6 % (ref 20–42)
MCH RBC QN AUTO: 35.6 PG (ref 26–35)
MCHC RBC AUTO-ENTMCNC: 34.1 % (ref 32–34.5)
MCV RBC AUTO: 104.6 FL (ref 80–99.9)
MONOCYTES ABSOLUTE: 0.67 E9/L (ref 0.1–0.95)
MONOCYTES RELATIVE PERCENT: 7.9 % (ref 2–12)
NEUTROPHILS ABSOLUTE: 6.18 E9/L (ref 1.8–7.3)
NEUTROPHILS RELATIVE PERCENT: 72.7 % (ref 43–80)
PDW BLD-RTO: 16.1 FL (ref 11.5–15)
PLATELET # BLD: 821 E9/L (ref 130–450)
PMV BLD AUTO: 9 FL (ref 7–12)
POTASSIUM SERPL-SCNC: 3.8 MMOL/L (ref 3.5–5)
RBC # BLD: 2.16 E12/L (ref 3.5–5.5)
SODIUM BLD-SCNC: 132 MMOL/L (ref 132–146)
WBC # BLD: 8.5 E9/L (ref 4.5–11.5)

## 2023-01-09 PROCEDURE — 80048 BASIC METABOLIC PNL TOTAL CA: CPT

## 2023-01-09 PROCEDURE — 36415 COLL VENOUS BLD VENIPUNCTURE: CPT

## 2023-01-09 PROCEDURE — 85025 COMPLETE CBC W/AUTO DIFF WBC: CPT

## 2023-01-09 RX ORDER — CYCLOBENZAPRINE HCL 5 MG
TABLET ORAL
Qty: 30 TABLET | Refills: 1 | OUTPATIENT
Start: 2023-01-09

## 2023-01-09 NOTE — TELEPHONE ENCOUNTER
Last Appointment:  8/5/2022  Future Appointments   Date Time Provider Grover Christina   1/11/2023  1:00 PM SCHEDULE, [facility] PHYSICAL THERAPY FLOAT [facility]   1/12/2023 10:00 AM MD Carson Pierre [facility]   1/31/2023 10:30 AM Christiane Angelo MD [facility] Subjective:   Pt. seen and examined at bedside ICU in NAD appears slightly confused, no acute events overnight. On Norepinephrine infusion. Atkinson catheter drains clear yellow urine.     ROS:   [ x ] A 10 Point Review of Systems was negative except where noted  [    ] Due to altered mental status/intubation, subjective information was not able to be obtained from the patient. History was obtained to the extent possible from review of the chart and collateral sources of information.     acetaminophen  Suppository .. 650 milliGRAM(s) Rectal every 6 hours PRN  chlorhexidine 4% Liquid 1 Application(s) Topical <User Schedule>  dextrose 5% + sodium chloride 0.9%. 1000 milliLiter(s) IV Continuous <Continuous>  digoxin     Tablet 0.125 milliGRAM(s) Oral daily  heparin   Injectable 5000 Unit(s) SubCutaneous every 12 hours  ketorolac   Injectable 15 milliGRAM(s) IV Push every 6 hours PRN  meropenem  IVPB 2000 milliGRAM(s) IV Intermittent every 8 hours  metoprolol tartrate 25 milliGRAM(s) Oral two times a day  norepinephrine Infusion 0.05 MICROgram(s)/kG/Min IV Continuous <Continuous>  ondansetron Injectable 4 milliGRAM(s) IV Push once PRN  pantoprazole  Injectable 40 milliGRAM(s) IV Push daily  polymyxin B IVPB [id number] Unit(s) IV Intermittent every 12 hours  sodium bicarbonate 650 milliGRAM(s) Oral every 8 hours  sodium chloride 1 Gram(s) Oral two times a day        Vital Signs Last 24 Hrs  T(C): 37.4 (22 Nov 2020 02:00), Max: 38.2 (21 Nov 2020 18:00)  T(F): 99.3 (22 Nov 2020 02:00), Max: 100.8 (21 Nov 2020 18:00)  HR: 76 (22 Nov 2020 07:00) (76 - 152)  BP: 91/60 (22 Nov 2020 07:00) (71/49 - 170/103)  BP(mean): 71 (22 Nov 2020 07:00) (54 - 152)  RR: 21 (22 Nov 2020 07:00) (15 - 48)  SpO2: 100% (22 Nov 2020 07:00) (84% - 100%)      PE  Constitutional: NAD, awake, lethargic, opens eyes when name called  HEENT: NC/AT, EOMI. Dry oral mucosa  Neck: no pain  Back: No CVA tenderness B/L   Respiratory: No accessory respiratory muscle use  Abd: Soft, NT/ND  well healed abdominal scar, + colostomy right side abdomen  :  normal external female genitalia, + Atkinson catheter drains yellow urine   Extremities: MARSHALL x 4  Skin: No rashes     I&O's Detail    21 Nov 2020 07:01  -  22 Nov 2020 07:00  --------------------------------------------------------  IN:    dextrose 5% + sodium chloride 0.9%: 150 mL    IV PiggyBack: 2300 mL    Norepinephrine: 51.1 mL    sodium chloride 0.9%: 250 mL  Total IN: 2751.1 mL    OUT:    Colostomy (mL): 50 mL    Indwelling Catheter - Urethral (mL): 2050 mL  Total OUT: 2100 mL    Total NET: 651.1 mL      LABS:                        8.0    7.65  )-----------( 136      ( 22 Nov 2020 05:39 )             25.6     11-22    127<L>  |  100  |  12  ----------------------------<  47<LL>  3.8   |  19  |  0.6<L>    Ca    9.0      22 Nov 2020 05:39  Mg     1.6     11-22    TPro  3.8<L>  /  Alb  1.6<L>  /  TBili  0.7  /  DBili  x   /  AST  97<H>  /  ALT  49<H>  /  AlkPhos  350<H>  11-22      Urinalysis (11.19.20 @ 14:27)    Glucose Qualitative, Urine: Negative    Blood, Urine: Moderate    pH Urine: 6.5    Color: Yellow    Urine Appearance: Turbid    Bilirubin: Negative    Ketone - Urine: Negative    Specific Gravity: 1.043    Protein, Urine: 100 mg/dL    Urobilinogen: <2 mg/dL    Nitrite: Negative    Leukocyte Esterase Concentration: Large    Urine Microscopic-Add On (NC) (11.19.20 @ 14:27)    Red Blood Cell - Urine: 16 /HPF    White Blood Cell - Urine: >720 /HPF    Hyaline Casts: 32 /LPF    Bacteria: Moderate    Epithelial Cells: 2 /HPF        Culture - Urine (11.19.20 @ 19:00)    Specimen Source: Kidney None    Culture Results:   Few Pseudomonas aeruginosa    Culture - Urine (11.19.20 @ 14:27)    Specimen Source: .Urine Clean Catch (Midstream)    Culture Results:   >100,000 CFU/ml Gram Negative Rods      Culture - Blood (11.19.20 @ 10:30)    Specimen Source: .Blood Blood-Peripheral    Culture Results:   No growth to date.        RADIOLOGY & ADDITIONAL STUDIES:     < from: CT Abdomen and Pelvis w/ IV Cont (11.19.20 @ 12:38) >  IMPRESSION: Delayed left nephrogram with mild hydroureteronephrosis secondary to a 0.6 x 0.5 x 0.7 cm obstructing stone in the proximal ureter (Hounsfield unit of 1354).    Bilateral other renal nonobstructing stones, stable.    Stable 3 cm right adnexal cyst.    Mild circumferential wall thickening of the urinary bladder wall with perivesical fat stranding. Correlate with urinalysis for cystitis.    Interval resolution of the right perianal collection September 22, 2020 with a Seton cord reidentified, partially imaged.        GRACE POWELL MD; Attending Radiologist  This document has been electronically signed. Nov 19 2020  2:01PM    < end of copied text >

## 2023-01-12 ENCOUNTER — OFFICE VISIT (OUTPATIENT)
Dept: FAMILY MEDICINE CLINIC | Age: 63
End: 2023-01-12
Payer: MEDICAID

## 2023-01-12 VITALS
OXYGEN SATURATION: 96 % | SYSTOLIC BLOOD PRESSURE: 102 MMHG | BODY MASS INDEX: 18.43 KG/M2 | DIASTOLIC BLOOD PRESSURE: 68 MMHG | TEMPERATURE: 97.9 F | HEART RATE: 66 BPM | WEIGHT: 97.56 LBS

## 2023-01-12 DIAGNOSIS — R56.9 SEIZURE (HCC): ICD-10-CM

## 2023-01-12 DIAGNOSIS — J41.0 SIMPLE CHRONIC BRONCHITIS (HCC): ICD-10-CM

## 2023-01-12 DIAGNOSIS — Z09 HOSPITAL DISCHARGE FOLLOW-UP: Primary | ICD-10-CM

## 2023-01-12 DIAGNOSIS — E87.1 HYPONATREMIA: ICD-10-CM

## 2023-01-12 DIAGNOSIS — Z12.31 BREAST CANCER SCREENING BY MAMMOGRAM: ICD-10-CM

## 2023-01-12 DIAGNOSIS — Z87.891 PERSONAL HISTORY OF TOBACCO USE: ICD-10-CM

## 2023-01-12 DIAGNOSIS — Z12.11 COLON CANCER SCREENING: ICD-10-CM

## 2023-01-12 PROCEDURE — G8419 CALC BMI OUT NRM PARAM NOF/U: HCPCS | Performed by: FAMILY MEDICINE

## 2023-01-12 PROCEDURE — 3017F COLORECTAL CA SCREEN DOC REV: CPT | Performed by: FAMILY MEDICINE

## 2023-01-12 PROCEDURE — 3023F SPIROM DOC REV: CPT | Performed by: FAMILY MEDICINE

## 2023-01-12 PROCEDURE — G8427 DOCREV CUR MEDS BY ELIG CLIN: HCPCS | Performed by: FAMILY MEDICINE

## 2023-01-12 PROCEDURE — 99214 OFFICE O/P EST MOD 30 MIN: CPT | Performed by: FAMILY MEDICINE

## 2023-01-12 PROCEDURE — 4004F PT TOBACCO SCREEN RCVD TLK: CPT | Performed by: FAMILY MEDICINE

## 2023-01-12 PROCEDURE — G8484 FLU IMMUNIZE NO ADMIN: HCPCS | Performed by: FAMILY MEDICINE

## 2023-01-12 PROCEDURE — G0296 VISIT TO DETERM LDCT ELIG: HCPCS | Performed by: FAMILY MEDICINE

## 2023-01-12 PROCEDURE — 99212 OFFICE O/P EST SF 10 MIN: CPT | Performed by: FAMILY MEDICINE

## 2023-01-12 PROCEDURE — 1111F DSCHRG MED/CURRENT MED MERGE: CPT | Performed by: FAMILY MEDICINE

## 2023-01-12 ASSESSMENT — PATIENT HEALTH QUESTIONNAIRE - PHQ9
SUM OF ALL RESPONSES TO PHQ QUESTIONS 1-9: 0
SUM OF ALL RESPONSES TO PHQ QUESTIONS 1-9: 0
2. FEELING DOWN, DEPRESSED OR HOPELESS: 0
SUM OF ALL RESPONSES TO PHQ9 QUESTIONS 1 & 2: 0
1. LITTLE INTEREST OR PLEASURE IN DOING THINGS: 0
SUM OF ALL RESPONSES TO PHQ QUESTIONS 1-9: 0
SUM OF ALL RESPONSES TO PHQ QUESTIONS 1-9: 0

## 2023-01-12 NOTE — PATIENT INSTRUCTIONS

## 2023-01-12 NOTE — PROGRESS NOTES
S: Massimo Sparrow 58 y.o. female  here for hospital follow-up. Admission 20 7/22 - 1/6/2023 for altered mental status. Patient has had multiple episodes where she is presented to the hospital for confusion, dizziness. This ended was altered mental status  Patient does have history of EtOH use and tobacco use, currently smoking half a pack a day for the last 40 years  In the ER she was noted to have tachycardia, hypotension, low-grade fever, elevated Pro-Willian and a sodium of 118. UA was positive for possible infection with elevated leuk esterase and WBC. Urine culture came back positive for E. coli  She was initially treated with a 2 L of fluids and Zosyn in the ER and admitted for altered mental status secondary to possible UTI VS hyponatremia  They did perform a CT abdomen on her which was negative  To tailor treatment with antibiotics her Zosyn was switched to Rocephin and patient was placed on D5W  Patient was started on CIWA protocol for history of EtOH abuse, nephrology was consulted and placed patient on a fluid restriction diet  After showed interim to improve, patient was transferred out of the ICU on 12/29/2022. ID was consulted for biotic management, and patient was further switched from ceftriaxone to Bactrim DS twice daily for 10 days    Other complications included thrombocytosis with the Bactrim, Bactrim was then switched to Augmentin which patient was able to complete the course. Per nephrology patient was started on salt tabs 1 g 3 times a day, which patient was tolerating well prior to discharge    Patient was then discharged on 1/6/2023 was advised to follow-up with to nephrology and PCP as well as general surgery. Today (1/12/2023): Patient is feeling much better. Reports compliance with recommended treatments. Last sodium (1/9/2023) = 132. Asymptomatic at this time.   ROS: Remarkable for complaints of bilateral lower extremity weakness    Health maintenance: Due for LDCT screening, mammography; patient has an appointment with general surgery 1/31/2023 to address colorectal cancer screening at that time. O: VS: /68   Pulse 66   Temp 97.9 °F (36.6 °C) (Temporal)   Wt 97 lb 9 oz (44.3 kg)   SpO2 96%   BMI 18.43 kg/m²    General: Very pleasant lady NAD              HEENT: WDL              Neck: WDL   CV:  RRR, no gallops, rubs, or murmurs   Resp: CTAB no R/R/W   Abd:  Soft, nontender, no masses    Ext:  no C/C/E. Strength of lower extremities tested and found to be WDL    Assessment / Plan:      Lydia Barrow was seen today for follow-up from hospital.    Diagnoses and all orders for this visit:    1. Hospital discharge follow-up  Patient doing well since hospital discharge  Advise appropriate follow-up with nephrology  Advise appropriate follow-up to general surgery  Continue with salt tabs 1 tab 3 times daily     2. Hyponatremia  No outpatient nephrology consult was placed when patient left the hospital  Chart reviewed, patient was supposed to follow-up within 2 weeks of discharge  Patient consulted Dr. Meron Babin placed  Continue salt tabs as above  - Ambulatory referral to Nephrology     3. Simple chronic bronchitis (HCC)  Stable and controlled at this time  Reinforced good use of inhalers     4. Seizure [facility])  Patient states she has never had seizures  Patient overall is at high risk, not on any medications right now; will continue to monitor  Will add to history     5. Colon cancer screening  CT scan with diverticulosis  Patient already referred to general surgery; appointment 1/31/2023  Advised patient to follow-up as appropriate     6. Personal history of tobacco use  Patient amenable to lung CT scan  Ordered this visit  - [place] VISIT TO DISCUSS LUNG CA SCREEN W LDCT  - CT Lung Screen (Annual/Baseline); Future     7. Breast cancer screening by mammogram  Patient amenable to mammogram screening  Ordered this visit  - [facility] YARIEL DIGITAL SCREEN BILATERAL PER PROTOCOL;  Future      RTO 1-2 months or sooner prn for any persistent, new, or worsening symptoms. Assessment/plan:  1. Hospital discharge follow-up: The following issues have been addressed    2. Hyponatremia: Clinically and lab wise improved. However, she currently does not have follow-up in place with nephrology (Dr. Chidi Dubon). Referral placed to Dr. Cihdi Dubon to ensure appropriate follow-up. For now continue with NaCl tabs 1 g 3 times daily. 3.  UTI: Antibiotic course (Augmentin completed); this may have been part of the differential diagnosis of altered mental status. All resolving. 4.  Health maintenance: LDCT and mammography ordered; follow-up for colon cancer screening as documented above. 5.  HCC: AA) COPD--reinforced proper use of inhalers; she is otherwise stable and well-controlled; be) seizure disorder: Patient reports that she does not have a known history of seizure disorder. She is indeed at risk for seizures because of the alcohol use. For now we will simply monitor           No follow-ups on file. Follow-up on all the above, including alcohol abstinence efforts, in 4 weeks. Attending Physician Statement  I have discussed the case, including pertinent history and exam findings with the resident. I also have seen the patient and performed key portions of the examination. I agree with the documented assessment and plan.          Nya Archer MD

## 2023-01-12 NOTE — PROGRESS NOTES
[facility]  Department of [facility]  Family Medicine Residency Program    Date of Visit: 1/12/2023     Chief Complaint     Chief Complaint   Patient presents with    Follow-Up from Hospital     Dizziness. History of Present Illness     HPI:  58 y.o. female with COPD, Tobacco Abuse, Alcohol Abuse, Cirrhosis, Hyponatremia, and Thyroid Disease who presents for hospital follow up    Admitted 12/27/2022 to 1/6/2022 for AMS  Patient was found to be having multiple episodes of confusion and dizziness at home. Presented to the ER on 12/27/2022; at that time pertinent labs included elevated pro Willian, NA of 118 and a UA positive for possible infection with elevated leuk esterase and WBC. CT abdomen pelvis was also performed at the time and was negative for any acute pathology  Patient was initially treated with a 2 L normal saline bolus as well as Zosyn for possible UTI; she was then admitted to the medicine/ICU for altered mental status secondary to UTI versus hyponatremia. Her urine culture later came back positive for E. coli. After this was found, her antibiotics were switched from Zosyn to Rocephin. Jeff Coulter was consulted for hyponatremia, which is likely secondary to beer potomania; patient with long history of EtOH abuse. With recommendation for fluid restriction, patient's sodium did improve while in the ICU; she was transferred out on 12/29/2022. ID was consulted on the floors for antibiotic management for UTI, she was switched from Rocephin to Bactrim for 10 days. Complications during her stay and included thrombocytosis with the use of Bactrim. This was then switched to Augmentin which patient tolerated well. Nephrology commended patient be discharged with salt tabs 1 g 3 times daily which patient was tolerating well prior to discharge. She was then discharged on 1/6/2023, with advised to follow-up with nephrology, PCP as well as GEN surge.   PAC scores were 11-12 on discharge, it was recommended that patient also start physical therapy which patient states she is starting next week    She is due for her general surgery appointment on 1/31/2023. No other concerns today    Social History     Tobacco Use    Smoking status: Every Day     Packs/day: 0.50     Years: 40.00     Pack years: 20.00     Types: Cigarettes     Start date: 9/11/1980    Smokeless tobacco: Never   Substance Use Topics    Alcohol use: Yes     Alcohol/week: 2.0 standard drinks     Types: 2 Cans of beer per week     Comment: daily    Drug use: Never     Comment: Denies       ROS:    Reviewed, pertinent as above otherwise negative    Review of Systems   Constitutional:  Negative for activity change and appetite change. Respiratory:  Negative for chest tightness and shortness of breath. Gastrointestinal:  Negative for constipation, diarrhea, nausea and vomiting. Genitourinary:  Negative for decreased urine volume and dysuria. Neurological:  Positive for weakness. Negative for dizziness, light-headedness, numbness and headaches. Psychiatric/Behavioral:  Negative for sleep disturbance. The patient is not nervous/anxious. Objective:    VS:  Blood pressure 102/68, pulse 66, temperature 97.9 °F (36.6 °C), temperature source Temporal, weight 97 lb 9 oz (44.3 kg), SpO2 96 %. Physical Exam  Constitutional:       Appearance: She is not ill-appearing or diaphoretic. HENT:      Head: Normocephalic and atraumatic. Right Ear: Tympanic membrane, ear canal and external ear normal. There is no impacted cerumen. Left Ear: Tympanic membrane, ear canal and external ear normal. There is no impacted cerumen. Nose: Nose normal. No congestion or rhinorrhea. Mouth/Throat:      Pharynx: Oropharynx is clear. No oropharyngeal exudate or posterior oropharyngeal erythema. Eyes:      General: No scleral icterus. Right eye: No discharge. Left eye: No discharge. Extraocular Movements: Extraocular movements intact. Conjunctiva/sclera: Conjunctivae normal.      Pupils: Pupils are equal, round, and reactive to light. Cardiovascular:      Rate and Rhythm: Normal rate and regular rhythm. Pulses: Normal pulses. Heart sounds: Normal heart sounds. No murmur heard. No gallop. Pulmonary:      Effort: Pulmonary effort is normal. No respiratory distress. Breath sounds: Normal breath sounds. No wheezing. Abdominal:      General: Bowel sounds are normal. There is no distension. Palpations: Abdomen is soft. Tenderness: There is no abdominal tenderness. Musculoskeletal:         General: No swelling or tenderness. Normal range of motion. Comments: 4 -/4+ bilateral lower extremities   Neurological:      Mental Status: She is alert. Assessment/Plan:    1. Hospital discharge follow-up  Patient doing well since hospital discharge  Advise appropriate follow-up with nephrology  Advise appropriate follow-up to general surgery  Continue with salt tabs 1 tab 3 times daily    2. Hyponatremia  No outpatient nephrology consult was placed when patient left the hospital  Chart reviewed, patient was supposed to follow-up within 2 weeks of discharge  Patient consulted Dr. Claudio Molina placed  Continue salt tabs as above  - Ambulatory referral to Nephrology    3. Simple chronic bronchitis (HCC)  Stable and controlled at this time  Reinforced good use of inhalers    4. Seizure [facility])  Patient states she has never had seizures  Patient overall is at high risk, not on any medications right now; will continue to monitor  Will add to history    5. Colon cancer screening  CT scan with diverticulosis  Patient already referred to general surgery; appointment 1/31/2023  Advised patient to follow-up as appropriate    6.  Personal history of tobacco use  Patient amenable to lung CT scan  Ordered this visit  - [place] VISIT TO DISCUSS LUNG CA SCREEN W LDCT  - CT Lung Screen (Annual/Baseline); Future    7. Breast cancer screening by mammogram  Patient amenable to mammogram screening  Ordered this visit  - [facility] YARIEL DIGITAL SCREEN BILATERAL PER PROTOCOL; Future     RTO 1-2 months or sooner prn for any persistent, new, or worsening symptoms. Please see Patient Instructions for further counseling and information given. Advised to please be adherent to the treatment plans discussed today, and please call with any questions or concerns, letting the office know of any reasons that the plans may not be followed. The risks of untreated conditions include worsening illness, injury, disability, and possibly, death. Please call if symptoms change in any way, worsen, or fail to completely resolve, as this could necessitate a change to treatment plans. Patient and/or caregiver expressed understanding. Indications and proper use of medication(s) reviewed. Potential side-effects and risks of medication(s) also explained. Patient and/or caregiver was instructed to call if any new symptoms develop prior to next visit. Health risk factors discussed and addressed. Electronically signed by Arslan Pillai MD PGY-3 on 1/12/2023 at 10:36 AM  This case was discussed with attending physician: Dr. Smiley Rivera    Discussed with the patient the current USPSTF guidelines released March 9, 2021 for screening for lung cancer. For adults aged 48 to [address] years who have a 20 pack-year smoking history and currently smoke or have quit within the past 15 years the grade B recommendation is to:  Screen for lung cancer with low-dose computed tomography (LDCT) every year. Stop screening once a person has not smoked for 15 years or has a health problem that limits life expectancy or the ability to have lung surgery. The patient  reports that she has been smoking cigarettes. She started smoking about 42 years ago. She has a 20.00 pack-year smoking history. She has never used smokeless tobacco.. Discussed with patient the risks and benefits of screening, including over-diagnosis, false positive rate, and total radiation exposure. The patient currently exhibits no signs or symptoms suggestive of lung cancer. Discussed with patient the importance of compliance with yearly annual lung cancer screenings and willingness to undergo diagnosis and treatment if screening scan is positive. In addition, the patient was counseled regarding the importance of remaining smoke free and/or total smoking cessation.     Also reviewed the following if the patient has Medicare that as of February 10, 2022, Medicare only covers LDCT screening in patients aged 51-72 with at least a 20 pack-year smoking history who currently smoke or have quit in the last 15 years

## 2023-01-16 ENCOUNTER — HOSPITAL ENCOUNTER (OUTPATIENT)
Dept: OCCUPATIONAL THERAPY | Age: 63
Setting detail: THERAPIES SERIES
Discharge: HOME | End: 2023-01-16

## 2023-01-16 NOTE — PROGRESS NOTES
Phone: [phone] Fax: [phone]    Occupational Therapy   Cancellation/No-show Note     Patient Name:  Jez Coy  : 1960  Date:  2023  MRN: [id number]    For today's appointment patient:       Total missed visits including today: 1         Total number of no shows: 0    [x]  Cancelled   []  Rescheduled appointment   []  No-show     Reason given by patient:   [x]  Patient ill   []  Conflicting appointment   []  No transportation   []  Conflict with work   []  No reason given   []  Other:     Comments: Per front office, her and  are sick. She requested she'll call back to reschedule her evaluation once feeling better.     Electronically signed by: MAZIN Craig/Isak RAMIREZ [address] [employer], #[id number]

## 2023-01-17 ENCOUNTER — TELEPHONE (OUTPATIENT)
Dept: FAMILY MEDICINE CLINIC | Age: 63
End: 2023-01-17

## 2023-01-17 ENCOUNTER — HOSPITAL ENCOUNTER (OUTPATIENT)
Dept: PHYSICAL THERAPY | Age: 63
Setting detail: THERAPIES SERIES
Discharge: HOME | End: 2023-01-17

## 2023-01-17 ENCOUNTER — OFFICE VISIT (OUTPATIENT)
Dept: FAMILY MEDICINE CLINIC | Age: 63
End: 2023-01-17
Payer: MEDICAID

## 2023-01-17 VITALS
DIASTOLIC BLOOD PRESSURE: 80 MMHG | TEMPERATURE: 97.1 F | OXYGEN SATURATION: 99 % | HEART RATE: 102 BPM | WEIGHT: 92.56 LBS | SYSTOLIC BLOOD PRESSURE: 116 MMHG | BODY MASS INDEX: 17.49 KG/M2

## 2023-01-17 DIAGNOSIS — E87.1 HYPONATREMIA: Primary | ICD-10-CM

## 2023-01-17 DIAGNOSIS — E87.1 HYPONATREMIA: ICD-10-CM

## 2023-01-17 DIAGNOSIS — A08.4 VIRAL GASTROENTERITIS: Primary | ICD-10-CM

## 2023-01-17 PROBLEM — R56.9 SEIZURE (HCC): Status: RESOLVED | Noted: 2023-01-12 | Resolved: 2023-01-17

## 2023-01-17 LAB
ANION GAP SERPL CALCULATED.3IONS-SCNC: 11 MMOL/L (ref 7–16)
BUN BLDV-MCNC: 5 MG/DL (ref 6–23)
CALCIUM SERPL-MCNC: 9.1 MG/DL (ref 8.6–10.2)
CHLORIDE BLD-SCNC: 93 MMOL/L (ref 98–107)
CO2: 23 MMOL/L (ref 22–29)
CREAT SERPL-MCNC: 1.2 MG/DL (ref 0.5–1)
GFR SERPL CREATININE-BSD FRML MDRD: 51 ML/MIN/1.73
GLUCOSE BLD-MCNC: 80 MG/DL (ref 74–99)
POTASSIUM SERPL-SCNC: 4.9 MMOL/L (ref 3.5–5)
SODIUM BLD-SCNC: 127 MMOL/L (ref 132–146)

## 2023-01-17 PROCEDURE — 1111F DSCHRG MED/CURRENT MED MERGE: CPT | Performed by: FAMILY MEDICINE

## 2023-01-17 PROCEDURE — 99212 OFFICE O/P EST SF 10 MIN: CPT | Performed by: FAMILY MEDICINE

## 2023-01-17 PROCEDURE — 99213 OFFICE O/P EST LOW 20 MIN: CPT | Performed by: FAMILY MEDICINE

## 2023-01-17 PROCEDURE — 3017F COLORECTAL CA SCREEN DOC REV: CPT | Performed by: FAMILY MEDICINE

## 2023-01-17 PROCEDURE — 4004F PT TOBACCO SCREEN RCVD TLK: CPT | Performed by: FAMILY MEDICINE

## 2023-01-17 PROCEDURE — G8484 FLU IMMUNIZE NO ADMIN: HCPCS | Performed by: FAMILY MEDICINE

## 2023-01-17 PROCEDURE — G8428 CUR MEDS NOT DOCUMENT: HCPCS | Performed by: FAMILY MEDICINE

## 2023-01-17 PROCEDURE — 36415 COLL VENOUS BLD VENIPUNCTURE: CPT | Performed by: FAMILY MEDICINE

## 2023-01-17 PROCEDURE — G8419 CALC BMI OUT NRM PARAM NOF/U: HCPCS | Performed by: FAMILY MEDICINE

## 2023-01-17 RX ORDER — ONDANSETRON 4 MG/1
4 TABLET, ORALLY DISINTEGRATING ORAL 3 TIMES DAILY PRN
Qty: 21 TABLET | Refills: 0 | Status: SHIPPED | OUTPATIENT
Start: 2023-01-17

## 2023-01-17 RX ORDER — LACTOBACILLUS RHAMNOSUS GG 10B CELL
1 CAPSULE ORAL 2 TIMES DAILY
COMMUNITY
Start: 2023-01-17

## 2023-01-17 ASSESSMENT — ENCOUNTER SYMPTOMS
VOMITING: 0
DIARRHEA: 0
NAUSEA: 0
SHORTNESS OF BREATH: 0
CONSTIPATION: 0
CHEST TIGHTNESS: 0

## 2023-01-17 NOTE — PROGRESS NOTES
Attending Physician Statement    S:   Chief Complaint   Patient presents with    Nausea & Vomiting     Pt c/o diarrhea, abdominal pain since Friday Jan 13 2023. N/V/D for past 4 days. 2-3 emesis and diarrhea episodes daily. Some abdomen pain and cramping. No GERD symptoms or food-related symptoms. Recently on multiple abx. For UTI and hyponatremia. No blood in BM or emesis. Pepto Bismol didn't help. O: Blood pressure 116/80, pulse (!) 102, temperature 97.1 °F (36.2 °C), temperature source Temporal, weight 92 lb 9 oz (42 kg), SpO2 99 %. Exam:   Heart - RRR   Lungs - clear   Abdomen- diffuse tenderness, normal BS  A: Gastroenteritis  P:  Fluids, bland diet   BMP   probiotics   Follow-up as ordered    I have discussed the case, including pertinent history and exam findings with the resident. I agree with the documented assessment and plan.     Loly Vasquez MD

## 2023-01-17 NOTE — PROGRESS NOTES
[facility]  Department of [facility] Medicine  Family Medicine Residency Program    Date of Visit: 1/17/2023     Chief Complaint     Chief Complaint   Patient presents with    Nausea & Vomiting     Pt c/o diarrhea, abdominal pain since Friday Jan 13 2023. History of Present Illness     HPI:  58 y.o. female with PMH of hyponatremia who presents for same-day visit for concerns of worsening nausea, vomiting and diarrhea    Nausea, vomiting and diarrhea  Patient states that her symptoms started Friday  Denies any sick contacts at this time  Currently states she is been experiencing nausea, with episodes of emesis and diarrhea as well  Of note patient recently treated for UTI; has been on Bactrim, Augmentin, Rocephin and Zosyn in the past month  Recently finished Augmentin approximately a week ago  Notes that she is been having 3-4 episodes of diarrhea daily, sometimes watery sometimes with stool; overall brownish-states since taking Pepto-Bismol some of her stools were a little bit blackish  States that she is having about 2-3 episodes of emesis as well sometimes water sometimes with some food, denies any hematemesis or hematochezia at this time  Noticed overall she had worsening stomach cramps, not being able to keep heavy or food down  Is tolerating the brat diet; including saltine crackers and bananas very well  Denies any fevers or chills, tolerating liquids; currently drinking about 12 ounces of water daily  Continues to drink alcohol 2-3 beer cans every 2 days  Denies any headaches, blurry vision or dizziness at this time  As mentioned, the only OTC medication she has trialed include Pepto-Bismol    No other concerns today    Social History     Tobacco Use    Smoking status: Every Day     Packs/day: 0.50     Years: 40.00     Pack years: 20.00     Types: Cigarettes     Start date: 9/11/1980    Smokeless tobacco: Never   Substance Use Topics    Alcohol use:  Yes     Alcohol/week: 2.0 standard drinks     Types: 2 Cans of beer per week     Comment: daily    Drug use: Never     Comment: Denies       ROS:    Reviewed, pertinent as above otherwise negative    Objective:    VS:  Blood pressure 116/80, pulse (!) 102, temperature 97.1 °F (36.2 °C), temperature source Temporal, weight 92 lb 9 oz (42 kg), SpO2 99 %. Physical Exam  Constitutional:       Appearance: She is not ill-appearing or diaphoretic. Cardiovascular:      Rate and Rhythm: Normal rate and regular rhythm. Pulses: Normal pulses. Heart sounds: Normal heart sounds. No murmur heard. No gallop. Pulmonary:      Effort: Pulmonary effort is normal. No respiratory distress. Breath sounds: Normal breath sounds. No wheezing. Abdominal:      General: There is no distension. Palpations: There is no mass. Tenderness: There is abdominal tenderness. There is no guarding or rebound. Comments: Diffusely tender abdomen, no guarding or rigidity appreciated   Musculoskeletal:         General: No swelling or tenderness. Normal range of motion. Skin:     General: Skin is warm. Coloration: Skin is not jaundiced. Findings: No bruising. Neurological:      Mental Status: She is alert. Assessment/Plan    1. Viral gastroenteritis  Likely viral gastroenteritis with the way patient symptoms presenting, though still we will monitor for possible C. difficile with patient's recent antibiotic use  At this time advised against using Imodium to allow for viral shedding  Can utilize Zofran as required for nausea  Will prescribe Culturelle/probiotic use that will help with gut anum  - ondansetron (ZOFRAN-ODT) 4 MG disintegrating tablet; Take 1 tablet by mouth 3 times daily as needed for Nausea or Vomiting  Dispense: 21 tablet; Refill: 0  Obtain BMP at this time    2.  Hyponatremia  Patient with chronic hyponatremia  Continues to consume 2 to 3 cans of beer every other day  With current symptoms, cannot rule out worsening of hyponatremia  At this time patient denies any dizziness, headaches or blurry vision  States it is not related to her consumption of salt tabs as she has been taking them for the past few weeks without any symptoms  Will obtain BMP  - Basic Metabolic Panel; Future     RTO 1 week or sooner prn for any persistent, new, or worsening symptoms. Please see Patient Instructions for further counseling and information given. Advised to please be adherent to the treatment plans discussed today, and please call with any questions or concerns, letting the office know of any reasons that the plans may not be followed. The risks of untreated conditions include worsening illness, injury, disability, and possibly, death. Please call if symptoms change in any way, worsen, or fail to completely resolve, as this could necessitate a change to treatment plans. Patient and/or caregiver expressed understanding. Indications and proper use of medication(s) reviewed. Potential side-effects and risks of medication(s) also explained. Patient and/or caregiver was instructed to call if any new symptoms develop prior to next visit. Health risk factors discussed and addressed.      Electronically signed by Mariela Bermeo MD PGY-3 on 1/17/2023 at 11:17 AM  This case was discussed with attending physician: Dr. Tommy Franco

## 2023-01-17 NOTE — TELEPHONE ENCOUNTER
----- Message from Alexey Mullen sent at 1/17/2023  8:30 AM EST -----  Subject: Message to Provider    QUESTIONS  Information for Provider? Patient would like something called into her   pharmacy for nausea, diarrhea and stomach craps along with some vomiting. Please call it in to [facility] [address]   [address] [place],[address] [phone]  ---------------------------------------------------------------------------  --------------  Chapo MARTÍNEZ  [phone]; Do not leave any message, patient will call back for answer  ---------------------------------------------------------------------------  --------------  SCRIPT ANSWERS  Relationship to Patient?  Self

## 2023-01-17 NOTE — TELEPHONE ENCOUNTER
Nurse Triage :     Nausea , diarrhea, abdomina pain since 1/13/23. Tried Pepetobismal  , I was not effective. Placed patient on same day for Dr. Shantell Humphrey.

## 2023-01-17 NOTE — PROGRESS NOTES
[facility]                Phone: [phone]  Fax: [phone]    Physical Therapy  Cancellation/No-show Note  Patient Name:  Arnol Bone  :  1960   Date:  2023    For today's appointment patient:  [x]  Cancelled  []  Rescheduled appointment  []  No-show     Reason given by patient:  [x]  Patient ill  []  Conflicting appointment  []  No transportation    []  Conflict with work  []  No reason given  []  Other:     Comments:      Electronically signed by:   Bobbi Clark PT

## 2023-01-17 NOTE — TELEPHONE ENCOUNTER
Called patient and advised of BMP results  Sodium low at 127, previous 132  Patient chronically hyponatremic; continues to drink EtOH at least 2 to 3 cans of beer every other day currently per patient  She is asymptomatic; denies any headaches, blurry vision or dizziness  Will continue to monitor  Ordered BMP to be completed in the next 2 to 4 days  Please advise patient that she needs to come in for this, I did already call her and also advise her though she may need a reminder.    Sarah Tilley MD  1/17/2023  6:17 PM     None

## 2023-01-18 ENCOUNTER — TELEPHONE (OUTPATIENT)
Dept: FAMILY MEDICINE CLINIC | Age: 63
End: 2023-01-18

## 2023-01-18 NOTE — TELEPHONE ENCOUNTER
----- Message from Kay Damon sent at 1/18/2023  8:20 AM EST -----  Subject: Message to Provider    QUESTIONS  Information for Provider? Pt states that she saw Dr. Sarita De Anda yesterday and   would like to know if something in a liquid form for nausea and stomach   cramps. Pt is taking other medication and it is not helping with the   stomach cramps. Pt would also Dr. Sarita De Anda to know that she believes that her   sodium was low from the vomiting that she had. Would like a call back  ---------------------------------------------------------------------------  --------------  Fernanda MARTÍNEZ  [phone]; OK to leave message on voicemail  ---------------------------------------------------------------------------  --------------  SCRIPT ANSWERS  Relationship to Patient?  Self

## 2023-01-18 NOTE — TELEPHONE ENCOUNTER
Hi, patient was seen yesterday  Did order Zofran ODT for the patient  Please advise patient that this tablet does dissolve in her mouth    If there are still concerns, I can look for different medication to order    Thank you so much    Kenia Valentine MD  1/18/2023  12:29 PM

## 2023-01-24 ENCOUNTER — HOSPITAL ENCOUNTER (OUTPATIENT)
Dept: PHYSICAL THERAPY | Age: 63
Setting detail: THERAPIES SERIES
Discharge: HOME | End: 2023-01-24

## 2023-01-24 DIAGNOSIS — A08.4 VIRAL GASTROENTERITIS: ICD-10-CM

## 2023-01-24 RX ORDER — ONDANSETRON 4 MG/1
4 TABLET, ORALLY DISINTEGRATING ORAL 3 TIMES DAILY PRN
Qty: 21 TABLET | Refills: 0 | Status: SHIPPED | OUTPATIENT
Start: 2023-01-24

## 2023-01-24 NOTE — PROGRESS NOTES
Physical Therapy: Initial Evaluation    Patient: Maura Harrington (89 y.o. female)   Examination Date:   Plan of Care Certification Period: 2023 to        :  1960 ;    Confirmed: Yes MRN: [id number]  CSN: [id number]   Insurance: Payor: [place] [address] / Plan: [employer] [facility] [address] / Product Type: *No Product type* /   Insurance ID: [id number] - (Medicaid Managed) Secondary Insurance (if applicable):    Referring Physician: Hany Meredith MD     PCP: Meryle Maus, MD Visits to Date/Visits Approved:     No Show/Cancelled Appts:   /       Medical Diagnosis: Other malaise [R53.81] deconditioning  Treatment Diagnosis:       PERTINENT MEDICAL HISTORY           Medical History: Chart Reviewed: Yes   Past Medical History:   Diagnosis Date    Carpal tunnel syndrome     Pulmonary nodule     Seizure ([facility] [address] 2023    Thyroid disease      Surgical History: History reviewed. No pertinent surgical history.     Medications:   Current Outpatient Medications:     ondansetron (ZOFRAN-ODT) 4 MG disintegrating tablet, Take 1 tablet by mouth 3 times daily as needed for Nausea or Vomiting, Disp: 21 tablet, Rfl: 0    lactobacillus (CULTURELLE) CAPS capsule, Take 1 capsule by mouth 2 times daily, Disp: , Rfl:     sodium chloride 1 g tablet, Take 1 tablet by mouth 3 times daily (with meals), Disp: 90 tablet, Rfl: 3    montelukast (SINGULAIR) 10 MG tablet, TAKE 1 TABLET BY MOUTH DAILY, Disp: 30 tablet, Rfl: 3    MAPAP ARTHRITIS PAIN 650 MG extended release tablet, TAKE 1 TABLET BY MOUTH EVERY 6 HOURS AS NEEDED FOR PAIN, Disp: 30 tablet, Rfl: 2    cyclobenzaprine (FLEXERIL) 5 MG tablet, TAKE 1 TABLET BY MOUTH EVERY EVENING AS NEEDED FOR MUSCLE SPASMS, Disp: 30 tablet, Rfl: 1    albuterol sulfate HFA (PROVENTIL;VENTOLIN;PROAIR) 108 (90 Base) MCG/ACT inhaler, INHALE 2 PUFFS INTO THE LUNGS FOUR TIMES DAILY AS NEEDED FOR WHEEZING, Disp: 6.7 g, Rfl: 2    cetirizine (ZYRTEC) 10 MG tablet, Take 1 tablet by mouth daily, Disp: 30 tablet, Rfl: 5    FLOVENT  MCG/ACT inhaler, Inhale 1 puff into the lungs 2 times daily, Disp: 1 each, Rfl: 3    omeprazole (PRILOSEC) 40 MG delayed release capsule, Take 1 capsule by mouth every morning (before breakfast), Disp: 90 capsule, Rfl: 1    tiotropium (SPIRIVA RESPIMAT) 2.5 MCG/ACT AERS inhaler, INHALE 2 PUFFS INTO THE LUNGS DAILY, Disp: 4 g, Rfl: 3  Allergies: Ibuprofen and Morphine      SUBJECTIVE EXAMINATION      ,           Subjective History:  Onset Date: 01/06/23  Subjective: pt presents to therapy with c/o feeling weak at times; tells PT she was in the hospital for a short time recently and was told \"all her labs were low\"; at this time she is ambulating I with no issues of buckling or LOB noted; tells PT she is doing well over all with only having some trouble with going up steps; sleep is fine; no PMH for LB/LE injury or sx; no c/o dizziness or vertigo at this time; follow-up 2/6/23  Additional Pertinent Hx (if applicable):            Learning/Language: Learning  Does the patient/guardian have any barriers to learning?: No barriers  What is the preferred language of the patient/guardian?: English     Pain Screening    Pain Screening  Patient Currently in Pain: No    OBJECTIVE EXAMINATION     Regional Screen:   Hip Screen: AROM/strength WNL for all ranges  Knee Screen: AROM/strength WNL for all ranges  Ankle Screen: AROM/strength WNL for all ranges     Endurance:  NORMAL for all prolonged activities        Ambulation/Gait (if applicable):   Ambulation  Surface: Level tile  Device: No Device  Assistance: Independent  Gait Deviations: None    Balance Screen:   Balance  Comments: static/dynamic balance is GOOD+/NORMAL; COORDINATION is NORMAL B UE/LE's; TINETTI score:  28/28    Neuro Screen:   Sensation      Sensation  Overall Sensation Status: WNL       ASSESSMENT     Impression: Assessment: pt exhibits no physical deficits in B LE's or with gait/balance at time of eval requiring skilled care         Statement of Medical Necessity: Physical Therapy is both indicated and medically necessary as outlined in the POC to increase the likelihood of meeting the functionally related goals stated below. Patient's Activity Tolerance: Patient tolerated evaluation without incident      Patient's rehabilitation potential/prognosis is considered to be: Excellent    Factors which may impact rehabilitation potential include:               TREATMENT PLAN     Pt. actively involved in establishing Plan of Care and Goals: Yes  Patient/ Caregiver education and instruction:      pt exhibits no physical deficits in B LE's or with gait/balance at time of eval requiring skilled care; provided with comprehensive/written HEP for B LE strength and balance        Treatment may include any combination of the following:       Frequency / Duration:  Patient to be seen   for   weeks      Eval Complexity:    Decision Making: Low Complexity        Therapist Signature: Catrina Pierson PT    Date: 5/11/7937     I certify that the above Therapy Services are being furnished while the patient is under my care. I agree with the treatment plan and certify that this therapy is necessary. Physician's Signature:  ___________________________   Date:_______                                                                   Tunde Nair MD        Physician Comments: _______________________________________________    Please sign and return to [facility] PHYSICAL THERAPY. Please fax to the location listed below.  Kaylin Dorsey for this referral!    [address] [facility] [address]  Dept: [phone]       POC NOTE

## 2023-01-24 NOTE — TELEPHONE ENCOUNTER
Last Appointment:  1/17/2023  Future Appointments   Date Time Provider Grover Lyi   1/24/2023 11:00 AM FREDDIE Sams PT [facility]   1/31/2023  2:00 PM [place] [facility] 1 [facility]   2/6/2023  1:00 PM Romana Lucky, MD [facility]   2/9/2023  2:15 PM Belinda Coreas MD [facility]

## 2023-01-24 NOTE — PLAN OF CARE
[address] PHYSICAL THERAPY  [address]  [place] [address]  Dept: [phone]  Loc: [phone]    PHYSICAL THERAPY PLAN OF CARE: PLAN OF CARE   Patient: Tennille Franklin (33 y.o. female)   Examination Date:   Plan of Care Certification Period: 2023 to        :  1960  MRN: [id number]  CSN: [id number]   Insurance: Payor: [address] / Plan: [place] [address] / Product Type: *No Product type* /   Insurance ID: [id number] - (Medicaid Managed) Secondary Insurance (if applicable):    Referring Physician: Amada Nichols MD     PCP: Esperanza Ridley MD Visits to Date/Visits Approved:     No Show/Cancelled Appts:   /       Medical Diagnosis: Other malaise [R53.81] deconditioning  No data recorded                Patient Status: [x] Continue / Initiate Plan of Care     Signature: Electronically signed by Joycelyn Michel MD on 2023 at 11:29 AM.     If you have any questions or concerns, please don't hesitate to call.   Thank you for your referral!

## 2023-01-27 ENCOUNTER — TELEPHONE (OUTPATIENT)
Dept: FAMILY MEDICINE CLINIC | Age: 63
End: 2023-01-27

## 2023-01-27 NOTE — TELEPHONE ENCOUNTER
----- Message from Solomon Hernandez sent at 1/26/2023  1:13 PM EST -----  Subject: Message to Provider    QUESTIONS  Information for Provider? Pt is wanting to know if she can get a Dr note   for [employer] duty, starting 1/31, please advise.   ---------------------------------------------------------------------------  --------------  [id number]  [phone]; OK to leave message on voicemail  ---------------------------------------------------------------------------  --------------  SCRIPT ANSWERS  Relationship to Patient?  Self

## 2023-01-27 NOTE — TELEPHONE ENCOUNTER
Hi!    Notes reviewed. I do not see any reason why she would not be able to attend jury duty. She can reach out to me if she has any concerns. Thanks!

## 2023-01-30 ENCOUNTER — TELEPHONE (OUTPATIENT)
Dept: FAMILY MEDICINE CLINIC | Age: 63
End: 2023-01-30

## 2023-01-30 ENCOUNTER — HOSPITAL ENCOUNTER (OUTPATIENT)
Age: 63
Discharge: HOME | End: 2023-01-30
Payer: MEDICAID

## 2023-01-30 ENCOUNTER — OFFICE VISIT (OUTPATIENT)
Dept: FAMILY MEDICINE CLINIC | Age: 63
End: 2023-01-30
Payer: MEDICAID

## 2023-01-30 VITALS
OXYGEN SATURATION: 98 % | DIASTOLIC BLOOD PRESSURE: 65 MMHG | HEIGHT: 61 IN | BODY MASS INDEX: 17.18 KG/M2 | RESPIRATION RATE: 18 BRPM | HEART RATE: 100 BPM | TEMPERATURE: 97.2 F | WEIGHT: 91 LBS | SYSTOLIC BLOOD PRESSURE: 114 MMHG

## 2023-01-30 DIAGNOSIS — E87.6 HYPOKALEMIA: Primary | ICD-10-CM

## 2023-01-30 DIAGNOSIS — K21.9 GASTROESOPHAGEAL REFLUX DISEASE WITHOUT ESOPHAGITIS: ICD-10-CM

## 2023-01-30 DIAGNOSIS — R10.13 EPIGASTRIC PAIN: Primary | ICD-10-CM

## 2023-01-30 DIAGNOSIS — R19.7 DIARRHEA, UNSPECIFIED TYPE: ICD-10-CM

## 2023-01-30 LAB
ALBUMIN SERPL-MCNC: 3.2 G/DL (ref 3.5–5.2)
ALP BLD-CCNC: 123 U/L (ref 35–104)
ALT SERPL-CCNC: 8 U/L (ref 0–32)
ANION GAP SERPL CALCULATED.3IONS-SCNC: 12 MMOL/L (ref 7–16)
AST SERPL-CCNC: 18 U/L (ref 0–31)
BASOPHILS ABSOLUTE: 0.03 E9/L (ref 0–0.2)
BASOPHILS RELATIVE PERCENT: 0.4 % (ref 0–2)
BILIRUB SERPL-MCNC: 0.2 MG/DL (ref 0–1.2)
BUN BLDV-MCNC: 9 MG/DL (ref 6–23)
CALCIUM SERPL-MCNC: 8.8 MG/DL (ref 8.6–10.2)
CHLORIDE BLD-SCNC: 96 MMOL/L (ref 98–107)
CO2: 27 MMOL/L (ref 22–29)
CREAT SERPL-MCNC: 1.4 MG/DL (ref 0.5–1)
EOSINOPHILS ABSOLUTE: 0.08 E9/L (ref 0.05–0.5)
EOSINOPHILS RELATIVE PERCENT: 1.1 % (ref 0–6)
GFR SERPL CREATININE-BSD FRML MDRD: 42 ML/MIN/1.73
GLUCOSE BLD-MCNC: 118 MG/DL (ref 74–99)
HCT VFR BLD CALC: 28.9 % (ref 34–48)
HEMOGLOBIN: 9.6 G/DL (ref 11.5–15.5)
IMMATURE GRANULOCYTES #: 0.03 E9/L
IMMATURE GRANULOCYTES %: 0.4 % (ref 0–5)
LIPASE: 20 U/L (ref 13–60)
LYMPHOCYTES ABSOLUTE: 2.1 E9/L (ref 1.5–4)
LYMPHOCYTES RELATIVE PERCENT: 29 % (ref 20–42)
MCH RBC QN AUTO: 34.4 PG (ref 26–35)
MCHC RBC AUTO-ENTMCNC: 33.2 % (ref 32–34.5)
MCV RBC AUTO: 103.6 FL (ref 80–99.9)
MONOCYTES ABSOLUTE: 0.78 E9/L (ref 0.1–0.95)
MONOCYTES RELATIVE PERCENT: 10.8 % (ref 2–12)
NEUTROPHILS ABSOLUTE: 4.21 E9/L (ref 1.8–7.3)
NEUTROPHILS RELATIVE PERCENT: 58.3 % (ref 43–80)
PDW BLD-RTO: 17 FL (ref 11.5–15)
PLATELET # BLD: 372 E9/L (ref 130–450)
PMV BLD AUTO: 8.5 FL (ref 7–12)
POTASSIUM SERPL-SCNC: 3 MMOL/L (ref 3.5–5)
RBC # BLD: 2.79 E12/L (ref 3.5–5.5)
SODIUM BLD-SCNC: 135 MMOL/L (ref 132–146)
TOTAL PROTEIN: 7.4 G/DL (ref 6.4–8.3)
WBC # BLD: 7.2 E9/L (ref 4.5–11.5)

## 2023-01-30 PROCEDURE — 83690 ASSAY OF LIPASE: CPT

## 2023-01-30 PROCEDURE — 99213 OFFICE O/P EST LOW 20 MIN: CPT

## 2023-01-30 PROCEDURE — G8428 CUR MEDS NOT DOCUMENT: HCPCS

## 2023-01-30 PROCEDURE — 80053 COMPREHEN METABOLIC PANEL: CPT

## 2023-01-30 PROCEDURE — 36415 COLL VENOUS BLD VENIPUNCTURE: CPT

## 2023-01-30 PROCEDURE — 85025 COMPLETE CBC W/AUTO DIFF WBC: CPT

## 2023-01-30 PROCEDURE — G8484 FLU IMMUNIZE NO ADMIN: HCPCS

## 2023-01-30 PROCEDURE — 3017F COLORECTAL CA SCREEN DOC REV: CPT

## 2023-01-30 PROCEDURE — 99212 OFFICE O/P EST SF 10 MIN: CPT

## 2023-01-30 PROCEDURE — 4004F PT TOBACCO SCREEN RCVD TLK: CPT

## 2023-01-30 PROCEDURE — G8419 CALC BMI OUT NRM PARAM NOF/U: HCPCS

## 2023-01-30 PROCEDURE — 1111F DSCHRG MED/CURRENT MED MERGE: CPT

## 2023-01-30 RX ORDER — POTASSIUM CHLORIDE 20 MEQ/1
20 TABLET, EXTENDED RELEASE ORAL DAILY
Qty: 90 TABLET | Refills: 1 | Status: SHIPPED | OUTPATIENT
Start: 2023-01-30

## 2023-01-30 RX ORDER — OMEPRAZOLE 40 MG/1
40 CAPSULE, DELAYED RELEASE ORAL 2 TIMES DAILY
Qty: 90 CAPSULE | Refills: 1 | Status: SHIPPED | OUTPATIENT
Start: 2023-01-30

## 2023-01-30 NOTE — PROGRESS NOTES
[address] MEDICINE RESIDENCY PROGRAM  DATE OF VISIT : 2023    Patient : Elliot Marino   Age : 58 y.o.  : 1960   MRN : [id number]   ______________________________________________________________________    Chief Complaint:   Chief Complaint   Patient presents with    Abdominal Pain     With weight loss     Nausea & Vomiting    Diarrhea       HPI:   Elliot Marino is a 58 y.o. female with PMH alcohol abuse, hyponatremia who presents today for abdominal pain and nausea. The patient reports a one month history of burning epigastric pain that occurs every day and worsens after she eats. It is accompanied by significant nausea, which causes her to vomit with almost every meal. She states that she is able to swallow food and liquids, but after about 10 minutes, the abdominal pain will worsen and she will vomit. The patient is also experiencing diarrhea (watery with some solid components) 3x/day. She has lost 9 pounds in the last three weeks. She reports the pain also worsens significantly at night- she will experience a substernal burning that radiates from her epigastric region. This makes it difficult for her to sleep. She has taken Prilosec every morning for years, and she states it has not helped to alleviate the pain. She has taken Zofran, which mildly helps the nausea. In addition, she takes Mylanta and Pepto Bismol for the epigastric pain, which also mildly help. The patient denies hematemesis, coffee ground emesis, hematochezia, melena, chest pain, heart palpitations, fever, and shortness of breath. Does endorse nighttime cough. She smokes 1/2 pack of cigarettes per day, which she says worsens the burning. She stated she has not had alcohol since discharged from the hospital on  (previous note from 23 states she is drinking 2-3 beers per day). She has not previously had any similar symptoms.  She noted that her gallbladder was removed 20 years ago, but she has a previous hx of choledocholithiasis. Past Medical History:  Past Medical History:   Diagnosis Date    Carpal tunnel syndrome     Pulmonary nodule     Seizure ([facility] [address] 1/12/2023    Thyroid disease        Allergies:    Allergies   Allergen Reactions    Ibuprofen Other (See Comments)     Hallucinations    Morphine        Medications:    Current Outpatient Medications   Medication Sig Dispense Refill    omeprazole (PRILOSEC) 40 MG delayed release capsule Take 1 capsule by mouth in the morning and at bedtime 90 capsule 1    ondansetron (ZOFRAN-ODT) 4 MG disintegrating tablet Take 1 tablet by mouth 3 times daily as needed for Nausea or Vomiting 21 tablet 0    lactobacillus (CULTURELLE) CAPS capsule Take 1 capsule by mouth 2 times daily      sodium chloride 1 g tablet Take 1 tablet by mouth 3 times daily (with meals) 90 tablet 3    montelukast (SINGULAIR) 10 MG tablet TAKE 1 TABLET BY MOUTH DAILY 30 tablet 3    MAPAP ARTHRITIS PAIN 650 MG extended release tablet TAKE 1 TABLET BY MOUTH EVERY 6 HOURS AS NEEDED FOR PAIN 30 tablet 2    cyclobenzaprine (FLEXERIL) 5 MG tablet TAKE 1 TABLET BY MOUTH EVERY EVENING AS NEEDED FOR MUSCLE SPASMS 30 tablet 1    albuterol sulfate HFA (PROVENTIL;VENTOLIN;PROAIR) 108 (90 Base) MCG/ACT inhaler INHALE 2 PUFFS INTO THE LUNGS FOUR TIMES DAILY AS NEEDED FOR WHEEZING 6.7 g 2    cetirizine (ZYRTEC) 10 MG tablet Take 1 tablet by mouth daily 30 tablet 5    FLOVENT  MCG/ACT inhaler Inhale 1 puff into the lungs 2 times daily 1 each 3    tiotropium (SPIRIVA RESPIMAT) 2.5 MCG/ACT AERS inhaler INHALE 2 PUFFS INTO THE LUNGS DAILY 4 g 3     No current facility-administered medications for this visit.      __________________________________________________    Physical Exam:    Vitals:    01/30/23 1034   BP: 114/65   Site: Right Upper Arm   Position: Sitting   Cuff Size: Child   Pulse: 100   Resp: 18   Temp: 97.2 °F (36.2 °C)   TempSrc: Temporal   SpO2: 98%   Weight: 91 lb (41.3 kg)   Height: 5' 1\" (1.549 m)         Physical Exam  Constitutional:       Appearance: She is underweight. She is not ill-appearing or toxic-appearing. HENT:      Head: Normocephalic and atraumatic. Mouth/Throat:      Mouth: Mucous membranes are moist.      Pharynx: Oropharynx is clear. Eyes:      Conjunctiva/sclera: Conjunctivae normal.      Pupils: Pupils are equal, round, and reactive to light. Cardiovascular:      Rate and Rhythm: Normal rate and regular rhythm. Pulses: Normal pulses. Heart sounds: Normal heart sounds. Pulmonary:      Effort: Pulmonary effort is normal.      Breath sounds: Normal breath sounds. Abdominal:      General: Abdomen is flat. Bowel sounds are normal. There is no distension. Palpations: Abdomen is soft. Tenderness: There is abdominal tenderness (diffuse, worst in epigastric region/RUQ, Canela's sign -ve). There is no left CVA tenderness, guarding or rebound. Musculoskeletal:         General: Normal range of motion. Right lower leg: No edema. Left lower leg: No edema. Skin:     General: Skin is warm and dry. Neurological:      General: No focal deficit present. Mental Status: She is alert. Mental status is at baseline. Motor: No weakness. Gait: Gait normal.      Deep Tendon Reflexes: Reflexes normal.   Psychiatric:         Mood and Affect: Mood normal.         Behavior: Behavior normal. Behavior is cooperative. Thought Content: Thought content normal.         Judgment: Judgment normal.     ______________________________________________________________________    Assessment & Plan:  1.  Epigastric pain  Differential currently broad: GERD vs. Gastritis vs. Pancreatitis vs. Colitis  Given constellation of symptoms, likely GERD component along with additional diagnosis  Will evaluate for inflammation with CBC, ESR, CRP  Lipase to r/o pancreatitis  Given patient's hx electrolyte abnormalities and hx vomiting/diarrhea, will check CMP  Patient has hx significant weight loss (9 lbs in 3 weeks)- not currently experiencing dysphagia, odynophagia, hematochezia, melena, or hematemesis. Patient also has long hx smoking/alcohol use  Given presence of alarm sxs, if tests ordered are unremarkable, will consider further workup to r/o malignancy    2. Gastroesophageal reflux disease without esophagitis  Increase Prilosec to BID to alleviate patient's GERD sxs  - omeprazole (PRILOSEC) 40 MG delayed release capsule; Take 1 capsule by mouth in the morning and at bedtime  Dispense: 90 capsule; Refill: 1    3. Diarrhea, unspecified type  Patient has had diarrhea for one month at this point with no discernible cause  Will evaluate stool studies  - Comprehensive Metabolic Panel; Future  - Lipase; Future  - CBC with Auto Differential; Future  - Culture, Stool; Future  - GIARDIA ANTIGEN; Future  - Calprotectin Stool; Future  - FECAL FAT, QUALITATIVE; Future  - H. Pylori Antigen, Stool; Future  - BLOOD OCCULT STOOL SCREEN #1; Future  - Sedimentation Rate; Future  - C-Reactive Protein;  Future        Return to Office: Has f/u with Dr. Raza Mcgrath on 2/6/2023    Vika Phillips MD   This case was discussed with Dr. David Otto

## 2023-01-30 NOTE — LETTER
To whom it may concern,    It have evaluated Oral Jason in my clinic, and I can confirm that she has been experiencing severe abdominal pain that significantly impacts her daily life. Her symptoms make the completion of daily tasks difficult for her and require her to frequently reschedule plans due to feeling ill. Please call my office at [phone] with any questions.       Sincerely,      Maria Guadalupe Alfaro MD

## 2023-01-30 NOTE — TELEPHONE ENCOUNTER
Patient's potassium was 3.0 on last recheck. Called patient to inform her but instead reached a family member. Family member said she would have patient call back when she got home. Have sent in potassium chloride supplements.

## 2023-01-30 NOTE — TELEPHONE ENCOUNTER
----- Message from Beau Rodas sent at 1/30/2023  2:23 PM EST -----  Subject: Medication Problem    Medication: omeprazole (PRILOSEC) 40 MG delayed release capsule  Dosage: 1 10mg tablet 4x a day  Ordering Provider: sandra    Question/Problem: Insurance will fill the prescription pt received today   due to them already having an order for that; pt wants to know if a   different medication can be called in for her stomach      Pharmacy: [facility] [address], [facility] [phone]    ---------------------------------------------------------------------------  --------------  [address] [employer]  [phone]; OK to leave message on voicemail  ---------------------------------------------------------------------------  --------------    SCRIPT ANSWERS  Relationship to Patient: Self

## 2023-01-30 NOTE — PROGRESS NOTES
S: 58 y.o. female here for epigastric burning. Worse at night. Vomiting after eating. H/o EtOH abuse, but none since hospital (records from 1/17/23 say pt still drinking). Losing weight. Diarrhea tid. Mucus. No blood. Prilosec 40 daily chronically. No fever. Hyponatremia. disc  Can drink water. Sxs not related to salt tabs. O: VS: /65 (Site: Right Upper Arm, Position: Sitting, Cuff Size: Child)   Pulse 100   Temp 97.2 °F (36.2 °C) (Temporal)   Resp 18   Ht 5' 1\" (1.549 m)   Wt 91 lb (41.3 kg)   SpO2 98%   BMI 17.19 kg/m²    General: NAD, alert and interacting appropriately. CV:  RRR, no gallops, rubs, or murmurs    Resp: CTAB   Abd:  Soft, ttp epigastric and RUQ. Nondistended. Neg lewis's. Ext:  No edema    Impression: epigastric and RUQ pain 2/2 gastritis vs PUD vs pancreas vs colitis   Plan:   Etelvina Dwyer was seen today for abdominal pain, nausea & vomiting and diarrhea. Diagnoses and all orders for this visit:    Epigastric pain    Gastroesophageal reflux disease without esophagitis  -     omeprazole (PRILOSEC) 40 MG delayed release capsule; Take 1 capsule by mouth in the morning and at bedtime    Diarrhea, unspecified type  -     Comprehensive Metabolic Panel; Future  -     Lipase; Future  -     CBC with Auto Differential; Future  -     Culture, Stool; Future  -     GIARDIA ANTIGEN; Future  -     Calprotectin Stool; Future  -     FECAL FAT, QUALITATIVE; Future  -     H. Pylori Antigen, Stool; Future  -     BLOOD OCCULT STOOL SCREEN #1; Future  -     Sedimentation Rate; Future  -     C-Reactive Protein; Future       Attending Physician Statement  I have discussed the case, including pertinent history and exam findings with the resident. I agree with the documented assessment and plan.

## 2023-01-31 ENCOUNTER — TELEPHONE (OUTPATIENT)
Dept: FAMILY MEDICINE CLINIC | Age: 63
End: 2023-01-31

## 2023-01-31 ENCOUNTER — HOSPITAL ENCOUNTER (OUTPATIENT)
Age: 63
Discharge: HOME | End: 2023-01-31

## 2023-01-31 DIAGNOSIS — R19.7 DIARRHEA, UNSPECIFIED TYPE: ICD-10-CM

## 2023-01-31 DIAGNOSIS — K21.9 GASTROESOPHAGEAL REFLUX DISEASE WITHOUT ESOPHAGITIS: Primary | ICD-10-CM

## 2023-01-31 NOTE — TELEPHONE ENCOUNTER
----- Message from Tawanda Wright sent at 1/31/2023  9:12 AM EST -----  Subject: Message to Provider    QUESTIONS  Information for Provider? pt states the pharmacy called yesterday to let   pt know that omeprazole (PRILOSEC) 40 MG delayed release capsule will not   be covered on her current insurance and they would like something else   sent over. another brand but for same thing  ---------------------------------------------------------------------------  --------------  Ananda Last INFO  [phone]; OK to leave message on voicemail  ---------------------------------------------------------------------------  --------------  SCRIPT ANSWERS  Relationship to Patient?  Self

## 2023-02-01 LAB
GIARDIA ANTIGEN STOOL: NORMAL
OCCULT BLOOD SCREENING: NORMAL

## 2023-02-01 RX ORDER — PANTOPRAZOLE SODIUM 40 MG/1
40 TABLET, DELAYED RELEASE ORAL 2 TIMES DAILY
Qty: 60 TABLET | Refills: 0 | Status: SHIPPED | OUTPATIENT
Start: 2023-02-01 | End: 2023-03-03

## 2023-02-02 LAB — CULTURE, STOOL: NORMAL

## 2023-02-03 LAB
FECAL NEUTRAL FAT: NORMAL
FECAL SPLIT FATS: NORMAL

## 2023-02-04 LAB
CALPROTECTIN, FECAL: 16 UG/G
H PYLORI ANTIGEN STOOL: NEGATIVE

## 2023-02-06 ENCOUNTER — OFFICE VISIT (OUTPATIENT)
Dept: FAMILY MEDICINE CLINIC | Age: 63
End: 2023-02-06
Payer: MEDICAID

## 2023-02-06 VITALS
TEMPERATURE: 97.7 F | DIASTOLIC BLOOD PRESSURE: 65 MMHG | BODY MASS INDEX: 18.53 KG/M2 | OXYGEN SATURATION: 100 % | SYSTOLIC BLOOD PRESSURE: 80 MMHG | HEART RATE: 80 BPM | WEIGHT: 98.06 LBS

## 2023-02-06 DIAGNOSIS — Z11.4 ENCOUNTER FOR SCREENING FOR HIV: ICD-10-CM

## 2023-02-06 DIAGNOSIS — E87.6 HYPOKALEMIA: ICD-10-CM

## 2023-02-06 DIAGNOSIS — R31.9 HEMATURIA, UNSPECIFIED TYPE: ICD-10-CM

## 2023-02-06 DIAGNOSIS — R10.13 EPIGASTRIC PAIN: Primary | ICD-10-CM

## 2023-02-06 DIAGNOSIS — D52.0 DIETARY FOLATE DEFICIENCY ANEMIA: ICD-10-CM

## 2023-02-06 LAB
ANION GAP SERPL CALCULATED.3IONS-SCNC: 9 MMOL/L (ref 7–16)
BILIRUBIN URINE: NEGATIVE
BLOOD, URINE: ABNORMAL
BUN BLDV-MCNC: 8 MG/DL (ref 6–23)
CALCIUM SERPL-MCNC: 8.6 MG/DL (ref 8.6–10.2)
CHLORIDE BLD-SCNC: 102 MMOL/L (ref 98–107)
CLARITY: CLEAR
CO2: 24 MMOL/L (ref 22–29)
COLOR: YELLOW
CREAT SERPL-MCNC: 0.9 MG/DL (ref 0.5–1)
GFR SERPL CREATININE-BSD FRML MDRD: >60 ML/MIN/1.73
GLUCOSE BLD-MCNC: 70 MG/DL (ref 74–99)
GLUCOSE URINE: NEGATIVE MG/DL
KETONES, URINE: NEGATIVE MG/DL
LEUKOCYTE ESTERASE, URINE: ABNORMAL
NITRITE, URINE: NEGATIVE
PH UA: 5.5 (ref 5–9)
POTASSIUM SERPL-SCNC: 4.8 MMOL/L (ref 3.5–5)
PROTEIN UA: NEGATIVE MG/DL
SODIUM BLD-SCNC: 135 MMOL/L (ref 132–146)
SPECIFIC GRAVITY UA: <=1.005 (ref 1–1.03)
UROBILINOGEN, URINE: 0.2 E.U./DL

## 2023-02-06 PROCEDURE — G8484 FLU IMMUNIZE NO ADMIN: HCPCS | Performed by: FAMILY MEDICINE

## 2023-02-06 PROCEDURE — G8420 CALC BMI NORM PARAMETERS: HCPCS | Performed by: FAMILY MEDICINE

## 2023-02-06 PROCEDURE — 99213 OFFICE O/P EST LOW 20 MIN: CPT | Performed by: FAMILY MEDICINE

## 2023-02-06 PROCEDURE — 3017F COLORECTAL CA SCREEN DOC REV: CPT | Performed by: FAMILY MEDICINE

## 2023-02-06 PROCEDURE — 4004F PT TOBACCO SCREEN RCVD TLK: CPT | Performed by: FAMILY MEDICINE

## 2023-02-06 PROCEDURE — G8428 CUR MEDS NOT DOCUMENT: HCPCS | Performed by: FAMILY MEDICINE

## 2023-02-06 PROCEDURE — 36415 COLL VENOUS BLD VENIPUNCTURE: CPT

## 2023-02-06 RX ORDER — POTASSIUM CHLORIDE 1500 MG/1
TABLET, FILM COATED, EXTENDED RELEASE ORAL
COMMUNITY
Start: 2023-01-30 | End: 2023-02-06

## 2023-02-06 RX ORDER — FOLIC ACID 1 MG/1
1 TABLET ORAL DAILY
Qty: 30 TABLET | Refills: 3 | Status: SHIPPED | OUTPATIENT
Start: 2023-02-06

## 2023-02-06 SDOH — ECONOMIC STABILITY: FOOD INSECURITY: WITHIN THE PAST 12 MONTHS, THE FOOD YOU BOUGHT JUST DIDN'T LAST AND YOU DIDN'T HAVE MONEY TO GET MORE.: NEVER TRUE

## 2023-02-06 SDOH — ECONOMIC STABILITY: HOUSING INSECURITY
IN THE LAST 12 MONTHS, WAS THERE A TIME WHEN YOU DID NOT HAVE A STEADY PLACE TO SLEEP OR SLEPT IN A SHELTER (INCLUDING NOW)?: NO

## 2023-02-06 SDOH — ECONOMIC STABILITY: INCOME INSECURITY: HOW HARD IS IT FOR YOU TO PAY FOR THE VERY BASICS LIKE FOOD, HOUSING, MEDICAL CARE, AND HEATING?: NOT HARD AT ALL

## 2023-02-06 SDOH — ECONOMIC STABILITY: FOOD INSECURITY: WITHIN THE PAST 12 MONTHS, YOU WORRIED THAT YOUR FOOD WOULD RUN OUT BEFORE YOU GOT MONEY TO BUY MORE.: NEVER TRUE

## 2023-02-06 NOTE — PROGRESS NOTES
S: 58 y.o. female presents today for Follow-up    Abdominal pain f/u: worse after meals; episodes of non-bloody emesis; recent increase prilosec to BID dosing; see labs / testing as ordered; +anemia, hypokalemia, elevated creatine  Today with improvement in symptoms  Diet includes 2 16oz pop per day; limited fatty food  3 16oz cans alcohol for 2 months; markedly decreased from previous    Hyponatremia: likely secondary to alcohol use    GERD: on prilosec BID    Alcohol use    O: VS: BP 80/65   Pulse 80   Temp 97.7 °F (36.5 °C) (Temporal)   Wt 98 lb 1 oz (44.5 kg)   SpO2 100%   BMI 18.53 kg/m²   AAO/NAD, appropriate affect for mood  CV:  RRR, no murmur  Resp: CTAB  Abdomen: SNTND  Ext: no edema    Assessment/Plan:   1) Abdominal pain / GERD - f/u with gen surg as planned; continue with prilosec BID  2) Hypokalemia - continue on 20meq daily; repeat BMP  3) RYLEE - repeat BMP today; UA   4) anemia + macrocytosis - start folate +/- B12; f/u with gen surg  5) chronic hyponatremia - continue salt tabs per nephrology; alcohol cessation  6) alcohol use - encourage cessation  7) hematuria - repeat ua +/- culture  RTO: 1 month      Attending Physician Statement  I have discussed the case, including pertinent history and exam findings with the resident. I agree with the documented assessment and plan.       Electronically signed by Luis Mandujano MD on 2/6/2023 at 3:07 PM

## 2023-02-06 NOTE — PATIENT INSTRUCTIONS
Suggest selective elimination of dietary triggers (caffeine, chocolate, spicy foods, food with high fat content, carbonated beverages, and peppermint)    Please try to eliminate one at a time and see if your symptoms improve  Our Office is MOVING! [facility]      With all future appointments starting February 27, 2023,   please see us at our [address] [place] [address] Street:     [address] [place] [address]. Please call our office with any questions. We look forward to caring for you at our new location. Thank you!

## 2023-02-07 LAB
BACTERIA: ABNORMAL /HPF
EPITHELIAL CELLS, UA: ABNORMAL /HPF
HIV-1 AND HIV-2 ANTIBODIES: NORMAL
RBC UA: ABNORMAL /HPF (ref 0–2)
WBC UA: ABNORMAL /HPF (ref 0–5)

## 2023-02-08 DIAGNOSIS — R35.0 URINE FREQUENCY: Primary | ICD-10-CM

## 2023-02-08 NOTE — PROGRESS NOTES
Further result management note  Advised patient repeat a UA and a urine culture to confirm UTI  We will treat if positive

## 2023-02-09 ENCOUNTER — TELEPHONE (OUTPATIENT)
Dept: SURGERY | Age: 63
End: 2023-02-09

## 2023-02-09 ENCOUNTER — OFFICE VISIT (OUTPATIENT)
Dept: SURGERY | Age: 63
End: 2023-02-09
Payer: MEDICAID

## 2023-02-09 ENCOUNTER — NURSE ONLY (OUTPATIENT)
Dept: FAMILY MEDICINE CLINIC | Age: 63
End: 2023-02-09
Payer: MEDICAID

## 2023-02-09 VITALS
HEART RATE: 104 BPM | OXYGEN SATURATION: 97 % | BODY MASS INDEX: 18.52 KG/M2 | HEIGHT: 61 IN | RESPIRATION RATE: 16 BRPM | DIASTOLIC BLOOD PRESSURE: 92 MMHG | WEIGHT: 98.1 LBS | TEMPERATURE: 98.3 F | SYSTOLIC BLOOD PRESSURE: 134 MMHG

## 2023-02-09 DIAGNOSIS — R10.9 CHRONIC ABDOMINAL PAIN: Primary | ICD-10-CM

## 2023-02-09 DIAGNOSIS — R19.7 DIARRHEA, UNSPECIFIED TYPE: ICD-10-CM

## 2023-02-09 DIAGNOSIS — R11.15 NON-INTRACTABLE CYCLICAL VOMITING WITH NAUSEA: ICD-10-CM

## 2023-02-09 DIAGNOSIS — R63.4 UNINTENTIONAL WEIGHT LOSS: ICD-10-CM

## 2023-02-09 DIAGNOSIS — R35.0 URINE FREQUENCY: ICD-10-CM

## 2023-02-09 DIAGNOSIS — G89.29 CHRONIC ABDOMINAL PAIN: Primary | ICD-10-CM

## 2023-02-09 PROBLEM — E87.1 HYPONATREMIA: Status: RESOLVED | Noted: 2022-08-06 | Resolved: 2023-02-09

## 2023-02-09 LAB
AMORPHOUS: ABNORMAL
BACTERIA: ABNORMAL /HPF
BILIRUBIN URINE: NEGATIVE
BLOOD, URINE: ABNORMAL
CLARITY: CLEAR
COLOR: YELLOW
CRYSTALS, UA: ABNORMAL /HPF
EPITHELIAL CELLS, UA: ABNORMAL /HPF
GLUCOSE URINE: NEGATIVE MG/DL
KETONES, URINE: NEGATIVE MG/DL
LEUKOCYTE ESTERASE, URINE: ABNORMAL
NITRITE, URINE: NEGATIVE
PH UA: 7 (ref 5–9)
PROTEIN UA: NEGATIVE MG/DL
RBC UA: ABNORMAL /HPF (ref 0–2)
SPECIFIC GRAVITY UA: 1.01 (ref 1–1.03)
UROBILINOGEN, URINE: 0.2 E.U./DL
WBC UA: ABNORMAL /HPF (ref 0–5)

## 2023-02-09 PROCEDURE — 99211 OFF/OP EST MAY X REQ PHY/QHP: CPT

## 2023-02-09 PROCEDURE — 3017F COLORECTAL CA SCREEN DOC REV: CPT | Performed by: SURGERY

## 2023-02-09 PROCEDURE — G8427 DOCREV CUR MEDS BY ELIG CLIN: HCPCS | Performed by: SURGERY

## 2023-02-09 PROCEDURE — 81003 URINALYSIS AUTO W/O SCOPE: CPT

## 2023-02-09 PROCEDURE — 4004F PT TOBACCO SCREEN RCVD TLK: CPT | Performed by: SURGERY

## 2023-02-09 PROCEDURE — 99212 OFFICE O/P EST SF 10 MIN: CPT | Performed by: SURGERY

## 2023-02-09 PROCEDURE — G8420 CALC BMI NORM PARAMETERS: HCPCS | Performed by: SURGERY

## 2023-02-09 PROCEDURE — G8484 FLU IMMUNIZE NO ADMIN: HCPCS | Performed by: SURGERY

## 2023-02-09 PROCEDURE — 99203 OFFICE O/P NEW LOW 30 MIN: CPT | Performed by: SURGERY

## 2023-02-09 RX ORDER — SODIUM CHLORIDE, SODIUM LACTATE, POTASSIUM CHLORIDE, CALCIUM CHLORIDE 600; 310; 30; 20 MG/100ML; MG/100ML; MG/100ML; MG/100ML
INJECTION, SOLUTION INTRAVENOUS CONTINUOUS
OUTPATIENT
Start: 2023-02-09

## 2023-02-09 RX ORDER — SODIUM CHLORIDE 9 MG/ML
25 INJECTION, SOLUTION INTRAVENOUS PRN
OUTPATIENT
Start: 2023-02-09

## 2023-02-09 RX ORDER — SODIUM CHLORIDE 0.9 % (FLUSH) 0.9 %
10 SYRINGE (ML) INJECTION PRN
OUTPATIENT
Start: 2023-02-09

## 2023-02-09 RX ORDER — POLYETHYLENE GLYCOL 3350 17 G/17G
POWDER, FOR SOLUTION ORAL
Qty: 238 G | Refills: 0 | Status: SHIPPED | OUTPATIENT
Start: 2023-02-09

## 2023-02-09 RX ORDER — BISACODYL 5 MG
TABLET, DELAYED RELEASE (ENTERIC COATED) ORAL
Qty: 8 TABLET | Refills: 0 | Status: SHIPPED | OUTPATIENT
Start: 2023-02-09

## 2023-02-09 RX ORDER — SODIUM CHLORIDE 0.9 % (FLUSH) 0.9 %
10 SYRINGE (ML) INJECTION EVERY 12 HOURS SCHEDULED
OUTPATIENT
Start: 2023-02-09

## 2023-02-09 NOTE — PATIENT INSTRUCTIONS
Dr. Analilia García recommended upper GI endoscopy and colonoscopy with possible biopsy or polypectomy and he explained the risk, benefits, expected outcome, and alternatives to the procedure. Risks included but are not limited to bleeding, infection, respiratory distress, hypotension, and perforation of the esophagus, stomach, small intestine, or colon. You understood and were in agreement. You will need to have someone bring you to the hospital and take you home because you will not be able to drive or work the rest of that day. Also, you need to have someone stay with you the rest of the day to make sure you do not develop any complications. [id number] [facility] TABLET PREP  COLON PREP FOR COLONOSCOPY OR COLON SURGERY    It is very important that you follow all of the instructions listed on this sheet carefully (they may be slightly different than the directions on the product that you purchase at the pharmacy) to ensure that your colon is adequately cleaned out or your risk of complications could be increased. 2 Days or More Before Endoscopy:  Obtain Miralax powder 238 gm (8.3 oz) bottle, Dulcolax tablets, and three 20 oz bottles of Gatorade from the pharmacy. Do not eat corn, tomatoes, peas or watermelon 5 days before procedure. Two days before the colonoscopy, pour all three 20 oz bottles of Gatorade into a large pitcher and add all 238 gm of Miralax to the pitcher then mix very well and re-mix every time pouring an 8 oz glass from the pitcher. Put the pitcher in the refrigerator to get cold  If you are on INSULIN or OTHER DIABETIC MEDICATIONS, then check with your primary care physician as to how to adjust your medication while on clear liquid diet and when nothing by mouth. 1 Day Before the Endoscopy:  No solid food - only clear liquids (soup, jello, or juice that you can see through with no solid food) for breakfast, lunch and supper.   DO NOT drink or eat anything that is red as it will turn the inside of the colon red and look like blood. 8:00 am Breakfast - all clear liquids  10:00 am Drink at least 8 oz of clear liquids. 12 Noon Lunch - all clear liquids and take 4 Dulcolax tablets followed immediately by at least 8 oz of clear liquids. 2:00 pm Drink 8 oz of the Gatorade/Miralax mix every 30 minutes x six glasses (should have 12 oz left)  6:00 pm Dinner - all clear liquids and take 4 Dulcolax tablets followed immediately by at least 8 oz of clear liquids. 8:00 pm Drink at least 8 oz of clear liquids. 10:00 pm Drink at least 8 oz of clear liquids. Can continue to take liquids until 12 midnight then nothing to eat or drink except as instructed below    Day of Endoscopy:  5 hours prior to scheduled time for colonoscopy, drink the last 12 oz of Gatorade with Miralax followed immediately by at least 8 oz of clear liquids. Then nothing to drink after that. If any blood pressure medications or heart medications are due in the morning, you should take them with a sip of water. Patient Information and Instructions for Colonoscopy         Definition of Colonoscopy   A colonoscopy is the visual exam of the rectum and colon (large intestine). The exam is done with a tool called a colonoscope. The colonoscope is a flexible tube with a tiny camera on the end. This instrument allows the doctor to view the inside of your rectum and colon. Sigmoidoscopy is a shorter scope that views only the last one third of the colon. Reasons for Colonoscopy   It is used to examine, diagnose, and treat problems in your large intestine. The procedure is most often done for the following reasons:    To determine the cause of abdominal pain, rectal bleeding, or a change in bowel habits   To detect and treat colon cancer or colon polyps   To obtain tissue samples for testing   To stop intestinal bleeding   Monitor response to treatment if you have inflammatory bowel disease Possible Complications   Complications are rare, but no procedure is completely free of risk. If you are planning to have a colonoscopy, your doctor will review a list of possible complications, which may include:   Bleeding   Reaction to the sedation causing drop in your blood pressure or problems breathing  Perforation or puncture of the bowel     Factors that may increase the risk of complications include:   Pre-existing heart or kidney condition   Treatment with certain medicines, including aspirin and other drugs with anticoagulant or blood-thinning properties   Prior abdominal surgery or radiation treatments   Active colitis , diverticulitis , or other acute bowel disease   Previous treatment with radiation therapy     Be sure to discuss these risks with your doctor before the procedure. What to Expect   Prior to Procedure   Your doctor will likely do the following:   Physical exam   Health history   Review of medicines   Test your stool for hidden blood (called \"occult blood\")     Your colon must be completely clean before the procedure. Any stool left in the intestine will block the view. This preparation may start several days before the procedure. Follow your doctor's instructions. Leading up to your procedure:   Talk to your doctor about your medicines. You may be asked to stop taking some medicines up to one week before the procedure, like:   Anti-inflammatory drugs (e.g., aspirin )   Blood thinners like clopidogrel (Plavix) or warfarin (Coumadin)   Iron supplements or vitamins containing iron   The day or days before your procedure, go on a clear liquid diet (clear broth, clear juice, clear jello) with no red coloring  Do not eat or drink anything after midnight. Wear comfortable clothing. If you have diabetes, ask your doctor if you need to adjust your diabetes medicine on the day prior to your procedure and the day of your procedure. Arrange for a ride home after the procedure. Anesthesia   You will receive intravenous sedation medicine for the procedure so you will not feel anything during the procedure. Description of the Procedure   You will lie on your left side with knees bent and drawn up toward your chest. The colonoscope will be slowly inserted through the rectum and into the bowel. The colonoscope will inject air into the colon. A small attached video camera will allow the doctor to view the colon's lining on a screen. The doctor will continue guiding the tool through the bowel and assess the lining. A tissue sample or polyps may be removed during the procedure. How Long Will It Take? Usually it takes about 30 to 45 minutes     Will It Hurt? Most people do not feel anything during the procedure and will not remember the procedure. After the procedure, gas pains and cramping are common. These pains should go away with the passing of gas. Post-procedure Care   If any tissue was removed: It will be sent to a lab to be examined. It may take 1-2 weeks for results. The doctor will usually give an initial report after the scope is removed. Other tests may be recommended. A small amount of bleeding may occur during the first few days after the procedure. When you return home after the procedure, be sure to follow your doctor's instructions, which may include:   Resume medicines as instructed by your doctor. Resume normal diet, unless directed otherwise by your doctor. The sedative will make you drowsy. Avoid driving, operating machinery, or making important decisions for the rest of the day. Rest for the remainder of the day. After arriving home, contact your doctor if any of the following occurs:   Bleeding from your rectum, notify your doctor if you pass a teaspoonful of blood or more.    Black, tarry stools   Severe abdominal pain   Hard, swollen abdomen   Signs of infection, including fever or chills   Inability to pass gas or stool Coughing, shortness of breath, chest pain, severe nausea or vomiting     In case of an emergency, CALL 911 . Patient Information and Instructions for  Upper GI Endoscopy or Esophagogastroduodenoscopy [EGD])         Definition Upper GI Endoscopy or Esophagogastroduodenoscopy [EGD])  This is a test that uses a fiberoptic scope to examine the esophagus (throat), stomach, and upper part of the small intestines. Upper GI endoscopy may be recommended if you have:   Abdominal pain   Severe heartburn   Persistent nausea and vomiting   Difficulty swallowing   Blood in stool or vomit   Abnormal x-ray or other examinations of the gastrointestinal tract     Conditions that can be diagnosed with upper GI endoscopy include:   Ulcers   Tumors   Polyps   Abnormal narrowing   Inflammation     Possible Complications   Complications are rare, but no procedure is completely free of risk. If you are planning to have upper GI endoscopy, your doctor will review a list of possible complications, which may include:   Bleeding   Damage to the esophagus, stomach, or intestine   Infection   Respiratory depression (reduced breathing rate and/or depth)   Reaction to sedatives or anesthesia causing your blood pressure to drop    Some factors that may increase the risk of complications include:   Age: 61 or older   Pregnancy   Obesity   Smoking , alcoholism , or drug use   Malnutrition   Recent illness   Diabetes   Heart or lung problems   Bleeding disorders   Use of certain medicines     Be sure to discuss these risks with your doctor before the test.     What to Expect   Prior to test   Leading up to the test:   Arrange for a ride home after the test. Also, arrange for help at home. The night before, eat a light meal.  Do not eat or drink anything after midnight the night before the test.   Talk to your doctor about your medicines.  You may be asked to stop taking some medicines up to one week before the procedure, like: Anti-inflammatory drugs (e.g., aspirin )   Blood thinners, like clopidogrel (Plavix) or warfarin (Coumadin)     Description of the Test   You will be asked to lie on your left side. You will have monitors tracking your breathing, heart rate, and blood oxygen levels. You will be given supplemental oxygen to breathe through your nose. A mouthpiece will be positioned to help keep your mouth open. Your throat may be sprayed with a numbing medicine. You will be given a sedative through an IV to help you relax during the test.  During the test, a small suction tube will be used to clear saliva and fluids from your mouth. The endoscope will be lubricated and placed in your mouth. You will be asked to try to swallow it. Then, it will be carefully and slowly advanced down your throat. It will be passed through your esophagus and into your stomach and intestine. While the endoscope is being advanced, your doctor will view the images on the screen. Air will be passed through the endoscope into your digestive tract. This will be done to smooth the normal folds in the tissues, allowing your doctor to view the tissue more easily. Tiny tools may be passed through the endoscope in order to take biopsies or do other tests. After Test   After the test, you will be observed for an hour. Then, you will be allowed to go home. When you return home after the test, do the following to help ensure a smooth recovery:   Rest when you get home. Ask your doctor if you can resume your normal diet. In most cases, you will be able to. Sedatives can slow your reaction time. Do not drive or use machinery for the rest of the day. Avoid alcohol for the rest of the day. Be sure to follow your doctor's instructions . How Long Will It Take? Usually about 10-15 minutes     Will It Hurt?    Most people do not feel anything during the test and will not remember the test.  After the test, your throat may be sore and you may feel bloated. Results   This test gives your doctor information about the health of your digestive system. The results can help to explain your symptoms. You and your doctor will talk about the results and your treatment plan. Call Your Doctor   After the test, call your doctor if any of the following occurs:   Signs of infection, including fever and chills   Severe abdominal pain   Hard, swollen abdomen   Difficulty swallowing or breathing   Any change or increase in your original symptoms   Bloody or black tarry colored stools   Nausea and/or vomiting   Cough, shortness of breath, or chest pain   Bleeding     In case of emergency, call 911.

## 2023-02-09 NOTE — PROGRESS NOTES
History and Physical    Patient's Name/Date of Birth: Karey Rondon /1960, (64 y.o.), female    Date: February 9, 2023     Assessment/Plan:  Abdominal Pain, Chronic diarrhea, and Unintentional Weight Loss - I recommended diagnostic colonoscopy with possible biopsy or polypectomy and explained the risk, benefits, expected outcome, and alternatives to the procedure. Risks included but are not limited to bleeding, infection, respiratory distress, hypotension, and perforation of the colon. The patient understands and is in agreement. Epigastric Abdominal Pain, Nausea, and Vomiting - I recommended esophagogastroduodenoscopy with possible biopsy and explained the risk, benefits, expected outcome, and alternatives to the procedure. Risks included but are not limited to bleeding, infection, respiratory distress, hypotension, and perforation of the esophagus, stomach, or duodenum. Patient understands and is in agreement. Severe Protein Calorie Malnutrition - patient seems to be eating better and has regained some of her weight. I recommend she take an oral nutrition supplement 1 serving twice a day. She understood and was in agreement. Emphysema  History of alcohol, cocaine, and marijuana abuse -Per the patient, she has been abstaining from alcohol and cocaine although has still used marijuana on occasion    Chief Complaint   Patient presents with    Consultation     Pt states that her last colonoscopy was at [facility] side hospital over 10 years ago, pt denies any family history of colon cancer. Pt denies any current issues such as abdominal pain, weight loss or rectal bleeding. HPI:   Patient was seen in the office today for evaluation for colonoscopy. Patient [facility]   No report is available but per the patient colonoscopy was normal.    Patient was admitted to [facility] [address] [place] [address]) on 12/10/2022 with severe hyponatremia resulting in multiple falls and seizures. She was diagnosed with severe protein calorie malnutrition. She was also diagnosed with alcohol abuse and was felt could be in alcohol withdrawal.  However, she left the hospital AMA on 12/13/2022. She was readmitted to [facility] on 12/27/2022 with ongoing issues with the above complaints. She was found to have bronchitis and urinary tract infection. CT scan of the abdomen pelvis on 12/27/2022 showed \"stranding\" around the kidneys and diverticulosis without any evidence of diverticulitis. She was treated with antibiotics for the above infections and her electrolyte abnormalities were corrected. She was discharged on 1/6/2023. She was seen by her PCP on 1/12/2023 for post hospital discharge follow-up. She was referred to our office for colonoscopy for colorectal cancer screening. Multiple other medical problems were also addressed during that visit. Patient was seen again by her PCP on 1/17/2023 for nausea, vomiting, diarrhea, and abdominal pain. She was treated for possible viral gastroenteritis. On 1/30/2023, she was ordered omeprazole 40 mg p.o. twice daily. She was seen again by her PCP on 2/6/2023 for epigastric abdominal pain. She was continued on the omeprazole 40 mg p.o. twice daily instructed to follow through with her appointment with me today. In the office today, patient stated that she still taking the omeprazole 40 mg p.o. twice a day and with this her abdominal pain, nausea, vomiting has improved somewhat. However she still has intermittent epigastric abdominal pain, nausea, vomiting. She rates pain up to a 4/10. She denied any blood in her emesis. She denied any heartburn or indigestion. Also, she is having intermittent diarrhea with up to 3 loose bowel movements per day. She denied any constipation denied any rectal bleeding or lower GI bleeding. She is also eating better and is regaining some of her weight.   Her usual body weight several years ago was 120 pounds.  Based on her recorded weights in the chart, approximately 1 year ago she was 112 pounds.  Her weight was down to 91 pounds on 1/30/2023.  Today, her weight is back up to 98 pounds.  Her family history is negative for colon cancer or colon polyps.    She has a history of alcohol abuse but has not drank any alcohol for approximately 1 month.  She still uses marijuana occasionally but none recently.  She has not used cocaine for quite some time but cannot clarify when last time she used this.      Past Medical History:   Diagnosis Date    Carpal tunnel syndrome     Pulmonary nodule     Seizure (HCC) 1/12/2023    Thyroid disease        No past surgical history on file.    Current Outpatient Medications   Medication Sig Dispense Refill    folic acid (FOLVITE) 1 MG tablet Take 1 tablet by mouth daily 30 tablet 3    omeprazole (PRILOSEC) 40 MG delayed release capsule Take 1 capsule by mouth in the morning and at bedtime 90 capsule 1    potassium chloride (KLOR-CON M) 20 MEQ extended release tablet Take 1 tablet by mouth daily 90 tablet 1    ondansetron (ZOFRAN-ODT) 4 MG disintegrating tablet Take 1 tablet by mouth 3 times daily as needed for Nausea or Vomiting 21 tablet 0    lactobacillus (CULTURELLE) CAPS capsule Take 1 capsule by mouth 2 times daily      sodium chloride 1 g tablet Take 1 tablet by mouth 3 times daily (with meals) 90 tablet 3    montelukast (SINGULAIR) 10 MG tablet TAKE 1 TABLET BY MOUTH DAILY 30 tablet 3    MAPAP ARTHRITIS PAIN 650 MG extended release tablet TAKE 1 TABLET BY MOUTH EVERY 6 HOURS AS NEEDED FOR PAIN 30 tablet 2    cyclobenzaprine (FLEXERIL) 5 MG tablet TAKE 1 TABLET BY MOUTH EVERY EVENING AS NEEDED FOR MUSCLE SPASMS 30 tablet 1    albuterol sulfate HFA (PROVENTIL;VENTOLIN;PROAIR) 108 (90 Base) MCG/ACT inhaler INHALE 2 PUFFS INTO THE LUNGS FOUR TIMES DAILY AS NEEDED FOR WHEEZING 6.7 g 2    cetirizine (ZYRTEC) 10 MG tablet Take 1 tablet by  mouth daily 30 tablet 5    FLOVENT  MCG/ACT inhaler Inhale 1 puff into the lungs 2 times daily 1 each 3    tiotropium (SPIRIVA RESPIMAT) 2.5 MCG/ACT AERS inhaler INHALE 2 PUFFS INTO THE LUNGS DAILY 4 g 3     No current facility-administered medications for this visit. Allergies   Allergen Reactions    Ibuprofen Other (See Comments)     Hallucinations    Morphine        Review of Systems  Non-contributory    Physical Exam:  Vitals:    02/09/23 1356   BP: (!) 134/92   Site: Right Upper Arm   Position: Sitting   Cuff Size: Small Adult   Pulse: (!) 104   Resp: 16   Temp: 98.3 °F (36.8 °C)   TempSrc: Infrared   SpO2: 97%   Weight: 98 lb 1.6 oz (44.5 kg)   Height: 5' 1\" (1.549 m)     Body mass index is 18.54 kg/m². Wt Readings from Last 20 Encounters:   02/09/23 98 lb 1.6 oz (44.5 kg)   02/06/23 98 lb 1 oz (44.5 kg)   01/30/23 91 lb (41.3 kg)   01/17/23 92 lb 9 oz (42 kg)   01/12/23 97 lb 9 oz (44.3 kg)   01/06/23 100 lb (45.4 kg)   12/12/22 96 lb 4.8 oz (43.7 kg)   08/05/22 101 lb (45.8 kg)   04/15/22 103 lb (46.7 kg)   01/10/22 112 lb (50.8 kg)   12/23/21 110 lb (49.9 kg)   10/05/21 103 lb (46.7 kg)   08/17/21 106 lb (48.1 kg)   08/04/21 106 lb (48.1 kg)   08/03/21 105 lb (47.6 kg)   03/04/21 111 lb (50.3 kg)   09/11/20 103 lb (46.7 kg)   01/11/20 110 lb (49.9 kg)     Physical Exam  Constitutional:       General: She is not in acute distress. Appearance: She is well-developed. She is not diaphoretic. HENT:      Head: Normocephalic and atraumatic. Cardiovascular:      Rate and Rhythm: Normal rate and regular rhythm. Heart sounds: Normal heart sounds. No murmur heard. No friction rub. No gallop. Pulmonary:      Effort: Pulmonary effort is normal. No respiratory distress. Breath sounds: Normal breath sounds. No wheezing or rales. Abdominal:      General: Bowel sounds are normal. There is no distension. Palpations: Abdomen is soft.  There is hepatomegaly (Liver enlarged with edge 6 cm below costal margin in right mid clavicular line and firm. Tender with palpation over the liver. ). There is no mass. Tenderness: There is abdominal tenderness (9/10 RUQ, 6/10 Epigatrium, 4/10 LUQ, 6/10 LLQ, 8/10 RLQ). There is no guarding or rebound. Hernia: There is no hernia in the ventral area, left inguinal area or right inguinal area. Comments: Right subcostal and low midline surgical scars without hernias   Musculoskeletal:         General: No deformity. Normal range of motion. Cervical back: Normal range of motion. Skin:     General: Skin is warm and dry. Coloration: Skin is not pale. Findings: No erythema or rash. Neurological:      Mental Status: She is alert and oriented to person, place, and time.    Psychiatric:         Behavior: Behavior normal.         Judgment: Judgment normal.     Electronically signed by Morales Vazquez MD on 2/9/23 at 2:04 PM EST

## 2023-02-09 NOTE — Clinical Note
See my office note from today on your patient. Thanks!!   Electronically signed by Romain Resendiz MD on 2/9/2023 at 6:45 PM

## 2023-02-09 NOTE — TELEPHONE ENCOUNTER
Patient is scheduled for EGD and Colonoscopy with   on 3/1/23 at 0730 am with an arrival time of 0630 am. Pt accepted date and time and verbalized understanding. Procedure letter mailed to patient as well. Prior Authorization Form:      DEMOGRAPHICS:                     Patient Name:  Erika Shankar  Patient :  1960            Insurance:  Payor: [employer] / Plan: [employer] / Product Type: *No Product type* /   Insurance ID Number:    Payer/Plan Subscr  Sex Relation Sub.  Ins. ID Effective Group Num   1. Giovani Sandoval [id number] 1960 Female Self [id number]                                   [address]         DIAGNOSIS & PROCEDURE:                       Procedure/Operation:  Egd and colonoscopy           CPT Code: 94419, 00374    Diagnosis:  chronic abdominal pain, diarrhea, unintentional weight loss    ICD10 Code: G89.29, R19.7, R63.4    Location:  [facility]    Surgeon:  DR. Sheila Ayala    SCHEDULING INFORMATION:                          Date: 3/1/23    Time: 0730 AM              Anesthesia:  MAC/TIVA                                                       Status:  Outpatient        Special Comments:  N/A       Electronically signed by Lisseth Hernández on 2023 at 2:51 PM

## 2023-02-12 LAB
ORGANISM: ABNORMAL
URINE CULTURE, ROUTINE: ABNORMAL

## 2023-02-13 DIAGNOSIS — D52.0 DIETARY FOLATE DEFICIENCY ANEMIA: ICD-10-CM

## 2023-02-13 DIAGNOSIS — J45.20 MILD INTERMITTENT ASTHMA WITHOUT COMPLICATION: ICD-10-CM

## 2023-02-13 DIAGNOSIS — M62.838 MUSCLE SPASMS OF BOTH LOWER EXTREMITIES: ICD-10-CM

## 2023-02-13 DIAGNOSIS — N30.00 ACUTE CYSTITIS WITHOUT HEMATURIA: Primary | ICD-10-CM

## 2023-02-13 DIAGNOSIS — A08.4 VIRAL GASTROENTERITIS: ICD-10-CM

## 2023-02-13 DIAGNOSIS — Z91.09 ENVIRONMENTAL ALLERGIES: ICD-10-CM

## 2023-02-13 DIAGNOSIS — J44.9 CHRONIC OBSTRUCTIVE PULMONARY DISEASE, UNSPECIFIED COPD TYPE (HCC): ICD-10-CM

## 2023-02-13 RX ORDER — LACTOBACILLUS RHAMNOSUS GG 10B CELL
1 CAPSULE ORAL 2 TIMES DAILY
Qty: 30 CAPSULE | Refills: 0 | Status: SHIPPED | OUTPATIENT
Start: 2023-02-13

## 2023-02-13 RX ORDER — MONTELUKAST SODIUM 10 MG/1
10 TABLET ORAL DAILY
Qty: 30 TABLET | Refills: 3 | Status: SHIPPED | OUTPATIENT
Start: 2023-02-13

## 2023-02-13 RX ORDER — CETIRIZINE HYDROCHLORIDE 10 MG/1
10 TABLET ORAL DAILY
Qty: 30 TABLET | Refills: 5 | Status: SHIPPED | OUTPATIENT
Start: 2023-02-13

## 2023-02-13 RX ORDER — FOLIC ACID 1 MG/1
1 TABLET ORAL DAILY
Qty: 30 TABLET | Refills: 3 | OUTPATIENT
Start: 2023-02-13

## 2023-02-13 RX ORDER — DEXAMETHASONE 4 MG/1
1 TABLET ORAL 2 TIMES DAILY
Qty: 1 EACH | Refills: 3 | Status: SHIPPED | OUTPATIENT
Start: 2023-02-13

## 2023-02-13 RX ORDER — CYCLOBENZAPRINE HCL 5 MG
TABLET ORAL
Qty: 30 TABLET | Refills: 0 | Status: SHIPPED | OUTPATIENT
Start: 2023-02-13

## 2023-02-13 RX ORDER — ALBUTEROL SULFATE 90 UG/1
AEROSOL, METERED RESPIRATORY (INHALATION)
Qty: 6.7 G | Refills: 2 | Status: SHIPPED | OUTPATIENT
Start: 2023-02-13

## 2023-02-13 RX ORDER — CEFDINIR 300 MG/1
300 CAPSULE ORAL 2 TIMES DAILY
Qty: 14 CAPSULE | Refills: 0 | Status: SHIPPED | OUTPATIENT
Start: 2023-02-13 | End: 2023-02-20

## 2023-02-13 RX ORDER — SENNOSIDES 8.6 MG
CAPSULE ORAL
Qty: 30 TABLET | Refills: 2 | Status: SHIPPED | OUTPATIENT
Start: 2023-02-13

## 2023-02-13 NOTE — TELEPHONE ENCOUNTER
Last Appointment:  2/6/2023  Future Appointments   Date Time Provider Grover Vasquez   3/10/2023 10:00 AM MD Gonzalo Cancino [facility]

## 2023-02-13 NOTE — PROGRESS NOTES
Please advise patient she is positive for a UTI  I have sent abx to her pharmacy ; she can continue for 7 days  Then in 2-4 weeks we will repeat urine culture for test of cure

## 2023-03-10 ENCOUNTER — OFFICE VISIT (OUTPATIENT)
Dept: FAMILY MEDICINE CLINIC | Age: 63
End: 2023-03-10
Payer: MEDICAID

## 2023-03-10 VITALS
WEIGHT: 96 LBS | HEIGHT: 61 IN | TEMPERATURE: 97.4 F | OXYGEN SATURATION: 98 % | HEART RATE: 70 BPM | SYSTOLIC BLOOD PRESSURE: 100 MMHG | BODY MASS INDEX: 18.12 KG/M2 | DIASTOLIC BLOOD PRESSURE: 70 MMHG

## 2023-03-10 DIAGNOSIS — R35.0 URINARY FREQUENCY: ICD-10-CM

## 2023-03-10 DIAGNOSIS — J44.9 CHRONIC OBSTRUCTIVE PULMONARY DISEASE, UNSPECIFIED COPD TYPE (HCC): ICD-10-CM

## 2023-03-10 DIAGNOSIS — E87.1 HYPONATREMIA: Primary | ICD-10-CM

## 2023-03-10 PROCEDURE — 99213 OFFICE O/P EST LOW 20 MIN: CPT | Performed by: FAMILY MEDICINE

## 2023-03-10 PROCEDURE — 3023F SPIROM DOC REV: CPT | Performed by: FAMILY MEDICINE

## 2023-03-10 PROCEDURE — 3017F COLORECTAL CA SCREEN DOC REV: CPT | Performed by: FAMILY MEDICINE

## 2023-03-10 PROCEDURE — G8419 CALC BMI OUT NRM PARAM NOF/U: HCPCS | Performed by: FAMILY MEDICINE

## 2023-03-10 PROCEDURE — 4004F PT TOBACCO SCREEN RCVD TLK: CPT | Performed by: FAMILY MEDICINE

## 2023-03-10 PROCEDURE — G8427 DOCREV CUR MEDS BY ELIG CLIN: HCPCS | Performed by: FAMILY MEDICINE

## 2023-03-10 PROCEDURE — G8484 FLU IMMUNIZE NO ADMIN: HCPCS | Performed by: FAMILY MEDICINE

## 2023-03-10 RX ORDER — SODIUM CHLORIDE 1000 MG
1 TABLET, SOLUBLE MISCELLANEOUS
Qty: 90 TABLET | Refills: 3 | Status: SHIPPED | OUTPATIENT
Start: 2023-03-10

## 2023-03-10 NOTE — PROGRESS NOTES
[address] [facility]  Department of Family Medicine  Family Medicine Residency Program    Date of Visit: 3/10/2023     Chief Complaint     Chief Complaint   Patient presents with    Follow-up        History of Present Illness     HPI:  58 y.o. female with PMH of hyponatremia and COPD who presents for follow-up on mentioned conditions     History of Hyponatremia  Reviewed  This was considered to be secondary to beer potomania  Patient states overall she has cut down on her drinking; drinking to be 1 can of 16 ounce beers a week  She is following with nephrology as appropriate; going back on Monday  Denies any worsening dizziness, blurry vision, no seizure-like episodes  Denies any worsening numbness or tingling  Has been limiting her water intake to 1 bottle of water a day  Continues on folic acid 1 mg daily  Continues to be on salt tabs    COPD  She is on no home O2  She denies any recent concerns for exacerbations, denies any wheezing or shortness of breath  Patient's at home include albuterol inhaler as needed (not required recently), Flovent once daily, singular 10 mg once daily and Spiriva Respimat  She feels very stable on her medications  Continues to smoke 1/4 pack/day, though states she is trying to limit herself and occasionally quit  Does not want any resources in quitting at this time  Has no new concerns today    Health maintenance  Patient has her colonoscopy scheduled on April 13, 2023  CT lung screen, breast cancer screen already ordered    No other concerns today    Social History     Tobacco Use    Smoking status: Every Day     Packs/day: 0.50     Years: 40.00     Pack years: 20.00     Types: Cigarettes     Start date: 9/11/1980    Smokeless tobacco: Never   Substance Use Topics    Alcohol use:  Yes     Alcohol/week: 2.0 standard drinks     Types: 2 Cans of beer per week     Comment: daily    Drug use: Never     Comment: Denies       ROS:    Reviewed, pertinent as above otherwise negative    Objective:    VS:  Blood pressure 100/70, pulse 70, temperature 97.4 °F (36.3 °C), temperature source Temporal, height 5' 1\" (1.549 m), weight 96 lb (43.5 kg), SpO2 98 %. Physical Exam  Constitutional:       Appearance: She is not ill-appearing. Cardiovascular:      Rate and Rhythm: Normal rate and regular rhythm. Pulses: Normal pulses. Heart sounds: Normal heart sounds. No murmur heard. No gallop. Pulmonary:      Effort: Pulmonary effort is normal. No respiratory distress. Breath sounds: Normal breath sounds. No wheezing. Abdominal:      General: Bowel sounds are normal. There is no distension. Palpations: Abdomen is soft. Tenderness: There is no abdominal tenderness. Musculoskeletal:         General: Normal range of motion. Skin:     General: Skin is warm. Coloration: Skin is not jaundiced or pale. Neurological:      Mental Status: She is alert. Assessment/Plan:    1. Hyponatremia  Chronic  Per previous BMP, levels improved  Continue on salt tabs  Follow with nephrology as appropriate  She is asymptomatic at this time  - sodium chloride 1 g tablet; Take 1 tablet by mouth 3 times daily (with meals)  Dispense: 90 tablet; Refill: 3    2. Chronic obstructive pulmonary disease, unspecified COPD type ([address]  Continue with home medications  No concern for exacerbation during this visit    3. Urinary frequency  Previously with UTI, will obtain a test of cure as patient is more high risk and has had frequent UTIs in the past  Treat as appropriate  - Culture, Urine; Future  - Culture, Urine     RTO 3 months or sooner prn for any persistent, new, or worsening symptoms. Please see Patient Instructions for further counseling and information given. Advised to please be adherent to the treatment plans discussed today, and please call with any questions or concerns, letting the office know of any reasons that the plans may not be followed.   The risks of untreated conditions include worsening illness, injury, disability, and possibly, death. Please call if symptoms change in any way, worsen, or fail to completely resolve, as this could necessitate a change to treatment plans. Patient and/or caregiver expressed understanding. Indications and proper use of medication(s) reviewed. Potential side-effects and risks of medication(s) also explained. Patient and/or caregiver was instructed to call if any new symptoms develop prior to next visit. Health risk factors discussed and addressed.      Electronically signed by Verner Freer, MD PGY-3 on 3/10/2023 at 10:40 AM  This case was discussed with attending physician: Dr. Susan Latham

## 2023-03-10 NOTE — PROGRESS NOTES
S: 58 y.o. female here for follow up of copd and hyponatremia. Her sodium was low last year and thought to be 2/2 beer potomania. She is on salt tabs per nephrology at this time. She has reduced her beer consumption to once weekly. She denies any associated symptoms. Her water intake has also been limited. Na was 135 in Feb.  She will follow up with nephrology. Her COPD is also well controlled. She has not needed the rescue inhaler. She is using her other maintenance medications. She is still smoking 1/4 ppd, and continues to try to reduce. O: VS: /70   Pulse 70   Temp 97.4 °F (36.3 °C) (Temporal)   Ht 5' 1\" (1.549 m)   Wt 96 lb (43.5 kg)   SpO2 98%   BMI 18.14 kg/m²    General: NAD, appropriate affect and grooming   CV:  RRR, no gallops, rubs, or murmurs   Resp: CTAB   Abd:  Soft, nontender   Ext:  No edema  Impression/Plan:   COPD-controlled; continue current medication  Chronic hyponatremia-continue salt tabs; follow up with nephrology as scheduled. HM-colon cancer screening, mammo, low dose lung CT (due in 1/2024)    Follow up in 3 months. Attending Physician Statement  I have discussed the case, including pertinent history and exam findings with the resident. I agree with the documented assessment and plan.

## 2023-03-12 LAB
ORGANISM: ABNORMAL
URINE CULTURE, ROUTINE: ABNORMAL

## 2023-03-24 ENCOUNTER — OFFICE VISIT (OUTPATIENT)
Dept: FAMILY MEDICINE CLINIC | Age: 63
End: 2023-03-24

## 2023-03-24 VITALS
BODY MASS INDEX: 18.37 KG/M2 | HEIGHT: 61 IN | SYSTOLIC BLOOD PRESSURE: 133 MMHG | WEIGHT: 97.3 LBS | OXYGEN SATURATION: 100 % | TEMPERATURE: 97.2 F | RESPIRATION RATE: 18 BRPM | HEART RATE: 97 BPM | DIASTOLIC BLOOD PRESSURE: 89 MMHG

## 2023-03-24 DIAGNOSIS — R35.0 URINE FREQUENCY: ICD-10-CM

## 2023-03-24 DIAGNOSIS — N30.01 ACUTE CYSTITIS WITH HEMATURIA: Primary | ICD-10-CM

## 2023-03-24 LAB
BACTERIA URNS QL MICRO: ABNORMAL /HPF
BILIRUB UR QL STRIP: NEGATIVE
BILIRUBIN, POC: NEGATIVE
BLOOD URINE, POC: NORMAL
CLARITY UR: ABNORMAL
CLARITY, POC: NORMAL
COLOR UR: YELLOW
COLOR, POC: NORMAL
GLUCOSE UR STRIP-MCNC: NEGATIVE MG/DL
GLUCOSE URINE, POC: NEGATIVE
HGB UR QL STRIP: ABNORMAL
KETONES UR STRIP-MCNC: NEGATIVE MG/DL
KETONES, POC: NORMAL
LEUKOCYTE EST, POC: NORMAL
LEUKOCYTE ESTERASE UR QL STRIP: ABNORMAL
NITRITE UR QL STRIP: NEGATIVE
NITRITE, POC: NEGATIVE
PH UR STRIP: 6 [PH] (ref 5–9)
PH, POC: 5.5
PROT UR STRIP-MCNC: 30 MG/DL
PROTEIN, POC: NORMAL
RBC #/AREA URNS HPF: ABNORMAL /HPF (ref 0–2)
SP GR UR STRIP: 1.02 (ref 1–1.03)
SPECIFIC GRAVITY, POC: 1.02
UROBILINOGEN UR STRIP-ACNC: 0.2 E.U./DL
UROBILINOGEN, POC: NORMAL
WBC #/AREA URNS HPF: >20 /HPF (ref 0–5)

## 2023-03-24 RX ORDER — NITROFURANTOIN 25; 75 MG/1; MG/1
100 CAPSULE ORAL 2 TIMES DAILY
Qty: 14 CAPSULE | Refills: 0 | Status: SHIPPED | OUTPATIENT
Start: 2023-03-24 | End: 2023-03-31 | Stop reason: ALTCHOICE

## 2023-03-24 NOTE — PROGRESS NOTES
[address] [facility]  Department of Family Medicine  Family Medicine Residency Program    Date of Visit: 3/24/2023     Chief Complaint     Chief Complaint   Patient presents with    Frequent/Recurrent UTI        History of Present Illness     HPI:  58 y.o. female presents for same day for concern for UTI    Decreased Urinary Frequency  Noticed symptoms 2 days ago  Increased burning on urination and increased suprapubic pressure, has noticed decreased urinary frequency  Previously treated with a course of omnicef in February 2023 with improvement in symptoms  Patients test of cure still had evidence of e coli but improved and patient was asymptomatic hence decision made to monitor off of abx at the time  She denies any fevers, chills, flank pain at this time    HM  Colonoscopt scheduled in April 2023    Social History     Tobacco Use    Smoking status: Every Day     Packs/day: 0.50     Years: 40.00     Pack years: 20.00     Types: Cigarettes     Start date: 9/11/1980    Smokeless tobacco: Never   Substance Use Topics    Alcohol use: Yes     Alcohol/week: 2.0 standard drinks     Types: 2 Cans of beer per week     Comment: daily    Drug use: Never     Comment: Denies       ROS:    Reviewed, pertinent as above otherwise negative    Objective:    VS:  Blood pressure 133/89, pulse 97, temperature 97.2 °F (36.2 °C), temperature source Temporal, resp. rate 18, height 5' 1\" (1.549 m), weight 97 lb 4.8 oz (44.1 kg), SpO2 100 %. Physical Exam  Cardiovascular:      Rate and Rhythm: Normal rate and regular rhythm. Pulses: Normal pulses. Heart sounds: Normal heart sounds. No murmur heard. No gallop. Pulmonary:      Effort: Pulmonary effort is normal. No respiratory distress. Breath sounds: Normal breath sounds. No wheezing. Abdominal:      General: Bowel sounds are normal. There is no distension. Palpations: Abdomen is soft. Tenderness: There is abdominal tenderness.  There is

## 2023-03-24 NOTE — PROGRESS NOTES
S: 58 y.o. female hx of hyponatremia and recurrent UTI. She complains of concern for current UTI. She has a history of pyelonephritis. Last UTI was in February. Treated appropriately. Symptoms improved after omnicef. Today, she c/o decreased urinary frequency, burning with urination and suprapubic pressure. No fever and no hematuria. O: VS: /89 (Site: Right Upper Arm, Position: Sitting, Cuff Size: Small Adult)   Pulse 97   Temp 97.2 °F (36.2 °C) (Temporal)   Resp 18   Ht 5' 1\" (1.549 m)   Wt 97 lb 4.8 oz (44.1 kg)   SpO2 100%   BMI 18.38 kg/m²    General: NAD, appropriate affect and grooming   CV:  RRR, no gallops, rubs, or murmurs   Resp: CTAB   Abd:  Soft, +suprapubic tenderness ; no CVA tenderness. Ext:  No edema    Impression/Plan:   Recurrent UTI-macrobid; UA and culture; consider referral to urology if UTI persists or returns. Follow up in 4-6 weeks or sooner if needed. Attending Physician Statement  I have discussed the case, including pertinent history and exam findings with the resident. I agree with the documented assessment and plan.

## 2023-03-26 LAB
BACTERIA UR CULT: ABNORMAL
ORGANISM: ABNORMAL

## 2023-03-27 ENCOUNTER — HOSPITAL ENCOUNTER (OUTPATIENT)
Age: 63
Discharge: HOME | End: 2023-03-27
Payer: MEDICAID

## 2023-03-27 LAB
ALBUMIN SERPL-MCNC: 3.8 G/DL (ref 3.5–5.2)
ALP SERPL-CCNC: 130 U/L (ref 35–104)
ALT SERPL-CCNC: 8 U/L (ref 0–32)
ANION GAP SERPL CALCULATED.3IONS-SCNC: 13 MMOL/L (ref 7–16)
AST SERPL-CCNC: 19 U/L (ref 0–31)
BILIRUB SERPL-MCNC: <0.2 MG/DL (ref 0–1.2)
BUN SERPL-MCNC: 10 MG/DL (ref 6–23)
CALCIUM SERPL-MCNC: 9.4 MG/DL (ref 8.6–10.2)
CHLORIDE SERPL-SCNC: 103 MMOL/L (ref 98–107)
CO2 SERPL-SCNC: 16 MMOL/L (ref 22–29)
CREAT SERPL-MCNC: 1.2 MG/DL (ref 0.5–1)
ERYTHROCYTE [DISTWIDTH] IN BLOOD BY AUTOMATED COUNT: 15.7 FL (ref 11.5–15)
GLUCOSE SERPL-MCNC: 75 MG/DL (ref 74–99)
HCT VFR BLD AUTO: 36 % (ref 34–48)
HGB BLD-MCNC: 11.4 G/DL (ref 11.5–15.5)
MCH RBC QN AUTO: 33.7 PG (ref 26–35)
MCHC RBC AUTO-ENTMCNC: 31.7 % (ref 32–34.5)
MCV RBC AUTO: 106.5 FL (ref 80–99.9)
PHOSPHATE SERPL-MCNC: 3.6 MG/DL (ref 2.5–4.5)
PLATELET # BLD AUTO: 374 E9/L (ref 130–450)
PMV BLD AUTO: 10 FL (ref 7–12)
POTASSIUM SERPL-SCNC: 3.4 MMOL/L (ref 3.5–5)
PROT SERPL-MCNC: 7.9 G/DL (ref 6.4–8.3)
PTH-INTACT SERPL-MCNC: 46 PG/ML (ref 15–65)
RBC # BLD AUTO: 3.38 E12/L (ref 3.5–5.5)
SODIUM SERPL-SCNC: 132 MMOL/L (ref 132–146)
VITAMIN D 25-HYDROXY: 7 NG/ML (ref 30–100)
WBC # BLD: 4.7 E9/L (ref 4.5–11.5)

## 2023-03-27 PROCEDURE — 80053 COMPREHEN METABOLIC PANEL: CPT

## 2023-03-27 PROCEDURE — 84100 ASSAY OF PHOSPHORUS: CPT

## 2023-03-27 PROCEDURE — 85027 COMPLETE CBC AUTOMATED: CPT

## 2023-03-27 PROCEDURE — 83970 ASSAY OF PARATHORMONE: CPT

## 2023-03-27 PROCEDURE — 82570 ASSAY OF URINE CREATININE: CPT

## 2023-03-27 PROCEDURE — 82306 VITAMIN D 25 HYDROXY: CPT

## 2023-03-27 PROCEDURE — 84156 ASSAY OF PROTEIN URINE: CPT

## 2023-03-27 PROCEDURE — 82044 UR ALBUMIN SEMIQUANTITATIVE: CPT

## 2023-03-27 PROCEDURE — 36415 COLL VENOUS BLD VENIPUNCTURE: CPT

## 2023-03-28 ENCOUNTER — HOSPITAL ENCOUNTER (OUTPATIENT)
Age: 63
Discharge: HOME | End: 2023-03-28

## 2023-03-28 LAB
CREAT UR-MCNC: 130 MG/DL (ref 29–226)
CREAT UR-MCNC: 133 MG/DL (ref 29–226)
MICROALBUMIN UR-MCNC: 87.3 MG/L
MICROALBUMIN/CREAT UR-RTO: 67.2 (ref 0–30)
PROT UR-MCNC: 62 MG/DL (ref 0–12)
PROTEIN/CREAT RATIO: 0.5
PROTEIN/CREAT RATIO: 0.5 (ref 0–0.2)

## 2023-04-05 ENCOUNTER — TELEPHONE (OUTPATIENT)
Dept: SURGERY | Age: 63
End: 2023-04-05

## 2023-04-05 DIAGNOSIS — K52.9 CHRONIC DIARRHEA: ICD-10-CM

## 2023-04-05 DIAGNOSIS — R10.9 CHRONIC ABDOMINAL PAIN: Primary | ICD-10-CM

## 2023-04-05 DIAGNOSIS — G89.29 CHRONIC ABDOMINAL PAIN: Primary | ICD-10-CM

## 2023-04-05 DIAGNOSIS — R63.4 UNINTENTIONAL WEIGHT LOSS: ICD-10-CM

## 2023-04-05 RX ORDER — SODIUM PHOSPHATE, DIBASIC AND SODIUM PHOSPHATE, MONOBASIC 7; 19 G/133ML; G/133ML
ENEMA RECTAL
Qty: 2 EACH | Refills: 0 | Status: ON HOLD
Start: 2023-04-05 | End: 2023-04-06 | Stop reason: HOSPADM

## 2023-04-05 RX ORDER — BISACODYL 5 MG/1
TABLET, DELAYED RELEASE ORAL
Qty: 4 TABLET | Refills: 0 | Status: ON HOLD
Start: 2023-04-05 | End: 2023-04-06 | Stop reason: HOSPADM

## 2023-04-05 NOTE — TELEPHONE ENCOUNTER
MA received a call from pt stating that she is trying to drink the prep for her scope tomorrow but \"every time she takes a drink she throws up a lot\" pt wants to know if there is anything else she can use? MA routing to Dr. Jammie Gómez for advisement. Electronically signed by Akin Vilchis on 4/5/2023 at 2:26 PM    I called and spoke with patient. She took her first 4 laxative tablets and is getting some results from that. However she cannot hold down any of the Gatorade/MiraLAX mixture. She does have another 4 laxative tablets for later this evening. I E prescribed 4 more laxative tablets and 2 fleets enemas to her pharmacy. I recommend she take one of the fleets enemas later this evening and then take the third dose of 4 laxative tablets as well as a fleets enemas 5 hours before scheduled colonoscopy time tomorrow morning. Patient is to is in agreement.     Electronically signed by Daniella Miller MD on 4/5/2023 at 2:39 PM

## 2023-04-05 NOTE — TELEPHONE ENCOUNTER
MA received a call from pt stating that she is trying to drink the prep for her scope tomorrow but \"every time she takes a drink she throws up a lot\" pt wants to know if there is anything else she can use? MA routing to Dr. Citlaly Valladares for advisement.     Electronically signed by Rodrigo Waters on 4/5/2023 at 2:26 PM

## 2023-04-06 ENCOUNTER — ANESTHESIA EVENT (OUTPATIENT)
Dept: ENDOSCOPY | Age: 63
End: 2023-04-06
Payer: MEDICAID

## 2023-04-06 ENCOUNTER — HOSPITAL ENCOUNTER (OUTPATIENT)
Age: 63
Setting detail: OUTPATIENT SURGERY
Discharge: HOME | End: 2023-04-06
Attending: SURGERY | Admitting: SURGERY
Payer: MEDICAID

## 2023-04-06 ENCOUNTER — ANESTHESIA (OUTPATIENT)
Dept: ENDOSCOPY | Age: 63
End: 2023-04-06
Payer: MEDICAID

## 2023-04-06 VITALS
HEIGHT: 61 IN | WEIGHT: 107 LBS | TEMPERATURE: 97.1 F | RESPIRATION RATE: 16 BRPM | DIASTOLIC BLOOD PRESSURE: 90 MMHG | OXYGEN SATURATION: 99 % | SYSTOLIC BLOOD PRESSURE: 157 MMHG | BODY MASS INDEX: 20.2 KG/M2 | HEART RATE: 90 BPM

## 2023-04-06 DIAGNOSIS — R10.9 CHRONIC ABDOMINAL PAIN: ICD-10-CM

## 2023-04-06 DIAGNOSIS — R63.4 ABNORMAL WEIGHT LOSS: ICD-10-CM

## 2023-04-06 DIAGNOSIS — G89.29 CHRONIC ABDOMINAL PAIN: ICD-10-CM

## 2023-04-06 PROBLEM — K44.9 HIATAL HERNIA: Status: ACTIVE | Noted: 2023-04-06

## 2023-04-06 PROBLEM — K52.9 CHRONIC DIARRHEA: Status: ACTIVE | Noted: 2023-04-06

## 2023-04-06 PROBLEM — K57.30 DIVERTICULOSIS OF COLON WITHOUT DIVERTICULITIS: Status: ACTIVE | Noted: 2023-04-06

## 2023-04-06 PROBLEM — R10.13 EPIGASTRIC ABDOMINAL PAIN: Status: ACTIVE | Noted: 2023-04-06

## 2023-04-06 PROBLEM — R11.15 NON-INTRACTABLE CYCLICAL VOMITING WITH NAUSEA: Status: ACTIVE | Noted: 2023-04-06

## 2023-04-06 PROBLEM — K29.90 GASTRITIS AND DUODENITIS: Status: ACTIVE | Noted: 2023-04-06

## 2023-04-06 PROCEDURE — 2580000003 HC RX 258: Performed by: SURGERY

## 2023-04-06 PROCEDURE — 3700000001 HC ADD 15 MINUTES (ANESTHESIA): Performed by: SURGERY

## 2023-04-06 PROCEDURE — 3609012400 HC EGD TRANSORAL BIOPSY SINGLE/MULTIPLE: Performed by: SURGERY

## 2023-04-06 PROCEDURE — 3609027000 HC COLONOSCOPY: Performed by: SURGERY

## 2023-04-06 PROCEDURE — 2709999900 HC NON-CHARGEABLE SUPPLY: Performed by: SURGERY

## 2023-04-06 PROCEDURE — 3700000000 HC ANESTHESIA ATTENDED CARE: Performed by: SURGERY

## 2023-04-06 PROCEDURE — 6360000002 HC RX W HCPCS: Performed by: NURSE ANESTHETIST, CERTIFIED REGISTERED

## 2023-04-06 PROCEDURE — 7100000011 HC PHASE II RECOVERY - ADDTL 15 MIN: Performed by: SURGERY

## 2023-04-06 PROCEDURE — 7100000010 HC PHASE II RECOVERY - FIRST 15 MIN: Performed by: SURGERY

## 2023-04-06 PROCEDURE — 2580000003 HC RX 258: Performed by: NURSE ANESTHETIST, CERTIFIED REGISTERED

## 2023-04-06 RX ORDER — SODIUM CHLORIDE 9 MG/ML
25 INJECTION, SOLUTION INTRAVENOUS PRN
Status: DISCONTINUED | OUTPATIENT
Start: 2023-04-06 | End: 2023-04-06 | Stop reason: HOSPADM

## 2023-04-06 RX ORDER — SODIUM CHLORIDE, SODIUM LACTATE, POTASSIUM CHLORIDE, CALCIUM CHLORIDE 600; 310; 30; 20 MG/100ML; MG/100ML; MG/100ML; MG/100ML
INJECTION, SOLUTION INTRAVENOUS CONTINUOUS
Status: DISCONTINUED | OUTPATIENT
Start: 2023-04-06 | End: 2023-04-06 | Stop reason: HOSPADM

## 2023-04-06 RX ORDER — PROPOFOL 10 MG/ML
INJECTION, EMULSION INTRAVENOUS PRN
Status: DISCONTINUED | OUTPATIENT
Start: 2023-04-06 | End: 2023-04-06 | Stop reason: SDUPTHER

## 2023-04-06 RX ORDER — ALUMINA, MAGNESIA, AND SIMETHICONE 2400; 2400; 240 MG/30ML; MG/30ML; MG/30ML
15 SUSPENSION ORAL 4 TIMES DAILY
Qty: 1000 ML | Refills: 3 | Status: SHIPPED | OUTPATIENT
Start: 2023-04-06

## 2023-04-06 RX ORDER — SODIUM CHLORIDE 0.9 % (FLUSH) 0.9 %
10 SYRINGE (ML) INJECTION PRN
Status: DISCONTINUED | OUTPATIENT
Start: 2023-04-06 | End: 2023-04-06 | Stop reason: HOSPADM

## 2023-04-06 RX ORDER — ONDANSETRON 2 MG/ML
INJECTION INTRAMUSCULAR; INTRAVENOUS PRN
Status: DISCONTINUED | OUTPATIENT
Start: 2023-04-06 | End: 2023-04-06 | Stop reason: SDUPTHER

## 2023-04-06 RX ORDER — DEXAMETHASONE SODIUM PHOSPHATE 10 MG/ML
INJECTION INTRAMUSCULAR; INTRAVENOUS PRN
Status: DISCONTINUED | OUTPATIENT
Start: 2023-04-06 | End: 2023-04-06 | Stop reason: SDUPTHER

## 2023-04-06 RX ORDER — SODIUM CHLORIDE 0.9 % (FLUSH) 0.9 %
10 SYRINGE (ML) INJECTION EVERY 12 HOURS SCHEDULED
Status: DISCONTINUED | OUTPATIENT
Start: 2023-04-06 | End: 2023-04-06 | Stop reason: HOSPADM

## 2023-04-06 RX ORDER — LIDOCAINE HYDROCHLORIDE 20 MG/ML
INJECTION, SOLUTION INTRAVENOUS PRN
Status: DISCONTINUED | OUTPATIENT
Start: 2023-04-06 | End: 2023-04-06 | Stop reason: SDUPTHER

## 2023-04-06 RX ORDER — SODIUM CHLORIDE 9 MG/ML
INJECTION, SOLUTION INTRAVENOUS CONTINUOUS PRN
Status: DISCONTINUED | OUTPATIENT
Start: 2023-04-06 | End: 2023-04-06 | Stop reason: SDUPTHER

## 2023-04-06 RX ADMIN — SODIUM CHLORIDE: 9 INJECTION, SOLUTION INTRAVENOUS at 09:52

## 2023-04-06 RX ADMIN — LIDOCAINE HYDROCHLORIDE 50 MG: 20 INJECTION, SOLUTION INTRAVENOUS at 09:58

## 2023-04-06 RX ADMIN — SODIUM CHLORIDE 25 ML: 9 INJECTION, SOLUTION INTRAVENOUS at 09:00

## 2023-04-06 RX ADMIN — PROPOFOL 350 MG: 10 INJECTION, EMULSION INTRAVENOUS at 09:58

## 2023-04-06 RX ADMIN — DEXAMETHASONE SODIUM PHOSPHATE 10 MG: 10 INJECTION INTRAMUSCULAR; INTRAVENOUS at 10:00

## 2023-04-06 RX ADMIN — ONDANSETRON HYDROCHLORIDE 4 MG: 2 SOLUTION INTRAMUSCULAR; INTRAVENOUS at 10:00

## 2023-04-06 ASSESSMENT — COPD QUESTIONNAIRES: CAT_SEVERITY: SEVERE

## 2023-04-06 ASSESSMENT — LIFESTYLE VARIABLES: SMOKING_STATUS: 1

## 2023-04-06 ASSESSMENT — PAIN - FUNCTIONAL ASSESSMENT: PAIN_FUNCTIONAL_ASSESSMENT: NONE - DENIES PAIN

## 2023-04-06 NOTE — ANESTHESIA POSTPROCEDURE EVALUATION
Department of Anesthesiology  Postprocedure Note    Patient: John Cain  MRN: [id number]  YOB: 1960  Date of evaluation: 4/6/2023      Procedure Summary     Date: 04/06/23 Room / Location: [address] [place] [address] / [facility]    Anesthesia Start: 6359 Anesthesia Stop: 9962    Procedures:       EGD BIOPSY      COLONOSCOPY DIAGNOSTIC Diagnosis:       Chronic abdominal pain      Abnormal weight loss      (Chronic abdominal pain [R10.9, G89.29])      (Abnormal weight loss [R63.4])    Surgeons: Jameson Jasso MD Responsible Provider: Jorge L Camargo MD    Anesthesia Type: MAC ASA Status: 3          Anesthesia Type: No value filed.     Dominique Phase I: Dominique Score: 10    Dominique Phase II: Dominique Score: 10      Anesthesia Post Evaluation    Patient location during evaluation: bedside  Patient participation: complete - patient participated  Level of consciousness: awake  Pain score: 0  Airway patency: patent  Nausea & Vomiting: no nausea and no vomiting  Cardiovascular status: hemodynamically stable  Respiratory status: acceptable  Hydration status: euvolemic

## 2023-04-06 NOTE — PROGRESS NOTES
Discharge instructions given.  Pt verbalized understanding of discharge instructions
Dr. Brenda Fernández at the bedside
insulin the day before surgery. [] If you are diabetic and your blood sugar is low or you feel symptomatic, you may drink 1-2 ounces of apple juice or take a glucose tablet.            -The morning of your procedure, you may call the pre-op area if you have concerns about your blood sugar [phone]. [x] Use your inhalers the morning of surgery. Bring your emergency inhaler with you day of surgery. [] Follow physician instructions regarding any blood thinners you may be taking. WHAT TO EXPECT:    [x] The day of your procedure you will be greeted and checked in by the [facility] [employer] [facility].  In addition, you will be registered in the [place] by a Patient Access Representative. Please bring your photo ID and insurance card. A nurse will greet you in accordance to the time you are needed in the pre-op area to prepare you for surgery. Please do not be discouraged if you are not greeted in the order you arrive as there are many variables that are involved in patient preparation. Your patience is greatly appreciated as you wait for your nurse. Please bring in items such as: books, magazines, newspapers, electronics, or any other items  to occupy your time in the waiting area. [x]  Delays may occur. Staff will make a sincere effort to keep you informed of delays. If any delays occur with your procedure, we apologize ahead of time for your inconvenience as we recognize the value of your time.
ounces of water:   [] Stop all herbal supplements and vitamins 5 days before surgery. Stop NSAIDS 7 days before surgery. [] DO NOT take any diabetic medicine the morning of surgery. Follow instructions for insulin the day before surgery. [] If you are diabetic and your blood sugar is low or you feel symptomatic, you may drink 1-2 ounces of apple juice or take a glucose tablet.            -The morning of your procedure, you may call the pre-op area if you have concerns about your blood sugar [phone]. [x] Use your inhalers the morning of surgery. Bring your emergency inhaler with you day of surgery. [] Follow physician instructions regarding any blood thinners you may be taking. WHAT TO EXPECT:    [x] The day of your procedure you will be greeted and checked in by the [facility].  In addition, you will be registered in the [place] by a Patient Access Representative. Please bring your photo ID and insurance card. A nurse will greet you in accordance to the time you are needed in the pre-op area to prepare you for surgery. Please do not be discouraged if you are not greeted in the order you arrive as there are many variables that are involved in patient preparation. Your patience is greatly appreciated as you wait for your nurse. Please bring in items such as: books, magazines, newspapers, electronics, or any other items  to occupy your time in the waiting area. []  Delays may occur. Staff will make a sincere effort to keep you informed of delays. If any delays occur with your procedure, we apologize ahead of time for your inconvenience as we recognize the value of your time.

## 2023-04-06 NOTE — DISCHARGE INSTRUCTIONS
digest it. Eat six small meals each day instead of three large ones, and eat slowly. Avoid eating for 2 to 3 hours before bedtime as this may also decrease acid reflux. Avoid foods and drinks that may increase heartburn: These include chocolate, peppermint, spicy foods, tomato-based foods, fried or fatty foods, and caffeine-containing beverages. Caffeine may be found in some coffee, tea, and soda. Foods and beverages that can irritate your esophagus, such as citrus fruits and juices, should also be avoided. Elevate the head of the bed: Place 6-inch blocks under the head of your bed frame. Use one or two pillows under your head and shoulders when sleeping. Keep a healthy weight: Talk to your caregiver about your weight to learn if you weigh too little or too much. If you are overweight, weight loss helps relieve symptoms of GERD. Stop smoking: Cigarette and tobacco smoking weakens the lower esophageal sphincter. Ask your caregiver for help to stop smoking! Irritable Bowel Syndrome     Irritable bowel syndrome (IBS) is a chronic disorder of the intestines. IBS does not cause inflammation and does not lead to a more serious condition. There is no cure for IBS. Treatment focuses on managing the symptoms. Common treatment approaches include:    Diet     Stress management     Medication     What You Will Need     Along with your medications, you will need the following:    Food diary     Heating pad     Steps to [address] [place] [address]     Your doctor may ask you to keep a food diary. Record everything you eat and drink and any symptoms. This can help identify food allergies or intolerances, as well as help plan your treatment. To relieve discomfort, apply a heating pad to your abdomen. Diet     Diet is a crucial part of treating IBS. Understanding which foods cause your symptoms and avoiding them is important.  Taking these measures may help control your symptoms:    Make gradual changes to your

## 2023-04-06 NOTE — OP NOTE
Unintentional weight loss - no etiology seen for this. Patient increase oral intake and can also take oral nutrition supplements twice a day.   Uncomplicated Diverticulosis - maintain regular bowel habits and avoid constipation, hard stools, and excessive straining with stools  Colorectal cancer screening - recommend follow-up colonoscopy in 10 years    Electronically signed by Lily Smith MD on 4/6/23 at 10:48 AM EDT

## 2023-04-06 NOTE — H&P
History and Physical    Patient's Name/Date of Birth: Venita Eisenmenger / 1960, [de-identified]58 y.o.), female    Date: April 6, 2023     Assessment/Plan:  Abdominal Pain, Chronic diarrhea, and Unintentional Weight Loss - I recommended diagnostic colonoscopy with possible biopsy or polypectomy and explained the risk, benefits, expected outcome, and alternatives to the procedure. Risks included but are not limited to bleeding, infection, respiratory distress, hypotension, and perforation of the colon. The patient understands and is in agreement. Epigastric Abdominal Pain, Nausea, and Vomiting - I recommended esophagogastroduodenoscopy with possible biopsy and explained the risk, benefits, expected outcome, and alternatives to the procedure. Risks included but are not limited to bleeding, infection, respiratory distress, hypotension, and perforation of the esophagus, stomach, or duodenum. Patient understands and is in agreement. Severe Protein Calorie Malnutrition - patient seems to be regaining some of her unintentional weight loss. Emphysema  History of alcohol, cocaine, and marijuana abuse -Per the patient, she has been abstaining from alcohol and cocaine although has still used marijuana on occasion    Chief Complaint: Chronic abdominal pain, chronic diarrhea, unintentional weight loss, and nausea and vomiting    HPI:   Patient seen in the office on 2/9/2023 for the above complaints. She is recommended EGD and colonoscopy is here today for this. Since she was in the office the diarrhea has nearly resolved. She does not take anything for her bowels. She has regained a little bit of her weight that she lost unintentionally. She has been taking omeprazole 40 mg p.o. twice daily and with this nausea and vomiting is nearly resolved. She had 1 previous colonoscopy in [facility] in [place] [address] that was reportedly normal but no report was available for review.       Patient called me yesterday stating

## 2023-04-06 NOTE — ANESTHESIA PRE PROCEDURE
hours.    Coags:   Lab Results   Component Value Date/Time    PROTIME 13.0 12/27/2022 05:18 PM    INR 1.2 12/27/2022 05:18 PM    APTT 35.8 12/27/2022 05:18 PM       HCG (If Applicable): No results found for: PREGTESTUR, PREGSERUM, HCG, HCGQUANT     ABGs: No results found for: PHART, PO2ART, XNS1YQK, JWE9ONB, BEART, G4JEHEAD     Type & Screen (If Applicable):  No results found for: LABABO, LABRH    Drug/Infectious Status (If Applicable):  No results found for: HIV, HEPCAB    COVID-19 Screening (If Applicable):   Lab Results   Component Value Date/Time    COVID19 Not Detected 12/28/2022 12:07 AM           Anesthesia Evaluation  Patient summary reviewed  Airway: Mallampati: II  TM distance: >3 FB   Neck ROM: full  Mouth opening: > = 3 FB   Dental:    (+) edentulous      Pulmonary:   (+) COPD: severe,  decreased breath sounds current smoker ( 1-2 PPD x 40 years)          Patient did not smoke on day of surgery. ROS comment: Pulmonary nodule   Cardiovascular:Negative CV ROS            Rhythm: regular  Rate: normal                    Neuro/Psych:   (+) seizures:,             GI/Hepatic/Renal:            ROS comment: Severe protein-calorie malnutrition . Endo/Other:    (+) hypothyroidism, blood dyscrasia: anemia:., .                 Abdominal:             Vascular: negative vascular ROS. Other Findings:           Anesthesia Plan      MAC     ASA 3       Induction: intravenous. Anesthetic plan and risks discussed with patient. Plan discussed with CRNA.                     Min Monet MD   4/6/2023

## 2023-04-06 NOTE — OP NOTE
gastritis and duodenitis  Gastritis and duodenitis - continue omeprazole 40 mg by mouth twice a day (before breakfast and one hour before bedtime). DO NOT take within one hour of antacids. Start Antacids as directed by mouth four times a day (1 hour after meals and right before bedtime, do not take within 1 hour of other medications) x 6 weeks then as needed. Heartburn, reflux, and indigestion precautions given regarding diet and activity restrictions. Check biopsies and if positive for Helicobacter pylori then add dual antibiotics for 2 weeks. Follow-up with me in the office in 6 weeks.   Small Sliding Hiatal Hernia without Esophagitis    Electronically signed by Rosanna Clinton MD  on 4/6/2023 at 10:41 AM

## 2023-04-20 DIAGNOSIS — M62.838 MUSCLE SPASMS OF BOTH LOWER EXTREMITIES: ICD-10-CM

## 2023-04-20 RX ORDER — SENNOSIDES 8.6 MG
CAPSULE ORAL
Qty: 30 TABLET | Refills: 2 | Status: SHIPPED | OUTPATIENT
Start: 2023-04-20

## 2023-04-20 RX ORDER — CYCLOBENZAPRINE HCL 5 MG
TABLET ORAL
Qty: 30 TABLET | Refills: 0 | Status: SHIPPED | OUTPATIENT
Start: 2023-04-20

## 2023-04-20 NOTE — TELEPHONE ENCOUNTER
Last Appointment:  3/24/2023  Future Appointments   Date Time Provider Grover Vasquez   5/11/2023  2:40 PM MD Soraya Victoria [facility]   5/18/2023  2:00 PM Rudy Hayden MD [facility]   6/15/2023 10:00 AM MD Soraya [facility]

## 2023-05-18 ENCOUNTER — OFFICE VISIT (OUTPATIENT)
Dept: SURGERY | Age: 63
End: 2023-05-18
Payer: MEDICAID

## 2023-05-18 VITALS
OXYGEN SATURATION: 98 % | HEIGHT: 61 IN | RESPIRATION RATE: 16 BRPM | HEART RATE: 95 BPM | DIASTOLIC BLOOD PRESSURE: 85 MMHG | BODY MASS INDEX: 18.88 KG/M2 | WEIGHT: 100 LBS | TEMPERATURE: 97.8 F | SYSTOLIC BLOOD PRESSURE: 121 MMHG

## 2023-05-18 DIAGNOSIS — E43 SEVERE PROTEIN-CALORIE MALNUTRITION (HCC): ICD-10-CM

## 2023-05-18 DIAGNOSIS — K29.90 GASTRITIS AND DUODENITIS: Primary | ICD-10-CM

## 2023-05-18 DIAGNOSIS — K52.9 CHRONIC DIARRHEA: ICD-10-CM

## 2023-05-18 DIAGNOSIS — K44.9 HIATAL HERNIA: ICD-10-CM

## 2023-05-18 PROCEDURE — G8427 DOCREV CUR MEDS BY ELIG CLIN: HCPCS | Performed by: SURGERY

## 2023-05-18 PROCEDURE — 99213 OFFICE O/P EST LOW 20 MIN: CPT | Performed by: SURGERY

## 2023-05-18 PROCEDURE — G8420 CALC BMI NORM PARAMETERS: HCPCS | Performed by: SURGERY

## 2023-05-18 PROCEDURE — 4004F PT TOBACCO SCREEN RCVD TLK: CPT | Performed by: SURGERY

## 2023-05-18 PROCEDURE — 99212 OFFICE O/P EST SF 10 MIN: CPT | Performed by: SURGERY

## 2023-05-18 PROCEDURE — 3017F COLORECTAL CA SCREEN DOC REV: CPT | Performed by: SURGERY

## 2023-05-18 NOTE — PATIENT INSTRUCTIONS
Dr. Sebastian Banks recommended:  Decrease Omeprazole 40 mg by mouth to once a day (1 hour before bedtime)  Decrease antacids to just as needed. Can take up to 4 times per day as needed for heartburn or indigestion  Continue Heartburn & Indigestion precautions  Follow up with Dr. Sebastian Banks in 6 weeks to try to continue to decrease the Omeprazole  Continue eating 3 well balanced meals a day as well as 1 serving of Sustacal per day    Heartburn, Acid Reflux, and Indigestion Diet and Activity Restrictions    Avoid aspirin or Non-Steroidal Anti-Inflammatory Drugs (NSAIDs) such as Ibuprofen, Motrin, Naprosyn, etc.    Avoid drinking alcohol: Alcohol is found in beer, wine, liquor such as vodka and whiskey, or other adult drinks. Avoid eating large meals frequently: Eating a lot of food at one time increases the amount of acid needed to digest it. Eat six small meals each day instead of three large ones, and eat slowly. Avoid eating for 2 to 3 hours before bedtime as this may also decrease acid reflux. Avoid foods and drinks that may increase heartburn: These include chocolate, peppermint, spicy foods, tomato-based foods, fried or fatty foods, and caffeine-containing beverages. Caffeine may be found in some coffee, tea, and soda. Foods and beverages that can irritate your esophagus, such as citrus fruits and juices, should also be avoided. Elevate the head of the bed: Place 6-inch blocks under the head of your bed frame. Use one or two pillows under your head and shoulders when sleeping. Keep a healthy weight: Talk to your caregiver about your weight to learn if you weigh too little or too much. If you are overweight, weight loss helps relieve symptoms of GERD. Stop smoking: Cigarette and tobacco smoking weakens the lower esophageal sphincter. Ask your caregiver for help to stop smoking!

## 2023-05-18 NOTE — PROGRESS NOTES
General Surgery Progress Note  Date: 5/18/2023     ASSESSMENT/PLAN:  Gastritis, Duodenitis, and Small Sliding Hiatal Hernia without Esophagitis - I recommended patient decrease omeprazole 40 mg from twice a day to once a day. I also recommend she decrease the antacids to as needed up to 4 times a day. Recommend to continue taking oral nutrition supplement daily. I gave the patient heartburn precautions going dietary to restrictions once again. Patient to follow-up with me in 6 weeks to try to continue decrease her omeprazole. Patient is to is in agreement. Chronic Diarrhea Secondary to Irritable Bowel Syndrome - treat symptomatically  Severe Protein Calorie Malnutrition - improving with increased oral intake and daily oral nutrition supplements     Emphysema  History of alcohol, cocaine, and marijuana abuse - patient needs to avoid these as part of her treatment of gastritis and duodenitis as well as the malnutrition    Chief Complaint   Patient presents with    EGD     Egd and gastritis follow up, pt states that she has been feeling better since starting mylanta. History:  The patient is 58 y.o. female who was seen in the office today for follow-up from EGD and colonoscopy on 4/16/2021. Colonoscopy performed for evaluation of chronic abdominal pain, chronic diarrhea, and unintentional weight loss. Patient found to have uncomplicated diverticula transverse in the colon but no polyps or tumors. It was felt the patient may have Irritable Bowel Syndrome. She was recommend symptomatic treatment for this. She is recommend follow-up colonoscopy 10 years. EGD was performed for epigastric abdominal pain, nausea, vomiting, and unintentional weight loss. She was found to have moderate to severe gastritis, moderate duodenitis, and a small (1 to 2 cm) sliding hiatal hernia with normal esophagus.   She was recommended continue Omeprazole 40 mg by mouth twice a day and started on routine doses of antacids 4

## 2023-05-22 DIAGNOSIS — J44.9 CHRONIC OBSTRUCTIVE PULMONARY DISEASE, UNSPECIFIED COPD TYPE (HCC): ICD-10-CM

## 2023-05-22 RX ORDER — ALBUTEROL SULFATE 90 UG/1
AEROSOL, METERED RESPIRATORY (INHALATION)
Qty: 18 G | Refills: 3 | Status: SHIPPED | OUTPATIENT
Start: 2023-05-22

## 2023-05-22 NOTE — TELEPHONE ENCOUNTER
Last Appointment:  3/24/2023  Future Appointments  6/15/2023  10:00 AM   MD Amber Girard [facility]  6/29/2023  12:45 PM   Dilcia Best MD        [facility]        [facility]

## 2023-06-29 ENCOUNTER — OFFICE VISIT (OUTPATIENT)
Dept: SURGERY | Age: 63
End: 2023-06-29
Payer: MEDICAID

## 2023-06-29 VITALS
HEART RATE: 94 BPM | TEMPERATURE: 97.2 F | OXYGEN SATURATION: 98 % | WEIGHT: 97.5 LBS | BODY MASS INDEX: 18.41 KG/M2 | DIASTOLIC BLOOD PRESSURE: 84 MMHG | RESPIRATION RATE: 16 BRPM | SYSTOLIC BLOOD PRESSURE: 124 MMHG | HEIGHT: 61 IN

## 2023-06-29 DIAGNOSIS — K44.9 HIATAL HERNIA WITH GERD AND ESOPHAGITIS: ICD-10-CM

## 2023-06-29 DIAGNOSIS — K21.00 HIATAL HERNIA WITH GERD AND ESOPHAGITIS: ICD-10-CM

## 2023-06-29 DIAGNOSIS — K52.9 CHRONIC DIARRHEA: ICD-10-CM

## 2023-06-29 DIAGNOSIS — E43 SEVERE MALNUTRITION (HCC): ICD-10-CM

## 2023-06-29 DIAGNOSIS — K29.90 GASTRITIS AND DUODENITIS: Primary | ICD-10-CM

## 2023-06-29 PROCEDURE — 99214 OFFICE O/P EST MOD 30 MIN: CPT | Performed by: SURGERY

## 2023-06-29 PROCEDURE — 99212 OFFICE O/P EST SF 10 MIN: CPT | Performed by: SURGERY

## 2023-06-29 PROCEDURE — G8419 CALC BMI OUT NRM PARAM NOF/U: HCPCS | Performed by: SURGERY

## 2023-06-29 PROCEDURE — G8427 DOCREV CUR MEDS BY ELIG CLIN: HCPCS | Performed by: SURGERY

## 2023-06-29 PROCEDURE — 4004F PT TOBACCO SCREEN RCVD TLK: CPT | Performed by: SURGERY

## 2023-06-29 PROCEDURE — 3017F COLORECTAL CA SCREEN DOC REV: CPT | Performed by: SURGERY

## 2023-06-29 RX ORDER — FAMOTIDINE 20 MG/1
20 TABLET, FILM COATED ORAL 2 TIMES DAILY
Qty: 180 TABLET | Refills: 3 | Status: SHIPPED | OUTPATIENT
Start: 2023-06-29 | End: 2024-06-28

## 2023-06-29 RX ORDER — PEDIATRIC NUTRIT, IRON/DHA/ARA 4G/150KCAL
1 POWDER (GRAM) ORAL 2 TIMES DAILY
Qty: 60 EACH | Refills: 5 | Status: SHIPPED | OUTPATIENT
Start: 2023-06-29

## 2023-06-29 RX ORDER — POTASSIUM CHLORIDE 1500 MG/1
20 TABLET, EXTENDED RELEASE ORAL DAILY
COMMUNITY
Start: 2023-05-22

## 2023-08-24 DIAGNOSIS — E87.6 HYPOKALEMIA: ICD-10-CM

## 2023-08-25 RX ORDER — POTASSIUM CHLORIDE 1500 MG/1
TABLET, EXTENDED RELEASE ORAL
Qty: 90 TABLET | Refills: 0 | Status: SHIPPED | OUTPATIENT
Start: 2023-08-25

## 2023-10-03 ENCOUNTER — OFFICE VISIT (OUTPATIENT)
Dept: FAMILY MEDICINE CLINIC | Age: 63
End: 2023-10-03
Payer: MEDICAID

## 2023-10-03 VITALS
RESPIRATION RATE: 18 BRPM | BODY MASS INDEX: 17.88 KG/M2 | HEART RATE: 90 BPM | OXYGEN SATURATION: 92 % | DIASTOLIC BLOOD PRESSURE: 80 MMHG | WEIGHT: 94.7 LBS | TEMPERATURE: 98.1 F | HEIGHT: 61 IN | SYSTOLIC BLOOD PRESSURE: 125 MMHG

## 2023-10-03 DIAGNOSIS — D53.9 MACROCYTIC ANEMIA: ICD-10-CM

## 2023-10-03 DIAGNOSIS — Z12.2 ENCOUNTER FOR SCREENING FOR LUNG CANCER: ICD-10-CM

## 2023-10-03 DIAGNOSIS — F17.200 TOBACCO USE DISORDER: ICD-10-CM

## 2023-10-03 DIAGNOSIS — R74.8 ELEVATED ALKALINE PHOSPHATASE LEVEL: ICD-10-CM

## 2023-10-03 DIAGNOSIS — K44.9 HIATAL HERNIA WITH GERD AND ESOPHAGITIS: ICD-10-CM

## 2023-10-03 DIAGNOSIS — E55.9 VITAMIN D DEFICIENCY: ICD-10-CM

## 2023-10-03 DIAGNOSIS — Z12.31 ENCOUNTER FOR SCREENING MAMMOGRAM FOR MALIGNANT NEOPLASM OF BREAST: ICD-10-CM

## 2023-10-03 DIAGNOSIS — K29.90 GASTRITIS AND DUODENITIS: ICD-10-CM

## 2023-10-03 DIAGNOSIS — M19.90 OSTEOARTHRITIS, UNSPECIFIED OSTEOARTHRITIS TYPE, UNSPECIFIED SITE: ICD-10-CM

## 2023-10-03 DIAGNOSIS — D52.0 DIETARY FOLATE DEFICIENCY ANEMIA: ICD-10-CM

## 2023-10-03 DIAGNOSIS — F10.90 ALCOHOL USE DISORDER: ICD-10-CM

## 2023-10-03 DIAGNOSIS — J30.2 SEASONAL ALLERGIES: ICD-10-CM

## 2023-10-03 DIAGNOSIS — J44.9 CHRONIC OBSTRUCTIVE PULMONARY DISEASE, UNSPECIFIED COPD TYPE (HCC): ICD-10-CM

## 2023-10-03 DIAGNOSIS — N18.31 STAGE 3A CHRONIC KIDNEY DISEASE (HCC): ICD-10-CM

## 2023-10-03 DIAGNOSIS — K21.9 GASTROESOPHAGEAL REFLUX DISEASE WITHOUT ESOPHAGITIS: ICD-10-CM

## 2023-10-03 DIAGNOSIS — K21.00 HIATAL HERNIA WITH GERD AND ESOPHAGITIS: ICD-10-CM

## 2023-10-03 DIAGNOSIS — E87.6 HYPOKALEMIA: ICD-10-CM

## 2023-10-03 DIAGNOSIS — E43 SEVERE MALNUTRITION (HCC): ICD-10-CM

## 2023-10-03 DIAGNOSIS — Z23 NEED FOR SHINGLES VACCINE: ICD-10-CM

## 2023-10-03 DIAGNOSIS — J45.20 MILD INTERMITTENT ASTHMA WITHOUT COMPLICATION: ICD-10-CM

## 2023-10-03 DIAGNOSIS — Z23 NEED FOR INFLUENZA VACCINATION: ICD-10-CM

## 2023-10-03 DIAGNOSIS — E87.1 HYPONATREMIA: ICD-10-CM

## 2023-10-03 DIAGNOSIS — Z00.00 HEALTHCARE MAINTENANCE: Primary | ICD-10-CM

## 2023-10-03 LAB
ABSOLUTE IMMATURE GRANULOCYTE: <0.03 K/UL (ref 0–0.58)
ALBUMIN SERPL-MCNC: 4 G/DL (ref 3.5–5.2)
ALP BLD-CCNC: 158 U/L (ref 35–104)
ALT SERPL-CCNC: 11 U/L (ref 0–32)
ANION GAP SERPL CALCULATED.3IONS-SCNC: 20 MMOL/L (ref 7–16)
AST SERPL-CCNC: 27 U/L (ref 0–31)
BASOPHILS ABSOLUTE: 0.04 K/UL (ref 0–0.2)
BASOPHILS RELATIVE PERCENT: 1 % (ref 0–2)
BILIRUB SERPL-MCNC: 0.2 MG/DL (ref 0–1.2)
BUN BLDV-MCNC: 5 MG/DL (ref 6–23)
CALCIUM SERPL-MCNC: 9.1 MG/DL (ref 8.6–10.2)
CHLORIDE BLD-SCNC: 92 MMOL/L (ref 98–107)
CO2: 19 MMOL/L (ref 22–29)
CREAT SERPL-MCNC: 0.9 MG/DL (ref 0.5–1)
EOSINOPHILS ABSOLUTE: 0.08 K/UL (ref 0.05–0.5)
EOSINOPHILS RELATIVE PERCENT: 2 % (ref 0–6)
FOLATE: 9 NG/ML (ref 4.8–24.2)
GFR SERPL CREATININE-BSD FRML MDRD: >60 ML/MIN/1.73M2
GGT, 20027: 142 U/L (ref 6–42)
GLUCOSE BLD-MCNC: 75 MG/DL (ref 74–99)
HCT VFR BLD CALC: 35.9 % (ref 34–48)
HEMOGLOBIN: 12.4 G/DL (ref 11.5–15.5)
IMMATURE GRANULOCYTES: 0 % (ref 0–5)
LYMPHOCYTES ABSOLUTE: 2.15 K/UL (ref 1.5–4)
LYMPHOCYTES RELATIVE PERCENT: 47 % (ref 20–42)
MCH RBC QN AUTO: 33.2 PG (ref 26–35)
MCHC RBC AUTO-ENTMCNC: 34.5 G/DL (ref 32–34.5)
MCV RBC AUTO: 96.2 FL (ref 80–99.9)
MONOCYTES ABSOLUTE: 0.85 K/UL (ref 0.1–0.95)
MONOCYTES RELATIVE PERCENT: 19 % (ref 2–12)
NEUTROPHILS ABSOLUTE: 1.44 K/UL (ref 1.8–7.3)
NEUTROPHILS RELATIVE PERCENT: 32 % (ref 43–80)
PDW BLD-RTO: 16.3 % (ref 11.5–15)
PLATELET # BLD: 347 K/UL (ref 130–450)
PMV BLD AUTO: 9.7 FL (ref 7–12)
POTASSIUM SERPL-SCNC: 4.3 MMOL/L (ref 3.5–5)
RBC # BLD: 3.73 M/UL (ref 3.5–5.5)
SODIUM BLD-SCNC: 131 MMOL/L (ref 132–146)
TOTAL PROTEIN: 7.4 G/DL (ref 6.4–8.3)
TSH SERPL DL<=0.05 MIU/L-ACNC: 0.33 UIU/ML (ref 0.27–4.2)
VITAMIN B-12: 326 PG/ML (ref 211–946)
VITAMIN D 25-HYDROXY: 17.9 NG/ML (ref 30–100)
WBC # BLD: 4.6 K/UL (ref 4.5–11.5)

## 2023-10-03 PROCEDURE — 3023F SPIROM DOC REV: CPT | Performed by: FAMILY MEDICINE

## 2023-10-03 PROCEDURE — G8427 DOCREV CUR MEDS BY ELIG CLIN: HCPCS | Performed by: FAMILY MEDICINE

## 2023-10-03 PROCEDURE — 36415 COLL VENOUS BLD VENIPUNCTURE: CPT | Performed by: FAMILY MEDICINE

## 2023-10-03 PROCEDURE — G8419 CALC BMI OUT NRM PARAM NOF/U: HCPCS | Performed by: FAMILY MEDICINE

## 2023-10-03 PROCEDURE — 3017F COLORECTAL CA SCREEN DOC REV: CPT | Performed by: FAMILY MEDICINE

## 2023-10-03 PROCEDURE — G8482 FLU IMMUNIZE ORDER/ADMIN: HCPCS | Performed by: FAMILY MEDICINE

## 2023-10-03 PROCEDURE — 4004F PT TOBACCO SCREEN RCVD TLK: CPT | Performed by: FAMILY MEDICINE

## 2023-10-03 RX ORDER — MONTELUKAST SODIUM 10 MG/1
10 TABLET ORAL DAILY
Qty: 90 TABLET | Refills: 1 | Status: SHIPPED | OUTPATIENT
Start: 2023-10-03

## 2023-10-03 RX ORDER — ZOSTER VACCINE RECOMBINANT, ADJUVANTED 50 MCG/0.5
0.5 KIT INTRAMUSCULAR SEE ADMIN INSTRUCTIONS
Qty: 0.5 ML | Refills: 0 | Status: SHIPPED | OUTPATIENT
Start: 2023-10-03 | End: 2024-03-31

## 2023-10-03 RX ORDER — LIDOCAINE 4 G/G
1 PATCH TOPICAL DAILY
Qty: 30 PATCH | Refills: 0 | Status: SHIPPED | OUTPATIENT
Start: 2023-10-03 | End: 2023-11-02

## 2023-10-03 RX ORDER — SODIUM CHLORIDE 1 G/1
1 TABLET ORAL
Qty: 90 TABLET | Refills: 1 | Status: SHIPPED | OUTPATIENT
Start: 2023-10-03

## 2023-10-03 RX ORDER — SENNOSIDES 8.6 MG
CAPSULE ORAL
Qty: 30 TABLET | Refills: 0 | Status: SHIPPED | OUTPATIENT
Start: 2023-10-03

## 2023-10-03 RX ORDER — FAMOTIDINE 20 MG/1
20 TABLET, FILM COATED ORAL 2 TIMES DAILY
Qty: 180 TABLET | Refills: 1 | Status: SHIPPED | OUTPATIENT
Start: 2023-10-03 | End: 2024-03-31

## 2023-10-03 RX ORDER — FOLIC ACID 1 MG/1
1 TABLET ORAL DAILY
Qty: 90 TABLET | Refills: 1 | Status: SHIPPED | OUTPATIENT
Start: 2023-10-03

## 2023-10-03 RX ORDER — PEDIATRIC NUTRIT, IRON/DHA/ARA 4G/150KCAL
1 POWDER (GRAM) ORAL 2 TIMES DAILY
Qty: 60 EACH | Refills: 5 | Status: SHIPPED | OUTPATIENT
Start: 2023-10-03

## 2023-10-03 RX ORDER — CYCLOBENZAPRINE HCL 5 MG
TABLET ORAL
Qty: 30 TABLET | Refills: 0 | Status: CANCELLED | OUTPATIENT
Start: 2023-10-03

## 2023-10-03 RX ORDER — DEXAMETHASONE 4 MG/1
1 TABLET ORAL 2 TIMES DAILY
Qty: 1 EACH | Refills: 3 | Status: SHIPPED | OUTPATIENT
Start: 2023-10-03

## 2023-10-03 RX ORDER — CETIRIZINE HYDROCHLORIDE 10 MG/1
10 TABLET ORAL DAILY
Qty: 90 TABLET | Refills: 1 | Status: SHIPPED | OUTPATIENT
Start: 2023-10-03

## 2023-10-03 RX ORDER — OMEPRAZOLE 40 MG/1
40 CAPSULE, DELAYED RELEASE ORAL 2 TIMES DAILY
Qty: 90 CAPSULE | Refills: 1 | Status: CANCELLED | OUTPATIENT
Start: 2023-10-03

## 2023-10-03 RX ORDER — ALBUTEROL SULFATE 90 UG/1
AEROSOL, METERED RESPIRATORY (INHALATION)
Qty: 18 G | Refills: 3 | Status: SHIPPED | OUTPATIENT
Start: 2023-10-03

## 2023-10-03 RX ORDER — POTASSIUM CHLORIDE 1500 MG/1
20 TABLET, EXTENDED RELEASE ORAL DAILY
Qty: 90 TABLET | Refills: 0 | Status: CANCELLED | OUTPATIENT
Start: 2023-10-03

## 2023-10-03 RX ORDER — ALUMINA, MAGNESIA, AND SIMETHICONE 2400; 2400; 240 MG/30ML; MG/30ML; MG/30ML
15 SUSPENSION ORAL 4 TIMES DAILY
Qty: 1000 ML | Refills: 3 | Status: CANCELLED | OUTPATIENT
Start: 2023-10-03

## 2023-10-04 RX ORDER — ERGOCALCIFEROL 1.25 MG/1
50000 CAPSULE ORAL WEEKLY
Qty: 8 CAPSULE | Refills: 0 | Status: SHIPPED | OUTPATIENT
Start: 2023-10-04 | End: 2023-11-23

## 2024-04-18 ENCOUNTER — TELEPHONE (OUTPATIENT)
Dept: FAMILY MEDICINE CLINIC | Age: 64
End: 2024-04-18

## 2024-04-18 NOTE — TELEPHONE ENCOUNTER
Patient called  states that she is having a left sided headache  worse headache she has ever had, no other symptoms .    Scheduled patient with first available provider tomorrow 4/19/24 . Advised  patient to seek urgent care at ER if symptomes worsen or if she develops any weakness, numbness , vision changes  , patient verbalized understanding.    Patient's BP is 167/69

## 2024-04-19 ENCOUNTER — OFFICE VISIT (OUTPATIENT)
Dept: FAMILY MEDICINE CLINIC | Age: 64
End: 2024-04-19

## 2024-04-19 VITALS
HEART RATE: 90 BPM | OXYGEN SATURATION: 96 % | WEIGHT: 97 LBS | BODY MASS INDEX: 18.31 KG/M2 | TEMPERATURE: 97.6 F | RESPIRATION RATE: 16 BRPM | HEIGHT: 61 IN | DIASTOLIC BLOOD PRESSURE: 74 MMHG | SYSTOLIC BLOOD PRESSURE: 111 MMHG

## 2024-04-19 DIAGNOSIS — J44.9 CHRONIC OBSTRUCTIVE PULMONARY DISEASE, UNSPECIFIED COPD TYPE (HCC): ICD-10-CM

## 2024-04-19 DIAGNOSIS — Z86.39 HISTORY OF FOLIC ACID DEFICIENCY (NON-ANEMIC): ICD-10-CM

## 2024-04-19 DIAGNOSIS — Z12.31 ENCOUNTER FOR SCREENING MAMMOGRAM FOR MALIGNANT NEOPLASM OF BREAST: ICD-10-CM

## 2024-04-19 DIAGNOSIS — K29.90 GASTRITIS AND DUODENITIS: ICD-10-CM

## 2024-04-19 DIAGNOSIS — R74.8 ELEVATED ALKALINE PHOSPHATASE LEVEL: ICD-10-CM

## 2024-04-19 DIAGNOSIS — Z86.2 HISTORY OF MACROCYTOSIS: ICD-10-CM

## 2024-04-19 DIAGNOSIS — E87.1 HYPONATREMIA: ICD-10-CM

## 2024-04-19 DIAGNOSIS — K21.9 GASTROESOPHAGEAL REFLUX DISEASE WITHOUT ESOPHAGITIS: ICD-10-CM

## 2024-04-19 DIAGNOSIS — K44.9 HIATAL HERNIA WITH GERD AND ESOPHAGITIS: ICD-10-CM

## 2024-04-19 DIAGNOSIS — R51.9 NONINTRACTABLE EPISODIC HEADACHE, UNSPECIFIED HEADACHE TYPE: Primary | ICD-10-CM

## 2024-04-19 DIAGNOSIS — K21.00 HIATAL HERNIA WITH GERD AND ESOPHAGITIS: ICD-10-CM

## 2024-04-19 DIAGNOSIS — Z00.00 HEALTHCARE MAINTENANCE: ICD-10-CM

## 2024-04-19 DIAGNOSIS — Z12.2 ENCOUNTER FOR SCREENING FOR LUNG CANCER: ICD-10-CM

## 2024-04-19 DIAGNOSIS — F17.200 TOBACCO USE DISORDER: ICD-10-CM

## 2024-04-19 DIAGNOSIS — J45.40 MODERATE PERSISTENT ASTHMA WITHOUT COMPLICATION: ICD-10-CM

## 2024-04-19 DIAGNOSIS — J30.2 SEASONAL ALLERGIES: ICD-10-CM

## 2024-04-19 DIAGNOSIS — E55.9 VITAMIN D DEFICIENCY: ICD-10-CM

## 2024-04-19 DIAGNOSIS — N18.31 STAGE 3A CHRONIC KIDNEY DISEASE (HCC): ICD-10-CM

## 2024-04-19 LAB
ALBUMIN: 4.6 G/DL (ref 3.5–5.2)
ALP BLD-CCNC: 193 U/L (ref 35–104)
ALT SERPL-CCNC: 8 U/L (ref 0–32)
ANION GAP SERPL CALCULATED.3IONS-SCNC: 20 MMOL/L (ref 7–16)
AST SERPL-CCNC: 27 U/L (ref 0–31)
BASOPHILS ABSOLUTE: 0.05 K/UL (ref 0–0.2)
BASOPHILS RELATIVE PERCENT: 1 % (ref 0–2)
BILIRUB SERPL-MCNC: 0.4 MG/DL (ref 0–1.2)
BUN BLDV-MCNC: 6 MG/DL (ref 6–23)
CALCIUM SERPL-MCNC: 9.7 MG/DL (ref 8.6–10.2)
CHLORIDE BLD-SCNC: 93 MMOL/L (ref 98–107)
CO2: 16 MMOL/L (ref 22–29)
CREAT SERPL-MCNC: 1.1 MG/DL (ref 0.5–1)
EOSINOPHILS ABSOLUTE: 0.12 K/UL (ref 0.05–0.5)
EOSINOPHILS RELATIVE PERCENT: 3 % (ref 0–6)
FOLATE: >20 NG/ML (ref 4.8–24.2)
GFR SERPL CREATININE-BSD FRML MDRD: 56 ML/MIN/1.73M2
GLUCOSE BLD-MCNC: 74 MG/DL (ref 74–99)
HCT VFR BLD CALC: 41.3 % (ref 34–48)
HEMOGLOBIN: 14.2 G/DL (ref 11.5–15.5)
IMMATURE GRANULOCYTES %: 1 % (ref 0–5)
IMMATURE GRANULOCYTES ABSOLUTE: <0.03 K/UL (ref 0–0.58)
LYMPHOCYTES ABSOLUTE: 1.63 K/UL (ref 1.5–4)
LYMPHOCYTES RELATIVE PERCENT: 37 % (ref 20–42)
MCH RBC QN AUTO: 34.1 PG (ref 26–35)
MCHC RBC AUTO-ENTMCNC: 34.4 G/DL (ref 32–34.5)
MCV RBC AUTO: 99 FL (ref 80–99.9)
MONOCYTES ABSOLUTE: 0.8 K/UL (ref 0.1–0.95)
MONOCYTES RELATIVE PERCENT: 18 % (ref 2–12)
NEUTROPHILS ABSOLUTE: 1.74 K/UL (ref 1.8–7.3)
NEUTROPHILS RELATIVE PERCENT: 40 % (ref 43–80)
PDW BLD-RTO: 14 % (ref 11.5–15)
PLATELET # BLD: 330 K/UL (ref 130–450)
PMV BLD AUTO: 9.7 FL (ref 7–12)
POTASSIUM SERPL-SCNC: 5 MMOL/L (ref 3.5–5)
RBC # BLD: 4.17 M/UL (ref 3.5–5.5)
SODIUM BLD-SCNC: 129 MMOL/L (ref 132–146)
TOTAL PROTEIN: 8.4 G/DL (ref 6.4–8.3)
VITAMIN B-12: 249 PG/ML (ref 211–946)
VITAMIN D 25-HYDROXY: 11.6 NG/ML (ref 30–100)
WBC # BLD: 4.4 K/UL (ref 4.5–11.5)

## 2024-04-19 RX ORDER — NAPROXEN 500 MG/1
500 TABLET ORAL AS NEEDED
Qty: 30 TABLET | Refills: 0 | Status: SHIPPED | OUTPATIENT
Start: 2024-04-19

## 2024-04-19 RX ORDER — DEXAMETHASONE 4 MG/1
1 TABLET ORAL 2 TIMES DAILY
Qty: 1 EACH | Refills: 3 | Status: SHIPPED | OUTPATIENT
Start: 2024-04-19

## 2024-04-19 RX ORDER — FAMOTIDINE 20 MG/1
20 TABLET, FILM COATED ORAL 2 TIMES DAILY
Qty: 60 TABLET | Refills: 1 | Status: SHIPPED | OUTPATIENT
Start: 2024-04-19

## 2024-04-19 RX ORDER — ACETAMINOPHEN 500 MG
500 TABLET ORAL
Qty: 30 TABLET | Refills: 0 | Status: SHIPPED | OUTPATIENT
Start: 2024-04-19

## 2024-04-19 RX ORDER — MONTELUKAST SODIUM 10 MG/1
10 TABLET ORAL DAILY
Qty: 90 TABLET | Refills: 1 | Status: SHIPPED | OUTPATIENT
Start: 2024-04-19

## 2024-04-19 RX ORDER — ALBUTEROL SULFATE 90 UG/1
AEROSOL, METERED RESPIRATORY (INHALATION)
Qty: 18 G | Refills: 3 | Status: SHIPPED | OUTPATIENT
Start: 2024-04-19

## 2024-04-19 NOTE — PROGRESS NOTES
Attending Physician Statement    S:   Chief Complaint   Patient presents with    Headache     Patient states she is having a headache, that gravitates to under her left eye.     Medication Refill      3 weeks of intermittent headaches.  Occurs every 2-3 days and last for about 30 minutes.  Unilateral left side, sometimes radiates to eyes.  Throbbing, improves spontaneously.   Hx of hyponatremia, hypokalemia.  Previous EtOH, drug use, says not any more  O: Blood pressure 111/74, pulse 90, temperature 97.6 °F (36.4 °C), temperature source Temporal, resp. rate 16, height 1.549 m (5' 1\"), weight 44 kg (97 lb), SpO2 96 %.   Exam:   Heart - RRR   Lungs - clear   Neuro - strength, sensation, CN's and DTR's all normal  A: Headache, electrolyte abnormalities  P:  OTC analgesics, f/u if worsening or no improvement   Labs - recheck lytes, folic acid   Mammogram, low dose CT for lung screening   Follow-up as ordered    I have discussed the case, including pertinent history and exam findings with the resident. I agree with the documented assessment and plan.    Adam Martinez MD           
Microalbuminuria.  Vitamin D deficiency.      I reviewed the patient's past medications, allergies, past medical history, past surgical history, family history and social history during this visit      ROS:  Pertinent positives and negatives are stated within HPI    Physical Exam:    Vitals: /74 (Site: Left Upper Arm, Position: Sitting, Cuff Size: Medium Adult)   Pulse 90   Temp 97.6 °F (36.4 °C) (Temporal)   Resp 16   Ht 1.549 m (5' 1\")   Wt 44 kg (97 lb)   SpO2 96%   BMI 18.33 kg/m²   Physical Exam  Vitals reviewed.   Constitutional:       General: She is not in acute distress.  Eyes:      Extraocular Movements: Extraocular movements intact.      Conjunctiva/sclera: Conjunctivae normal.      Pupils: Pupils are equal, round, and reactive to light.   Cardiovascular:      Rate and Rhythm: Normal rate and regular rhythm.      Pulses: Normal pulses.      Heart sounds: Normal heart sounds.   Pulmonary:      Effort: Pulmonary effort is normal. No respiratory distress.      Breath sounds: Normal breath sounds.   Abdominal:      General: Bowel sounds are normal. There is no distension.      Palpations: Abdomen is soft.      Tenderness: There is no abdominal tenderness.   Musculoskeletal:      Cervical back: Normal range of motion and neck supple.      Right lower leg: No edema.      Left lower leg: No edema.   Neurological:      General: No focal deficit present.      Mental Status: She is alert and oriented to person, place, and time.      Cranial Nerves: No cranial nerve deficit.      Sensory: No sensory deficit.      Motor: No weakness.      Coordination: Coordination normal.      Gait: Gait normal.      Deep Tendon Reflexes: Reflexes normal.   Psychiatric:         Thought Content: Thought content normal.         Judgment: Judgment normal.         Assessment & Plan:  Headaches.  New onset.  Etiology unclear.  Headache diary.  Alternate Tylenol and naproxen as needed for acute headaches.  Consider medication

## 2024-04-20 RX ORDER — ERGOCALCIFEROL 1.25 MG/1
50000 CAPSULE ORAL WEEKLY
Qty: 12 CAPSULE | Refills: 0 | Status: SHIPPED | OUTPATIENT
Start: 2024-04-20 | End: 2024-07-07

## 2024-04-20 RX ORDER — SODIUM CHLORIDE 1 G/1
1 TABLET ORAL
Qty: 90 TABLET | Refills: 1 | Status: SHIPPED | OUTPATIENT
Start: 2024-04-20

## 2024-04-23 LAB
ALK PHOS BONE SPECIFIC: 126 U/L (ref 0–55)
ALK PHOS OTHER CALC: 0 U/L
ALK PHOSPHATASE: 206 U/L (ref 40–120)
ALKALINE PHOSPHATASE LIVER FRACTION: 80 U/L (ref 0–94)

## 2024-04-24 DIAGNOSIS — J45.40 MODERATE PERSISTENT ASTHMA WITHOUT COMPLICATION: ICD-10-CM

## 2024-04-24 DIAGNOSIS — J44.9 CHRONIC OBSTRUCTIVE PULMONARY DISEASE, UNSPECIFIED COPD TYPE (HCC): ICD-10-CM

## 2024-04-24 RX ORDER — FLUTICASONE PROPIONATE 110 UG/1
1 AEROSOL, METERED RESPIRATORY (INHALATION) 2 TIMES DAILY
Qty: 1 EACH | Refills: 3 | Status: SHIPPED | OUTPATIENT
Start: 2024-04-24

## 2024-05-13 DIAGNOSIS — R51.9 NONINTRACTABLE EPISODIC HEADACHE, UNSPECIFIED HEADACHE TYPE: ICD-10-CM

## 2024-05-13 NOTE — TELEPHONE ENCOUNTER
Last Appointment:  4/19/2024  Future Appointments   Date Time Provider Department Center   5/17/2024  9:40 AM Sabino Massey MD [facility]

## 2024-05-14 RX ORDER — NAPROXEN 500 MG/1
500 TABLET ORAL AS NEEDED
Qty: 30 TABLET | Refills: 0 | Status: SHIPPED | OUTPATIENT
Start: 2024-05-14

## 2024-05-17 ENCOUNTER — OFFICE VISIT (OUTPATIENT)
Dept: FAMILY MEDICINE CLINIC | Age: 64
End: 2024-05-17
Payer: MEDICAID

## 2024-05-17 VITALS
BODY MASS INDEX: 18.67 KG/M2 | WEIGHT: 98.9 LBS | OXYGEN SATURATION: 97 % | DIASTOLIC BLOOD PRESSURE: 87 MMHG | TEMPERATURE: 97.6 F | HEART RATE: 76 BPM | HEIGHT: 61 IN | SYSTOLIC BLOOD PRESSURE: 138 MMHG | RESPIRATION RATE: 16 BRPM

## 2024-05-17 DIAGNOSIS — J44.9 CHRONIC OBSTRUCTIVE PULMONARY DISEASE, UNSPECIFIED COPD TYPE (HCC): ICD-10-CM

## 2024-05-17 DIAGNOSIS — E55.9 VITAMIN D DEFICIENCY: ICD-10-CM

## 2024-05-17 DIAGNOSIS — N18.31 STAGE 3A CHRONIC KIDNEY DISEASE (HCC): ICD-10-CM

## 2024-05-17 DIAGNOSIS — E87.1 HYPONATREMIA: ICD-10-CM

## 2024-05-17 DIAGNOSIS — J45.40 MODERATE PERSISTENT ASTHMA WITHOUT COMPLICATION: ICD-10-CM

## 2024-05-17 DIAGNOSIS — F17.200 TOBACCO USE DISORDER: ICD-10-CM

## 2024-05-17 DIAGNOSIS — F10.90 ALCOHOL USE DISORDER: ICD-10-CM

## 2024-05-17 DIAGNOSIS — J30.2 SEASONAL ALLERGIES: ICD-10-CM

## 2024-05-17 DIAGNOSIS — E43 SEVERE MALNUTRITION (HCC): ICD-10-CM

## 2024-05-17 DIAGNOSIS — R51.9 NONINTRACTABLE EPISODIC HEADACHE, UNSPECIFIED HEADACHE TYPE: Primary | ICD-10-CM

## 2024-05-17 DIAGNOSIS — Z00.00 HEALTHCARE MAINTENANCE: ICD-10-CM

## 2024-05-17 LAB
ANION GAP SERPL CALCULATED.3IONS-SCNC: 11 MMOL/L (ref 7–16)
BUN BLDV-MCNC: 8 MG/DL (ref 6–23)
CALCIUM SERPL-MCNC: 9.3 MG/DL (ref 8.6–10.2)
CHLORIDE BLD-SCNC: 95 MMOL/L (ref 98–107)
CO2: 23 MMOL/L (ref 22–29)
CREAT SERPL-MCNC: 1 MG/DL (ref 0.5–1)
GFR, ESTIMATED: 66 ML/MIN/1.73M2
GLUCOSE BLD-MCNC: 101 MG/DL (ref 74–99)
OSMOLALITY URINE: 146 MOSM/KG (ref 300–900)
POTASSIUM SERPL-SCNC: 4.4 MMOL/L (ref 3.5–5)
SERUM OSMOLALITY: 286 MOSM/KG (ref 285–310)
SODIUM BLD-SCNC: 129 MMOL/L (ref 132–146)
SODIUM,UR: 22 MMOL/L

## 2024-05-17 PROCEDURE — 99213 OFFICE O/P EST LOW 20 MIN: CPT

## 2024-05-17 RX ORDER — CYANOCOBALAMIN 1000 UG/ML
1000 INJECTION, SOLUTION INTRAMUSCULAR; SUBCUTANEOUS ONCE
Status: CANCELLED | OUTPATIENT
Start: 2024-05-17 | End: 2024-05-17

## 2024-05-17 RX ORDER — THIAMINE MONONITRATE (VIT B1) 100 MG
100 TABLET ORAL DAILY
Qty: 30 TABLET | Refills: 3 | Status: SHIPPED | OUTPATIENT
Start: 2024-05-17

## 2024-05-17 SDOH — ECONOMIC STABILITY: FOOD INSECURITY: WITHIN THE PAST 12 MONTHS, THE FOOD YOU BOUGHT JUST DIDN'T LAST AND YOU DIDN'T HAVE MONEY TO GET MORE.: NEVER TRUE

## 2024-05-17 SDOH — ECONOMIC STABILITY: INCOME INSECURITY: HOW HARD IS IT FOR YOU TO PAY FOR THE VERY BASICS LIKE FOOD, HOUSING, MEDICAL CARE, AND HEATING?: NOT VERY HARD

## 2024-05-17 SDOH — ECONOMIC STABILITY: FOOD INSECURITY: WITHIN THE PAST 12 MONTHS, YOU WORRIED THAT YOUR FOOD WOULD RUN OUT BEFORE YOU GOT MONEY TO BUY MORE.: NEVER TRUE

## 2024-05-17 ASSESSMENT — PATIENT HEALTH QUESTIONNAIRE - PHQ9
SUM OF ALL RESPONSES TO PHQ QUESTIONS 1-9: 0
2. FEELING DOWN, DEPRESSED OR HOPELESS: NOT AT ALL
SUM OF ALL RESPONSES TO PHQ QUESTIONS 1-9: 0

## 2024-05-17 NOTE — PROGRESS NOTES
Attending Physician Statement    S:   Chief Complaint   Patient presents with    1 Month Follow-Up     Patient presents today for a 4 week follow up.      F/U headaches.  Improved.  Rare use of OTC analgesics.  Didn't complete headache diarrhea.    CKD, hyponatremia - failed to make appointment with nephrology and didn't come in for labs.  History of macrocytic anemia, had low folate but improved  O: Blood pressure (!) 147/89, pulse 76, temperature 97.6 °F (36.4 °C), temperature source Temporal, resp. rate 16, height 1.549 m (5' 1\"), weight 44.9 kg (98 lb 14.4 oz), SpO2 97 %.   Exam:   Heart - RRR   Lungs - clear   No edema  A: As above  P:  Obtain hyponatremia labs   Advise to call nephrology   Discussed EtOH use - unwilling to quit   Advised to take multivitamin daily   Advised to obtain mammogram, CT lung screen    Follow-up as ordered    I have discussed the case, including pertinent history and exam findings with the resident.  I agree with the documented assessment and plan.    Adam Martinez MD           
Pulses: Normal pulses.      Heart sounds: Normal heart sounds.   Pulmonary:      Effort: Pulmonary effort is normal. No respiratory distress.      Breath sounds: Normal breath sounds.   Abdominal:      General: Bowel sounds are normal. There is no distension.      Palpations: Abdomen is soft.      Tenderness: There is no abdominal tenderness.   Musculoskeletal:      Right lower leg: No edema.      Left lower leg: No edema.   Neurological:      General: No focal deficit present.      Mental Status: She is alert and oriented to person, place, and time.   Psychiatric:         Thought Content: Thought content normal.         Judgment: Judgment normal.         Assessment & Plan:  Headaches.  Improving.  Continue Tylenol as needed.  Moderate persistent asthma.  COPD.  Well-controlled.  Continue albuterol as needed, Flovent twice daily and tiotropium daily.  Seasonal allergies.  Stable.  Continue montelukast 10 mg nightly and cetirizine 10 mg as needed.  GERD.  Gastritis and duodenitis.  Hiatal hernia.  Irritable bowel syndrome.  Stable.  Continue famotidine 20 mg as needed and lactobacillus twice daily.  Follow-up with general surgery.  Severe protein calorie malnutrition.  Continue nutritional supplements.  Follow-up with general surgery.  Alcohol use disorder.  Borderline low vitamin B12.  Continue folic acid 1 mg daily.  Start thiamine 100 mg daily.  Start multivitamin with vitamin B12.  Consider vitamin B12 injection.  Alcohol cessation encouraged, not interested at this time.  Chronic kidney disease stage IIIa.  Microalbuminuria.  Hyponatremia.  Resume sodium chloride 1 g 3 times daily.  Repeat BMP.  Check serum osmolality, urine osmolality and urine sodium.  Nephrology appointment pending.  Elevated alkaline phosphatase.  Elevated GGT.  Elevated bone alkaline phosphatase.  Monitor liver function.  Consider liver ultrasound.  Vitamin D deficiency.  Continue vitamin D 50,000 units weekly for a total of 12 weeks.

## 2024-06-27 ENCOUNTER — OFFICE VISIT (OUTPATIENT)
Dept: FAMILY MEDICINE CLINIC | Age: 64
End: 2024-06-27
Payer: MEDICAID

## 2024-06-27 VITALS
HEART RATE: 87 BPM | BODY MASS INDEX: 17.48 KG/M2 | HEIGHT: 61 IN | WEIGHT: 92.6 LBS | SYSTOLIC BLOOD PRESSURE: 94 MMHG | DIASTOLIC BLOOD PRESSURE: 60 MMHG | OXYGEN SATURATION: 98 % | RESPIRATION RATE: 16 BRPM | TEMPERATURE: 97.5 F

## 2024-06-27 DIAGNOSIS — T73.1XXA: ICD-10-CM

## 2024-06-27 DIAGNOSIS — R63.0 LOSS OF APPETITE: ICD-10-CM

## 2024-06-27 DIAGNOSIS — R42 DIZZINESS: Primary | ICD-10-CM

## 2024-06-27 DIAGNOSIS — Z76.0 MEDICATION REFILL: ICD-10-CM

## 2024-06-27 DIAGNOSIS — F10.10 ALCOHOL ABUSE: ICD-10-CM

## 2024-06-27 DIAGNOSIS — F10.930 ALCOHOL WITHDRAWAL SYNDROME WITHOUT COMPLICATION (HCC): ICD-10-CM

## 2024-06-27 LAB
ALBUMIN: 4.5 G/DL (ref 3.5–5.2)
ALP BLD-CCNC: 145 U/L (ref 35–104)
ALT SERPL-CCNC: 8 U/L (ref 0–32)
ANION GAP SERPL CALCULATED.3IONS-SCNC: 24 MMOL/L (ref 7–16)
AST SERPL-CCNC: 21 U/L (ref 0–31)
BILIRUB SERPL-MCNC: 0.2 MG/DL (ref 0–1.2)
BUN BLDV-MCNC: 25 MG/DL (ref 6–23)
CALCIUM SERPL-MCNC: 9.8 MG/DL (ref 8.6–10.2)
CHLORIDE BLD-SCNC: 85 MMOL/L (ref 98–107)
CO2: 15 MMOL/L (ref 22–29)
CREAT SERPL-MCNC: 4.3 MG/DL (ref 0.5–1)
FERRITIN: 267 NG/ML
FOLATE: >20 NG/ML (ref 4.8–24.2)
GFR, ESTIMATED: 11 ML/MIN/1.73M2
GLUCOSE BLD-MCNC: 97 MG/DL (ref 74–99)
HCT VFR BLD CALC: 43.5 % (ref 34–48)
HEMOGLOBIN: 14.6 G/DL (ref 11.5–15.5)
IRON % SATURATION: 7 % (ref 15–50)
IRON: 29 UG/DL (ref 37–145)
MCH RBC QN AUTO: 34 PG (ref 26–35)
MCHC RBC AUTO-ENTMCNC: 33.6 G/DL (ref 32–34.5)
MCV RBC AUTO: 101.4 FL (ref 80–99.9)
PDW BLD-RTO: 15.4 % (ref 11.5–15)
PLATELET # BLD: 410 K/UL (ref 130–450)
PMV BLD AUTO: 9.7 FL (ref 7–12)
POTASSIUM SERPL-SCNC: 4.3 MMOL/L (ref 3.5–5)
RBC # BLD: 4.29 M/UL (ref 3.5–5.5)
SODIUM BLD-SCNC: 124 MMOL/L (ref 132–146)
TOTAL IRON BINDING CAPACITY: 389 UG/DL (ref 250–450)
TOTAL PROTEIN: 8.6 G/DL (ref 6.4–8.3)
VITAMIN B-12: 351 PG/ML (ref 211–946)
WBC # BLD: 14.5 K/UL (ref 4.5–11.5)

## 2024-06-27 PROCEDURE — 99213 OFFICE O/P EST LOW 20 MIN: CPT

## 2024-06-27 RX ORDER — NAPROXEN 500 MG/1
500 TABLET ORAL AS NEEDED
Qty: 30 TABLET | Refills: 0 | Status: SHIPPED | OUTPATIENT
Start: 2024-06-27

## 2024-06-27 NOTE — PROGRESS NOTES
[facility]  FAMILY MEDICINE RESIDENCY PROGRAM  DATE OF VISIT : 24       PATIENT:Sole Cowna    AGE:63 y.o.     :1960      MRN:[id number]   ___________________________________________________________________________________________________________________________________________________    ASSESSMENT AND PLAN  Dizziness  Hypotension  Fatigue   Loss of appetite  Deprivation of water, initial encounter   Symptoms likely secondary to dehydration in the setting of alcoholism and poor fluid/solid meal intake. Also, iron deficiency may play a factor in this as well.   Orthostatics in office. Results negative.   Order CMP, CBC, B12, Folate, Iron Studies, and UA  Recommended to increase fluid intake with atleast 3 glasses of water per day. Also, consume three meals per day and nutritional supplementations like BOOST or ENSURE.     Alcohol abuse  Patient has decreased her beer intake from more than 4 cans daily to 2 cans daily. Encouraged to continue decreasing intake.   Continue thiamine and folic acid supplementation.     Medication refill  naproxen (NAPROSYN) 500 MG tablet; Take 1 tablet by mouth as needed for Pain  Dispense: 30 tablet; Refill: 0     Return to Office: PRN with PCP if symptoms persist     Mohit Griffin MD PGY-3  This case was discussed with Dr. Talavera     ______________________________________________________________________________________________________________________    CHIEF COMPLAINT  Chief Complaint   Patient presents with    Dizziness     Patient presents today with c/o having dizziness for 2 days. Patient states she was making some tacos and had some sour cream that she believes was no good. She states she stated to threw up not too long after, and then the dizziness began. Patient states she gets dizziness when going to stand and when she  stands up.        HPI:  History obtained from the patient. Sole Cowan  is a 63 y.o.  female who presents to

## 2024-06-27 NOTE — PATIENT INSTRUCTIONS
Please increase water intake to atleast 3 glasses per day. This can be in the form of hot tea and honey if you prefer.     Also, please eat atleast three meals per day. If it's difficult use BOOST or ENSURE supplement drinks.     Abstain from alcohol use. Please decrease as able. Great job on lowering to two cans per day thus far.     If symptoms worsen or fail to improve please go to the ED ASAP.

## 2024-06-27 NOTE — PROGRESS NOTES
S: Sole Cowan 63 y.o. female  here for  dizziness    Dizziness:  Onset x 3 days.  Did not feel well after eating tacos; symptoms then were nausea and vomiting x 1, Followed by abdominal cramping the following day.  While she did not eat or consume fluids normally, she did drink at least 2 beers/day. No associated symptoms; ROS o/w negative    SH: History of alcoholism; decreased from 4 cans beer/day to 2 cans/day.  On thiamine and folate.  Tobacco: 0.5 ppd.  Remote use of cocaine    O: VS: BP (!) 69/52   Pulse 90   Temp 97.5 °F (36.4 °C) (Temporal)   Resp 16   Ht 1.549 m (5' 1\")   Wt 42 kg (92 lb 9.6 oz)   SpO2 98%   BMI 17.50 kg/m²    General: NAD.Hypotensive on admission to office.              HEENT: WDL              Neck: WDL   CV:  RRR, no gallops, rubs, or murmurs   Resp: CTAB no R/R/W   Abd:  Soft, nontender, no masses    Ext:  no C/C/E    Assessment / Plan:      Sole was seen today for dizziness.    Diagnoses and all orders for this visit:    Dizziness  Hypotension  Fatigue   Loss of appetite  Deprivation of water, initial encounter   Symptoms likely secondary to dehydration in the setting of alcoholism and poor fluid/solid meal intake. Also, iron deficiency may play a factor in this as well.   Orthostatics in office. Results negative.   Order CMP, CBC, B12, Folate, Iron Studies, and UA  Recommended to increase fluid intake with atleast 3 glasses of water per day. Also, consume three meals per day and nutritional supplementations like BOOST or ENSURE.      Alcohol abuse  Patient has decreased her beer intake from more than 4 cans daily to 2 cans daily. Encouraged to continue decreasing intake.   Continue thiamine and folic acid supplementation.      Medication refill  naproxen (NAPROSYN) 500 MG tablet; Take 1 tablet by mouth as needed for Pain  Dispense: 30 tablet; Refill: 0     Return to Office: PRN with PCP if symptoms persist      Mohit Griffin MD PGY-3    Medication refill  -

## 2024-07-02 LAB
BILIRUBIN, URINE: NEGATIVE
COLOR, UA: YELLOW
GLUCOSE URINE: NEGATIVE MG/DL
KETONES, URINE: NEGATIVE MG/DL
LEUKOCYTE ESTERASE, URINE: NEGATIVE
NITRITE, URINE: NEGATIVE
PH, URINE: 6 (ref 5–9)
PROTEIN UA: NEGATIVE MG/DL
RBC UA: ABNORMAL /HPF
SPECIFIC GRAVITY UA: <1.005 (ref 1–1.03)
TURBIDITY: CLEAR
URINE HGB: NEGATIVE
UROBILINOGEN, URINE: 0.2 EU/DL (ref 0–1)
WBC UA: ABNORMAL /HPF

## 2024-07-24 DIAGNOSIS — Z76.0 MEDICATION REFILL: ICD-10-CM

## 2024-07-25 RX ORDER — NAPROXEN 500 MG/1
500 TABLET ORAL DAILY PRN
Qty: 30 TABLET | Refills: 0 | OUTPATIENT
Start: 2024-07-25

## 2024-08-19 PROBLEM — F10.930 ALCOHOL WITHDRAWAL SYNDROME WITHOUT COMPLICATION (HCC): Status: RESOLVED | Noted: 2024-06-27 | Resolved: 2024-08-19

## 2024-08-19 PROBLEM — J44.9 STAGE 3 SEVERE COPD BY GOLD CLASSIFICATION (HCC): Status: ACTIVE | Noted: 2022-12-28

## 2024-08-19 PROBLEM — J44.9 STAGE 3 SEVERE COPD BY GOLD CLASSIFICATION (HCC): Status: ACTIVE | Noted: 2021-10-04

## 2024-08-19 PROBLEM — F10.90 ALCOHOL USE DISORDER: Status: ACTIVE | Noted: 2022-12-28

## 2024-08-19 RX ORDER — METHION/INOS/CHOL BT/B COM/LIV 110MG-86MG
1 CAPSULE ORAL DAILY
COMMUNITY
Start: 2024-07-24

## 2024-08-19 NOTE — PROGRESS NOTES
[facility]  FAMILY MEDICINE RESIDENCY PROGRAM  DATE OF VISIT : 2024    Patient : Sole Cowan   Age : 64 y.o.    : 1960   MRN : [id number]   ______________________________________________________________________    Chief Complaint:   Chief Complaint   Patient presents with    Leg Pain     Bilateral thigh pain  comes and goes  chronic     Lower Back Pain     Left side chronic     Medication Refill         HPI:   Sole Cowan is a 64 y.o. female who presents for f/u.  PMH of   Past Medical History:   Diagnosis Date    Alcohol use disorder 2022    Pulmonary nodule     Seizure (HCC) 2023    Related to alcohol withdrawal it seems, one episode    Stage 3 severe COPD by GOLD classification ([facility]) 10/04/2021     Patient last seen 2024 for dizziness. Thought to be orthostatic d/t dehydration and excess alcohol intake, though orthostatic vitals in office were negative. Labs were severely abnormal at that time with creatinine 4.3, though it does not appear that any action was undertaken at that time. Patient has chronic hx of hypo-osmotic hyponatremia and CKD IIIb for which she has been referred to nephrology but does not appear to have established.     The patient states that she feels well today. Her dizziness has resolved. Patient denies Lightheadedness, Dizziness, Chest pain, Shortness of breath, Abdominal pain, Nausea, Vomiting, Peripheral edema, Fever, Chills.    Alcohol use disorder: The patient states that she is drinking 16 oz of beer per day, which is a significant decrease from what she was doing. She states there are several days that she goes without drinking and has no withdrawal symptoms. Prior hx of alcohol withdrawal seizure 2023 but none since then.    Health maintenance: Due for LDCT        Allergies:   Allergies   Allergen Reactions    Ibuprofen Other (See Comments)     Hallucinations    Morphine        Medications:    Current Outpatient Medications

## 2024-08-20 ENCOUNTER — OFFICE VISIT (OUTPATIENT)
Dept: FAMILY MEDICINE CLINIC | Age: 64
End: 2024-08-20

## 2024-08-20 VITALS
HEART RATE: 83 BPM | OXYGEN SATURATION: 96 % | RESPIRATION RATE: 16 BRPM | SYSTOLIC BLOOD PRESSURE: 123 MMHG | BODY MASS INDEX: 19.45 KG/M2 | DIASTOLIC BLOOD PRESSURE: 84 MMHG | TEMPERATURE: 97.2 F | WEIGHT: 103 LBS | HEIGHT: 61 IN

## 2024-08-20 DIAGNOSIS — Z87.891 PERSONAL HISTORY OF TOBACCO USE: ICD-10-CM

## 2024-08-20 DIAGNOSIS — N17.9 AKI (ACUTE KIDNEY INJURY) (HCC): Primary | ICD-10-CM

## 2024-08-20 DIAGNOSIS — N17.9 AKI (ACUTE KIDNEY INJURY) (HCC): ICD-10-CM

## 2024-08-20 DIAGNOSIS — R09.89 JVD (JUGULAR VENOUS DISTENSION): ICD-10-CM

## 2024-08-20 DIAGNOSIS — F10.90 ALCOHOL USE DISORDER: ICD-10-CM

## 2024-08-20 DIAGNOSIS — R14.0 ABDOMINAL DISTENSION: ICD-10-CM

## 2024-08-20 DIAGNOSIS — E61.1 IRON DEFICIENCY: ICD-10-CM

## 2024-08-20 LAB
ALBUMIN: 4.4 G/DL (ref 3.5–5.2)
ALP BLD-CCNC: 147 U/L (ref 35–104)
ALT SERPL-CCNC: 10 U/L (ref 0–32)
ANION GAP SERPL CALCULATED.3IONS-SCNC: 11 MMOL/L (ref 7–16)
AST SERPL-CCNC: 20 U/L (ref 0–31)
BILIRUB SERPL-MCNC: 0.3 MG/DL (ref 0–1.2)
BUN BLDV-MCNC: 9 MG/DL (ref 6–23)
CALCIUM SERPL-MCNC: 9.7 MG/DL (ref 8.6–10.2)
CHLORIDE BLD-SCNC: 96 MMOL/L (ref 98–107)
CO2: 25 MMOL/L (ref 22–29)
CREAT SERPL-MCNC: 1.1 MG/DL (ref 0.5–1)
GFR, ESTIMATED: 59 ML/MIN/1.73M2
GLUCOSE BLD-MCNC: 71 MG/DL (ref 74–99)
OSMOLALITY URINE: 298 MOSM/KG (ref 300–900)
POTASSIUM SERPL-SCNC: 4.2 MMOL/L (ref 3.5–5)
SODIUM BLD-SCNC: 132 MMOL/L (ref 132–146)
SODIUM URINE: 42 MMOL/L
TOTAL PROTEIN: 8.2 G/DL (ref 6.4–8.3)

## 2024-08-20 RX ORDER — ACETAMINOPHEN 500 MG
500 TABLET ORAL
Qty: 30 TABLET | Refills: 0 | Status: SHIPPED | OUTPATIENT
Start: 2024-08-20

## 2024-08-20 RX ORDER — FERROUS SULFATE 325(65) MG
325 TABLET ORAL
Qty: 90 TABLET | Refills: 1 | Status: SHIPPED | OUTPATIENT
Start: 2024-08-20

## 2024-08-20 NOTE — PROGRESS NOTES
[facility]  Precepting Note    Subjective:  Follow up  Hx of AUD, CKD3, chronic hyponatermia  Cr above 4 at last visit   Also found to be iron deficient  Denies dizziness or other symptoms today   Cutting down on alcohol use, no withdrawal symptoms. Last seizure over one year ago  1/2ppd for over 30 years       ROS otherwise negative     Past medical, surgical, family and social history were reviewed, non-contributory, and unchanged unless otherwise stated.    Objective:    /84   Pulse 83   Temp 97.2 °F (36.2 °C) (Temporal)   Resp 16   Ht 1.549 m (5' 1\")   Wt 46.7 kg (103 lb)   SpO2 96%   BMI 19.46 kg/m²     Exam is as noted by resident with the following changes, additions or corrections:    General:  NAD; alert & oriented x 3   JVD  Heart:  RRR, no murmurs, gallops, or rubs.  Lungs:  CTA bilaterally, no wheeze, rales or rhonchi  Abd: distended, RUQ TTP  Extrem:  No clubbing, cyanosis, or edema    Assessment/Plan:  RYLEE- repeat labs to follow up results in June. Check urine lytes, refer to Nephro  RUQ tenderness- check abdominal US   JVD- check Echo     Attending Physician Statement  I have reviewed the chart, including any radiology or labs. I have discussed the case, including pertinent history and exam findings with the resident.  I agree with the assessment, plan and orders as documented by the resident.  Please refer to the resident note for additional information.      Electronically signed by Donnell Mosqueda MD on 8/20/2024 at 11:07 AM

## 2024-08-20 NOTE — PATIENT INSTRUCTIONS
Screening can find some cancers early, when treatment may be more likely to work.  What happens after screening?  The results of your CT scan will be sent to your doctor. Someone from your care team will explain the results of your scan and answer any questions you may have. If you need any follow-up, he or she will help you understand what to do next.  After a lung cancer screening, you can go back to your usual activities right away.  A lung cancer screening test can't tell if you have lung cancer. If your results are positive, your doctor can't tell whether an abnormal finding is a harmless nodule, cancer, or something else without doing more tests.  What can you do to help prevent lung cancer?  Some lung cancers can't be prevented. But if you smoke, quitting smoking is the best step you can take to prevent lung cancer. If you want to quit, your doctor can recommend medicines or other ways to help.  Follow-up care is a key part of your treatment and safety. Be sure to make and go to all appointments, and call your doctor if you are having problems. It's also a good idea to know your test results and keep a list of the medicines you take.  Where can you learn more?  Go to https://www.[employer]/patientEd and enter Q940 to learn more about \"Learning About Lung Cancer Screening.\"  Current as of: October 25, 2023  Content Version: 14.1  © 7809-3107 [employer].   Care instructions adapted under license by [email or web address]. If you have questions about a medical condition or this instruction, always ask your healthcare professional. [employer] disclaims any warranty or liability for your use of this information.

## 2024-10-01 DIAGNOSIS — R14.0 ABDOMINAL DISTENSION: ICD-10-CM

## 2024-10-01 RX ORDER — PSEUDOEPHED/ACETAMINOPH/DIPHEN 30MG-500MG
TABLET ORAL
Qty: 30 TABLET | Refills: 0 | Status: SHIPPED | OUTPATIENT
Start: 2024-10-01

## 2024-10-01 NOTE — TELEPHONE ENCOUNTER
Last Appointment:  8/20/2024  Future Appointments   Date Time Provider Department Center   11/5/2024  1:20 PM Red Arnold MD [facility] DEP

## 2024-10-02 ENCOUNTER — HOSPITAL ENCOUNTER (EMERGENCY)
Age: 64
Discharge: HOME | End: 2024-10-02
Attending: EMERGENCY MEDICINE
Payer: MEDICAID

## 2024-10-02 ENCOUNTER — APPOINTMENT (OUTPATIENT)
Dept: CT IMAGING | Age: 64
End: 2024-10-02
Payer: MEDICAID

## 2024-10-02 VITALS
HEART RATE: 93 BPM | SYSTOLIC BLOOD PRESSURE: 175 MMHG | TEMPERATURE: 97.5 F | DIASTOLIC BLOOD PRESSURE: 102 MMHG | WEIGHT: 105 LBS | RESPIRATION RATE: 18 BRPM | BODY MASS INDEX: 19.83 KG/M2 | HEIGHT: 61 IN | OXYGEN SATURATION: 98 %

## 2024-10-02 DIAGNOSIS — R11.0 NAUSEA: Primary | ICD-10-CM

## 2024-10-02 DIAGNOSIS — R10.84 GENERALIZED ABDOMINAL PAIN: ICD-10-CM

## 2024-10-02 LAB
ALBUMIN SERPL-MCNC: 4.4 G/DL (ref 3.5–5.2)
ALP SERPL-CCNC: 136 U/L (ref 35–104)
ALT SERPL-CCNC: 11 U/L (ref 0–32)
ANION GAP SERPL CALCULATED.3IONS-SCNC: 18 MMOL/L (ref 7–16)
AST SERPL-CCNC: 20 U/L (ref 0–31)
BASOPHILS # BLD: 0.02 K/UL (ref 0–0.2)
BASOPHILS NFR BLD: 0 % (ref 0–2)
BILIRUB DIRECT SERPL-MCNC: <0.2 MG/DL (ref 0–0.3)
BILIRUB INDIRECT SERPL-MCNC: ABNORMAL MG/DL (ref 0–1)
BILIRUB SERPL-MCNC: 0.4 MG/DL (ref 0–1.2)
BILIRUB UR QL STRIP: NEGATIVE
BUN SERPL-MCNC: 11 MG/DL (ref 6–23)
CALCIUM SERPL-MCNC: 9.6 MG/DL (ref 8.6–10.2)
CHLORIDE SERPL-SCNC: 89 MMOL/L (ref 98–107)
CLARITY UR: CLEAR
CO2 SERPL-SCNC: 20 MMOL/L (ref 22–29)
COLOR UR: YELLOW
CREAT SERPL-MCNC: 0.8 MG/DL (ref 0.5–1)
EOSINOPHIL # BLD: 0.01 K/UL (ref 0.05–0.5)
EOSINOPHILS RELATIVE PERCENT: 0 % (ref 0–6)
ERYTHROCYTE [DISTWIDTH] IN BLOOD BY AUTOMATED COUNT: 15.4 % (ref 11.5–15)
GFR, ESTIMATED: 78 ML/MIN/1.73M2
GLUCOSE SERPL-MCNC: 141 MG/DL (ref 74–99)
GLUCOSE UR STRIP-MCNC: NEGATIVE MG/DL
HCT VFR BLD AUTO: 37.4 % (ref 34–48)
HGB BLD-MCNC: 13 G/DL (ref 11.5–15.5)
HGB UR QL STRIP.AUTO: NEGATIVE
IMM GRANULOCYTES # BLD AUTO: 0.03 K/UL (ref 0–0.58)
IMM GRANULOCYTES NFR BLD: 1 % (ref 0–5)
KETONES UR STRIP-MCNC: 15 MG/DL
LACTATE BLDV-SCNC: 1 MMOL/L (ref 0.5–2.2)
LEUKOCYTE ESTERASE UR QL STRIP: NEGATIVE
LIPASE SERPL-CCNC: 19 U/L (ref 13–60)
LYMPHOCYTES NFR BLD: 0.62 K/UL (ref 1.5–4)
LYMPHOCYTES RELATIVE PERCENT: 12 % (ref 20–42)
MAGNESIUM SERPL-MCNC: 1.7 MG/DL (ref 1.6–2.6)
MCH RBC QN AUTO: 33.3 PG (ref 26–35)
MCHC RBC AUTO-ENTMCNC: 34.8 G/DL (ref 32–34.5)
MCV RBC AUTO: 95.9 FL (ref 80–99.9)
MONOCYTES NFR BLD: 0.43 K/UL (ref 0.1–0.95)
MONOCYTES NFR BLD: 9 % (ref 2–12)
NEUTROPHILS NFR BLD: 78 % (ref 43–80)
NEUTS SEG NFR BLD: 3.92 K/UL (ref 1.8–7.3)
NITRITE UR QL STRIP: NEGATIVE
PH UR STRIP: 7.5 [PH] (ref 5–9)
PLATELET # BLD AUTO: 287 K/UL (ref 130–450)
PMV BLD AUTO: 9.1 FL (ref 7–12)
POTASSIUM SERPL-SCNC: 4 MMOL/L (ref 3.5–5)
PROT SERPL-MCNC: 7.8 G/DL (ref 6.4–8.3)
PROT UR STRIP-MCNC: NEGATIVE MG/DL
RBC # BLD AUTO: 3.9 M/UL (ref 3.5–5.5)
RBC #/AREA URNS HPF: ABNORMAL /HPF
SODIUM SERPL-SCNC: 127 MMOL/L (ref 132–146)
SP GR UR STRIP: 1.01 (ref 1–1.03)
UROBILINOGEN UR STRIP-ACNC: 0.2 EU/DL (ref 0–1)
WBC #/AREA URNS HPF: ABNORMAL /HPF
WBC OTHER # BLD: 5 K/UL (ref 4.5–11.5)

## 2024-10-02 PROCEDURE — 96375 TX/PRO/DX INJ NEW DRUG ADDON: CPT

## 2024-10-02 PROCEDURE — 96374 THER/PROPH/DIAG INJ IV PUSH: CPT

## 2024-10-02 PROCEDURE — 6370000000 HC RX 637 (ALT 250 FOR IP)

## 2024-10-02 PROCEDURE — 6360000002 HC RX W HCPCS

## 2024-10-02 PROCEDURE — 85025 COMPLETE CBC W/AUTO DIFF WBC: CPT

## 2024-10-02 PROCEDURE — 2580000003 HC RX 258: Performed by: EMERGENCY MEDICINE

## 2024-10-02 PROCEDURE — 99284 EMERGENCY DEPT VISIT MOD MDM: CPT

## 2024-10-02 PROCEDURE — 81001 URINALYSIS AUTO W/SCOPE: CPT

## 2024-10-02 PROCEDURE — 74176 CT ABD & PELVIS W/O CONTRAST: CPT

## 2024-10-02 PROCEDURE — 83605 ASSAY OF LACTIC ACID: CPT

## 2024-10-02 PROCEDURE — 80053 COMPREHEN METABOLIC PANEL: CPT

## 2024-10-02 PROCEDURE — 82248 BILIRUBIN DIRECT: CPT

## 2024-10-02 PROCEDURE — 6360000002 HC RX W HCPCS: Performed by: EMERGENCY MEDICINE

## 2024-10-02 PROCEDURE — 83690 ASSAY OF LIPASE: CPT

## 2024-10-02 PROCEDURE — 83735 ASSAY OF MAGNESIUM: CPT

## 2024-10-02 RX ORDER — ONDANSETRON 4 MG/1
4 TABLET, ORALLY DISINTEGRATING ORAL 3 TIMES DAILY PRN
Qty: 21 TABLET | Refills: 0 | Status: SHIPPED | OUTPATIENT
Start: 2024-10-02

## 2024-10-02 RX ORDER — HYDROCODONE BITARTRATE AND ACETAMINOPHEN 5; 325 MG/1; MG/1
1 TABLET ORAL ONCE
Status: COMPLETED | OUTPATIENT
Start: 2024-10-02 | End: 2024-10-02

## 2024-10-02 RX ORDER — 0.9 % SODIUM CHLORIDE 0.9 %
1000 INTRAVENOUS SOLUTION INTRAVENOUS ONCE
Status: COMPLETED | OUTPATIENT
Start: 2024-10-02 | End: 2024-10-02

## 2024-10-02 RX ORDER — DIPHENHYDRAMINE HYDROCHLORIDE 50 MG/ML
25 INJECTION INTRAMUSCULAR; INTRAVENOUS ONCE
Status: COMPLETED | OUTPATIENT
Start: 2024-10-02 | End: 2024-10-02

## 2024-10-02 RX ORDER — ONDANSETRON 2 MG/ML
4 INJECTION INTRAMUSCULAR; INTRAVENOUS ONCE
Status: COMPLETED | OUTPATIENT
Start: 2024-10-02 | End: 2024-10-02

## 2024-10-02 RX ORDER — PROCHLORPERAZINE EDISYLATE 5 MG/ML
10 INJECTION INTRAMUSCULAR; INTRAVENOUS ONCE
Status: COMPLETED | OUTPATIENT
Start: 2024-10-02 | End: 2024-10-02

## 2024-10-02 RX ORDER — FENTANYL CITRATE 50 UG/ML
25 INJECTION, SOLUTION INTRAMUSCULAR; INTRAVENOUS ONCE
Status: COMPLETED | OUTPATIENT
Start: 2024-10-02 | End: 2024-10-02

## 2024-10-02 RX ADMIN — HYDROCODONE BITARTRATE AND ACETAMINOPHEN 1 TABLET: 5; 325 TABLET ORAL at 13:22

## 2024-10-02 RX ADMIN — FENTANYL CITRATE 25 MCG: 50 INJECTION INTRAMUSCULAR; INTRAVENOUS at 15:11

## 2024-10-02 RX ADMIN — ONDANSETRON 4 MG: 2 INJECTION INTRAMUSCULAR; INTRAVENOUS at 13:23

## 2024-10-02 RX ADMIN — PROCHLORPERAZINE EDISYLATE 10 MG: 5 INJECTION INTRAMUSCULAR; INTRAVENOUS at 15:51

## 2024-10-02 RX ADMIN — SODIUM CHLORIDE 1000 ML: 9 INJECTION, SOLUTION INTRAVENOUS at 13:24

## 2024-10-02 RX ADMIN — DIPHENHYDRAMINE HYDROCHLORIDE 25 MG: 50 INJECTION INTRAMUSCULAR; INTRAVENOUS at 15:50

## 2024-10-02 ASSESSMENT — PAIN SCALES - GENERAL
PAINLEVEL_OUTOF10: 7
PAINLEVEL_OUTOF10: 8

## 2024-10-02 ASSESSMENT — PAIN DESCRIPTION - DESCRIPTORS
DESCRIPTORS: ACHING
DESCRIPTORS: ACHING

## 2024-10-02 ASSESSMENT — PAIN DESCRIPTION - LOCATION
LOCATION: ABDOMEN
LOCATION: ABDOMEN

## 2024-10-02 ASSESSMENT — PAIN DESCRIPTION - ORIENTATION
ORIENTATION: RIGHT;UPPER
ORIENTATION: RIGHT;LOWER;UPPER

## 2024-10-02 NOTE — ED PROVIDER NOTES
[facility]  Department of Emergency Medicine   ED  Encounter Note  Admit Date/RoomTime: 10/2/2024 12:34 PM  ED Room: [facility]    NAME: Sole Cowan  : 1960  MRN: [id number]     Chief Complaint:  Abdominal Pain (From home with Right sided abdominal pain starting last night. )    History of Present Illness        Sole Cowan is a 64 y.o. old female who presents to the emergency department by private vehicle, for sudden onset aching pain in the right flank without radiation which began 1 day(s) prior to arrival.  There has been no similar episodes in the past .   Since onset the symptoms have been unchanged.  The pain is associated with nausea and vomiting.  The pain is aggravated by nothing in particular and relieved by none and nothing. There has been NO back pain, chest pain, shortness of breath, fever, chills, anorexia, weight loss, diarrhea, dark/black stools, blood in stool, blood in emesis, dysuria, hematuria, or urinary urgency.    .  ROS   Pertinent positives and negatives are stated within HPI, all other systems reviewed and are negative.    Past Medical History:  has a past medical history of Alcohol use disorder, Pulmonary nodule, Seizure ([facility]), and Stage 3 severe COPD by GOLD classification ([facility]).    Surgical History:  has a past surgical history that includes Colonoscopy (); Cholecystectomy, open (); Hysterectomy (); ERCP (); Upper gastrointestinal endoscopy (N/A, 2023); and Colonoscopy (N/A, 2023).    Social History:  reports that she has been smoking cigarettes. She started smoking about 44 years ago. She has a 22 pack-year smoking history. She has never used smokeless tobacco. She reports current alcohol use of about 3.0 standard drinks of alcohol per week. She reports that she does not use drugs.    Family History: family history is not on file.     Allergies: Ibuprofen and Morphine    Physical Exam   Oxygen Saturation Interpretation:

## 2024-10-04 ENCOUNTER — OFFICE VISIT (OUTPATIENT)
Dept: PRIMARY CARE CLINIC | Age: 64
End: 2024-10-04
Payer: MEDICAID

## 2024-10-04 VITALS
HEIGHT: 61 IN | RESPIRATION RATE: 16 BRPM | WEIGHT: 105 LBS | TEMPERATURE: 97.2 F | HEART RATE: 86 BPM | SYSTOLIC BLOOD PRESSURE: 154 MMHG | DIASTOLIC BLOOD PRESSURE: 95 MMHG | OXYGEN SATURATION: 100 % | BODY MASS INDEX: 19.83 KG/M2

## 2024-10-04 DIAGNOSIS — R10.9 ABDOMINAL PAIN OF UNKNOWN ETIOLOGY: Primary | ICD-10-CM

## 2024-10-04 PROCEDURE — 3017F COLORECTAL CA SCREEN DOC REV: CPT

## 2024-10-04 PROCEDURE — 99214 OFFICE O/P EST MOD 30 MIN: CPT

## 2024-10-04 PROCEDURE — G8484 FLU IMMUNIZE NO ADMIN: HCPCS

## 2024-10-04 PROCEDURE — G8427 DOCREV CUR MEDS BY ELIG CLIN: HCPCS

## 2024-10-04 PROCEDURE — 4004F PT TOBACCO SCREEN RCVD TLK: CPT

## 2024-10-04 PROCEDURE — G8420 CALC BMI NORM PARAMETERS: HCPCS

## 2024-10-04 RX ORDER — METHYLPREDNISOLONE 4 MG
TABLET, DOSE PACK ORAL
Qty: 1 KIT | Refills: 0 | Status: SHIPPED | OUTPATIENT
Start: 2024-10-04

## 2024-10-04 RX ORDER — NAPROXEN 250 MG/1
250 TABLET ORAL 2 TIMES DAILY WITH MEALS
Qty: 60 TABLET | Refills: 0 | Status: SHIPPED | OUTPATIENT
Start: 2024-10-04

## 2024-10-04 RX ORDER — PROMETHAZINE HYDROCHLORIDE 25 MG/1
25 TABLET ORAL EVERY 6 HOURS PRN
Qty: 10 TABLET | Refills: 0 | Status: SHIPPED | OUTPATIENT
Start: 2024-10-04 | End: 2024-10-07

## 2024-10-04 NOTE — PROGRESS NOTES
reconstruction, and/or weight based adjustment of the mA/kV was utilized to reduce the radiation dose to as low as reasonably achievable. COMPARISON: 12/27/2022. HISTORY: ORDERING SYSTEM PROVIDED HISTORY: kidney stones TECHNOLOGIST PROVIDED HISTORY: Reason for exam:->kidney stones Additional Contrast?->None Decision Support Exception - unselect if not a suspected or confirmed emergency medical condition->Emergency Medical Condition (MA) What reading provider will be dictating this exam?->CRC FINDINGS: There is no evidence of pulmonary nodule or pleural effusion at the level of the lung bases. The liver, spleen, adrenals and abdominal aorta are unchanged in appearance. The gallbladder is surgically absent and prominence of the common bile duct and central intrahepatic biliary tree is not significantly changed.  Patchy low attenuation in the pancreatic head measuring 7 mm adjacent to the common bile duct measured 9 mm on the prior examination and may represent focal fat. Decrease in size over a 2 year time span suggest a benign etiology. Bilateral extrarenal pelves are again identified.  There is no evidence of hydronephrosis or hydroureter.  Calcifications in the left renal hilum are likely vascular and are unchanged.  No definite nephrolithiasis is identified.  No gross renal lesion is seen. Contrast is noted throughout the colon and there is no evidence of large or small bowel obstruction or inflammation.  There is no evidence of a dilated vermiform appendix.  No free intraperitoneal fluid or air is identified. Uterus is not identified and is likely surgically absent. Bone windows reveal dextroconvex scoliosis of the lumbar spine with marked degenerative change from the T12 level through S1.  There is no evidence of acute fracture or destructive bone lesion.  No gross abdominal or pelvic lymph node enlargement is identified and there is no evidence of anterior abdominal wall hernia.     There is no evidence of

## 2024-11-07 ENCOUNTER — OFFICE VISIT (OUTPATIENT)
Dept: FAMILY MEDICINE CLINIC | Age: 64
End: 2024-11-07
Payer: MEDICAID

## 2024-11-07 VITALS
SYSTOLIC BLOOD PRESSURE: 152 MMHG | HEIGHT: 61 IN | WEIGHT: 104 LBS | OXYGEN SATURATION: 94 % | DIASTOLIC BLOOD PRESSURE: 81 MMHG | BODY MASS INDEX: 19.63 KG/M2 | TEMPERATURE: 98.1 F | HEART RATE: 95 BPM | RESPIRATION RATE: 16 BRPM

## 2024-11-07 DIAGNOSIS — I10 PRIMARY HYPERTENSION: Primary | ICD-10-CM

## 2024-11-07 DIAGNOSIS — K29.90 GASTRITIS AND DUODENITIS: ICD-10-CM

## 2024-11-07 DIAGNOSIS — J44.9 STAGE 3 SEVERE COPD BY GOLD CLASSIFICATION (HCC): ICD-10-CM

## 2024-11-07 DIAGNOSIS — E55.9 VITAMIN D DEFICIENCY: ICD-10-CM

## 2024-11-07 DIAGNOSIS — F10.90 ALCOHOL USE DISORDER: ICD-10-CM

## 2024-11-07 DIAGNOSIS — E87.1 HYPONATREMIA: ICD-10-CM

## 2024-11-07 LAB
ALBUMIN: 4.5 G/DL (ref 3.5–5.2)
ALP BLD-CCNC: 150 U/L (ref 35–104)
ALT SERPL-CCNC: 13 U/L (ref 0–32)
ANION GAP SERPL CALCULATED.3IONS-SCNC: 13 MMOL/L (ref 7–16)
AST SERPL-CCNC: 32 U/L (ref 0–31)
BILIRUB SERPL-MCNC: 0.4 MG/DL (ref 0–1.2)
BUN BLDV-MCNC: 8 MG/DL (ref 6–23)
CALCIUM SERPL-MCNC: 9.8 MG/DL (ref 8.6–10.2)
CHLORIDE BLD-SCNC: 92 MMOL/L (ref 98–107)
CO2: 26 MMOL/L (ref 22–29)
CREAT SERPL-MCNC: 1 MG/DL (ref 0.5–1)
GFR, ESTIMATED: 67 ML/MIN/1.73M2
GLUCOSE BLD-MCNC: 61 MG/DL (ref 74–99)
OSMOLALITY URINE: 120 MOSM/KG (ref 300–900)
POTASSIUM SERPL-SCNC: 4.5 MMOL/L (ref 3.5–5)
SERUM OSMOLALITY: 315 MOSM/KG (ref 285–310)
SODIUM BLD-SCNC: 131 MMOL/L (ref 132–146)
TOTAL PROTEIN: 8 G/DL (ref 6.4–8.3)
TSH SERPL DL<=0.05 MIU/L-ACNC: 0.59 UIU/ML (ref 0.27–4.2)
VITAMIN D 25-HYDROXY: 40.9 NG/ML (ref 30–100)

## 2024-11-07 PROCEDURE — 3077F SYST BP >= 140 MM HG: CPT

## 2024-11-07 PROCEDURE — 3079F DIAST BP 80-89 MM HG: CPT

## 2024-11-07 PROCEDURE — 99213 OFFICE O/P EST LOW 20 MIN: CPT

## 2024-11-07 RX ORDER — ERGOCALCIFEROL 1.25 MG/1
50000 CAPSULE, LIQUID FILLED ORAL WEEKLY
Qty: 12 CAPSULE | Refills: 0 | Status: SHIPPED | OUTPATIENT
Start: 2024-11-07 | End: 2025-01-24

## 2024-11-07 RX ORDER — FAMOTIDINE 20 MG/1
20 TABLET, FILM COATED ORAL 2 TIMES DAILY
Qty: 60 TABLET | Refills: 1 | Status: SHIPPED | OUTPATIENT
Start: 2024-11-07

## 2024-11-07 RX ORDER — AMLODIPINE BESYLATE 5 MG/1
5 TABLET ORAL DAILY
Qty: 30 TABLET | Refills: 3 | Status: SHIPPED | OUTPATIENT
Start: 2024-11-07

## 2024-11-07 RX ORDER — PSEUDOEPHED/ACETAMINOPH/DIPHEN 30MG-500MG
1 TABLET ORAL EVERY 8 HOURS PRN
Qty: 30 TABLET | Refills: 0 | Status: SHIPPED | OUTPATIENT
Start: 2024-11-07

## 2024-11-07 NOTE — PROGRESS NOTES
S: Sole Cowan 64 y.o. female  here for F/U HTN, COPD, alcohol use; also ED visit    ER 10/2/24 for RLQ pain.  No other associated symptoms.  Imaging WDL.  Labs remarkable for Ek=379 (known history of hyponatremia  and had been treated previously). Symptoms have resolved spontaneously    Hypertension:  BP persistently elevated; now meets criteria for HTN requiring treatment.  No S/S hypertension or hypotension    COPD:  Spiriva, Flovent and PRN albuterol.  Stable and well controlled. Uses albuterol < 3x/week.  Currently tobacco use 0.5-1 ppd. Contemplative phase of cessation    Alcohol Use Disorder:  Decreased from 3 beers/night to 1 beer/day    Vitamin D Deficiency:    Lab Results   Component Value Date/Time    VITD25 11.6 04/19/2024 10:04 AM    VITD25 17.9 10/03/2023 11:24 AM    VITD25 7 03/27/2023 11:30 AM     Currently prescribed Vitamin D 95962 IU/week.  Refills and updated lab work       ROS otherwise WDL    O: VS: BP (!) 152/81   Pulse 95   Temp 98.1 °F (36.7 °C) (Temporal)   Resp 16   Ht 1.549 m (5' 1\")   Wt 47.2 kg (104 lb)   SpO2 94%   BMI 19.65 kg/m²    General: NAD              HEENT: WDL              Neck: WDL   CV:  RRR, no gallops, rubs, or murmurs   Resp: CTAB no R/R/W   Abd:  Soft, nontender, no masses    Ext:  no C/C/E    Assessment / Plan:      Sole was seen today for headache, dizziness and medication refill.    Diagnoses and all orders for this visit:    1. Primary hypertension  Amenable to tx at this time  Start amlodipine 5  Discussed appropriate use of medication and how to take. Discussed possible adverse effects / side effects and answered all questions. If medication not working advised close follow-up. Advised to not consume excessive alcohol/other substance use.  - amLODIPine (NORVASC) 5 MG tablet; Take 1 tablet by mouth daily  Dispense: 30 tablet; Refill: 3     2. Hyponatremia  Repeat evaluation  May need to restart salt tablets, will assess pending results of lab work  In

## 2024-11-07 NOTE — PROGRESS NOTES
[facility]  FAMILY MEDICINE RESIDENCY PROGRAM  DATE OF VISIT : 2024    Patient : Sole Cowan   Age : 64 y.o.    : 1960   MRN : [id number]   ______________________________________________________________________    Chief Complaint:   Chief Complaint   Patient presents with    Headache     Follow-up better    Dizziness     Follow-up better    Medication Refill       HPI:   Sole Cowan is a 64 y.o. female who presents for f/u alcohol use disorder and COPD    Abd pain: P/w RLQ abd pain to ED 10/2/24. CTAP showed no acute abnormality. Labs showed hyponatremia with Na 127. Was previously on sodium chloride tablets but stopped taking them d/t GI upset. She states that her abdominal pain is gone. She's not having nausea/vomiting, diarrhea, or constipation, and she wasn't at the time of presentation either.  Hyponatremia: Na 127 in hospital. Self d/c'd her salt tablets because they caused GI upset. Drinks 3 bottles of water daily.  HTN: BP is elevated at 152/81, last BP was similarly elevated. Amenable to treatment at this time.   COPD: Pt on Spiriva, Flovent, and albuterol. She uses the albuterol PRN 2x/week. The Spiriva and Flovent are daily. She states that she coughs every time she smokes, and she is decreasing the amount of smoking she is doing. She states that she is smoking 1/2-1/3 pack per day of cigarettes whereas she was previously doing a pack per day. She has been at this level for the last two months.   Alcohol use disorder: At last appt, pt stated she was drinking 3 beers daily. Prior hx of withdrawal seizure 2023. She states that she has decreased her alcohol consumption. She states that she was previously drinking 3 beers per day.  She has been at 1 beer daily for the last 3 months. No issues with withdrawals.     Past Medical History:  Past Medical History:   Diagnosis Date    Alcohol use disorder 2022    Pulmonary nodule     Seizure (HCC) 2023    Related to

## 2024-11-08 ENCOUNTER — TELEPHONE (OUTPATIENT)
Dept: FAMILY MEDICINE CLINIC | Age: 64
End: 2024-11-08

## 2024-11-08 DIAGNOSIS — E87.1 HYPONATREMIA: Primary | ICD-10-CM

## 2024-11-08 NOTE — TELEPHONE ENCOUNTER
Patient called and informed of results.  Advised that patient's sodium is improved but still low. Recommend restarting sodium tablets over the weekend and returning to office for recheck on Monday. If sodium levels normalized, will continue present management. If sodium levels remain abnormal, will advise referral to nephrology. Patient verbalized understanding and is agreeable to plan.

## 2024-11-12 ENCOUNTER — TELEPHONE (OUTPATIENT)
Dept: FAMILY MEDICINE CLINIC | Age: 64
End: 2024-11-12

## 2024-11-12 DIAGNOSIS — J30.2 SEASONAL ALLERGIES: ICD-10-CM

## 2024-11-12 RX ORDER — CETIRIZINE HYDROCHLORIDE 10 MG/1
10 TABLET ORAL DAILY
Qty: 90 TABLET | Refills: 0 | Status: SHIPPED | OUTPATIENT
Start: 2024-11-12

## 2025-01-21 ENCOUNTER — TELEPHONE (OUTPATIENT)
Dept: CARDIOLOGY | Age: 65
End: 2025-01-21

## 2025-01-21 NOTE — TELEPHONE ENCOUNTER
CALLED PATIENT 2X AND LEFT MESSAGE TO RESCHEDULE ECHO THAT WAS SCHEDULED FOR 01-10-25. PATIENT WAS A NO SHOW.    Electronically signed by Lisa Maldonado on 1/21/2025 at 11:56 AM

## 2025-01-28 DIAGNOSIS — R10.9 ABDOMINAL PAIN OF UNKNOWN ETIOLOGY: ICD-10-CM

## 2025-01-28 DIAGNOSIS — D52.0 DIETARY FOLATE DEFICIENCY ANEMIA: ICD-10-CM

## 2025-01-28 DIAGNOSIS — E61.1 IRON DEFICIENCY: ICD-10-CM

## 2025-01-28 DIAGNOSIS — I10 PRIMARY HYPERTENSION: ICD-10-CM

## 2025-01-28 DIAGNOSIS — A08.4 VIRAL GASTROENTERITIS: ICD-10-CM

## 2025-01-28 DIAGNOSIS — F10.90 ALCOHOL USE DISORDER: ICD-10-CM

## 2025-01-28 DIAGNOSIS — E55.9 VITAMIN D DEFICIENCY: ICD-10-CM

## 2025-01-28 DIAGNOSIS — J45.40 MODERATE PERSISTENT ASTHMA WITHOUT COMPLICATION: ICD-10-CM

## 2025-01-28 DIAGNOSIS — K29.90 GASTRITIS AND DUODENITIS: ICD-10-CM

## 2025-01-28 DIAGNOSIS — J30.2 SEASONAL ALLERGIES: ICD-10-CM

## 2025-01-28 RX ORDER — PSEUDOEPHED/ACETAMINOPH/DIPHEN 30MG-500MG
1 TABLET ORAL EVERY 8 HOURS PRN
Qty: 30 TABLET | Refills: 0 | Status: SHIPPED | OUTPATIENT
Start: 2025-01-28

## 2025-01-28 RX ORDER — CETIRIZINE HYDROCHLORIDE 10 MG/1
10 TABLET ORAL DAILY
Qty: 90 TABLET | Refills: 0 | Status: SHIPPED | OUTPATIENT
Start: 2025-01-28

## 2025-01-28 RX ORDER — FERROUS SULFATE 325(65) MG
325 TABLET ORAL
Qty: 90 TABLET | Refills: 1 | Status: SHIPPED | OUTPATIENT
Start: 2025-01-28

## 2025-01-28 RX ORDER — NAPROXEN 250 MG/1
250 TABLET ORAL 2 TIMES DAILY WITH MEALS
Qty: 60 TABLET | Refills: 0 | Status: SHIPPED | OUTPATIENT
Start: 2025-01-28

## 2025-01-28 RX ORDER — LACTOBACILLUS RHAMNOSUS GG 10B CELL
1 CAPSULE ORAL 2 TIMES DAILY
Qty: 30 CAPSULE | Refills: 0 | Status: SHIPPED | OUTPATIENT
Start: 2025-01-28

## 2025-01-28 RX ORDER — ONDANSETRON 4 MG/1
4 TABLET, ORALLY DISINTEGRATING ORAL 3 TIMES DAILY PRN
Qty: 21 TABLET | Refills: 0 | Status: SHIPPED | OUTPATIENT
Start: 2025-01-28

## 2025-01-28 RX ORDER — MONTELUKAST SODIUM 10 MG/1
10 TABLET ORAL DAILY
Qty: 90 TABLET | Refills: 1 | Status: SHIPPED | OUTPATIENT
Start: 2025-01-28

## 2025-01-28 RX ORDER — ERGOCALCIFEROL 1.25 MG/1
50000 CAPSULE, LIQUID FILLED ORAL WEEKLY
Qty: 12 CAPSULE | Refills: 0 | Status: SHIPPED | OUTPATIENT
Start: 2025-01-28 | End: 2025-04-16

## 2025-01-28 RX ORDER — THIAMINE MONONITRATE (VIT B1) 100 MG
100 TABLET ORAL DAILY
Qty: 30 TABLET | Refills: 3 | Status: SHIPPED | OUTPATIENT
Start: 2025-01-28

## 2025-01-28 RX ORDER — FAMOTIDINE 20 MG/1
20 TABLET, FILM COATED ORAL 2 TIMES DAILY
Qty: 60 TABLET | Refills: 1 | Status: SHIPPED | OUTPATIENT
Start: 2025-01-28

## 2025-01-28 RX ORDER — FOLIC ACID 1 MG/1
1 TABLET ORAL DAILY
Qty: 90 TABLET | Refills: 1 | Status: SHIPPED | OUTPATIENT
Start: 2025-01-28

## 2025-01-28 RX ORDER — AMLODIPINE BESYLATE 5 MG/1
5 TABLET ORAL DAILY
Qty: 30 TABLET | Refills: 3 | Status: SHIPPED | OUTPATIENT
Start: 2025-01-28

## 2025-02-04 ENCOUNTER — OFFICE VISIT (OUTPATIENT)
Dept: PRIMARY CARE CLINIC | Age: 65
End: 2025-02-04
Payer: MEDICAID

## 2025-02-04 ENCOUNTER — HOSPITAL ENCOUNTER (OUTPATIENT)
Dept: CT IMAGING | Age: 65
Discharge: HOME | End: 2025-02-06
Payer: MEDICAID

## 2025-02-04 VITALS
DIASTOLIC BLOOD PRESSURE: 74 MMHG | SYSTOLIC BLOOD PRESSURE: 105 MMHG | BODY MASS INDEX: 19.04 KG/M2 | TEMPERATURE: 97.4 F | RESPIRATION RATE: 16 BRPM | WEIGHT: 97 LBS | HEIGHT: 60 IN | OXYGEN SATURATION: 97 % | HEART RATE: 100 BPM

## 2025-02-04 DIAGNOSIS — R11.2 NAUSEA AND VOMITING, UNSPECIFIED VOMITING TYPE: ICD-10-CM

## 2025-02-04 DIAGNOSIS — R10.84 GENERALIZED ABDOMINAL PAIN: ICD-10-CM

## 2025-02-04 DIAGNOSIS — R10.84 GENERALIZED ABDOMINAL PAIN: Primary | ICD-10-CM

## 2025-02-04 PROCEDURE — G8427 DOCREV CUR MEDS BY ELIG CLIN: HCPCS

## 2025-02-04 PROCEDURE — 74176 CT ABD & PELVIS W/O CONTRAST: CPT

## 2025-02-04 PROCEDURE — 99213 OFFICE O/P EST LOW 20 MIN: CPT

## 2025-02-04 PROCEDURE — 4004F PT TOBACCO SCREEN RCVD TLK: CPT

## 2025-02-04 PROCEDURE — G8420 CALC BMI NORM PARAMETERS: HCPCS

## 2025-02-04 PROCEDURE — 3017F COLORECTAL CA SCREEN DOC REV: CPT

## 2025-02-04 RX ORDER — PROMETHAZINE HYDROCHLORIDE 25 MG/1
25 TABLET ORAL EVERY 6 HOURS PRN
Qty: 28 TABLET | Refills: 0 | Status: SHIPPED | OUTPATIENT
Start: 2025-02-04 | End: 2025-02-11

## 2025-02-04 NOTE — PROGRESS NOTES
Chief Complaint       Vomiting (Past 3 days diahrrea and vomitting took pepto bismal)      History of Present Illness   Source of history provided by:  patient.      Sole Cowan is a 64 y.o. old female presenting to the walk in clinic for evaluation of generalized abdominal pain which began 3 days ago. States the pain does not radiate. Patient describes the pain as cramping. Reports associated nausea, vomiting, diarrhea. Has tried taking Pepto Bismol OTC without symptomatic relief. Denies any fever, chills, loss of taste/smell, CP, SOB, dysuria, hematuria, change in stool color/consistency, melena, coffee-ground emesis, hematemesis, HA, sore throat, rash, or lethargy. No LMP recorded. Patient has had a hysterectomy.    ROS    Unless otherwise stated in this report or unable to obtain because of the patient's clinical or mental status as evidenced by the medical record, this patients's positive and negative responses for Review of Systems, constitutional, psych, eyes, ENT, cardiovascular, respiratory, gastrointestinal, neurological, genitourinary, musculoskeletal, integument systems and systems related to the presenting problem are either stated in the preceding or were not pertinent or were negative for the symptoms and/or complaints related to the medical problem.      Physical Exam         VS:  /74   Pulse 100   Temp 97.4 °F (36.3 °C) (Oral)   Resp 16   Ht 1.524 m (5')   Wt 44 kg (97 lb)   SpO2 97%   BMI 18.94 kg/m²    Oxygen Saturation Interpretation: Normal.    General Appearance/Constitutional:  Alert, development consistent with age, NAD.  HEENT:  NCAT.   Lungs: CTAB without wheezing, rales, or rhonchi.  Heart:  RRR, no murmurs, rubs, or gallops.  Abdomen:  General Appearance: No obvious trauma or bruising. No rashes or lesions.       Bowel sounds: BS+x4       Distension:  No distension.          Tenderness: TTP RUQ and LUQ, non-distended without guarding, rebound, or rigidity.

## 2025-02-10 ENCOUNTER — TELEPHONE (OUTPATIENT)
Dept: CARDIOLOGY | Age: 65
End: 2025-02-10

## 2025-02-10 NOTE — TELEPHONE ENCOUNTER
No show for echo.  Unable to LM to reschedule.  Second no show.    Electronically signed by Yaquelin Walsh on 2/10/2025 at 1:49 PM

## 2025-03-12 ENCOUNTER — OFFICE VISIT (OUTPATIENT)
Dept: FAMILY MEDICINE CLINIC | Age: 65
End: 2025-03-12

## 2025-03-12 VITALS
TEMPERATURE: 97.7 F | HEIGHT: 61 IN | RESPIRATION RATE: 18 BRPM | OXYGEN SATURATION: 94 % | HEART RATE: 94 BPM | WEIGHT: 101 LBS | DIASTOLIC BLOOD PRESSURE: 70 MMHG | SYSTOLIC BLOOD PRESSURE: 122 MMHG | BODY MASS INDEX: 19.07 KG/M2

## 2025-03-12 DIAGNOSIS — E87.1 HYPONATREMIA: ICD-10-CM

## 2025-03-12 DIAGNOSIS — Z87.891 PERSONAL HISTORY OF TOBACCO USE: ICD-10-CM

## 2025-03-12 DIAGNOSIS — I10 PRIMARY HYPERTENSION: Primary | ICD-10-CM

## 2025-03-12 DIAGNOSIS — K29.90 GASTRITIS AND DUODENITIS: ICD-10-CM

## 2025-03-12 DIAGNOSIS — J44.9 STAGE 3 SEVERE COPD BY GOLD CLASSIFICATION (HCC): ICD-10-CM

## 2025-03-12 DIAGNOSIS — Z12.31 ENCOUNTER FOR SCREENING MAMMOGRAM FOR MALIGNANT NEOPLASM OF BREAST: ICD-10-CM

## 2025-03-12 LAB
ANION GAP SERPL CALCULATED.3IONS-SCNC: 14 MMOL/L (ref 7–16)
BUN BLDV-MCNC: 8 MG/DL (ref 6–23)
CALCIUM SERPL-MCNC: 9.4 MG/DL (ref 8.6–10.2)
CHLORIDE BLD-SCNC: 95 MMOL/L (ref 98–107)
CO2: 21 MMOL/L (ref 22–29)
CREAT SERPL-MCNC: 0.9 MG/DL (ref 0.5–1)
GFR, ESTIMATED: 73 ML/MIN/1.73M2
GLUCOSE BLD-MCNC: 78 MG/DL (ref 74–99)
OSMOLALITY URINE: 103 MOSM/KG (ref 300–900)
POTASSIUM SERPL-SCNC: 3.9 MMOL/L (ref 3.5–5)
SODIUM BLD-SCNC: 130 MMOL/L (ref 132–146)
SODIUM URINE: <20 MMOL/L

## 2025-03-12 RX ORDER — FAMOTIDINE 20 MG/1
20 TABLET, FILM COATED ORAL 2 TIMES DAILY
Qty: 60 TABLET | Refills: 1 | Status: SHIPPED | OUTPATIENT
Start: 2025-03-12

## 2025-03-12 RX ORDER — AMLODIPINE BESYLATE 5 MG/1
5 TABLET ORAL DAILY
Qty: 30 TABLET | Refills: 3 | Status: SHIPPED | OUTPATIENT
Start: 2025-03-12

## 2025-03-12 SDOH — ECONOMIC STABILITY: FOOD INSECURITY: WITHIN THE PAST 12 MONTHS, YOU WORRIED THAT YOUR FOOD WOULD RUN OUT BEFORE YOU GOT MONEY TO BUY MORE.: NEVER TRUE

## 2025-03-12 SDOH — ECONOMIC STABILITY: FOOD INSECURITY: WITHIN THE PAST 12 MONTHS, THE FOOD YOU BOUGHT JUST DIDN'T LAST AND YOU DIDN'T HAVE MONEY TO GET MORE.: NEVER TRUE

## 2025-03-12 ASSESSMENT — PATIENT HEALTH QUESTIONNAIRE - PHQ9
SUM OF ALL RESPONSES TO PHQ QUESTIONS 1-9: 0
1. LITTLE INTEREST OR PLEASURE IN DOING THINGS: NOT AT ALL
2. FEELING DOWN, DEPRESSED OR HOPELESS: NOT AT ALL

## 2025-03-12 NOTE — PROGRESS NOTES
S: 64 y.o. female here for HTN, EtOH abuse, hyponatremia.   HTN controlled on amlodipine  EtOH abuse down to 3 cans per week. Good appetite.   Hyponatremia 2/2 beer potomania.   GERD. No red flags.   Copd.   Smoker. Cut down to 1/2 ppd.     O: VS: /70   Pulse 94   Temp 97.7 °F (36.5 °C) (Tympanic)   Resp 18   Ht 1.549 m (5' 1\")   Wt 45.8 kg (101 lb)   SpO2 94%   BMI 19.08 kg/m²    General: NAD, alert and interacting appropriately.    CV:  RRR, no gallops, rubs, or murmurs    Resp: CTAB   Abd:  Soft, nontender   Ext:  No edema    Impression: HTN, EtOH abuse, hyponatremia, GERD. Copd.   Plan:   CPM HTN  CPM EtOH abuse  bmp  Follow GERD diet  Cont flovent and spiriva    Attending Physician Statement  I have discussed the case, including pertinent history and exam findings with the resident.  I agree with the documented assessment and plan.

## 2025-03-12 NOTE — PROGRESS NOTES
[facility]  FAMILY MEDICINE RESIDENCY PROGRAM  DATE OF VISIT : 3/13/2025    Patient : Sole Cowan   Age : 64 y.o.    : 1960   MRN : [id number]       Assessment & Plan:   Diagnosis Orders   1. Primary hypertension  amLODIPine (NORVASC) 5 MG tablet      2. Stage 3 severe COPD by GOLD classification (HCC)        3. Gastritis and duodenitis  famotidine (PEPCID) 20 MG tablet      4. Hyponatremia        5. Personal history of tobacco use  NE VISIT TO DISCUSS LUNG CA SCREEN W LDCT    CT Lung Screen (Initial/Annual/Baseline)      6. Encounter for screening mammogram for malignant neoplasm of breast  AMANDA DIGITAL SCREEN BILATERAL PER PROTOCOL          HTN- Well controlled at this time, Continue present management w/ amlodipine  COPD- regimen is not ideal but pt is also not compliant w/ inhalers. Advised daily use of inhalers to minimize ABDULKADIR usage. Not currently in exacerbation and sxs otherwise overall well controlled  GERD- reports good effect from pepcid still with intermittent sxs with eating. Will avoid PPIs at this point d/t risk of osteoporosis. Pt agreeable  Hyponatremia- recheck BMP today  Health maintenance- mammogram and LDCT ordered   _____________________________________________________________________    Chief Complaint:   Chief Complaint   Patient presents with    Follow-up       HPI:   Sole Cowan is a 64 y.o. female who presents for f/u    HTN- here for f/u  BP Readings from Last 3 Encounters:   25 122/70   25 105/74   24 (!) 152/81      Current meds: Norvasc. Patient is  compliant with medications  Patient denies Lightheadedness, Dizziness, Chest pain, Shortness of breath, Peripheral edema     COPD  Symptoms: acute dyspnea and wheezing.   Frequency of symptoms:  only when she is exerting herself (climbs stairs very quickly. States she will go weeks without using any of her inhalers)   Triggers: exercise, tobacco smoke   Progression: stable.    Number of

## 2025-03-12 NOTE — PATIENT INSTRUCTIONS
Use Flovent and Spiriva daily        Learning About Lung Cancer Screening  What is screening for lung cancer?     Lung cancer screening is a way to find some lung cancers early, before a person has any symptoms of the cancer.  Lung cancer screening may help those who have the highest risk for lung cancer--people age 50 and older who are or were heavy smokers. For most people, who aren't at increased risk, screening for lung cancer probably isn't helpful.  Screening won't prevent cancer. And it may not find all lung cancers. Lung cancer screening may lower the risk of dying from lung cancer in a small number of people.  How is it done?  Lung cancer screening is done with a low-dose CT (computed tomography) scan. A CT scan uses X-rays, or radiation, to make detailed pictures of your body. Experts recommend that screening be done in medical centers that focus on finding and treating lung cancer.  Who is screening recommended for?  Lung cancer screening is recommended for people age 50 and older who are or were heavy smokers. That means people with a smoking history of at least 20 pack years. A pack year is a way to measure how heavy a smoker you are or were.  To figure out your pack years, multiply how many packs a day on average (assuming 20 cigarettes per pack) you have smoked by how many years you have smoked. For example:  If you smoked 1 pack a day for 20 years, that's 1 times 20. So you have a smoking history of 20 pack years.  If you smoked 2 packs a day for 10 years, that's 2 times 10. So you have a smoking history of 20 pack years.  Experts agree that screening is for people who have a high risk of lung cancer. But experts don't agree on what high risk means. Some say people age 50 or older with at least a 20-pack-year smoking history are high risk. Others say it's people age 55 or older with a 30-pack-year history.  To see if you could benefit from screening, first find out if you are at high risk for lung

## 2025-03-14 ENCOUNTER — RESULTS FOLLOW-UP (OUTPATIENT)
Dept: FAMILY MEDICINE CLINIC | Age: 65
End: 2025-03-14

## 2025-03-14 ENCOUNTER — TELEPHONE (OUTPATIENT)
Dept: FAMILY MEDICINE CLINIC | Age: 65
End: 2025-03-14

## 2025-03-14 DIAGNOSIS — J30.2 SEASONAL ALLERGIES: ICD-10-CM

## 2025-03-14 DIAGNOSIS — J45.40 MODERATE PERSISTENT ASTHMA WITHOUT COMPLICATION: ICD-10-CM

## 2025-03-14 DIAGNOSIS — K29.90 GASTRITIS AND DUODENITIS: ICD-10-CM

## 2025-03-14 DIAGNOSIS — J44.9 CHRONIC OBSTRUCTIVE PULMONARY DISEASE, UNSPECIFIED COPD TYPE (HCC): ICD-10-CM

## 2025-03-14 RX ORDER — FLUTICASONE PROPIONATE 110 UG/1
1 AEROSOL, METERED RESPIRATORY (INHALATION) 2 TIMES DAILY
Qty: 1 EACH | Refills: 3 | Status: SHIPPED | OUTPATIENT
Start: 2025-03-14

## 2025-03-14 RX ORDER — PSEUDOEPHED/ACETAMINOPH/DIPHEN 30MG-500MG
1 TABLET ORAL EVERY 8 HOURS PRN
Qty: 30 TABLET | Refills: 0 | Status: SHIPPED | OUTPATIENT
Start: 2025-03-14

## 2025-03-14 RX ORDER — PSEUDOEPHED/ACETAMINOPH/DIPHEN 30MG-500MG
1 TABLET ORAL EVERY 8 HOURS PRN
Qty: 30 TABLET | Refills: 0 | OUTPATIENT
Start: 2025-03-14

## 2025-03-14 RX ORDER — CETIRIZINE HYDROCHLORIDE 10 MG/1
10 TABLET ORAL DAILY
Qty: 90 TABLET | Refills: 0 | Status: SHIPPED | OUTPATIENT
Start: 2025-03-14

## 2025-03-14 RX ORDER — ALBUTEROL SULFATE 90 UG/1
INHALANT RESPIRATORY (INHALATION)
Qty: 18 G | Refills: 3 | Status: SHIPPED | OUTPATIENT
Start: 2025-03-14

## 2025-03-14 NOTE — TELEPHONE ENCOUNTER
Name of Medication(s) Requested:  Requested Prescriptions     Pending Prescriptions Disp Refills    Acetaminophen Extra Strength 500 MG TABS [Pharmacy Med Name: ACETAMINOPHEN 500MG E/S CAPLETS] 30 tablet 0     Sig: TAKE 1 TABLET BY MOUTH EVERY 8 HOURS AS NEEDED FOR PAIN    cetirizine (ZYRTEC) 10 MG tablet [Pharmacy Med Name: CETIRIZINE 10MG TABLETS] 90 tablet 0     Sig: TAKE 1 TABLET BY MOUTH DAILY       Medication is on current medication list Yes    Dosage and directions were verified? Yes    Quantity verified: 30 day supply     Pharmacy Verified?  Yes    Last Appointment:  3/12/2025    Future appts:  Future Appointments   Date Time Provider Department Center   4/2/2025  2:30 PM [facility] ECHO 1 [facility] Rad/Car   6/13/2025 11:00 AM Red Arnold MD [facility] [place] [facility] DEP        (If no appt send self scheduling link. .REFILLAPPT)  Scheduling request sent?     [] Yes  [x] No    Does patient need updated?  [] Yes  [x] No

## 2025-04-26 DIAGNOSIS — K29.90 GASTRITIS AND DUODENITIS: ICD-10-CM

## 2025-04-28 RX ORDER — PSEUDOEPHED/ACETAMINOPH/DIPHEN 30MG-500MG
1 TABLET ORAL EVERY 8 HOURS PRN
Qty: 30 TABLET | Refills: 0 | Status: SHIPPED | OUTPATIENT
Start: 2025-04-28

## 2025-04-28 NOTE — TELEPHONE ENCOUNTER
Name of Medication(s) Requested:  Requested Prescriptions     Pending Prescriptions Disp Refills    Acetaminophen Extra Strength 500 MG TABS [Pharmacy Med Name: ACETAMINOPHEN 500MG E/S CAPLETS] 30 tablet 0     Sig: TAKE 1 TABLET BY MOUTH EVERY 8 HOURS AS NEEDED FOR PAIN       Medication is on current medication list Yes    Dosage and directions were verified? Yes    Quantity verified: 30 day supply     Pharmacy Verified?  Yes    Last Appointment:  3/12/2025    Future appts:  Future Appointments   Date Time Provider Department Center   6/13/2025 11:00 AM Red Arnold MD [facility] [place] [facility] DEP        (If no appt send self scheduling link. .REFILLAPPT)  Scheduling request sent?     [] Yes  [x] No    Does patient need updated?  [] Yes  [x] No

## 2025-04-30 DIAGNOSIS — E55.9 VITAMIN D DEFICIENCY: ICD-10-CM

## 2025-05-01 RX ORDER — ERGOCALCIFEROL 1.25 MG/1
CAPSULE, LIQUID FILLED ORAL
Qty: 12 CAPSULE | Refills: 0 | Status: SHIPPED | OUTPATIENT
Start: 2025-05-01

## 2025-05-01 NOTE — TELEPHONE ENCOUNTER
Name of Medication(s) Requested:  Requested Prescriptions     Pending Prescriptions Disp Refills    vitamin D (ERGOCALCIFEROL) 1.25 MG (87451 UT) CAPS capsule [Pharmacy Med Name: VITAMIN D2 50,000IU (ERGO) CAP RX] 12 capsule 0     Sig: TAKE 1 CAPSULE BY MOUTH 1 TIME A WEEK FOR 12 DOSES       Medication is on current medication list Yes    Dosage and directions were verified? Yes    Quantity verified: 90 day supply     Pharmacy Verified?  Yes    Last Appointment:  3/12/2025    Future appts:  Future Appointments   Date Time Provider Department Center   6/13/2025 11:00 AM Red Arnold MD [facility] [place] [facility] DEP        (If no appt send self scheduling link. .REFILLAPPT)  Scheduling request sent?     [] Yes  [x] No    Does patient need updated?  [] Yes  [x] No

## 2025-06-26 DIAGNOSIS — D52.0 DIETARY FOLATE DEFICIENCY ANEMIA: ICD-10-CM

## 2025-06-26 DIAGNOSIS — F10.90 ALCOHOL USE DISORDER: ICD-10-CM

## 2025-06-26 DIAGNOSIS — K29.90 GASTRITIS AND DUODENITIS: ICD-10-CM

## 2025-06-26 RX ORDER — FOLIC ACID 1 MG/1
1 TABLET ORAL DAILY
Qty: 90 TABLET | Refills: 1 | Status: SHIPPED | OUTPATIENT
Start: 2025-06-26

## 2025-06-26 RX ORDER — PSEUDOEPHED/ACETAMINOPH/DIPHEN 30MG-500MG
1 TABLET ORAL EVERY 8 HOURS PRN
Qty: 30 TABLET | Refills: 0 | Status: SHIPPED | OUTPATIENT
Start: 2025-06-26

## 2025-06-26 NOTE — TELEPHONE ENCOUNTER
Name of Medication(s) Requested:  Requested Prescriptions     Pending Prescriptions Disp Refills    Acetaminophen Extra Strength 500 MG TABS [Pharmacy Med Name: ACETAMINOPHEN 500MG E/S CAPLETS] 30 tablet 0     Sig: TAKE 1 TABLET BY MOUTH EVERY 8 HOURS AS NEEDED FOR PAIN    folic acid (FOLVITE) 1 MG tablet [Pharmacy Med Name: FOLIC ACID 1MG TABLETS] 90 tablet 1     Sig: TAKE 1 TABLET BY MOUTH DAILY       Medication is on current medication list Yes    Dosage and directions were verified? Yes    Quantity verified: 90 day supply     Pharmacy Verified?  Yes    Last Appointment:  3/12/2025    Future appts:  No future appointments.     (If no appt send self scheduling link. .REFILLAPPT)  Scheduling request sent?     [] Yes  [x] No    Does patient need updated?  [] Yes  [x] No

## 2025-07-17 DIAGNOSIS — E55.9 VITAMIN D DEFICIENCY: Primary | ICD-10-CM

## 2025-07-21 ENCOUNTER — TELEPHONE (OUTPATIENT)
Dept: FAMILY MEDICINE CLINIC | Age: 65
End: 2025-07-21

## 2025-07-21 DIAGNOSIS — E55.9 VITAMIN D DEFICIENCY: ICD-10-CM

## 2025-07-23 ENCOUNTER — OFFICE VISIT (OUTPATIENT)
Dept: FAMILY MEDICINE CLINIC | Age: 65
End: 2025-07-23
Payer: MEDICAID

## 2025-07-23 VITALS
DIASTOLIC BLOOD PRESSURE: 80 MMHG | BODY MASS INDEX: 17.56 KG/M2 | HEART RATE: 95 BPM | TEMPERATURE: 98.3 F | HEIGHT: 61 IN | RESPIRATION RATE: 18 BRPM | SYSTOLIC BLOOD PRESSURE: 134 MMHG | WEIGHT: 93 LBS | OXYGEN SATURATION: 96 %

## 2025-07-23 DIAGNOSIS — F10.90 ALCOHOL USE DISORDER: ICD-10-CM

## 2025-07-23 DIAGNOSIS — D52.0 DIETARY FOLATE DEFICIENCY ANEMIA: ICD-10-CM

## 2025-07-23 DIAGNOSIS — F17.200 NICOTINE DEPENDENCE WITH CURRENT USE: Primary | ICD-10-CM

## 2025-07-23 DIAGNOSIS — I10 PRIMARY HYPERTENSION: ICD-10-CM

## 2025-07-23 DIAGNOSIS — J44.9 CHRONIC OBSTRUCTIVE PULMONARY DISEASE, UNSPECIFIED COPD TYPE (HCC): ICD-10-CM

## 2025-07-23 DIAGNOSIS — J45.40 MODERATE PERSISTENT ASTHMA WITHOUT COMPLICATION: ICD-10-CM

## 2025-07-23 DIAGNOSIS — A08.4 VIRAL GASTROENTERITIS: ICD-10-CM

## 2025-07-23 DIAGNOSIS — E61.1 IRON DEFICIENCY: ICD-10-CM

## 2025-07-23 DIAGNOSIS — J30.2 SEASONAL ALLERGIES: ICD-10-CM

## 2025-07-23 DIAGNOSIS — K29.90 GASTRITIS AND DUODENITIS: ICD-10-CM

## 2025-07-23 PROCEDURE — 1090F PRES/ABSN URINE INCON ASSESS: CPT

## 2025-07-23 PROCEDURE — 3017F COLORECTAL CA SCREEN DOC REV: CPT

## 2025-07-23 PROCEDURE — 3075F SYST BP GE 130 - 139MM HG: CPT

## 2025-07-23 PROCEDURE — 1124F ACP DISCUSS-NO DSCNMKR DOCD: CPT

## 2025-07-23 PROCEDURE — 3023F SPIROM DOC REV: CPT

## 2025-07-23 PROCEDURE — G8400 PT W/DXA NO RESULTS DOC: HCPCS

## 2025-07-23 PROCEDURE — 4004F PT TOBACCO SCREEN RCVD TLK: CPT

## 2025-07-23 PROCEDURE — 99213 OFFICE O/P EST LOW 20 MIN: CPT

## 2025-07-23 PROCEDURE — G8428 CUR MEDS NOT DOCUMENT: HCPCS

## 2025-07-23 PROCEDURE — 3079F DIAST BP 80-89 MM HG: CPT

## 2025-07-23 PROCEDURE — G8419 CALC BMI OUT NRM PARAM NOF/U: HCPCS

## 2025-07-23 RX ORDER — AMLODIPINE BESYLATE 5 MG/1
5 TABLET ORAL DAILY
Qty: 90 EACH | Refills: 3 | Status: SHIPPED | OUTPATIENT
Start: 2025-07-23

## 2025-07-23 RX ORDER — LACTOBACILLUS RHAMNOSUS GG 10B CELL
1 CAPSULE ORAL 2 TIMES DAILY
Qty: 30 CAPSULE | Refills: 0 | Status: SHIPPED | OUTPATIENT
Start: 2025-07-23

## 2025-07-23 RX ORDER — CETIRIZINE HYDROCHLORIDE 10 MG/1
10 TABLET ORAL DAILY
Qty: 90 TABLET | Refills: 0 | Status: SHIPPED | OUTPATIENT
Start: 2025-07-23

## 2025-07-23 RX ORDER — FERROUS SULFATE 325(65) MG
325 TABLET ORAL
Qty: 90 TABLET | Refills: 1 | Status: SHIPPED | OUTPATIENT
Start: 2025-07-23

## 2025-07-23 RX ORDER — MONTELUKAST SODIUM 10 MG/1
10 TABLET ORAL DAILY
Qty: 90 TABLET | Refills: 1 | Status: SHIPPED | OUTPATIENT
Start: 2025-07-23

## 2025-07-23 RX ORDER — FOLIC ACID 1 MG/1
1 TABLET ORAL DAILY
Qty: 90 TABLET | Refills: 1 | Status: SHIPPED | OUTPATIENT
Start: 2025-07-23

## 2025-07-23 RX ORDER — FLUTICASONE PROPIONATE 110 UG/1
1 AEROSOL, METERED RESPIRATORY (INHALATION) 2 TIMES DAILY
Qty: 1 EACH | Refills: 3 | Status: SHIPPED | OUTPATIENT
Start: 2025-07-23 | End: 2025-07-24

## 2025-07-23 RX ORDER — FAMOTIDINE 20 MG/1
20 TABLET, FILM COATED ORAL 2 TIMES DAILY
Qty: 60 TABLET | Refills: 1 | Status: SHIPPED | OUTPATIENT
Start: 2025-07-23

## 2025-07-23 RX ORDER — THIAMINE MONONITRATE (VIT B1) 100 MG
100 TABLET ORAL DAILY
Qty: 30 TABLET | Refills: 3 | Status: SHIPPED | OUTPATIENT
Start: 2025-07-23

## 2025-07-23 NOTE — PROGRESS NOTES
[facility]  FAMILY MEDICINE RESIDENCY PROGRAM  DATE OF VISIT : 2025    Patient : Sole Cowan   Age : 65 y.o.    : 1960   MRN : [id number]   ______________________________________________________________________    Chief Complaint:   Chief Complaint   Patient presents with    Hypertension     F/u    Medication Refill     Patient needs refills on her medication        HPI:   History obtained from the patient. Sole Cowan is a 65 y.o. female with Hx of HTN, EtOH abuse, COPD, hyponatremia. Today, she presents to the clinic for routine follow-up.    Patient reports she is doing well overall, except complaint of chronic right sided abdominal pain worse in the RLQ and only when she lays on her stomach on her right side. This has been ongoing for the past year. She is s/p cholecystectomy. She is unable to identify any other triggers or associations with meals. Denies fever, night sweats, vomit, diarrhea, or any changes in bladder or bowel habits.     For her history of EtOH abuse she endorses drinking 1 beer a day for the pat 5 months.Denies any additional spirits.     She admits she continues to smoke cigarettes but is down to 1/2 pack a day. She was smoking 1 pack a day. We discussed nicotine replacements options such as gums, lozenges and the patch. She would like to proceed with the gums at this time.     Patient is also requesting refills on all her medications today. Medications reviewed and reconciled.     Vitals signs reviewed and stable, borderline underweight BMI 17.57 kg/m^2. She endorses gradual loss of appetite over the last year.     Medications:    Current Outpatient Medications   Medication Sig Dispense Refill    amLODIPine (NORVASC) 5 MG tablet Take 1 tablet by mouth daily 90 each 3    cetirizine (ZYRTEC) 10 MG tablet Take 1 tablet by mouth daily 90 tablet 0    famotidine (PEPCID) 20 MG tablet Take 1 tablet by mouth 2 times daily 60 tablet 1    ferrous sulfate (IRON 325) 325

## 2025-07-23 NOTE — PROGRESS NOTES
S: 65 y.o. female presents today for:   HTN  RUQ pain- worse when she lies on her belly. Not worsened by anything else including food. Prior cholecystectomy. +alcohol, daily use.  Denies CIBH, diarrhea, constipation, melena, hematochezia, family history of colon cancer or bowel related issues.   Weight loss, unintentional 13 lbs since October.       O: VS: /80 (BP Site: Left Upper Arm, Patient Position: Sitting, BP Cuff Size: Medium Adult)   Pulse 95   Temp 98.3 °F (36.8 °C) (Temporal)   Resp 18   Ht 1.549 m (5' 1\")   Wt 42.2 kg (93 lb)   SpO2 96%   BMI 17.57 kg/m²   AAO/NAD, appropriate affect for mood  CV:  RRR, no murmur  Resp: CTAB  Abdomen- tender    Impression/Plan:   1) abdominal pain and anorexia- CT abdomen and pelvis, labs, including lipase  2) alcohol abuse    Attending Physician Statement  I have discussed the case, including pertinent history and exam findings with the resident.  I agree with the documented assessment and plan.      Debra Arnold, DO

## 2025-07-24 DIAGNOSIS — F10.90 ALCOHOL USE DISORDER: ICD-10-CM

## 2025-07-24 LAB
ALBUMIN: 4 G/DL (ref 3.5–5.2)
ALP BLD-CCNC: 93 U/L (ref 35–104)
ALT SERPL-CCNC: 25 U/L (ref 0–35)
ANION GAP SERPL CALCULATED.3IONS-SCNC: 16 MMOL/L (ref 7–16)
AST SERPL-CCNC: 65 U/L (ref 0–35)
BACTERIA: ABNORMAL
BASOPHILS ABSOLUTE: 0.06 K/UL (ref 0–0.2)
BASOPHILS RELATIVE PERCENT: 1 % (ref 0–2)
BILIRUB SERPL-MCNC: 0.4 MG/DL (ref 0–1.2)
BILIRUBIN, URINE: NEGATIVE
BUN BLDV-MCNC: 8 MG/DL (ref 8–23)
CALCIUM SERPL-MCNC: 9.2 MG/DL (ref 8.8–10.2)
CHLORIDE BLD-SCNC: 98 MMOL/L (ref 98–107)
CHOLESTEROL, TOTAL: 174 MG/DL
CO2: 19 MMOL/L (ref 22–29)
COLOR, UA: YELLOW
CREAT SERPL-MCNC: 1.1 MG/DL (ref 0.5–1)
EOSINOPHILS ABSOLUTE: 0.15 K/UL (ref 0.05–0.5)
EOSINOPHILS RELATIVE PERCENT: 3 % (ref 0–6)
EPITHELIAL CELLS, UA: ABNORMAL /HPF
GFR, ESTIMATED: 56 ML/MIN/1.73M2
GLUCOSE BLD-MCNC: 73 MG/DL (ref 74–99)
GLUCOSE URINE: NEGATIVE MG/DL
HCT VFR BLD CALC: 38.3 % (ref 34–48)
HDLC SERPL-MCNC: 97 MG/DL
HEMOGLOBIN: 13.2 G/DL (ref 11.5–15.5)
IMMATURE GRANULOCYTES %: 0 % (ref 0–5)
IMMATURE GRANULOCYTES ABSOLUTE: <0.03 K/UL (ref 0–0.58)
KETONES, URINE: NEGATIVE MG/DL
LDL CHOLESTEROL: 60 MG/DL
LEUKOCYTE ESTERASE, URINE: NEGATIVE
LIPASE: 19 U/L (ref 13–60)
LYMPHOCYTES ABSOLUTE: 2.19 K/UL (ref 1.5–4)
LYMPHOCYTES RELATIVE PERCENT: 39 % (ref 20–42)
MCH RBC QN AUTO: 35.6 PG (ref 26–35)
MCHC RBC AUTO-ENTMCNC: 34.5 G/DL (ref 32–34.5)
MCV RBC AUTO: 103.2 FL (ref 80–99.9)
MONOCYTES ABSOLUTE: 1.26 K/UL (ref 0.1–0.95)
MONOCYTES RELATIVE PERCENT: 22 % (ref 2–12)
NEUTROPHILS ABSOLUTE: 1.97 K/UL (ref 1.8–7.3)
NEUTROPHILS RELATIVE PERCENT: 35 % (ref 43–80)
NITRITE, URINE: NEGATIVE
PDW BLD-RTO: 18.6 % (ref 11.5–15)
PH, URINE: 6 (ref 5–8)
PLATELET # BLD: 256 K/UL (ref 130–450)
PMV BLD AUTO: 9.8 FL (ref 7–12)
POTASSIUM SERPL-SCNC: 4.4 MMOL/L (ref 3.5–5.1)
PROTEIN UA: NEGATIVE MG/DL
RBC # BLD: 3.71 M/UL (ref 3.5–5.5)
RBC UA: ABNORMAL /HPF
SODIUM BLD-SCNC: 132 MMOL/L (ref 136–145)
SPECIFIC GRAVITY UA: <1.005 (ref 1–1.03)
TOTAL PROTEIN: 7.3 G/DL (ref 6.4–8.3)
TRIGL SERPL-MCNC: 87 MG/DL
TSH SERPL DL<=0.05 MIU/L-ACNC: 0.46 UIU/ML (ref 0.27–4.2)
TURBIDITY: CLEAR
URINE HGB: NEGATIVE
UROBILINOGEN, URINE: 0.2 EU/DL (ref 0–1)
VLDLC SERPL CALC-MCNC: 17 MG/DL
WBC # BLD: 5.7 K/UL (ref 4.5–11.5)
WBC UA: ABNORMAL /HPF

## 2025-07-24 NOTE — PROGRESS NOTES
Reached out to [facility].   Fluticasone is not on formulary and so is not covered.   Albuterol is on formulary and covered but patients plan will be ending at the end of the month.   They will send letter asking patient to renew for continued coverage.     Karina Jose MD

## (undated) DEVICE — FORMALIN  10%NBF 60ML PREFLL CONT

## (undated) DEVICE — GAUZE,SPONGE,4"X4",8PLY,STRL,LF,10/TRAY: Brand: MEDLINE

## (undated) DEVICE — CONNECTOR IRRIGATION AUXILIARY H2O JET W/ PRT MTL THRD HYDR

## (undated) DEVICE — BITEBLOCK 54FR W/ DENT RIM BLOX

## (undated) DEVICE — FORCEPS BX L160CM JAW DIA2.4MM YEL L CAP W/ NDL DISP RAD

## (undated) DEVICE — DEFENDO AIR WATER SUCTION AND BIOPSY VALVE KIT FOR  OLYMPUS: Brand: DEFENDO AIR/WATER/SUCTION AND BIOPSY VALVE

## (undated) DEVICE — CANNULA NSL ORAL AD FOR CAPNOFLEX CO2 O2 AIRLFE